# Patient Record
Sex: MALE | Race: WHITE | NOT HISPANIC OR LATINO | Employment: PART TIME | URBAN - METROPOLITAN AREA
[De-identification: names, ages, dates, MRNs, and addresses within clinical notes are randomized per-mention and may not be internally consistent; named-entity substitution may affect disease eponyms.]

---

## 2017-03-06 RX ORDER — LEVOTHYROXINE SODIUM 175 UG/1
TABLET ORAL
Refills: 0 | Status: CANCELLED | OUTPATIENT
Start: 2017-03-06

## 2017-03-06 RX ORDER — ATORVASTATIN CALCIUM 40 MG/1
TABLET, FILM COATED ORAL
Refills: 0 | Status: CANCELLED | OUTPATIENT
Start: 2017-03-06

## 2017-03-07 RX ORDER — LEVOTHYROXINE SODIUM 175 UG/1
TABLET ORAL
Qty: 30 TAB | Refills: 0 | Status: SHIPPED | OUTPATIENT
Start: 2017-03-07 | End: 2017-06-13 | Stop reason: SDUPTHER

## 2017-03-07 RX ORDER — ATORVASTATIN CALCIUM 40 MG/1
TABLET, FILM COATED ORAL
Qty: 30 TAB | Refills: 0 | Status: SHIPPED | OUTPATIENT
Start: 2017-03-07 | End: 2017-06-13 | Stop reason: SDUPTHER

## 2017-06-13 RX ORDER — LEVOTHYROXINE SODIUM 175 UG/1
TABLET ORAL
Qty: 90 TAB | Refills: 1 | Status: SHIPPED | OUTPATIENT
Start: 2017-06-13 | End: 2018-07-12 | Stop reason: SDUPTHER

## 2017-06-13 RX ORDER — ATORVASTATIN CALCIUM 40 MG/1
TABLET, FILM COATED ORAL
Qty: 90 TAB | Refills: 1 | Status: SHIPPED | OUTPATIENT
Start: 2017-06-13 | End: 2018-07-12 | Stop reason: SDUPTHER

## 2017-07-13 ENCOUNTER — TELEPHONE (OUTPATIENT)
Dept: MEDICAL GROUP | Facility: MEDICAL CENTER | Age: 62
End: 2017-07-13

## 2017-07-13 DIAGNOSIS — E03.9 HYPOTHYROIDISM, UNSPECIFIED TYPE: ICD-10-CM

## 2017-07-13 DIAGNOSIS — E78.5 HYPERLIPIDEMIA, UNSPECIFIED HYPERLIPIDEMIA TYPE: ICD-10-CM

## 2017-07-13 NOTE — TELEPHONE ENCOUNTER
1. Caller Name: Alberto Galicia                                           Call Back Number: [phone] (home)         Patient approves a detailed voicemail message: N\A    Patient wanted to know if you would order labs for the patients up coming appointment on 8/11, ? Please advise

## 2017-07-25 ENCOUNTER — OFFICE VISIT (OUTPATIENT)
Dept: MEDICAL GROUP | Facility: MEDICAL CENTER | Age: 62
End: 2017-07-25
Payer: COMMERCIAL

## 2017-07-25 VITALS
RESPIRATION RATE: 16 BRPM | WEIGHT: 226 LBS | HEART RATE: 78 BPM | TEMPERATURE: 97.7 F | BODY MASS INDEX: 28.1 KG/M2 | OXYGEN SATURATION: 95 % | DIASTOLIC BLOOD PRESSURE: 72 MMHG | SYSTOLIC BLOOD PRESSURE: 124 MMHG | HEIGHT: 75 IN

## 2017-07-25 DIAGNOSIS — J01.10 ACUTE NON-RECURRENT FRONTAL SINUSITIS: ICD-10-CM

## 2017-07-25 DIAGNOSIS — E03.9 HYPOTHYROIDISM, UNSPECIFIED TYPE: ICD-10-CM

## 2017-07-25 DIAGNOSIS — Z91.09 POLLEN ALLERGIES: ICD-10-CM

## 2017-07-25 PROCEDURE — 99214 OFFICE O/P EST MOD 30 MIN: CPT | Performed by: NURSE PRACTITIONER

## 2017-07-25 RX ORDER — FEXOFENADINE HCL 180 MG/1
180 TABLET ORAL DAILY
Qty: 30 TAB | COMMUNITY
Start: 2017-07-25 | End: 2020-03-17

## 2017-07-25 RX ORDER — DOXYCYCLINE HYCLATE 100 MG
100 TABLET ORAL 2 TIMES DAILY
Qty: 20 TAB | Refills: 0 | Status: SHIPPED | OUTPATIENT
Start: 2017-07-25 | End: 2017-07-26 | Stop reason: CLARIF

## 2017-07-25 RX ORDER — FLUTICASONE PROPIONATE 50 MCG
2 SPRAY, SUSPENSION (ML) NASAL DAILY
Qty: 16 G | Refills: 2 | Status: SHIPPED
Start: 2017-07-25 | End: 2020-03-17

## 2017-07-25 ASSESSMENT — ENCOUNTER SYMPTOMS: HEADACHES: 1

## 2017-07-25 NOTE — PROGRESS NOTES
Subjective:      Alberto Galicia is a 62 y.o. male who presents with Sinus Problem            Sinus Problem  Associated symptoms include headaches.   Alberto Galicia is a patient of  here same day visit for sinus issues.      1. Acute non-recurrent frontal sinusitis  Patient reports while in [place] last week he developed severe allergy issues. Upon return to the area he developed pain in his left frontal sinus area. He states symptoms are similar to when he has had sinusitis in the past. He also has had sinus pressure, postnasal drip and congestion. He tried Actifed and some other over-the-counter medicines which did not help. He denies fever, chills, sore throat or ear pain. He did travel [place] the [place] altitude.    2. Pollen allergies  Patient reports history of allergies and normally uses decongestants. It only bothers him when he goes to [place] which he has to do frequently. He states he was going to go to an allergist in the past but never set up an appointment and would like a referral.    3. Hypothyroidism, unspecified type  Review of chart shows patient has not completed his lab work since 2015 and he does have lab work pending from his PCP. He states he is taking his levothyroxine 175 µg.    Social History   Substance Use Topics   • Smoking status: Never Smoker    • Smokeless tobacco: Never Used   • Alcohol Use: 1.5 oz/week     3 Standard drinks or equivalent per week     Current Outpatient Prescriptions   Medication Sig Dispense Refill   • fluticasone (FLONASE) 50 MCG/ACT nasal spray Spray 2 Sprays in nose every day. 16 g 2   • fexofenadine (ALLEGRA) 180 MG tablet Take 1 Tab by mouth every day. 30 Tab    • doxycycline (VIBRAMYCIN) 100 MG Tab Take 1 Tab by mouth 2 times a day for 10 days. 20 Tab 0   • levothyroxine (SYNTHROID) 175 MCG Tab TAKE ONE TABLET BY MOUTH ONCE DAILY IN THE MORNING ON EMPTY STOMACH 90 Tab 1   • atorvastatin (LIPITOR) 40 MG Tab TAKE ONE TABLET BY MOUTH ONCE DAILY  "90 Tab 1     No current facility-administered medications for this visit.     Past Medical History   Diagnosis Date   • Hyperlipidemia    • Thyroid disease    • Pituitary disease (CMS-HCC)    • Asthma      Family History   Problem Relation Age of Onset   • Diabetes Mother    • Heart Disease Father    • Diabetes Brother        Review of Systems   Neurological: Positive for headaches.   Endo/Heme/Allergies: Positive for environmental allergies.   All other systems reviewed and are negative.         Objective:     /72 mmHg  Pulse 78  Temp(Src) 36.5 °C (97.7 °F)  Resp 16  Ht 1.905 m (6' 3\")  Wt 102.513 kg (226 lb)  BMI 28.25 kg/m2  SpO2 95%     Physical Exam   Constitutional: He is oriented to person, place, and time. He appears well-developed and well-nourished. No distress.   HENT:   Head: Normocephalic and atraumatic.   Right Ear: External ear normal.   Left Ear: External ear normal.   Nose: Left sinus exhibits frontal sinus tenderness.   Mouth/Throat: Oropharynx is clear and moist.   Eyes: Conjunctivae are normal. Right eye exhibits no discharge. Left eye exhibits no discharge.   Neck: Normal range of motion. Neck supple. No tracheal deviation present. No thyromegaly present.   Cardiovascular: Normal rate, regular rhythm and normal heart sounds.    No murmur heard.  Pulmonary/Chest: Effort normal and breath sounds normal. No respiratory distress. He has no wheezes. He has no rales.   Lymphadenopathy:     He has no cervical adenopathy.   Neurological: He is alert and oriented to person, place, and time. Coordination normal.   Skin: Skin is warm and dry. No rash noted. He is not diaphoretic. No erythema.   Psychiatric: He has a normal mood and affect. His behavior is normal. Judgment and thought content normal.   Nursing note and vitals reviewed.              Assessment/Plan:     1. Acute non-recurrent frontal sinusitis  Patient is allergic to penicillin.  Sinus infection teaching provided including;  A. " Take antibiotics as prescribed until finished.  B. Use over-the-counter Tylenol, Aleve, or ibuprofen as needed for discomfort.  C. Consider Mucinex to help loosen secretions.  D. Sinus irrigations may be helpful. Consider using a Nedi Pot. Normal saline spray may help prevent sinus irritation in the future.  E. Increase humidity in your home with a humidifier or putting head over a steaming pot of water.  F. Consider seeing a ENT if symptoms do not improve or if symptoms becomes recurrent.  - doxycycline (VIBRAMYCIN) 100 MG Tab; Take 1 Tab by mouth 2 times a day for 10 days.  Dispense: 20 Tab; Refill: 0    2. Pollen allergies  I advised patient not to use Sudafed products since it can cause anxiety, palpitations and hypertension. I recommended instead Flonase nasal spray and a antihistamine.  - fluticasone (FLONASE) 50 MCG/ACT nasal spray; Spray 2 Sprays in nose every day.  Dispense: 16 g; Refill: 2  - fexofenadine (ALLEGRA) 180 MG tablet; Take 1 Tab by mouth every day.  Dispense: 30 Tab  - REFERRAL TO ALLERGY    3. Hypothyroidism, unspecified type  Patient reminded it is important he do his lab work. He states he has to do it at [facility] and I advised him to contact them in a week if he has not heard back regarding results.

## 2017-07-25 NOTE — MR AVS SNAPSHOT
"Alberto Galicia   2017 9:20 AM   Office Visit   MRN: [id number]    Department:  [address] [place] [address]   Dept Phone:  [phone]    Description:  Male : 1955   Provider:  ANSLEY MenendezPDARIAN.           Reason for Visit     Sinus Problem pressure on left side of the head       Allergies as of 2017     Allergen Noted Reactions    Pcn [Penicillins] 2015   Swelling      You were diagnosed with     Acute non-recurrent frontal sinusitis   [6173530]       Pollen allergies   [464625]       Hypothyroidism, unspecified type   [4909418]         Vital Signs     Blood Pressure Pulse Temperature Respirations Height Weight    124/72 mmHg 78 36.5 °C (97.7 °F) 16 1.905 m (6' 3\") 102.513 kg (226 lb)    Body Mass Index Oxygen Saturation Smoking Status             28.25 kg/m2 95% Never Smoker          Basic Information     Date Of Birth Sex Race Ethnicity Preferred Language    1955 Male White Unknown English      Your appointments     Aug 11, 2017  8:20 AM   Established Patient with Manjula Zaidi M.D.   [facility] [address]    [address] [facility] [address]  [facility] [id number] [phone]           You will be receiving a confirmation call a few days before your appointment from our automated call confirmation system.              Problem List              ICD-10-CM Priority Class Noted - Resolved    Hypothyroidism E03.9   2015 - Present    Hyperlipidemia E78.5   2015 - Present    Pituitary adenoma (CMS-[facility]) D35.2   2015 - Present    Reactive airway disease J45.909   2015 - Present    Pollen allergies J30.1   2017 - Present      Health Maintenance        Date Due Completion Dates    IMM DTaP/Tdap/Td Vaccine (1 - Tdap) 5/10/1973 ---    COLONOSCOPY 5/10/2004 ---    IMM ZOSTER VACCINE 5/10/2014 ---    IMM INFLUENZA (1) 2017 ---            Current Immunizations     No immunizations on file.      Below and/or attached are the medications your " provider expects you to take. Review all of your home medications and newly ordered medications with your provider and/or pharmacist. Follow medication instructions as directed by your provider and/or pharmacist. Please keep your medication list with you and share with your provider. Update the information when medications are discontinued, doses are changed, or new medications (including over-the-counter products) are added; and carry medication information at all times in the event of emergency situations     Allergies:  PCN - Swelling               Medications  Valid as of: July 25, 2017 -  9:43 AM    Generic Name Brand Name Tablet Size Instructions for use    Atorvastatin Calcium (Tab) LIPITOR 40 MG TAKE ONE TABLET BY MOUTH ONCE DAILY        Doxycycline Hyclate (Tab) VIBRAMYCIN 100 MG Take 1 Tab by mouth 2 times a day for 10 days.        Fexofenadine HCl (Tab) ALLEGRA 180 MG Take 1 Tab by mouth every day.        Fluticasone Propionate (Suspension) FLONASE 50 MCG/ACT Spray 2 Sprays in nose every day.        Levothyroxine Sodium (Tab) SYNTHROID 175 MCG TAKE ONE TABLET BY MOUTH ONCE DAILY IN THE MORNING ON EMPTY STOMACH        .                 Medicines prescribed today were sent to:     [facility] [address] [place] [address] (S), [place] [address] [facility] (S) [place] [address]    Phone: [phone] Fax: [phone]    Open 24 Hours?: No      Medication refill instructions:       If your prescription bottle indicates you have medication refills left, it is not necessary to call your provider’s office. Please contact your pharmacy and they will refill your medication.    If your prescription bottle indicates you do not have any refills left, you may request refills at any time through one of the following ways: The online [employer] system (except Urgent Care), by calling your provider’s office, or by asking your pharmacy to contact your provider’s office with a refill request. Medication refills are processed  only during regular business hours and may not be available until the next business day. Your provider may request additional information or to have a follow-up visit with you prior to refilling your medication.   *Please Note: Medication refills are assigned a new Rx number when refilled electronically. Your pharmacy may indicate that no refills were authorized even though a new prescription for the same medication is available at the pharmacy. Please request the medicine by name with the pharmacy before contacting your provider for a refill.        Referral     A referral request has been sent to our patient care coordination department. Please allow 3-5 business days for us to process this request and contact you either by phone or mail. If you do not hear from us by the 5th business day, please call us at [phone].           [employer] Access Code: Activation code not generated  Current [employer] Status: Active

## 2017-07-26 ENCOUNTER — TELEPHONE (OUTPATIENT)
Dept: MEDICAL GROUP | Facility: MEDICAL CENTER | Age: 62
End: 2017-07-26

## 2017-07-26 DIAGNOSIS — J01.10 ACUTE NON-RECURRENT FRONTAL SINUSITIS: ICD-10-CM

## 2017-07-26 RX ORDER — DOXYCYCLINE HYCLATE 100 MG/1
100 CAPSULE ORAL 2 TIMES DAILY
Qty: 20 CAP | Refills: 0 | Status: SHIPPED
Start: 2017-07-26 | End: 2020-03-17

## 2017-07-26 NOTE — TELEPHONE ENCOUNTER
1. Caller Name: Alberto Galicia                                           Call Back Number: [phone]        Patient approves a detailed voicemail message: yes    Patient called, he states his prescription for  doxycycline (VIBRAMYCIN) 100 MG Tab   That was sent to the pharmacy yesterday he was not able to pick it up because pharmacy does not have tablets, they only have capsules and would like you to sent a new rx to the pharmacy for doxycycline (VIBRAMYCIN) 100 MG Tab but in capsules so he can be able to  the prescription because pharmacy is requiring a new prescription for this.

## 2017-08-11 ENCOUNTER — OFFICE VISIT (OUTPATIENT)
Dept: MEDICAL GROUP | Facility: MEDICAL CENTER | Age: 62
End: 2017-08-11
Payer: COMMERCIAL

## 2017-08-11 VITALS
TEMPERATURE: 97.8 F | BODY MASS INDEX: 27.93 KG/M2 | DIASTOLIC BLOOD PRESSURE: 70 MMHG | SYSTOLIC BLOOD PRESSURE: 98 MMHG | HEIGHT: 75 IN | WEIGHT: 224.65 LBS | OXYGEN SATURATION: 96 % | RESPIRATION RATE: 14 BRPM | HEART RATE: 89 BPM

## 2017-08-11 DIAGNOSIS — D35.2 PITUITARY ADENOMA (HCC): ICD-10-CM

## 2017-08-11 DIAGNOSIS — E03.9 HYPOTHYROIDISM, UNSPECIFIED TYPE: ICD-10-CM

## 2017-08-11 DIAGNOSIS — E78.5 HYPERLIPIDEMIA, UNSPECIFIED HYPERLIPIDEMIA TYPE: ICD-10-CM

## 2017-08-11 DIAGNOSIS — Z12.11 COLON CANCER SCREENING: ICD-10-CM

## 2017-08-11 PROCEDURE — 99214 OFFICE O/P EST MOD 30 MIN: CPT | Performed by: FAMILY MEDICINE

## 2017-08-11 ASSESSMENT — PATIENT HEALTH QUESTIONNAIRE - PHQ9: CLINICAL INTERPRETATION OF PHQ2 SCORE: 0

## 2017-08-11 NOTE — MR AVS SNAPSHOT
"Alberto Galicia   2017 8:20 AM   Office Visit   MRN: [id number]    Department:  [address] [place] [address]   Dept Phone:  [phone]    Description:  Male : 1955   Provider:  Manjula Zaidi M.D.           Reason for Visit     Establish Care requesting lab work      Allergies as of 2017     Allergen Noted Reactions    Pcn [Penicillins] 2015   Swelling      You were diagnosed with     Pituitary adenoma (CMS-HCC)   [105460]       Hypothyroidism, unspecified type   [3945963]       Hyperlipidemia, unspecified hyperlipidemia type   [9113049]       Colon cancer screening   [556310]         Vital Signs     Blood Pressure Pulse Temperature Respirations Height Weight    98/70 mmHg 89 36.6 °C (97.8 °F) 14 1.905 m (6' 3\") 101.9 kg (224 lb 10.4 oz)    Body Mass Index Oxygen Saturation Smoking Status             28.08 kg/m2 96% Never Smoker          Basic Information     Date Of Birth Sex Race Ethnicity Preferred Language    1955 Male White Unknown English      Problem List              ICD-10-CM Priority Class Noted - Resolved    Hypothyroidism E03.9   2015 - Present    Hyperlipidemia E78.5   2015 - Present    Pituitary adenoma (CMS-HCC) D35.2   2015 - Present    Reactive airway disease J45.909   2015 - Present    Pollen allergies J30.1   2017 - Present      Health Maintenance        Date Due Completion Dates    IMM DTaP/Tdap/Td Vaccine (1 - Tdap) 5/10/1973 ---    COLONOSCOPY 5/10/2004 ---    IMM ZOSTER VACCINE 5/10/2014 ---    IMM INFLUENZA (1) 2017 ---            Current Immunizations     No immunizations on file.      Below and/or attached are the medications your provider expects you to take. Review all of your home medications and newly ordered medications with your provider and/or pharmacist. Follow medication instructions as directed by your provider and/or pharmacist. Please keep your medication list with you and share with your provider. Update the " information when medications are discontinued, doses are changed, or new medications (including over-the-counter products) are added; and carry medication information at all times in the event of emergency situations     Allergies:  PCN - Swelling               Medications  Valid as of: August 11, 2017 -  8:57 AM    Generic Name Brand Name Tablet Size Instructions for use    Atorvastatin Calcium (Tab) LIPITOR 40 MG TAKE ONE TABLET BY MOUTH ONCE DAILY        Doxycycline Hyclate (Cap) VIBRAMYCIN 100 MG Take 1 Cap by mouth 2 times a day.        Fexofenadine HCl (Tab) ALLEGRA 180 MG Take 1 Tab by mouth every day.        Fluticasone Propionate (Suspension) FLONASE 50 MCG/ACT Spray 2 Sprays in nose every day.        Levothyroxine Sodium (Tab) SYNTHROID 175 MCG TAKE ONE TABLET BY MOUTH ONCE DAILY IN THE MORNING ON EMPTY STOMACH        .                 Medicines prescribed today were sent to:     [facility] [address] [place] [address] (S), [place] - [address]    [facility] (S) [place] [address]    Phone: [phone] Fax: [phone]    Open 24 Hours?: No      Medication refill instructions:       If your prescription bottle indicates you have medication refills left, it is not necessary to call your provider’s office. Please contact your pharmacy and they will refill your medication.    If your prescription bottle indicates you do not have any refills left, you may request refills at any time through one of the following ways: The online [employer] system (except Urgent Care), by calling your provider’s office, or by asking your pharmacy to contact your provider’s office with a refill request. Medication refills are processed only during regular business hours and may not be available until the next business day. Your provider may request additional information or to have a follow-up visit with you prior to refilling your medication.   *Please Note: Medication refills are assigned a new Rx number when refilled electronically.  Your pharmacy may indicate that no refills were authorized even though a new prescription for the same medication is available at the pharmacy. Please request the medicine by name with the pharmacy before contacting your provider for a refill.        Your To Do List     Future Labs/Procedures Complete By Expires    CBC WITH DIFFERENTIAL  As directed 8/11/2018    COMP METABOLIC PANEL  As directed 8/11/2018    PROLACTIN  As directed 8/11/2018    TESTOSTERONE SERUM  As directed 8/11/2018      Referral     A referral request has been sent to our patient care coordination department. Please allow 3-5 business days for us to process this request and contact you either by phone or mail. If you do not hear from us by the 5th business day, please call us at [phone].           [facility] Access Code: Activation code not generated  Current [facility] Status: Active

## 2017-08-11 NOTE — PROGRESS NOTES
"CC: multiple medical issues.    HPI:   Alberto presents today to discuss the following medical issues:    Patient has h/o pituitary adenoma underwent surgical removal in 1979.has been stable, and asymptomatic.Has been checking her prolactin level, testosterone levels every year, and MRI every 3 yrs.Has been normal.    Hypothyroidism,he has been tolerating Levothyroxine, no palpitation, no cold or heat intolerance. Has been on levothyroxine 175 mcg, Last TSH checked in 2/2016 was normal.    Hyperlipidemia, he has been tolerating the statin. Denies muscle pain LFTs has been normal, he has been on lipitor 40 mg daily.        Patient Active Problem List    Diagnosis Date Noted   • Pollen allergies 07/25/2017   • Hypothyroidism 06/30/2015   • Hyperlipidemia 06/30/2015   • Pituitary adenoma (CMS-HCC) 06/30/2015   • Reactive airway disease 06/30/2015       Current Outpatient Prescriptions   Medication Sig Dispense Refill   • fluticasone (FLONASE) 50 MCG/ACT nasal spray Spray 2 Sprays in nose every day. 16 g 2   • fexofenadine (ALLEGRA) 180 MG tablet Take 1 Tab by mouth every day. 30 Tab    • levothyroxine (SYNTHROID) 175 MCG Tab TAKE ONE TABLET BY MOUTH ONCE DAILY IN THE MORNING ON EMPTY STOMACH 90 Tab 1   • atorvastatin (LIPITOR) 40 MG Tab TAKE ONE TABLET BY MOUTH ONCE DAILY 90 Tab 1   • doxycycline (VIBRAMYCIN) 100 MG Cap Take 1 Cap by mouth 2 times a day. 20 Cap 0     No current facility-administered medications for this visit.         Allergies as of 08/11/2017 - Farshad as Reviewed 08/11/2017   Allergen Reaction Noted   • Pcn [penicillins] Swelling 06/30/2015        ROS: Denies any chest pain, Shortness of breath, Changes bowel or bladder, Lower extremity edema.    Physical Exam:  BP 98/70 mmHg  Pulse 89  Temp(Src) 36.6 °C (97.8 °F)  Resp 14  Ht 1.905 m (6' 3\")  Wt 101.9 kg (224 lb 10.4 oz)  BMI 28.08 kg/m2  SpO2 96%  Gen.: Well-developed, well-nourished, no apparent distress,pleasant and cooperative with the " examination  Skin:  Warm and dry with good turgor. No rashes or suspicious lesions in visible areas  HEENT:Sinuses nontender with palpation, TMs clear, nares patent with pink mucosa and clear rhinorrhea,no septal deviation ,polyps or lesions. lips without lesions, oropharynx clear.  Neck: Trachea midline,no masses or adenopathy. No JVD.  Cor: Regular rate and rhythm without murmur, gallop or rub.  Lungs: Respirations unlabored.Clear to auscultation with equal breath sounds bilaterally. No wheezes, rhonchi.  Extremities: No cyanosis, clubbing or edema.      Assessment and Plan.   62 y.o. male       1. H/O Pituitary adenoma s/p surgery in 1979.  Stable, and asymptomatic.  Has been checking her prolactin level, testosterone levels every year, and MRI every 3 yrs.Has been normal.    - PROLACTIN; Future  - TESTOSTERONE SERUM; Future    2. Hypothyroidism, unspecified  He has been tolerating Levothyroxine, no palpitation, no cold or heat intolerance. Last TSH checked in 2/2016 was nrmal.  Continue on levothyroxine 175 mcg,   will check TSH/ freeT4.    - TSH; Future  - FREE THYROXINE; Future    3. Hyperlipidemia  He has been tolerating the statin. Denies muscle pain LFTs has been normal  Continue on lipitor 40 mg daily.    - LIPID PROFILE; Future

## 2018-07-12 RX ORDER — LEVOTHYROXINE SODIUM 175 UG/1
TABLET ORAL
Qty: 90 TAB | Refills: 3 | Status: SHIPPED | OUTPATIENT
Start: 2018-07-12 | End: 2019-07-10 | Stop reason: SDUPTHER

## 2018-07-12 RX ORDER — ATORVASTATIN CALCIUM 40 MG/1
TABLET, FILM COATED ORAL
Qty: 90 TAB | Refills: 3 | Status: SHIPPED | OUTPATIENT
Start: 2018-07-12 | End: 2019-07-10 | Stop reason: SDUPTHER

## 2019-07-10 ENCOUNTER — OFFICE VISIT (OUTPATIENT)
Dept: MEDICAL GROUP | Facility: MEDICAL CENTER | Age: 64
End: 2019-07-10
Payer: MEDICARE

## 2019-07-10 VITALS
WEIGHT: 211.64 LBS | OXYGEN SATURATION: 92 % | SYSTOLIC BLOOD PRESSURE: 128 MMHG | HEART RATE: 85 BPM | TEMPERATURE: 96.6 F | DIASTOLIC BLOOD PRESSURE: 88 MMHG | HEIGHT: 76 IN | BODY MASS INDEX: 25.77 KG/M2 | RESPIRATION RATE: 16 BRPM

## 2019-07-10 DIAGNOSIS — L30.9 ECZEMA, UNSPECIFIED TYPE: ICD-10-CM

## 2019-07-10 DIAGNOSIS — Z12.11 COLON CANCER SCREENING: ICD-10-CM

## 2019-07-10 DIAGNOSIS — J45.20 MILD INTERMITTENT ASTHMA WITHOUT COMPLICATION: ICD-10-CM

## 2019-07-10 DIAGNOSIS — D35.2 PITUITARY ADENOMA (HCC): ICD-10-CM

## 2019-07-10 DIAGNOSIS — E78.5 HYPERLIPIDEMIA, UNSPECIFIED HYPERLIPIDEMIA TYPE: ICD-10-CM

## 2019-07-10 DIAGNOSIS — E03.9 HYPOTHYROIDISM, UNSPECIFIED TYPE: ICD-10-CM

## 2019-07-10 PROCEDURE — 99214 OFFICE O/P EST MOD 30 MIN: CPT | Performed by: FAMILY MEDICINE

## 2019-07-10 RX ORDER — ATORVASTATIN CALCIUM 40 MG/1
TABLET, FILM COATED ORAL
Qty: 90 TAB | Refills: 3 | Status: SHIPPED | OUTPATIENT
Start: 2019-07-10 | End: 2020-03-18 | Stop reason: SDUPTHER

## 2019-07-10 RX ORDER — ATORVASTATIN CALCIUM 40 MG/1
40 TABLET, FILM COATED ORAL
Qty: 90 TAB | Refills: 3 | Status: CANCELLED | OUTPATIENT
Start: 2019-07-10

## 2019-07-10 RX ORDER — LEVOTHYROXINE SODIUM 175 UG/1
TABLET ORAL
Qty: 90 TAB | Refills: 3 | Status: CANCELLED | OUTPATIENT
Start: 2019-07-10

## 2019-07-10 RX ORDER — LEVOTHYROXINE SODIUM 175 UG/1
TABLET ORAL
Qty: 90 TAB | Refills: 3 | Status: SHIPPED | OUTPATIENT
Start: 2019-07-10 | End: 2020-03-18 | Stop reason: SDUPTHER

## 2019-07-10 ASSESSMENT — PATIENT HEALTH QUESTIONNAIRE - PHQ9: CLINICAL INTERPRETATION OF PHQ2 SCORE: 0

## 2019-07-10 NOTE — PROGRESS NOTES
CC: Hypothyroidism, hyperlipidemia, history of pituitary adenoma, asthma, eczema.    HPI:   Alberto presents today discuss the following    Hypothyroidism,  he has been tolerating Levothyroxine, no palpitation, no cold or heat intolerance. Has been on levothyroxine 175 mcg, Last TSH checked in 2/2016 was normal.     Hyperlipidemia,   he has been tolerating the statin. Denies muscle pain LFTs has been normal, he has been on lipitor 40 mg daily.     Pituitary adenoma (HCC)  Patient has h/o pituitary adenoma underwent surgical removal in 1979.has been stable, and asymptomatic.Has been checking her prolactin level, testosterone levels every year, and MRI every 3 yrs.Has been normal.    Mild intermittent asthma without complication  Patient has been mostly asymptomatic.  Patient stated that he usually asymptomatic in [place], however [place] he used to live, he got the symptoms, and albuterol has been helping.    Eczema, unspecified type  Patient has chronic eczematous lesion of the skin of the lower leg bilaterally.  Gets worse when the skin is dry.  Has been responding to hydrocortisone cream and skin moisturizers.    Due for colon cancer screening.    Patient Active Problem List    Diagnosis Date Noted   • Mild intermittent asthma without complication 07/10/2019   • Eczema 07/10/2019   • Pollen allergies 07/25/2017   • Hypothyroidism 06/30/2015   • Hyperlipidemia 06/30/2015   • Pituitary adenoma (HCC) 06/30/2015   • Reactive airway disease 06/30/2015       Current Outpatient Prescriptions   Medication Sig Dispense Refill   • levothyroxine (SYNTHROID) 175 MCG Tab TAKE ONE TABLET BY MOUTH ONCE DAILY IN THE MORNING ON EMPTY STOMACH 90 Tab 3   • atorvastatin (LIPITOR) 40 MG Tab TAKE ONE TABLET BY MOUTH ONCE DAILY 90 Tab 3   • doxycycline (VIBRAMYCIN) 100 MG Cap Take 1 Cap by mouth 2 times a day. 20 Cap 0   • fluticasone (FLONASE) 50 MCG/ACT nasal spray Spray 2 Sprays in nose every  "day. 16 g 2   • fexofenadine (ALLEGRA) 180 MG tablet Take 1 Tab by mouth every day. 30 Tab      No current facility-administered medications for this visit.          Allergies as of 07/10/2019 - Reviewed 07/10/2019   Allergen Reaction Noted   • Pcn [penicillins] Swelling 06/30/2015        ROS: Denies any chest pain, Shortness of breath, Changes bowel or bladder, Lower extremity edema.    Physical Exam:  /88 (BP Location: Right arm, Patient Position: Sitting, BP Cuff Size: Adult)   Pulse 85   Temp 35.9 °C (96.6 °F) (Temporal)   Resp 16   Ht 1.93 m (6' 4\") Comment: patient stated  Wt 96 kg (211 lb 10.3 oz)   SpO2 92%   BMI 25.76 kg/m²   Gen.: Well-developed, well-nourished, no apparent distress,pleasant and cooperative with the examination  Skin:  Warm and dry with good turgor. No rashes or suspicious lesions in visible areas  HEENT:Sinuses nontender with palpation, TMs clear, nares patent with pink mucosa and clear rhinorrhea,no septal deviation ,polyps or lesions. lips without lesions, oropharynx clear.  Neck: Trachea midline,no masses or adenopathy. No JVD.  Cor: Regular rate and rhythm without murmur, gallop or rub.  Lungs: Respirations unlabored.Clear to auscultation with equal breath sounds bilaterally. No wheezes, rhonchi.  Extremities: No cyanosis, clubbing or edema.      Assessment and Plan.   64 y.o. male     1. Hypothyroidism, unspecified type  He has been tolerating Levothyroxine, no palpitation, no cold or heat intolerance. Last TSH checked in 2/2016 was nrmal.  Continue on levothyroxine 175 mcg,   will check TSH/ freeT4.    - CBC WITH DIFFERENTIAL; Future  - TSH+FREE T4    2. Hyperlipidemia, unspecified hyperlipidemia type  He has been tolerating the statin. Denies muscle pain LFTs has been normal  Continue on atorvastatin 40 mg daily.     - Lipid Profile; Future    3. Pituitary adenoma (HCC)  Stable, and asymptomatic.  Has been checking her prolactin level, testosterone levels every " year..Has been normal as per patient.    - PROLACTIN; Future  - TESTOSTERONE, FREE AND TOTAL; Future  - Comp Metabolic Panel; Future    4. Colon cancer screening    - OCCULT BLOOD FECES IMMUNOASSAY; Future    5. Mild intermittent asthma without complication  Stable.  Asymptomatic.  Continue albuterol as needed.    6. Eczema, unspecified type  Chronic.  Has been responding to hydrocortisone cream.

## 2019-07-18 ENCOUNTER — HOSPITAL ENCOUNTER (OUTPATIENT)
Dept: LAB | Facility: MEDICAL CENTER | Age: 64
End: 2019-07-18
Attending: FAMILY MEDICINE
Payer: MEDICARE

## 2019-07-18 DIAGNOSIS — E78.5 HYPERLIPIDEMIA, UNSPECIFIED HYPERLIPIDEMIA TYPE: ICD-10-CM

## 2019-07-18 DIAGNOSIS — D35.2 PITUITARY ADENOMA (HCC): ICD-10-CM

## 2019-07-18 DIAGNOSIS — E03.9 HYPOTHYROIDISM, UNSPECIFIED TYPE: ICD-10-CM

## 2019-07-18 LAB
ALBUMIN SERPL BCP-MCNC: 4.2 G/DL (ref 3.2–4.9)
ALBUMIN/GLOB SERPL: 1.1 G/DL
ALP SERPL-CCNC: 68 U/L (ref 30–99)
ALT SERPL-CCNC: 47 U/L (ref 2–50)
ANION GAP SERPL CALC-SCNC: 9 MMOL/L (ref 0–11.9)
AST SERPL-CCNC: 82 U/L (ref 12–45)
BASOPHILS # BLD AUTO: 0.7 % (ref 0–1.8)
BASOPHILS # BLD: 0.04 K/UL (ref 0–0.12)
BILIRUB SERPL-MCNC: 0.7 MG/DL (ref 0.1–1.5)
BUN SERPL-MCNC: 7 MG/DL (ref 8–22)
CALCIUM SERPL-MCNC: 9.9 MG/DL (ref 8.5–10.5)
CHLORIDE SERPL-SCNC: 100 MMOL/L (ref 96–112)
CHOLEST SERPL-MCNC: 242 MG/DL (ref 100–199)
CO2 SERPL-SCNC: 27 MMOL/L (ref 20–33)
CREAT SERPL-MCNC: 0.94 MG/DL (ref 0.5–1.4)
EOSINOPHIL # BLD AUTO: 0.32 K/UL (ref 0–0.51)
EOSINOPHIL NFR BLD: 5.9 % (ref 0–6.9)
ERYTHROCYTE [DISTWIDTH] IN BLOOD BY AUTOMATED COUNT: 47.8 FL (ref 35.9–50)
FASTING STATUS PATIENT QL REPORTED: NORMAL
GLOBULIN SER CALC-MCNC: 3.8 G/DL (ref 1.9–3.5)
GLUCOSE SERPL-MCNC: 90 MG/DL (ref 65–99)
HCT VFR BLD AUTO: 55 % (ref 42–52)
HDLC SERPL-MCNC: 42 MG/DL
HGB BLD-MCNC: 18.1 G/DL (ref 14–18)
IMM GRANULOCYTES # BLD AUTO: 0.01 K/UL (ref 0–0.11)
IMM GRANULOCYTES NFR BLD AUTO: 0.2 % (ref 0–0.9)
LDLC SERPL CALC-MCNC: 161 MG/DL
LYMPHOCYTES # BLD AUTO: 2.51 K/UL (ref 1–4.8)
LYMPHOCYTES NFR BLD: 46.3 % (ref 22–41)
MCH RBC QN AUTO: 32.7 PG (ref 27–33)
MCHC RBC AUTO-ENTMCNC: 32.9 G/DL (ref 33.7–35.3)
MCV RBC AUTO: 99.3 FL (ref 81.4–97.8)
MONOCYTES # BLD AUTO: 0.47 K/UL (ref 0–0.85)
MONOCYTES NFR BLD AUTO: 8.7 % (ref 0–13.4)
NEUTROPHILS # BLD AUTO: 2.07 K/UL (ref 1.82–7.42)
NEUTROPHILS NFR BLD: 38.2 % (ref 44–72)
NRBC # BLD AUTO: 0 K/UL
NRBC BLD-RTO: 0 /100 WBC
PLATELET # BLD AUTO: 267 K/UL (ref 164–446)
PMV BLD AUTO: 9.4 FL (ref 9–12.9)
POTASSIUM SERPL-SCNC: 4.9 MMOL/L (ref 3.6–5.5)
PROLACTIN SERPL-MCNC: 2.9 NG/ML (ref 2.1–17.7)
PROT SERPL-MCNC: 8 G/DL (ref 6–8.2)
RBC # BLD AUTO: 5.54 M/UL (ref 4.7–6.1)
SODIUM SERPL-SCNC: 136 MMOL/L (ref 135–145)
T4 FREE SERPL-MCNC: 0.41 NG/DL (ref 0.53–1.43)
TRIGL SERPL-MCNC: 195 MG/DL (ref 0–149)
TSH SERPL DL<=0.005 MIU/L-ACNC: 5.38 UIU/ML (ref 0.38–5.33)
WBC # BLD AUTO: 5.4 K/UL (ref 4.8–10.8)

## 2019-07-18 PROCEDURE — 84270 ASSAY OF SEX HORMONE GLOBUL: CPT

## 2019-07-18 PROCEDURE — 84146 ASSAY OF PROLACTIN: CPT

## 2019-07-18 PROCEDURE — 84439 ASSAY OF FREE THYROXINE: CPT

## 2019-07-18 PROCEDURE — 84403 ASSAY OF TOTAL TESTOSTERONE: CPT

## 2019-07-18 PROCEDURE — 85025 COMPLETE CBC W/AUTO DIFF WBC: CPT

## 2019-07-18 PROCEDURE — 80061 LIPID PANEL: CPT

## 2019-07-18 PROCEDURE — 84443 ASSAY THYROID STIM HORMONE: CPT

## 2019-07-18 PROCEDURE — 80053 COMPREHEN METABOLIC PANEL: CPT

## 2019-07-18 PROCEDURE — 36415 COLL VENOUS BLD VENIPUNCTURE: CPT

## 2019-07-19 LAB
SHBG SERPL-SCNC: 66 NMOL/L (ref 11–80)
TESTOST FREE MFR SERPL: 1.1 % (ref 1.6–2.9)
TESTOST FREE SERPL-MCNC: 18 PG/ML (ref 47–244)
TESTOST SERPL-MCNC: 160 NG/DL (ref 300–720)

## 2019-07-21 DIAGNOSIS — D35.2 PITUITARY ADENOMA (HCC): ICD-10-CM

## 2019-07-21 DIAGNOSIS — R79.89 LOW TESTOSTERONE IN MALE: ICD-10-CM

## 2019-10-07 ENCOUNTER — HOSPITAL ENCOUNTER (OUTPATIENT)
Facility: MEDICAL CENTER | Age: 64
End: 2019-10-07
Attending: FAMILY MEDICINE
Payer: MEDICARE

## 2019-10-07 PROCEDURE — 82274 ASSAY TEST FOR BLOOD FECAL: CPT

## 2019-10-08 DIAGNOSIS — Z12.11 COLON CANCER SCREENING: ICD-10-CM

## 2019-10-08 LAB — HEMOCCULT STL QL IA: NEGATIVE

## 2019-10-22 ENCOUNTER — APPOINTMENT (OUTPATIENT)
Dept: ENDOCRINOLOGY | Facility: MEDICAL CENTER | Age: 64
End: 2019-10-22
Payer: COMMERCIAL

## 2019-11-15 ENCOUNTER — OFFICE VISIT (OUTPATIENT)
Dept: MEDICAL GROUP | Facility: MEDICAL CENTER | Age: 64
End: 2019-11-15
Payer: MEDICARE

## 2019-11-15 VITALS
SYSTOLIC BLOOD PRESSURE: 106 MMHG | DIASTOLIC BLOOD PRESSURE: 68 MMHG | TEMPERATURE: 98 F | HEIGHT: 76 IN | WEIGHT: 203 LBS | HEART RATE: 86 BPM | RESPIRATION RATE: 16 BRPM | BODY MASS INDEX: 24.72 KG/M2 | OXYGEN SATURATION: 94 %

## 2019-11-15 DIAGNOSIS — E03.9 HYPOTHYROIDISM, UNSPECIFIED TYPE: ICD-10-CM

## 2019-11-15 DIAGNOSIS — R79.89 LOW TESTOSTERONE IN MALE: ICD-10-CM

## 2019-11-15 DIAGNOSIS — D35.2 PITUITARY ADENOMA (HCC): ICD-10-CM

## 2019-11-15 DIAGNOSIS — E78.5 HYPERLIPIDEMIA, UNSPECIFIED HYPERLIPIDEMIA TYPE: ICD-10-CM

## 2019-11-15 PROCEDURE — 99214 OFFICE O/P EST MOD 30 MIN: CPT | Performed by: FAMILY MEDICINE

## 2019-11-15 NOTE — PROGRESS NOTES
CC: Pituitary adenoma, hypothyroidism, low testosterone level, hyperlipidemia    HPI:   Alberto presents today to discuss the following medical issues    Pituitary adenoma (HCC)  Patient underwent surgical removal in 1979.has been mostly stable.  Most recent prolactin level was normal (low normal ), patient denies any headache, dizziness, or blurry vision.  Has a low testosterone level.  Patient was referred to endocrinologist after the last.  But he did not follow-up because of insurance issues.  He has been following up with annual testosterone and prolactin level and MRI of the brain every 3 years.  He is due for an MRI.    Hypothyroidism,  he has been tolerating Levothyroxine, no palpitation, no cold or heat intolerance. Has been on levothyroxine 175 mcg, Last TSH checked in 2/2016 was normal.     Low testosterone in male  Last blood work showed low total and free testosterone level(160, and 18 respectively) patient has been feeling tired and weak.  He reported a complete loss of libido.    Hyperlipidemia,   he has been tolerating the statin. Denies muscle pain LFTs has been normal, he has been on lipitor 40 mg daily.  No history of diabetes, CAD, or stroke.       Patient Active Problem List    Diagnosis Date Noted   • Low testosterone in male 11/15/2019   • Mild intermittent asthma without complication 07/10/2019   • Eczema 07/10/2019   • Pollen allergies 07/25/2017   • Hypothyroidism 06/30/2015   • Hyperlipidemia 06/30/2015   • Pituitary adenoma (HCC) 06/30/2015   • Reactive airway disease 06/30/2015       Current Outpatient Medications   Medication Sig Dispense Refill   • levothyroxine (SYNTHROID) 175 MCG Tab TAKE ONE TABLET BY MOUTH ONCE DAILY IN THE MORNING ON EMPTY STOMACH 90 Tab 3   • atorvastatin (LIPITOR) 40 MG Tab TAKE ONE TABLET BY MOUTH ONCE DAILY 90 Tab 3   • fluticasone (FLONASE) 50 MCG/ACT nasal spray Spray 2 Sprays in nose every day. 16 g 2   • fexofenadine (ALLEGRA) 180 MG tablet Take 1 Tab by  "mouth every day. 30 Tab    • doxycycline (VIBRAMYCIN) 100 MG Cap Take 1 Cap by mouth 2 times a day. (Patient not taking: Reported on 11/15/2019) 20 Cap 0     No current facility-administered medications for this visit.          Allergies as of 11/15/2019 - Reviewed 11/15/2019   Allergen Reaction Noted   • Pcn [penicillins] Swelling 06/30/2015        ROS: Denies any chest pain, Shortness of breath, Changes bowel or bladder, Lower extremity edema.    Physical Exam:  /68 (BP Location: Right arm, Patient Position: Sitting)   Pulse 86   Temp 36.7 °C (98 °F) (Temporal)   Resp 16   Ht 1.93 m (6' 4\")   Wt 92.1 kg (203 lb)   SpO2 94%   BMI 24.71 kg/m²   Gen.: Well-developed, well-nourished, no apparent distress,pleasant and cooperative with the examination  Skin:  Warm and dry with good turgor. No rashes or suspicious lesions in visible areas  HEENT:Sinuses nontender with palpation, TMs clear, nares patent with pink mucosa and clear rhinorrhea,no septal deviation ,polyps or lesions. lips without lesions, oropharynx clear.  Neck: Trachea midline,no masses or adenopathy. No JVD.  Cor: Regular rate and rhythm without murmur, gallop or rub.  Lungs: Respirations unlabored.Clear to auscultation with equal breath sounds bilaterally. No wheezes, rhonchi.  Extremities: No cyanosis, clubbing or edema.        Assessment and Plan.   64 y.o. male     1. Pituitary adenoma (HCC)  Status post surgical removal in 1979.  Mostly stable.  However reported some tiredness.  Most recent blood work showed low testosterone level, prolactin is normal however in the low side.  No headache.  Will refer to endocrinologist for testosterone placement    2. Hypothyroidism, unspecified type  He has been tolerating Levothyroxine, no palpitation, no cold or heat intolerance. Last TSH checked in 2/2016 was nrmal.  Continue on levothyroxine 175 mcg,     3. Low testosterone in male  Will refer patient to endocrinologist for an evaluation for " testosterone replacement.    4. Hyperlipidemia, unspecified hyperlipidemia type  He has been tolerating the statin. Denies muscle pain LFTs has been normal  Continue on atorvastatin 40 mg daily.

## 2020-02-13 ENCOUNTER — PATIENT OUTREACH (OUTPATIENT)
Dept: HEALTH INFORMATION MANAGEMENT | Facility: OTHER | Age: 65
End: 2020-02-13

## 2020-02-13 SDOH — ECONOMIC STABILITY: FOOD INSECURITY: WITHIN THE PAST 12 MONTHS, YOU WORRIED THAT YOUR FOOD WOULD RUN OUT BEFORE YOU GOT MONEY TO BUY MORE.: NEVER TRUE

## 2020-02-13 SDOH — ECONOMIC STABILITY: TRANSPORTATION INSECURITY
IN THE PAST 12 MONTHS, HAS LACK OF TRANSPORTATION KEPT YOU FROM MEETINGS, WORK, OR FROM GETTING THINGS NEEDED FOR DAILY LIVING?: NO

## 2020-02-13 SDOH — ECONOMIC STABILITY: TRANSPORTATION INSECURITY
IN THE PAST 12 MONTHS, HAS THE LACK OF TRANSPORTATION KEPT YOU FROM MEDICAL APPOINTMENTS OR FROM GETTING MEDICATIONS?: NO

## 2020-02-13 SDOH — ECONOMIC STABILITY: FOOD INSECURITY: WITHIN THE PAST 12 MONTHS, THE FOOD YOU BOUGHT JUST DIDN'T LAST AND YOU DIDN'T HAVE MONEY TO GET MORE.: NEVER TRUE

## 2020-02-13 NOTE — PROGRESS NOTES
Outcome: Left Message    Please transfer to Patient Outreach Team at [phone] when patient returns call.    HealthConnect Verified: yes    Attempt # 1

## 2020-02-14 NOTE — PROGRESS NOTES
1. HealthConnect Verified: yes    2. Verify PCP: yes    3. Review and add  to Care Team: yes    4. WebIZ Checked & Epic Updated: No WebIZ record  WebIZ Recommendations: SHINGRIX (Shingles)  Is patient due for Tdap? NO  Is patient due for Shingles? YES. Patient was not notified of copay/out of pocket cost.    5. Reviewed/Updated the following with patient:       •   Communication Preference Obtained? YES  • [employer] Activation: already active       •   E-Mail Address Obtained? YES       •   Appointment Day and Time Preferences? YES       •   Preferred Pharmacy? YES       •   Preferred Lab? YES    6. Care Gap Scheduling (Attempt to Schedule EACH Overdue Care Gap!)    No Appointments scheduled at this time

## 2020-02-14 NOTE — PROGRESS NOTES
Alberto returned Mikaela's call and I Introduced [facility] PA program and Welcomed him to [facility].  Alberto informed me that he has not received his new ID Card, I reordered through [employer]  No other questions or concerns at this time.  Mikaela [facility] PA ext [phone] was notified of this call

## 2020-03-17 ENCOUNTER — OFFICE VISIT (OUTPATIENT)
Dept: ENDOCRINOLOGY | Facility: MEDICAL CENTER | Age: 65
End: 2020-03-17
Payer: MEDICARE

## 2020-03-17 VITALS
OXYGEN SATURATION: 94 % | HEIGHT: 76 IN | HEART RATE: 90 BPM | DIASTOLIC BLOOD PRESSURE: 78 MMHG | WEIGHT: 203.3 LBS | BODY MASS INDEX: 24.76 KG/M2 | SYSTOLIC BLOOD PRESSURE: 120 MMHG

## 2020-03-17 DIAGNOSIS — R79.89 LOW TESTOSTERONE IN MALE: ICD-10-CM

## 2020-03-17 DIAGNOSIS — E03.9 ACQUIRED HYPOTHYROIDISM: ICD-10-CM

## 2020-03-17 DIAGNOSIS — D35.2 PITUITARY ADENOMA (HCC): ICD-10-CM

## 2020-03-17 PROCEDURE — 99204 OFFICE O/P NEW MOD 45 MIN: CPT | Performed by: INTERNAL MEDICINE

## 2020-03-17 ASSESSMENT — FIBROSIS 4 INDEX: FIB4 SCORE: 2.91

## 2020-03-18 ENCOUNTER — PATIENT OUTREACH (OUTPATIENT)
Dept: HEALTH INFORMATION MANAGEMENT | Facility: OTHER | Age: 65
End: 2020-03-18

## 2020-03-18 DIAGNOSIS — E03.9 HYPOTHYROIDISM, UNSPECIFIED TYPE: ICD-10-CM

## 2020-03-18 DIAGNOSIS — E78.5 HYPERLIPIDEMIA, UNSPECIFIED HYPERLIPIDEMIA TYPE: ICD-10-CM

## 2020-03-18 RX ORDER — ATORVASTATIN CALCIUM 40 MG/1
TABLET, FILM COATED ORAL
Qty: 100 TAB | Refills: 0 | Status: SHIPPED | OUTPATIENT
Start: 2020-03-18 | End: 2020-04-03 | Stop reason: SDUPTHER

## 2020-03-18 RX ORDER — LEVOTHYROXINE SODIUM 175 UG/1
TABLET ORAL
Qty: 100 TAB | Refills: 0 | Status: SHIPPED | OUTPATIENT
Start: 2020-03-18 | End: 2020-09-02 | Stop reason: SDUPTHER

## 2020-03-18 RX ORDER — ATORVASTATIN CALCIUM 40 MG/1
TABLET, FILM COATED ORAL
Qty: 100 TAB | Refills: 1 | Status: SHIPPED | OUTPATIENT
Start: 2020-03-18 | End: 2020-03-18 | Stop reason: SDUPTHER

## 2020-03-18 NOTE — PROGRESS NOTES
"Member called in to schedule lab work and ask what his copay would be. I advised that his benefits states \" You pay $0 for Medicare-covered laboratory services. This copayment does not apply to blood draws or INR testing (anti-coagulant testing).\" I was able to schedule the lab work for him and provided directions. He had no other questions at this time  "

## 2020-03-18 NOTE — PROGRESS NOTES
Chief Complaint: Consult requested by Manjula Zaidi M.D. 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 male here for initial evaluation of history of pituitary tumor status post transsphenoidal surgery and radiotherapy with new diagnosis of hypogonadism.     Patient is a relatively poor historian.  Much of the history was obtained through review of records.    He claims that he was diagnosed with a large pituitary tumor in the 1970s and he underwent surgery in [place] but he does not recall the details.  After surgery he underwent external beam radiation therapy to decrease the chance of recurrence of his pituitary tumor.  He has not had an MRI recently for reevaluation of this.  I do not have the copies of his previous records including pathology reports and radiotherapy records.      He was found to have abnormal baseline labs on July 2019 by his primary care physician with a low total testosterone of 160 and low free testosterone of 18 and normal prolactin of 2.9.  Sex hormone binding protein was low at 66.  His TSH was elevated at 5.3 and free T4 was low at 0.41.    Currently he denies having any headache, vision changes, increased thirst, frequent urination, dizziness, and lightheadedness.  He reports decreased libido and fatigue.  He denies a history of cardiovascular disease and atrial arrhythmias.    He is supposed to be taking levothyroxine 175 MCG daily but admits to noncompliance.  He claims that he ran out of medication 3 weeks ago but when I reviewed his prescriptions he was given a 3-month supply with 3 refills by his primary care physician      Patient's medications, allergies, and social histories were reviewed and updated as appropriate.    ROS:     CONS:     No fever, no chills, no weight loss, reports fatigue   EYES:      No  diplopia, no blurry vision, no redness of eyes, no swelling of eyelids   ENT:    No hearing loss, No ear pain, No sore throat, no dysphagia, no neck swelling   CV:     No chest pain, no palpitations, no claudication, no orthopnea, no PND   PULM:    No SOB, no cough, no hemoptysis, no wheezing    GI:   No nausea, no vomiting, no diarrhea, no constipation, no bloody stools   :  Passing urine well, no dysuria, no hematuria   ENDO:   No polyuria, no polydipsia, no heat intolerance, no cold intolerance   NEURO: No headaches, no dizziness, no convulsions, no tremors   MUSC:  No joint swellings, no arthralgias, no myalgias, no weakness   SKIN:   No rash, no ulcers, no dry skin   PSYCH:   No depression, no anxiety, no difficulty sleeping       Past Medical History:  Patient Active Problem List    Diagnosis Date Noted   • Low testosterone in male 11/15/2019   • Mild intermittent asthma without complication 07/10/2019   • Eczema 07/10/2019   • Pollen allergies 07/25/2017   • Hypothyroidism 06/30/2015   • Hyperlipidemia 06/30/2015   • Pituitary adenoma (HCC) 06/30/2015   • Reactive airway disease 06/30/2015       Past Surgical History:  Past Surgical History:   Procedure Laterality Date   • CRANIOTOMY          Allergies:  Pcn [penicillins]     Current Medications:    Current Outpatient Medications:   •  levothyroxine (SYNTHROID) 175 MCG Tab, TAKE ONE TABLET BY MOUTH ONCE DAILY IN THE MORNING ON EMPTY STOMACH, Disp: 90 Tab, Rfl: 3  •  atorvastatin (LIPITOR) 40 MG Tab, TAKE ONE TABLET BY MOUTH ONCE DAILY, Disp: 90 Tab, Rfl: 3    Social History:  Social History     Socioeconomic History   • Marital status: Single     Spouse name: Not on file   • Number of children: Not on file   • Years of education: Not on file   • Highest education level: Not on file   Occupational History   • Not on file   Social Needs   • Financial resource strain: Not on file   • Food insecurity     Worry: Never true     Inability: Never true   •  "Transportation needs     Medical: No     Non-medical: No   Tobacco Use   • Smoking status: Never Smoker   • Smokeless tobacco: Never Used   Substance and Sexual Activity   • Alcohol use: Yes     Alcohol/week: 1.5 oz     Types: 3 Standard drinks or equivalent per week   • Drug use: No   • Sexual activity: Not on file   Lifestyle   • Physical activity     Days per week: Not on file     Minutes per session: Not on file   • Stress: Not on file   Relationships   • Social connections     Talks on phone: Not on file     Gets together: Not on file     Attends Zoroastrianism service: Not on file     Active member of club or organization: Not on file     Attends meetings of clubs or organizations: Not on file     Relationship status: Not on file   • Intimate partner violence     Fear of current or ex partner: Not on file     Emotionally abused: Not on file     Physically abused: Not on file     Forced sexual activity: Not on file   Other Topics Concern   • Not on file   Social History Narrative   • Not on file        Family History:   Family History   Problem Relation Age of Onset   • Diabetes Mother    • Heart Disease Father    • Diabetes Brother    • Alzheimer's Disease Maternal Grandmother    • Cancer Maternal Grandfather    • Heart Disease Paternal Grandmother    • Cancer Paternal Grandfather          PHYSICAL EXAM:   Vital signs: /78 (BP Location: Left arm, Patient Position: Sitting, BP Cuff Size: Adult)   Pulse 90   Ht 1.93 m (6' 4\")   Wt 92.2 kg (203 lb 4.8 oz)   SpO2 94%   BMI 24.75 kg/m²   GENERAL: Tall elderly gentleman in no apparent distress.   EYE: No ocular and eyelid asymmetry, Anicteric sclerae,  PERRL, No exophthalmos or lidlag  HENT: Hearing grossly intact, Normocephalic, atraumatic. Pink, moist mucous membranes, No exudate  NECK: Supple. Trachea midline. thyroid is normal in size without nodules or tenderness  CARDIOVASCULAR: Regular rate and rhythm. No murmurs, rubs, or gallops.   LUNGS: Clear to " auscultation bilaterally   ABDOMEN: Soft, nontender with positive bowel sounds.   EXTREMITIES: No clubbing, cyanosis, or edema.   NEUROLOGICAL: Cranial nerves II-XII are grossly intact   Symmetric reflexes at the patella no proximal muscle weakness, No visible tremor with both outstretched hands  LYMPH: No cervical, supraclavicular,  adenopathy palpated.   SKIN: No rashes, lesions. Turgor is normal.    Labs:  Lab Results   Component Value Date/Time    WBC 5.4 07/18/2019 07:22 AM    RBC 5.54 07/18/2019 07:22 AM    HEMOGLOBIN 18.1 (H) 07/18/2019 07:22 AM    MCV 99.3 (H) 07/18/2019 07:22 AM    MCH 32.7 07/18/2019 07:22 AM    MCHC 32.9 (L) 07/18/2019 07:22 AM    RDW 47.8 07/18/2019 07:22 AM    MPV 9.4 07/18/2019 07:22 AM       Lab Results   Component Value Date/Time    SODIUM 136 07/18/2019 07:22 AM    POTASSIUM 4.9 07/18/2019 07:22 AM    CHLORIDE 100 07/18/2019 07:22 AM    CO2 27 07/18/2019 07:22 AM    ANION 9.0 07/18/2019 07:22 AM    GLUCOSE 90 07/18/2019 07:22 AM    BUN 7 (L) 07/18/2019 07:22 AM    CREATININE 0.94 07/18/2019 07:22 AM    CALCIUM 9.9 07/18/2019 07:22 AM    ASTSGOT 82 (H) 07/18/2019 07:22 AM    ALTSGPT 47 07/18/2019 07:22 AM    TBILIRUBIN 0.7 07/18/2019 07:22 AM    ALBUMIN 4.2 07/18/2019 07:22 AM    TOTPROTEIN 8.0 07/18/2019 07:22 AM    GLOBULIN 3.8 (H) 07/18/2019 07:22 AM    AGRATIO 1.1 07/18/2019 07:22 AM       Lab Results   Component Value Date/Time    TSHULTRASEN 5.380 (H) 07/18/2019 0722     No results found for: FREEDIR  No results found for: FREET3  No results found for: THYSTIMIG        Imaging:      ASSESSMENT/PLAN:     1. Low testosterone in male  Uncontrolled  I suspect that he has secondary hypogonadism due to history of pituitary surgery and radiotherapy  His labs are more than 8 months old  I recommend checking a baseline morning LH, FSH and total testosterone and sex hormone binding protein level and I would like to see him again to go over the test results in detail and discuss androgen  replacement therapy and go over its side effects, advantages and disadvantages  He is going to get labs this week and we will schedule him for a follow-up appointment in 3 weeks    2. Pituitary adenoma (HCC)  Unstable  He has a history of pituitary surgery and radiotherapy  I recommend obtaining a new baseline MRI since his last MRI was many years ago  I am scheduling him for a pituitary MRI with [facility]  Clinically I doubt that he has recurrence of his pituitary tumor because he is asymptomatic  I explained to him that his low testosterone is because of the effects of radiotherapy    3. Acquired hypothyroidism  Uncontrolled  He admits to noncompliance with thyroid hormone replacement therapy  I am checking a TSH and free T4 level today.  Because of his history of radiotherapy I suspect that his TSH levels are going to be unreliable and we should monitor his free T4 levels for assessment of his control for his hypothyroidism  Based upon his history he most likely has secondary hypothyroidism now onset of primary hypothyroidism      Return in about 3 weeks (around 4/7/2020).       Thank you kindly for allowing me to participate in the endocrine care plan for this patient.    Catracho Montano MD, [facility], [facility]  03/17/20    CC:   Manjula Zaidi M.D.

## 2020-03-18 NOTE — PROGRESS NOTES
Patient called to ask what his co pay would be for a CAT scan. Per his EOB I advised that it is $100. I then transferred him to imaging for scheduling. He had no other questions.

## 2020-03-19 ENCOUNTER — HOSPITAL ENCOUNTER (OUTPATIENT)
Dept: LAB | Facility: MEDICAL CENTER | Age: 65
End: 2020-03-19
Attending: INTERNAL MEDICINE
Payer: MEDICARE

## 2020-03-19 DIAGNOSIS — E03.9 ACQUIRED HYPOTHYROIDISM: ICD-10-CM

## 2020-03-19 DIAGNOSIS — R79.89 LOW TESTOSTERONE IN MALE: ICD-10-CM

## 2020-03-19 DIAGNOSIS — D35.2 PITUITARY ADENOMA (HCC): ICD-10-CM

## 2020-03-19 LAB
ALBUMIN SERPL BCP-MCNC: 4.3 G/DL (ref 3.2–4.9)
ALBUMIN/GLOB SERPL: 1 G/DL
ALP SERPL-CCNC: 91 U/L (ref 30–99)
ALT SERPL-CCNC: 56 U/L (ref 2–50)
ANION GAP SERPL CALC-SCNC: 13 MMOL/L (ref 7–16)
AST SERPL-CCNC: 110 U/L (ref 12–45)
BASOPHILS # BLD AUTO: 1.3 % (ref 0–1.8)
BASOPHILS # BLD: 0.06 K/UL (ref 0–0.12)
BILIRUB SERPL-MCNC: 0.7 MG/DL (ref 0.1–1.5)
BUN SERPL-MCNC: 6 MG/DL (ref 8–22)
CALCIUM SERPL-MCNC: 9.5 MG/DL (ref 8.5–10.5)
CHLORIDE SERPL-SCNC: 98 MMOL/L (ref 96–112)
CO2 SERPL-SCNC: 24 MMOL/L (ref 20–33)
CREAT SERPL-MCNC: 0.76 MG/DL (ref 0.5–1.4)
EOSINOPHIL # BLD AUTO: 0.43 K/UL (ref 0–0.51)
EOSINOPHIL NFR BLD: 9.1 % (ref 0–6.9)
ERYTHROCYTE [DISTWIDTH] IN BLOOD BY AUTOMATED COUNT: 50.5 FL (ref 35.9–50)
FERRITIN SERPL-MCNC: 413 NG/ML (ref 22–322)
FSH SERPL-ACNC: 4 MIU/ML (ref 1.5–12.4)
GLOBULIN SER CALC-MCNC: 4.4 G/DL (ref 1.9–3.5)
GLUCOSE SERPL-MCNC: 91 MG/DL (ref 65–99)
HCT VFR BLD AUTO: 56.3 % (ref 42–52)
HGB BLD-MCNC: 18.7 G/DL (ref 14–18)
IMM GRANULOCYTES # BLD AUTO: 0.01 K/UL (ref 0–0.11)
IMM GRANULOCYTES NFR BLD AUTO: 0.2 % (ref 0–0.9)
IRON SATN MFR SERPL: 41 % (ref 15–55)
IRON SERPL-MCNC: 141 UG/DL (ref 50–180)
LH SERPL-ACNC: 3.2 IU/L (ref 1.7–8.6)
LYMPHOCYTES # BLD AUTO: 2.17 K/UL (ref 1–4.8)
LYMPHOCYTES NFR BLD: 46 % (ref 22–41)
MCH RBC QN AUTO: 33.3 PG (ref 27–33)
MCHC RBC AUTO-ENTMCNC: 33.2 G/DL (ref 33.7–35.3)
MCV RBC AUTO: 100.2 FL (ref 81.4–97.8)
MONOCYTES # BLD AUTO: 0.28 K/UL (ref 0–0.85)
MONOCYTES NFR BLD AUTO: 5.9 % (ref 0–13.4)
NEUTROPHILS # BLD AUTO: 1.77 K/UL (ref 1.82–7.42)
NEUTROPHILS NFR BLD: 37.5 % (ref 44–72)
NRBC # BLD AUTO: 0 K/UL
NRBC BLD-RTO: 0 /100 WBC
PLATELET # BLD AUTO: 181 K/UL (ref 164–446)
PMV BLD AUTO: 9.9 FL (ref 9–12.9)
POTASSIUM SERPL-SCNC: 4.1 MMOL/L (ref 3.6–5.5)
PROLACTIN SERPL-MCNC: 2.59 NG/ML (ref 2.1–17.7)
PROT SERPL-MCNC: 8.7 G/DL (ref 6–8.2)
RBC # BLD AUTO: 5.62 M/UL (ref 4.7–6.1)
SODIUM SERPL-SCNC: 135 MMOL/L (ref 135–145)
T4 FREE SERPL-MCNC: 0.47 NG/DL (ref 0.53–1.43)
THYROPEROXIDASE AB SERPL-ACNC: 12 IU/ML (ref 0–9)
TIBC SERPL-MCNC: 348 UG/DL (ref 250–450)
TSH SERPL DL<=0.005 MIU/L-ACNC: 5.33 UIU/ML (ref 0.38–5.33)
UIBC SERPL-MCNC: 207 UG/DL (ref 110–370)
WBC # BLD AUTO: 4.7 K/UL (ref 4.8–10.8)

## 2020-03-19 PROCEDURE — 83540 ASSAY OF IRON: CPT

## 2020-03-19 PROCEDURE — 80053 COMPREHEN METABOLIC PANEL: CPT

## 2020-03-19 PROCEDURE — 84270 ASSAY OF SEX HORMONE GLOBUL: CPT

## 2020-03-19 PROCEDURE — 82024 ASSAY OF ACTH: CPT

## 2020-03-19 PROCEDURE — 84146 ASSAY OF PROLACTIN: CPT

## 2020-03-19 PROCEDURE — 84305 ASSAY OF SOMATOMEDIN: CPT

## 2020-03-19 PROCEDURE — 85025 COMPLETE CBC W/AUTO DIFF WBC: CPT

## 2020-03-19 PROCEDURE — 83002 ASSAY OF GONADOTROPIN (LH): CPT

## 2020-03-19 PROCEDURE — 83520 IMMUNOASSAY QUANT NOS NONAB: CPT

## 2020-03-19 PROCEDURE — 84402 ASSAY OF FREE TESTOSTERONE: CPT

## 2020-03-19 PROCEDURE — 82728 ASSAY OF FERRITIN: CPT

## 2020-03-19 PROCEDURE — 83001 ASSAY OF GONADOTROPIN (FSH): CPT

## 2020-03-19 PROCEDURE — 84403 ASSAY OF TOTAL TESTOSTERONE: CPT

## 2020-03-19 PROCEDURE — 84439 ASSAY OF FREE THYROXINE: CPT

## 2020-03-19 PROCEDURE — 36415 COLL VENOUS BLD VENIPUNCTURE: CPT

## 2020-03-19 PROCEDURE — 84443 ASSAY THYROID STIM HORMONE: CPT

## 2020-03-19 PROCEDURE — 83550 IRON BINDING TEST: CPT

## 2020-03-19 PROCEDURE — 86376 MICROSOMAL ANTIBODY EACH: CPT

## 2020-03-21 LAB
ACTH PLAS-MCNC: 13.2 PG/ML (ref 7.2–63.3)
IGF-I SERPL-MCNC: 46 NG/ML (ref 40–231)
IGF-I Z-SCORE SERPL: -2.4
SHBG SERPL-SCNC: 56 NMOL/L (ref 11–80)
TESTOST FREE MFR SERPL: 1.2 % (ref 1.6–2.9)
TESTOST FREE SERPL-MCNC: 8 PG/ML (ref 47–244)
TESTOST SERPL-MCNC: 68 NG/DL (ref 300–720)

## 2020-03-23 ENCOUNTER — HOSPITAL ENCOUNTER (OUTPATIENT)
Dept: RADIOLOGY | Facility: MEDICAL CENTER | Age: 65
End: 2020-03-23
Attending: INTERNAL MEDICINE
Payer: MEDICARE

## 2020-03-23 DIAGNOSIS — R79.89 LOW TESTOSTERONE IN MALE: ICD-10-CM

## 2020-03-23 DIAGNOSIS — D35.2 PITUITARY ADENOMA (HCC): ICD-10-CM

## 2020-03-23 DIAGNOSIS — E03.9 ACQUIRED HYPOTHYROIDISM: ICD-10-CM

## 2020-03-23 PROCEDURE — A9576 INJ PROHANCE MULTIPACK: HCPCS | Performed by: INTERNAL MEDICINE

## 2020-03-23 PROCEDURE — 700117 HCHG RX CONTRAST REV CODE 255: Performed by: INTERNAL MEDICINE

## 2020-03-23 PROCEDURE — 70553 MRI BRAIN STEM W/O & W/DYE: CPT

## 2020-03-23 RX ADMIN — GADOTERIDOL 15 ML: 279.3 INJECTION, SOLUTION INTRAVENOUS at 13:58

## 2020-03-24 ENCOUNTER — HOSPITAL ENCOUNTER (OUTPATIENT)
Dept: LAB | Facility: MEDICAL CENTER | Age: 65
End: 2020-03-24
Attending: INTERNAL MEDICINE
Payer: MEDICARE

## 2020-03-24 DIAGNOSIS — R79.89 LOW TESTOSTERONE IN MALE: ICD-10-CM

## 2020-03-24 DIAGNOSIS — D35.2 PITUITARY ADENOMA (HCC): ICD-10-CM

## 2020-03-24 DIAGNOSIS — E03.9 ACQUIRED HYPOTHYROIDISM: ICD-10-CM

## 2020-03-24 LAB — CORTIS SERPL-MCNC: 3.2 UG/DL (ref 0–23)

## 2020-03-24 PROCEDURE — 36415 COLL VENOUS BLD VENIPUNCTURE: CPT

## 2020-03-24 PROCEDURE — 82533 TOTAL CORTISOL: CPT

## 2020-03-25 DIAGNOSIS — E78.5 HYPERLIPIDEMIA, UNSPECIFIED HYPERLIPIDEMIA TYPE: ICD-10-CM

## 2020-03-28 LAB — MISCELLANEOUS LAB RESULT MISCLAB: NORMAL

## 2020-03-31 ENCOUNTER — OFFICE VISIT (OUTPATIENT)
Dept: ENDOCRINOLOGY | Facility: MEDICAL CENTER | Age: 65
End: 2020-03-31
Payer: MEDICARE

## 2020-03-31 VITALS
DIASTOLIC BLOOD PRESSURE: 62 MMHG | WEIGHT: 197 LBS | OXYGEN SATURATION: 98 % | SYSTOLIC BLOOD PRESSURE: 100 MMHG | HEIGHT: 76 IN | HEART RATE: 117 BPM | BODY MASS INDEX: 23.99 KG/M2

## 2020-03-31 DIAGNOSIS — E27.49 SECONDARY ADRENAL INSUFFICIENCY (HCC): ICD-10-CM

## 2020-03-31 DIAGNOSIS — E03.8 SECONDARY HYPOTHYROIDISM: ICD-10-CM

## 2020-03-31 DIAGNOSIS — D35.2 PITUITARY ADENOMA (HCC): ICD-10-CM

## 2020-03-31 DIAGNOSIS — E29.1 SECONDARY MALE HYPOGONADISM: ICD-10-CM

## 2020-03-31 PROCEDURE — 99214 OFFICE O/P EST MOD 30 MIN: CPT | Performed by: INTERNAL MEDICINE

## 2020-03-31 RX ORDER — HYDROCORTISONE 10 MG/1
TABLET ORAL
Qty: 90 TAB | Refills: 3 | Status: SHIPPED | OUTPATIENT
Start: 2020-03-31 | End: 2020-09-02

## 2020-03-31 RX ORDER — TESTOSTERONE GEL, 1% 10 MG/G
50 GEL TRANSDERMAL DAILY
Qty: 30 PACKET | Refills: 5 | Status: SHIPPED | OUTPATIENT
Start: 2020-03-31 | End: 2020-04-30

## 2020-03-31 ASSESSMENT — FIBROSIS 4 INDEX: FIB4 SCORE: 5.28

## 2020-04-01 NOTE — PROGRESS NOTES
Chief Complaint: Follow up for Secondary Hypogonadism     HPI:     Alberto Galicia is a 64 y.o. male here for follow up of Hypogonadism   His main risk factors include   Previous pituitary surgery and radiation many years ago in the 1970s    Today I am seeing him to review his baseline labs showing to low testosterone levels.  LH was low at 3.2 with a total testosterone of 68 on March 2020 and a total testosterone of 160 on July 2019.  His cortisol was also low at 3.2 with a normal ACTH of 13, free T4 is low at 0.47 with a normal TSH of 5.3.  His prolactin is 2.59 IGF-I is 46.    Pituitary MRI on March 23, 2020 showed no evidence of tumor recurrence    Since last visit patient reports feeling poor.   He is not on androgen replacement therapy and not on physiologic steroid replacement therapy.  He is noncompliant with thyroid hormone replacement therapy.  He reports lack of appetite, nausea but no vomiting.  He reports poor energy levels.    He currently reports fatigue, depression, loss of libido, erectile dysfunction.    He currently denies decreased stream, hesitancy, intermittency, post void dribbling and sense of incomplete emptying.        Patient's medications, allergies, and social histories were reviewed and updated as appropriate.      ROS:       CONS:     No fever, no chills   EYES:     No diplopia, no blurry vision   CV:           No chest pain, no palpitations   PULM:     No SOB, no cough, no hemoptysis.   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  2020-03: Secondary adrenal insufficiency (HCC)  2019-11: Low testosterone in male  2019-07: Mild intermittent asthma without complication  2019-07: Eczema  2017-07: Pollen allergies  2015-06: Secondary hypothyroidism  2015-06: Hyperlipidemia  2015-06: Pituitary adenoma  "([facility])  2015-06: Reactive airway disease      Past Surgical History:  Past Surgical History:   Procedure Laterality Date   • CRANIOTOMY          Allergies:  Pcn [penicillins]     Social History:  Social History     Tobacco Use   • Smoking status: Never Smoker   • Smokeless tobacco: Never Used   Substance Use Topics   • Alcohol use: Yes     Alcohol/week: 1.5 oz     Types: 3 Standard drinks or equivalent per week   • Drug use: No        Family History:   family history includes Alzheimer's Disease in his maternal grandmother; Cancer in his maternal grandfather and paternal grandfather; Diabetes in his brother and mother; Heart Disease in his father and paternal grandmother.        PHYSICAL EXAM:   Vital signs: /62 (BP Location: Left arm, Patient Position: Sitting, BP Cuff Size: Adult)   Pulse (!) 117   Ht 1.93 m (6' 4\")   Wt 89.4 kg (197 lb)   SpO2 98%   BMI 23.98 kg/m²   GENERAL: Well-developed, well-nourished in no apparent distress.   EYE:  No ocular asymmetry, PERRLA  HENT: Pink, moist mucous membranes.     CHEST: no gynecomastia  CARDIOVASCULAR:  No murmurs  LUNGS: Clear breath sounds  ABDOMEN: Soft, nontender   GENIT: normal  EXTREMITIES: No clubbing, cyanosis, or edema.   NEUROLOGICAL: No gross focal motor abnormalities. No visible tremor, no proximal muscle weakness   LYMPH: No cervical adenopathy palpated.   SKIN: No rashes, lesions.       Labs:  Lab Results   Component Value Date/Time    WBC 4.7 (L) 03/19/2020 09:14 AM    RBC 5.62 03/19/2020 09:14 AM    HEMOGLOBIN 18.7 (H) 03/19/2020 09:14 AM    .2 (H) 03/19/2020 09:14 AM    MCH 33.3 (H) 03/19/2020 09:14 AM    MCHC 33.2 (L) 03/19/2020 09:14 AM    RDW 50.5 (H) 03/19/2020 09:14 AM    MPV 9.9 03/19/2020 09:14 AM       Lab Results   Component Value Date/Time    SODIUM 135 03/19/2020 09:14 AM    POTASSIUM 4.1 03/19/2020 09:14 AM    CHLORIDE 98 03/19/2020 09:14 AM    CO2 24 03/19/2020 09:14 AM    ANION 13.0 03/19/2020 09:14 AM    GLUCOSE 91 " 03/19/2020 09:14 AM    BUN 6 (L) 03/19/2020 09:14 AM    CREATININE 0.76 03/19/2020 09:14 AM    CALCIUM 9.5 03/19/2020 09:14 AM    ASTSGOT 110 (H) 03/19/2020 09:14 AM    ALTSGPT 56 (H) 03/19/2020 09:14 AM    TBILIRUBIN 0.7 03/19/2020 09:14 AM    ALBUMIN 4.3 03/19/2020 09:14 AM    TOTPROTEIN 8.7 (H) 03/19/2020 09:14 AM    GLOBULIN 4.4 (H) 03/19/2020 09:14 AM    AGRATIO 1.0 03/19/2020 09:14 AM       No results found for: LH    Lab Results   Component Value Date/Time    FSH 4.0 03/19/2020 0914       Lab Results   Component Value Date/Time    TESTOSTERONE 68 (L) 03/19/2020 0914           Imaging:    ASSESSMENT/PLAN:      1. Secondary hypothyroidism  Uncontrolled  Explained to patient that his TSH is unreliable  He should restart levothyroxine 175 MCG daily which is his previous dose  We will have to rely on his free T4 levels for monitoring of his thyroid hormone replacement therapy  Recommend repeating his free T4 levels in 2 to 3 months    2. Secondary male hypogonadism  Uncontrolled  Discussed the options for androgen replacement therapy such as injections and patches and gel.  Based upon his insurance gel therapy is covered particularly testim  I am starting him on Testim 50 mg daily  Reviewed the importance of handwashing and avoidance of transference  Reviewed the side effects of androgen replacement therapy  We will plan for repeat testosterone levels in 3 months    3. Secondary adrenal insufficiency (HCC)  Uncontrolled  Low cortisol levels noted  Recommend to restart hydrocortisone 20 mg in the morning and 10 mg in the afternoon  I will plan to gradually taper his dose  We will plan for follow-up in 3 months with repeat of his CMP    4. Pituitary adenoma (HCC)  Stable  There is no evidence of recurrence of his pituitary tumor  Recommend observation and repeating his MRI after 5 to 10 years      Return in about 3 months (around 6/30/2020).      This patient during there office visit today was started on a new  medication.  Side effects of the new medication were discussed with the patient today in the office.     Thank you kindly for allowing me to participate in the endocrine care plan for this patient.    Catracho Montano MD, [facility], [facility]  03/31/20    CC:   Manjula Zaidi M.D.

## 2020-04-03 DIAGNOSIS — E78.5 HYPERLIPIDEMIA, UNSPECIFIED HYPERLIPIDEMIA TYPE: ICD-10-CM

## 2020-04-03 RX ORDER — ATORVASTATIN CALCIUM 40 MG/1
TABLET, FILM COATED ORAL
Qty: 100 TAB | Refills: 1 | Status: SHIPPED | OUTPATIENT
Start: 2020-04-03 | End: 2020-11-18 | Stop reason: SDUPTHER

## 2020-04-24 ENCOUNTER — TELEPHONE (OUTPATIENT)
Dept: ENDOCRINOLOGY | Facility: MEDICAL CENTER | Age: 65
End: 2020-04-24

## 2020-04-24 DIAGNOSIS — E27.49 SECONDARY ADRENAL INSUFFICIENCY (HCC): ICD-10-CM

## 2020-04-24 RX ORDER — HYDROCORTISONE 20 MG/1
TABLET ORAL
Qty: 100 TAB | Refills: 3 | Status: SHIPPED | OUTPATIENT
Start: 2020-04-24 | End: 2020-09-02

## 2020-04-24 NOTE — TELEPHONE ENCOUNTER
Patient is requesting refill on Hydrocortisol 10mg. His pharmacy is out of the 10 mg, but can give him 20 mg they just need a script to be sent if possible.     [phone]

## 2020-08-04 ENCOUNTER — HOSPITAL ENCOUNTER (OUTPATIENT)
Dept: LAB | Facility: MEDICAL CENTER | Age: 65
End: 2020-08-04
Attending: INTERNAL MEDICINE
Payer: MEDICARE

## 2020-08-04 DIAGNOSIS — E03.8 SECONDARY HYPOTHYROIDISM: ICD-10-CM

## 2020-08-04 DIAGNOSIS — E27.49 SECONDARY ADRENAL INSUFFICIENCY (HCC): ICD-10-CM

## 2020-08-04 DIAGNOSIS — E29.1 SECONDARY MALE HYPOGONADISM: ICD-10-CM

## 2020-08-04 LAB
ALBUMIN SERPL BCP-MCNC: 4.2 G/DL (ref 3.2–4.9)
ALBUMIN/GLOB SERPL: 1.3 G/DL
ALP SERPL-CCNC: 75 U/L (ref 30–99)
ALT SERPL-CCNC: 105 U/L (ref 2–50)
ANION GAP SERPL CALC-SCNC: 13 MMOL/L (ref 7–16)
AST SERPL-CCNC: 157 U/L (ref 12–45)
BILIRUB SERPL-MCNC: 0.8 MG/DL (ref 0.1–1.5)
BUN SERPL-MCNC: 10 MG/DL (ref 8–22)
CALCIUM SERPL-MCNC: 9.5 MG/DL (ref 8.5–10.5)
CHLORIDE SERPL-SCNC: 102 MMOL/L (ref 96–112)
CO2 SERPL-SCNC: 21 MMOL/L (ref 20–33)
CREAT SERPL-MCNC: 0.7 MG/DL (ref 0.5–1.4)
GLOBULIN SER CALC-MCNC: 3.3 G/DL (ref 1.9–3.5)
GLUCOSE SERPL-MCNC: 94 MG/DL (ref 65–99)
HCT VFR BLD AUTO: 52 % (ref 42–52)
HGB BLD-MCNC: 17.1 G/DL (ref 14–18)
POTASSIUM SERPL-SCNC: 4.4 MMOL/L (ref 3.6–5.5)
PROT SERPL-MCNC: 7.5 G/DL (ref 6–8.2)
SODIUM SERPL-SCNC: 136 MMOL/L (ref 135–145)
T4 FREE SERPL-MCNC: 1.09 NG/DL (ref 0.93–1.7)
TSH SERPL DL<=0.005 MIU/L-ACNC: 0.48 UIU/ML (ref 0.38–5.33)

## 2020-08-04 PROCEDURE — 85014 HEMATOCRIT: CPT

## 2020-08-04 PROCEDURE — 84402 ASSAY OF FREE TESTOSTERONE: CPT

## 2020-08-04 PROCEDURE — 36415 COLL VENOUS BLD VENIPUNCTURE: CPT

## 2020-08-04 PROCEDURE — 80053 COMPREHEN METABOLIC PANEL: CPT

## 2020-08-04 PROCEDURE — 84439 ASSAY OF FREE THYROXINE: CPT

## 2020-08-04 PROCEDURE — 84403 ASSAY OF TOTAL TESTOSTERONE: CPT

## 2020-08-04 PROCEDURE — 85018 HEMOGLOBIN: CPT

## 2020-08-04 PROCEDURE — 84270 ASSAY OF SEX HORMONE GLOBUL: CPT

## 2020-08-04 PROCEDURE — 84443 ASSAY THYROID STIM HORMONE: CPT

## 2020-08-06 LAB
SHBG SERPL-SCNC: 56 NMOL/L (ref 11–80)
TESTOST FREE MFR SERPL: 1.5 % (ref 1.6–2.9)
TESTOST FREE SERPL-MCNC: 131 PG/ML (ref 47–244)
TESTOST SERPL-MCNC: 871 NG/DL (ref 300–720)

## 2020-09-02 ENCOUNTER — OFFICE VISIT (OUTPATIENT)
Dept: ENDOCRINOLOGY | Facility: MEDICAL CENTER | Age: 65
End: 2020-09-02
Attending: INTERNAL MEDICINE
Payer: MEDICARE

## 2020-09-02 VITALS
WEIGHT: 234.7 LBS | SYSTOLIC BLOOD PRESSURE: 120 MMHG | HEART RATE: 101 BPM | DIASTOLIC BLOOD PRESSURE: 70 MMHG | HEIGHT: 76 IN | BODY MASS INDEX: 28.58 KG/M2 | OXYGEN SATURATION: 94 %

## 2020-09-02 DIAGNOSIS — E29.1 SECONDARY MALE HYPOGONADISM: ICD-10-CM

## 2020-09-02 DIAGNOSIS — E27.49 SECONDARY ADRENAL INSUFFICIENCY (HCC): ICD-10-CM

## 2020-09-02 DIAGNOSIS — D32.9 MENINGIOMA (HCC): ICD-10-CM

## 2020-09-02 DIAGNOSIS — E23.0 PANHYPOPITUITARISM (HCC): ICD-10-CM

## 2020-09-02 DIAGNOSIS — E03.8 SECONDARY HYPOTHYROIDISM: ICD-10-CM

## 2020-09-02 PROCEDURE — 99214 OFFICE O/P EST MOD 30 MIN: CPT | Performed by: INTERNAL MEDICINE

## 2020-09-02 PROCEDURE — 99211 OFF/OP EST MAY X REQ PHY/QHP: CPT | Performed by: INTERNAL MEDICINE

## 2020-09-02 RX ORDER — HYDROCORTISONE 10 MG/1
TABLET ORAL
Qty: 180 TAB | Refills: 3 | Status: SHIPPED | OUTPATIENT
Start: 2020-09-02 | End: 2020-12-21 | Stop reason: SDUPTHER

## 2020-09-02 RX ORDER — TESTOSTERONE GEL, 1% 10 MG/G
50 GEL TRANSDERMAL DAILY
Qty: 5 G | Refills: 5 | Status: SHIPPED | OUTPATIENT
Start: 2020-09-02 | End: 2020-09-02 | Stop reason: SDUPTHER

## 2020-09-02 RX ORDER — LEVOTHYROXINE SODIUM 175 UG/1
175 TABLET ORAL
Qty: 90 TAB | Refills: 3 | Status: SHIPPED | OUTPATIENT
Start: 2020-09-02 | End: 2020-10-13 | Stop reason: SDUPTHER

## 2020-09-02 RX ORDER — TESTOSTERONE GEL, 1% 10 MG/G
50 GEL TRANSDERMAL DAILY
Qty: 5 G | Refills: 5 | Status: SHIPPED
Start: 2020-09-02 | End: 2020-10-02

## 2020-09-02 ASSESSMENT — FIBROSIS 4 INDEX: FIB4 SCORE: 5.59

## 2020-09-02 NOTE — PROGRESS NOTES
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 male here for follow up for the above medical issue.  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    At baseline he was uncontrolled with a low total testosterone of 68, low free T4 levels of 0.47, low prolactin levels and low IGF-I levels on March 2020.  He was also not taking any hydrocortisone or physiologic steroid replacement therapy and sodium levels were low at 135 at baseline    Pituitary MRI on March 23, 2020 showed no evidence of tumor recurrence  He did have an incidental 5 mm meningioma at the area of his previous pituitary surgery in the [place]      Since I last saw him he was started on physiologic replacement with hydrocortisone, thyroid hormone and testosterone.    He reports feeling much better however he has gained approximately more than 10 pounds because of increased appetite from his steroid replacement therapy.    He reports good energy levels and denies fatigue, depression, loss of libido and erectile dysfunction.  He denies having any headache, blurring of vision and excessive urination.      He is taking levothyroxine 175 MCG daily and his free T4 was normal 1.09 on August 4, 2020 we did check a TSH and it was normal 0.476     He is also taking hydrocortisone 20 mg in morning and 10 mg in the afternoon.  His blood pressure today is excellent and his sodium is back to normal potassium levels are also normal on August 2020.    His liver enzymes are high most likely because of fatty liver disease from progressive weight gain.    Finally he is on Testim 50 mg 1 packet daily and his total testosterone improved from 6-8 up to 871 and free testosterone improved from 8 up to 130 on August 4, 2020        Patient's medications, allergies, and social histories were reviewed and updated as  "appropriate.      ROS:       CONS:     No fever, no chills   EYES:     No diplopia, no blurry vision   CV:           No chest pain, no palpitations   PULM:     No SOB, no cough, no hemoptysis.   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  2020-03: Secondary adrenal insufficiency (HCC)  2019-11: Low testosterone in male  2019-07: Mild intermittent asthma without complication  2019-07: Eczema  2017-07: Pollen allergies  2015-06: Secondary hypothyroidism  2015-06: Hyperlipidemia  2015-06: Pituitary adenoma (HCC)  2015-06: Reactive airway disease      Past Surgical History:  Past Surgical History:   Procedure Laterality Date   • CRANIOTOMY          Allergies:  Pcn [penicillins]     Social History:  Social History     Tobacco Use   • Smoking status: Never Smoker   • Smokeless tobacco: Never Used   Substance Use Topics   • Alcohol use: Yes     Alcohol/week: 1.5 oz     Types: 3 Standard drinks or equivalent per week   • Drug use: No        Family History:   family history includes Alzheimer's Disease in his maternal grandmother; Cancer in his maternal grandfather and paternal grandfather; Diabetes in his brother and mother; Heart Disease in his father and paternal grandmother.        PHYSICAL EXAM:   Vital signs: /70 (BP Location: Left arm, Patient Position: Sitting, BP Cuff Size: Large adult)   Pulse (!) 101   Ht 1.93 m (6' 4\")   Wt 106.5 kg (234 lb 11.2 oz)   SpO2 94%   BMI 28.57 kg/m²   GENERAL: Well-developed, well-nourished in no apparent distress.   EYE:  No ocular asymmetry, PERRLA  HENT: Pink, moist mucous membranes.     CHEST: no gynecomastia  CARDIOVASCULAR:  No murmurs  LUNGS: Clear breath sounds  ABDOMEN: Soft, nontender   GENIT: normal  EXTREMITIES: No clubbing, cyanosis, or edema.   NEUROLOGICAL: No gross focal " motor abnormalities. No visible tremor, no proximal muscle weakness   LYMPH: No cervical adenopathy palpated.   SKIN: No rashes, lesions.       Labs:  Lab Results   Component Value Date/Time    WBC 4.7 (L) 03/19/2020 09:14 AM    RBC 5.62 03/19/2020 09:14 AM    HEMOGLOBIN 17.1 08/04/2020 09:12 AM    .2 (H) 03/19/2020 09:14 AM    MCH 33.3 (H) 03/19/2020 09:14 AM    MCHC 33.2 (L) 03/19/2020 09:14 AM    RDW 50.5 (H) 03/19/2020 09:14 AM    MPV 9.9 03/19/2020 09:14 AM       Lab Results   Component Value Date/Time    SODIUM 136 08/04/2020 09:12 AM    POTASSIUM 4.4 08/04/2020 09:12 AM    CHLORIDE 102 08/04/2020 09:12 AM    CO2 21 08/04/2020 09:12 AM    ANION 13.0 08/04/2020 09:12 AM    GLUCOSE 94 08/04/2020 09:12 AM    BUN 10 08/04/2020 09:12 AM    CREATININE 0.70 08/04/2020 09:12 AM    CALCIUM 9.5 08/04/2020 09:12 AM    ASTSGOT 157 (H) 08/04/2020 09:12 AM    ALTSGPT 105 (H) 08/04/2020 09:12 AM    TBILIRUBIN 0.8 08/04/2020 09:12 AM    ALBUMIN 4.2 08/04/2020 09:12 AM    TOTPROTEIN 7.5 08/04/2020 09:12 AM    GLOBULIN 3.3 08/04/2020 09:12 AM    AGRATIO 1.3 08/04/2020 09:12 AM       No results found for: LH    Lab Results   Component Value Date/Time    FSH 4.0 03/19/2020 0914       Lab Results   Component Value Date/Time    TESTOSTERONE 68 (L) 03/19/2020 0914           Imaging: Please refer to pituitary MRI from March 2020    ASSESSMENT/PLAN:      1. Secondary adrenal insufficiency (HCC)  Improved control  Blood pressure, serum electrolytes are much better and overall he is doing much better   However he has gained weight since starting physiologic steroid replacement therapy  I am adjusting his hydrocortisone to 10 mg in the morning and 5 mg in the afternoon  Discussed stress dosing of steroids for illness  Recommend follow-up in 6 months with labs    2. Secondary hypothyroidism  Well-controlled  His TSH levels are unreliable because of radiotherapy  Free T4 levels are normal  Continue levothyroxine 175 MCG  daily  Repeat free T4 levels in 6 months    3. Secondary male hypogonadism  Improved control  Continue Testim  Reviewed the risk of androgen replacement therapy  We will check his hematocrit, and testosterone levels in 6 months recommend that he get an annual PSA and digital rectal exam per his primary care physician    4. Panhypopituitarism (HCC)  See above    5. Meningioma (HCC)  Stable  This is most likely related to effects of radiotherapy  Recommend observation and repeating an MRI in 12 months and less frequently thereafter if stable because he is asymptomatic      Return in about 6 months (around 3/2/2021).      Thank you kindly for allowing me to participate in the endocrine care plan for this patient.    Catracho Montano MD, F[facility], [facility]  03/31/20    CC:   Manjula Zaidi M.D.

## 2020-10-13 DIAGNOSIS — E03.8 SECONDARY HYPOTHYROIDISM: ICD-10-CM

## 2020-10-13 RX ORDER — LEVOTHYROXINE SODIUM 175 UG/1
175 TABLET ORAL
Qty: 90 TAB | Refills: 3 | Status: SHIPPED | OUTPATIENT
Start: 2020-10-13 | End: 2020-12-21 | Stop reason: SDUPTHER

## 2020-11-18 DIAGNOSIS — E78.5 HYPERLIPIDEMIA, UNSPECIFIED HYPERLIPIDEMIA TYPE: ICD-10-CM

## 2020-11-18 RX ORDER — ATORVASTATIN CALCIUM 40 MG/1
TABLET, FILM COATED ORAL
Qty: 100 TAB | Refills: 1 | Status: SHIPPED | OUTPATIENT
Start: 2020-11-18 | End: 2020-12-21 | Stop reason: SDUPTHER

## 2020-12-21 DIAGNOSIS — E27.49 SECONDARY ADRENAL INSUFFICIENCY (HCC): ICD-10-CM

## 2020-12-21 DIAGNOSIS — E78.5 HYPERLIPIDEMIA, UNSPECIFIED HYPERLIPIDEMIA TYPE: ICD-10-CM

## 2020-12-21 DIAGNOSIS — E03.8 SECONDARY HYPOTHYROIDISM: ICD-10-CM

## 2020-12-21 RX ORDER — LEVOTHYROXINE SODIUM 175 UG/1
175 TABLET ORAL
Qty: 90 TAB | Refills: 3 | Status: SHIPPED | OUTPATIENT
Start: 2020-12-21 | End: 2021-03-15 | Stop reason: SDUPTHER

## 2020-12-21 RX ORDER — HYDROCORTISONE 10 MG/1
TABLET ORAL
Qty: 180 TAB | Refills: 3 | Status: SHIPPED | OUTPATIENT
Start: 2020-12-21 | End: 2021-03-15 | Stop reason: SDUPTHER

## 2020-12-21 RX ORDER — ATORVASTATIN CALCIUM 40 MG/1
TABLET, FILM COATED ORAL
Qty: 100 TAB | Refills: 1 | Status: SHIPPED | OUTPATIENT
Start: 2020-12-21 | End: 2021-07-02 | Stop reason: SDUPTHER

## 2020-12-23 ENCOUNTER — TELEPHONE (OUTPATIENT)
Dept: ENDOCRINOLOGY | Facility: MEDICAL CENTER | Age: 65
End: 2020-12-23

## 2020-12-23 NOTE — TELEPHONE ENCOUNTER
Phone Number Called: [phone]    Call outcome: Spoke to patient regarding message below.    Message: Contacted patient and let him know Dr. Montano was unable to order the test because he was not a primary care doctor. I directed patient to contact PCP. Patient stated he called PCP office three times since this morning no answer. I let him know and provided the [facility] hotline [phone]. He will be calling that number to hopefully get the test. He wanted me to thank dr. Montano for his time and call back.    .

## 2020-12-23 NOTE — TELEPHONE ENCOUNTER
VOICEMAIL  1. Caller Name: Alberto Galicia                      Call Back Number: [phone]    2. Message: Patient called requeting Dr. Montano to please order a covid test for him. He has been feeling sick with fevers, cough, and diarrhea. He is concerned that he might covid and he is a diabetic.     3. Patient approves office to leave a detailed voicemail/MyChart message: yes

## 2021-02-25 ENCOUNTER — HOSPITAL ENCOUNTER (OUTPATIENT)
Dept: LAB | Facility: MEDICAL CENTER | Age: 66
End: 2021-02-25
Attending: INTERNAL MEDICINE
Payer: MEDICARE

## 2021-02-25 DIAGNOSIS — E23.0 PANHYPOPITUITARISM (HCC): ICD-10-CM

## 2021-02-25 DIAGNOSIS — E27.49 SECONDARY ADRENAL INSUFFICIENCY (HCC): ICD-10-CM

## 2021-02-25 DIAGNOSIS — D32.9 MENINGIOMA (HCC): ICD-10-CM

## 2021-02-25 DIAGNOSIS — E03.8 SECONDARY HYPOTHYROIDISM: ICD-10-CM

## 2021-02-25 DIAGNOSIS — E29.1 SECONDARY MALE HYPOGONADISM: ICD-10-CM

## 2021-02-25 LAB
ALBUMIN SERPL BCP-MCNC: 4 G/DL (ref 3.2–4.9)
ALBUMIN/GLOB SERPL: 1.1 G/DL
ALP SERPL-CCNC: 72 U/L (ref 30–99)
ALT SERPL-CCNC: 38 U/L (ref 2–50)
ANION GAP SERPL CALC-SCNC: 8 MMOL/L (ref 7–16)
AST SERPL-CCNC: 56 U/L (ref 12–45)
BILIRUB SERPL-MCNC: 0.7 MG/DL (ref 0.1–1.5)
BUN SERPL-MCNC: 8 MG/DL (ref 8–22)
CALCIUM SERPL-MCNC: 9.3 MG/DL (ref 8.5–10.5)
CHLORIDE SERPL-SCNC: 108 MMOL/L (ref 96–112)
CO2 SERPL-SCNC: 28 MMOL/L (ref 20–33)
CORTIS SERPL-MCNC: 2.8 UG/DL (ref 0–23)
CREAT SERPL-MCNC: 0.79 MG/DL (ref 0.5–1.4)
GLOBULIN SER CALC-MCNC: 3.6 G/DL (ref 1.9–3.5)
GLUCOSE SERPL-MCNC: 85 MG/DL (ref 65–99)
HCT VFR BLD AUTO: 55.4 % (ref 42–52)
HGB BLD-MCNC: 18.1 G/DL (ref 14–18)
POTASSIUM SERPL-SCNC: 4.6 MMOL/L (ref 3.6–5.5)
PROT SERPL-MCNC: 7.6 G/DL (ref 6–8.2)
PSA SERPL-MCNC: 0.17 NG/ML (ref 0–4)
SODIUM SERPL-SCNC: 144 MMOL/L (ref 135–145)
T3FREE SERPL-MCNC: 3.16 PG/ML (ref 2–4.4)
T4 FREE SERPL-MCNC: 1 NG/DL (ref 0.93–1.7)

## 2021-02-25 PROCEDURE — 84403 ASSAY OF TOTAL TESTOSTERONE: CPT

## 2021-02-25 PROCEDURE — 80053 COMPREHEN METABOLIC PANEL: CPT

## 2021-02-25 PROCEDURE — 84270 ASSAY OF SEX HORMONE GLOBUL: CPT

## 2021-02-25 PROCEDURE — 85014 HEMATOCRIT: CPT

## 2021-02-25 PROCEDURE — 82533 TOTAL CORTISOL: CPT

## 2021-02-25 PROCEDURE — 84481 FREE ASSAY (FT-3): CPT

## 2021-02-25 PROCEDURE — 85018 HEMOGLOBIN: CPT

## 2021-02-25 PROCEDURE — 84439 ASSAY OF FREE THYROXINE: CPT

## 2021-02-25 PROCEDURE — 84402 ASSAY OF FREE TESTOSTERONE: CPT

## 2021-02-25 PROCEDURE — 36415 COLL VENOUS BLD VENIPUNCTURE: CPT

## 2021-02-25 PROCEDURE — 84153 ASSAY OF PSA TOTAL: CPT

## 2021-02-26 LAB
SHBG SERPL-SCNC: 72 NMOL/L (ref 11–80)
TESTOST FREE MFR SERPL: 1.4 % (ref 1.6–2.9)
TESTOST FREE SERPL-MCNC: 166 PG/ML (ref 47–244)
TESTOST SERPL-MCNC: 1220 NG/DL (ref 300–720)

## 2021-03-03 DIAGNOSIS — Z23 NEED FOR VACCINATION: ICD-10-CM

## 2021-03-15 ENCOUNTER — OFFICE VISIT (OUTPATIENT)
Dept: ENDOCRINOLOGY | Facility: MEDICAL CENTER | Age: 66
End: 2021-03-15
Attending: INTERNAL MEDICINE
Payer: MEDICARE

## 2021-03-15 VITALS
WEIGHT: 233.4 LBS | SYSTOLIC BLOOD PRESSURE: 134 MMHG | HEIGHT: 76 IN | OXYGEN SATURATION: 94 % | HEART RATE: 115 BPM | BODY MASS INDEX: 28.42 KG/M2 | DIASTOLIC BLOOD PRESSURE: 82 MMHG

## 2021-03-15 DIAGNOSIS — D32.9 MENINGIOMA (HCC): ICD-10-CM

## 2021-03-15 DIAGNOSIS — E29.1 SECONDARY MALE HYPOGONADISM: ICD-10-CM

## 2021-03-15 DIAGNOSIS — E03.8 SECONDARY HYPOTHYROIDISM: ICD-10-CM

## 2021-03-15 DIAGNOSIS — E23.0 PANHYPOPITUITARISM (HCC): ICD-10-CM

## 2021-03-15 DIAGNOSIS — E27.49 SECONDARY ADRENAL INSUFFICIENCY (HCC): ICD-10-CM

## 2021-03-15 PROCEDURE — 99211 OFF/OP EST MAY X REQ PHY/QHP: CPT | Performed by: INTERNAL MEDICINE

## 2021-03-15 PROCEDURE — 99214 OFFICE O/P EST MOD 30 MIN: CPT | Performed by: INTERNAL MEDICINE

## 2021-03-15 RX ORDER — TESTOSTERONE GEL, 1% 10 MG/G
50 GEL TRANSDERMAL
Qty: 70 G | Refills: 5 | Status: SHIPPED | OUTPATIENT
Start: 2021-03-15 | End: 2021-04-12

## 2021-03-15 RX ORDER — HYDROCORTISONE 10 MG/1
TABLET ORAL
Qty: 180 TABLET | Refills: 3 | Status: SHIPPED | OUTPATIENT
Start: 2021-03-15 | End: 2021-09-20 | Stop reason: SDUPTHER

## 2021-03-15 RX ORDER — TESTOSTERONE GEL, 1% 10 MG/G
GEL TRANSDERMAL
COMMUNITY
Start: 2021-02-23 | End: 2021-03-15 | Stop reason: SDUPTHER

## 2021-03-15 RX ORDER — LEVOTHYROXINE SODIUM 175 UG/1
175 TABLET ORAL
Qty: 90 TABLET | Refills: 3 | Status: SHIPPED | OUTPATIENT
Start: 2021-03-15 | End: 2021-06-13

## 2021-03-15 ASSESSMENT — FIBROSIS 4 INDEX: FIB4 SCORE: 3.31

## 2021-03-15 NOTE — PROGRESS NOTES
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 male here for follow up for the above medical issue.  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    Pituitary MRI on March 23, 2020 showed no evidence of tumor recurrence  He did have an incidental 5 mm meningioma at the area of his previous pituitary surgery in the [place].  I have asked him to follow up with his pcp for his meningioma          He reports that he is doing well.  His energy is good.  He does report mild grief from his parents passing away.  He denies depression however.      He is taking levothyroxine 175 MCG daily   He reports good compliance  His free T4  is 1.0 and free T3 is 3.16 on Feb 2021  He denies constipation and cold intolerance      Last visit I reduce his hydrocortisone from 30 mg to 15 mg total because he was experiencing weight gain  He is now taking hydrocortisone 10 mg in morning and 5 mg in the afternoon.  He reports feeling great  His blood pressure today normal and electrolytes are normal      His liver enzymes that were elevated have decreased progressively.    AST is 56 and ALT is 38 on Feb 2021      Finally he is on Testim 50 mg 1 packet daily and his total testosterone improved from 68 up to 871   But his most recent total testosterone is evaded at 1220 with an elevated hematocrit of 55% on February 25, 2021  PSA was normal at 0.17        Patient's medications, allergies, and social histories were reviewed and updated as appropriate.      ROS:       CONS:     No fever, no chills   EYES:     No diplopia, no blurry vision   CV:           No chest pain, no palpitations   PULM:     No SOB, no cough, no hemoptysis.   GI:            No nausea, no vomiting, no diarrhea, no constipation   ENDO:     No polyuria, no polydipsia, no heat intolerance, no cold intolerance  "      Past Medical History:  Problem List:  2020-09: Meningioma ([facility])  2020-09: Panhypopituitarism ([facility])  2020-03: Secondary male hypogonadism  2020-03: Secondary adrenal insufficiency ([facility])  2019-11: Low testosterone in male  2019-07: Mild intermittent asthma without complication  2019-07: Eczema  2017-07: Pollen allergies  2015-06: Secondary hypothyroidism  2015-06: Hyperlipidemia  2015-06: Pituitary adenoma ([facility])  2015-06: Reactive airway disease      Past Surgical History:  Past Surgical History:   Procedure Laterality Date   • CRANIOTOMY          Allergies:  Pcn [penicillins]     Social History:  Social History     Tobacco Use   • Smoking status: Never Smoker   • Smokeless tobacco: Never Used   Substance Use Topics   • Alcohol use: Yes     Alcohol/week: 1.5 oz     Types: 3 Standard drinks or equivalent per week   • Drug use: No        Family History:   family history includes Alzheimer's Disease in his maternal grandmother; Cancer in his maternal grandfather and paternal grandfather; Diabetes in his brother and mother; Heart Disease in his father and paternal grandmother.        PHYSICAL EXAM:   Vital signs: /82 (BP Location: Left arm, Patient Position: Sitting, BP Cuff Size: Adult)   Pulse (!) 115   Ht 1.93 m (6' 4\")   Wt 106 kg (233 lb 6.4 oz)   SpO2 94%   BMI 28.41 kg/m²   GENERAL: Well-developed, well-nourished in no apparent distress.   EYE:  No ocular asymmetry, PERRLA  HENT: Pink, moist mucous membranes.     CHEST: no gynecomastia  CARDIOVASCULAR:  No murmurs  LUNGS: Clear breath sounds  ABDOMEN: Soft, nontender   GENIT: normal  EXTREMITIES: No clubbing, cyanosis, or edema.   NEUROLOGICAL: No gross focal motor abnormalities. No visible tremor, no proximal muscle weakness   LYMPH: No cervical adenopathy palpated.   SKIN: No rashes, lesions.       Labs:  Lab Results   Component Value Date/Time    WBC 4.7 (L) 03/19/2020 09:14 AM    RBC 5.62 03/19/2020 09:14 AM    HEMOGLOBIN 18.1 (H) 02/25/2021 " 08:35 AM    .2 (H) 03/19/2020 09:14 AM    MCH 33.3 (H) 03/19/2020 09:14 AM    MCHC 33.2 (L) 03/19/2020 09:14 AM    RDW 50.5 (H) 03/19/2020 09:14 AM    MPV 9.9 03/19/2020 09:14 AM       Lab Results   Component Value Date/Time    SODIUM 144 02/25/2021 08:35 AM    POTASSIUM 4.6 02/25/2021 08:35 AM    CHLORIDE 108 02/25/2021 08:35 AM    CO2 28 02/25/2021 08:35 AM    ANION 8.0 02/25/2021 08:35 AM    GLUCOSE 85 02/25/2021 08:35 AM    BUN 8 02/25/2021 08:35 AM    CREATININE 0.79 02/25/2021 08:35 AM    CALCIUM 9.3 02/25/2021 08:35 AM    ASTSGOT 56 (H) 02/25/2021 08:35 AM    ALTSGPT 38 02/25/2021 08:35 AM    TBILIRUBIN 0.7 02/25/2021 08:35 AM    ALBUMIN 4.0 02/25/2021 08:35 AM    TOTPROTEIN 7.6 02/25/2021 08:35 AM    GLOBULIN 3.6 (H) 02/25/2021 08:35 AM    AGRATIO 1.1 02/25/2021 08:35 AM       No results found for: LH    Lab Results   Component Value Date/Time    FSH 4.0 03/19/2020 0914       Lab Results   Component Value Date/Time    TESTOSTERONE 68 (L) 03/19/2020 0914           Imaging: Please refer to pituitary MRI from March 2020    ASSESSMENT/PLAN:      1. Secondary adrenal insufficiency (HCC)  Stable control  Continue hydrocortisone 10 mg in the morning and 5 mg in the afternoon  Reviewed stress dosing of steroids for illness  Plan for follow-up in 6 months with labs    2. Secondary hypothyroidism  Well-controlled  His TSH levels are unreliable because of radiotherapy for his pituitary  Free T4 levels are normal  Continue levothyroxine 175 MCG daily  We will repeat his free T4 levels in 6 months    3. Secondary male hypogonadism  Unstable  Testosterone and hematocrit levels are elevated  Adjust Testim to every other day placement instead of every day  Reviewed the risks of androgen replacement therapy  We will check his hematocrit, and testosterone levels in 6 months recommend that he get an annual PSA and digital rectal exam with his primary care physician    4. Panhypopituitarism (HCC)  See above    5.  Meningioma (HCC)  Stable  This is most likely related to effects of radiotherapy  Recommend observation and repeating an MRI in 12 months and less frequently thereafter if stable because he is asymptomatic  I recommend that he follow-up with his primary care for monitoring of this incidental meningioma      Return in about 6 months (around 9/15/2021).      Thank you kindly for allowing me to participate in the endocrine care plan for this patient.    Catracho Montano MD, [facility], [facility]  03/31/20    CC:   Manjula Zaidi M.D.

## 2021-04-21 DIAGNOSIS — E29.1 SECONDARY MALE HYPOGONADISM: ICD-10-CM

## 2021-04-21 NOTE — TELEPHONE ENCOUNTER
Received request via: Pharmacy    Was the patient seen in the last year in this department? Yes    Does the patient have an active prescription (recently filled or refills available) for medication(s) requested? No       testosterone (TESTIM/ANDROGEL) 50 MG/5GM (1%) Gel gel    Sig: APPLY 50MG (1 PACKET) OF GEL TOPICALLY TO SKIN AS DIRECTED ONCE DAILY FOR 30 DAYS

## 2021-04-28 DIAGNOSIS — E29.1 SECONDARY MALE HYPOGONADISM: ICD-10-CM

## 2021-04-28 RX ORDER — TESTOSTERONE GEL, 1% 10 MG/G
50 GEL TRANSDERMAL DAILY
Qty: 150 G | Refills: 5 | Status: SHIPPED | OUTPATIENT
Start: 2021-04-28 | End: 2021-05-28

## 2021-05-20 ENCOUNTER — PATIENT MESSAGE (OUTPATIENT)
Dept: HEALTH INFORMATION MANAGEMENT | Facility: OTHER | Age: 66
End: 2021-05-20

## 2021-05-28 RX ORDER — TESTOSTERONE GEL, 1% 10 MG/G
GEL TRANSDERMAL
Qty: 150 G | Refills: 5 | Status: SHIPPED | OUTPATIENT
Start: 2021-05-28 | End: 2021-06-27

## 2021-07-02 DIAGNOSIS — E78.5 HYPERLIPIDEMIA, UNSPECIFIED HYPERLIPIDEMIA TYPE: ICD-10-CM

## 2021-07-06 RX ORDER — ATORVASTATIN CALCIUM 40 MG/1
TABLET, FILM COATED ORAL
Qty: 100 TABLET | Refills: 1 | Status: SHIPPED | OUTPATIENT
Start: 2021-07-06 | End: 2021-07-26 | Stop reason: SDUPTHER

## 2021-07-21 ENCOUNTER — PATIENT OUTREACH (OUTPATIENT)
Dept: HEALTH INFORMATION MANAGEMENT | Facility: OTHER | Age: 66
End: 2021-07-21

## 2021-07-21 NOTE — NON-PROVIDER
Attempt #: Final  [facility] Verified: yes  Verify PCP: yes      Annual Wellness Visit Scheduling /  Comprehensive Health Assessment   1. Scheduling Status:Scheduled     Care Gap Scheduling (Attempt to Schedule EACH Overdue Care Gap!)  Health Maintenance Due   Topic Date Due   • Annual Wellness Visit  Never done   • HEPATITIS C SCREENING  Never done   • COLONOSCOPY  Never done   • IMM ZOSTER VACCINES (1 of 2) Never done   • IMM DTaP/Tdap/Td Vaccine (2 - Td or Tdap) 07/12/2021         MyChart Activation: already active

## 2021-07-26 ENCOUNTER — OFFICE VISIT (OUTPATIENT)
Dept: MEDICAL GROUP | Facility: MEDICAL CENTER | Age: 66
End: 2021-07-26
Payer: MEDICARE

## 2021-07-26 VITALS
RESPIRATION RATE: 16 BRPM | TEMPERATURE: 97.4 F | HEART RATE: 90 BPM | HEIGHT: 76 IN | OXYGEN SATURATION: 94 % | BODY MASS INDEX: 28.49 KG/M2 | WEIGHT: 234 LBS | DIASTOLIC BLOOD PRESSURE: 72 MMHG | SYSTOLIC BLOOD PRESSURE: 110 MMHG

## 2021-07-26 DIAGNOSIS — J45.20 MILD INTERMITTENT ASTHMA WITHOUT COMPLICATION: ICD-10-CM

## 2021-07-26 DIAGNOSIS — E29.1 SECONDARY MALE HYPOGONADISM: ICD-10-CM

## 2021-07-26 DIAGNOSIS — E78.5 HYPERLIPIDEMIA, UNSPECIFIED HYPERLIPIDEMIA TYPE: ICD-10-CM

## 2021-07-26 DIAGNOSIS — Z00.00 MEDICARE ANNUAL WELLNESS VISIT, INITIAL: ICD-10-CM

## 2021-07-26 DIAGNOSIS — E23.0 PANHYPOPITUITARISM (HCC): ICD-10-CM

## 2021-07-26 DIAGNOSIS — Z12.11 COLON CANCER SCREENING: ICD-10-CM

## 2021-07-26 DIAGNOSIS — L30.9 ECZEMA, UNSPECIFIED TYPE: ICD-10-CM

## 2021-07-26 DIAGNOSIS — E03.8 SECONDARY HYPOTHYROIDISM: ICD-10-CM

## 2021-07-26 DIAGNOSIS — Z23 NEED FOR VACCINATION: ICD-10-CM

## 2021-07-26 DIAGNOSIS — D35.2 PITUITARY ADENOMA (HCC): ICD-10-CM

## 2021-07-26 DIAGNOSIS — E27.49 SECONDARY ADRENAL INSUFFICIENCY (HCC): ICD-10-CM

## 2021-07-26 PROCEDURE — 90715 TDAP VACCINE 7 YRS/> IM: CPT | Performed by: FAMILY MEDICINE

## 2021-07-26 PROCEDURE — G0438 PPPS, INITIAL VISIT: HCPCS | Performed by: FAMILY MEDICINE

## 2021-07-26 PROCEDURE — 90472 IMMUNIZATION ADMIN EACH ADD: CPT | Performed by: FAMILY MEDICINE

## 2021-07-26 PROCEDURE — 90471 IMMUNIZATION ADMIN: CPT | Performed by: FAMILY MEDICINE

## 2021-07-26 PROCEDURE — 90750 HZV VACC RECOMBINANT IM: CPT | Performed by: FAMILY MEDICINE

## 2021-07-26 RX ORDER — LEVOTHYROXINE SODIUM 175 UG/1
175 TABLET ORAL
COMMUNITY
End: 2021-09-20 | Stop reason: SDUPTHER

## 2021-07-26 RX ORDER — ALBUTEROL SULFATE 90 UG/1
2 AEROSOL, METERED RESPIRATORY (INHALATION) EVERY 4 HOURS PRN
Qty: 1 EACH | Refills: 3 | Status: SHIPPED | OUTPATIENT
Start: 2021-07-26 | End: 2022-01-05 | Stop reason: SDUPTHER

## 2021-07-26 RX ORDER — TESTOSTERONE GEL, 1% 10 MG/G
GEL TRANSDERMAL
COMMUNITY
Start: 2021-07-19 | End: 2021-09-20 | Stop reason: SDUPTHER

## 2021-07-26 RX ORDER — ATORVASTATIN CALCIUM 40 MG/1
TABLET, FILM COATED ORAL
Qty: 100 TABLET | Refills: 1 | Status: SHIPPED | OUTPATIENT
Start: 2021-07-26 | End: 2022-05-16

## 2021-07-26 RX ORDER — TRIAMCINOLONE ACETONIDE 1 MG/G
1 CREAM TOPICAL 2 TIMES DAILY
Qty: 80 G | Refills: 2 | Status: SHIPPED | OUTPATIENT
Start: 2021-07-26 | End: 2022-11-28

## 2021-07-26 ASSESSMENT — ACTIVITIES OF DAILY LIVING (ADL): BATHING_REQUIRES_ASSISTANCE: 0

## 2021-07-26 ASSESSMENT — PATIENT HEALTH QUESTIONNAIRE - PHQ9: CLINICAL INTERPRETATION OF PHQ2 SCORE: 0

## 2021-07-26 ASSESSMENT — ENCOUNTER SYMPTOMS: GENERAL WELL-BEING: FAIR

## 2021-07-26 ASSESSMENT — FIBROSIS 4 INDEX: FIB4 SCORE: 3.36

## 2021-07-26 NOTE — PROGRESS NOTES
Chief Complaint   Patient presents with   • Annual Exam       HPI:  Alberto Galicia is a 66 y.o. here for Medicare Annual Wellness Visit     Medicare annual wellness visit, initial  Preventive measures and chronic medical issues reviewed.    Panhypopituitarism (HCC)/ Pituitary adenoma (HCC)/secondary hypothyroidism/secondary adrenal insufficiency/secondary hypothyroidism  Patient underwent radiation of the pituitary adenoma.  Afterwards she developed panhypopituitarism.  Has been doing fine on replacement therapy(testosterone, levothyroxine, hydrocortisone).  He has been following up with endocrinologist.    Mild intermittent asthma without complication  Has been symptomatic because there is a lot of smoke in the air.  He has not been taking his albuterol because of the cost.  However now he has senior care plus insurance will help with that.  So medication refilled for him.  Patient also has been on hydrocortisone replacement therapy for adrenal insufficiency which has been helping his asthma episodes.    Hyperlipidemia, unspecified hyperlipidemia type  He has been tolerating the statin. Denies muscle pain LFTs has been normal, currently on atorvastatin 40 mg daily.  Needs medication refill    Eczema, unspecified type  Has been helpful using over-the-counter hydrocortisone cream, has been helping a little bit.    Patient does not have anyone to take him home after colonoscopy.  So he decided to do fit test.  Patient is due for Tdap vaccine and shingles vaccine.    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     • testosterone (TESTIM/ANDROGEL) 50 MG/5GM (1%) Gel gel APPLY 1 PACKET OF GEL TOPICALLY TO SKIN ONCE DAILY FOR 30 DAYS     • atorvastatin (LIPITOR) 40 MG Tab TAKE ONE TABLET BY MOUTH ONCE DAILY 100 tablet 1   • hydrocortisone (CORTEF) 10 MG Tab Take 1 pill in the morning and 1/2 in the afternoon 90 day supply 180 tablet 3     No current facility-administered medications for this visit.          Current supplements as per medication list.     Allergies: Pcn [penicillins]    Current social contact/activities:      He  reports that he has never smoked. He has never used smokeless tobacco. He reports current alcohol use of about 1.5 oz of alcohol per week. He reports that he does not use drugs.  Counseling given: Not Answered      DPA/Advanced Directive:  Patient does not have an Advanced Directive.  A packet and workshop information was given on Advanced Directives.    ROS:    Gait: Uses no assistive device  Ostomy: No  Other tubes: No  Amputations: No  Chronic oxygen use: No  Last eye exam: N/A  Wears hearing aids: No   : Denies any urinary leakage during the last 6 months    Screening:    Depression Screening    Little interest or pleasure in doing things?  0 - not at all  Feeling down, depressed , or hopeless? 0 - not at all  Patient Health Questionnaire Score: 0     If depressive symptoms identified deferred to follow up visit unless specifically addressed in assessment and plan.    Interpretation of PHQ-9 Total Score   Score Severity   1-4 No Depression   5-9 Mild Depression   10-14 Moderate Depression   15-19 Moderately Severe Depression   20-27 Severe Depression    Screening for Cognitive Impairment    Three Minute Recall  (captain, garden, picture) 3/3    Carlos clock face with all 12 numbers and set the hands to show 5 past 8.  Yes    Cognitive concerns identified deferred for follow up unless specifically addressed in assessment and plan.    Fall Risk Assessment    Has the patient had two or more falls in the last year or any fall with injury in the last year?  No    Safety Assessment    Throw rugs on floor.  Yes  Handrails on all stairs.  No  Good lighting in all hallways.  Yes  Difficulty hearing.  No  Patient counseled about all safety risks that were identified.    Functional Assessment ADLs    Are there any barriers preventing you from cooking for yourself or meeting nutritional needs?  No.    Are there any barriers preventing you from driving safely or obtaining transportation?  No.    Are there any barriers preventing you from using a telephone or calling for help?  No.    Are there any barriers preventing you from shopping?  No.    Are there any barriers preventing you from taking care of your own finances?  No.    Are there any barriers preventing you from managing your medications?  No.    Are there any barriers preventing you from showering, bathing or dressing yourself?  No.    Are you currently engaging in any exercise or physical activity?  No.     What is your perception of your health?  Fair.      Health Maintenance Summary                Annual Wellness Visit Overdue 1955     HEPATITIS C SCREENING Overdue 1955     COLONOSCOPY Overdue 5/10/2004     IMM ZOSTER VACCINES Overdue 5/10/2004     IMM DTaP/Tdap/Td Vaccine Overdue 7/12/2021      Done 7/12/2011 Imm Admin: Tdap Vaccine    IMM INFLUENZA Next Due 9/1/2021      Done 10/21/2020 Imm Admin: Influenza Vaccine Quad Recombinant     Patient has more history with this topic...          Patient Care Team:  Manjula Zaidi M.D. as PCP - General (Geriatrics)  Mikaela Manzanares as          Social History     Tobacco Use   •  Smoking status: Never Smoker   • Smokeless tobacco: Never Used   Vaping Use   • Vaping Use: Never used   Substance Use Topics   • Alcohol use: Yes     Alcohol/week: 1.5 oz     Types: 3 Standard drinks or equivalent per week   • Drug use: No     Family History   Problem Relation Age of Onset   • Diabetes Mother    • Heart Disease Father    • Diabetes Brother    • Alzheimer's Disease Maternal Grandmother    • Cancer Maternal Grandfather    • Heart Disease Paternal Grandmother    • Cancer Paternal Grandfather      He  has a past medical history of Asthma, Hyperlipidemia, Pituitary disease (HCC), and Thyroid disease.   Past Surgical History:   Procedure Laterality Date   • CRANIOTOMY         Exam:   There were no vitals taken for this visit. There is no height or weight on file to calculate BMI.    Hearing excellent.    Dentition good  Alert, oriented in no acute distress.  Eye contact is good, speech goal directed, affect calm    Assessment and Plan. The following treatment and monitoring plan is recommended:    66 y.o. male     1. Medicare annual wellness visit, initial  Preventive measures and chronic medical issues reviewed.    - Initial Annual Wellness Visit - Includes PPPS ()    2. Panhypopituitarism (HCC)/ Pituitary adenoma (HCC)  Status post radiation of the pituitary adenoma  On replacement therapy(testosterone, levothyroxine, hydrocortisone)    - Initial Annual Wellness Visit - Includes PPPS ()    3. Secondary male hypogonadism  Stable.  On testosterone  Follow-up with endocrinology    - Initial Annual Wellness Visit - Includes PPPS ()    4. Secondary adrenal insufficiency (HCC)  Stable.  On hydrocortisone 10 mg in the morning, 5 mg in the evening  Continue follow-up with endocrinology    - Initial Annual Wellness Visit - Includes PPPS ()    5. Secondary hypothyroidism  Stable.  Continue levothyroxine replacement therapy  Continue to follow endocrinology    - Initial Annual Wellness Visit -  Includes PPPS ()    6. Pituitary adenoma (HCC)  Status post radiation therapy.  Developed panhypopituitarism  Replacement therapy.    - Initial Annual Wellness Visit - Includes PPPS ()    7. Mild intermittent asthma without complication  Has been symptomatic because of the smoke.  Will start patient on albuterol as needed.  Hydrocortisone for adrenal insufficiency has been helping.    - Initial Annual Wellness Visit - Includes PPPS ()  - albuterol 108 (90 Base) MCG/ACT Aero Soln inhalation aerosol; Inhale 2 Puffs every four hours as needed for Shortness of Breath.  Dispense: 1 Each; Refill: 3    8. Hyperlipidemia, unspecified hyperlipidemia type  He has been tolerating the statin. Denies muscle pain LFTs has been normal  Continue atorvastatin 40 mg daily    - atorvastatin (LIPITOR) 40 MG Tab; TAKE ONE TABLET BY MOUTH ONCE DAILY  Dispense: 100 tablet; Refill: 1  - Lipid Profile; Future    9. Eczema, unspecified type  Has been helpful using over-the-counter hydrocortisone cream, has been helping a little bit.  Advised to take Kenalog cream    - triamcinolone acetonide (KENALOG) 0.1 % Cream; Apply 1 Application topically 2 times a day.  Dispense: 80 g; Refill: 2    10. Colon cancer screening  Patient does not have anyone to take him home after colonoscopy.  So he decided to do fit test.    - OCCULT BLOOD FECES IMMUNOASSAY; Future    11. Need for vaccination  Shingles patient was and Tdap given today.  - Shingles Vaccine (Shingrix)  - Tdap =>8yo IM        Services suggested: No services needed at this time  Health Care Screening: Age-appropriate preventive services recommended by USPTF and ACIP covered by Medicare were discussed today. Services ordered if indicated and agreed upon by the patient.  Referrals offered: Community-based lifestyle interventions to reduce health risks and promote self-management and wellness, fall prevention, nutrition, physical activity, tobacco-use cessation, weight loss, and  mental health services as per orders if indicated.    Discussion today about general wellness and lifestyle habits:    · Prevent falls and reduce trip hazards; Cautioned about securing or removing rugs.  · Have a working fire alarm and carbon monoxide detector;   · Engage in regular physical activity and social activities     Follow-up: No follow-ups on file.

## 2021-07-27 PROBLEM — J30.2 SEASONAL ALLERGIES: Status: ACTIVE | Noted: 2021-07-27

## 2021-07-27 PROBLEM — D75.89 MACROCYTOSIS: Status: ACTIVE | Noted: 2021-07-27

## 2021-08-06 ENCOUNTER — HOSPITAL ENCOUNTER (OUTPATIENT)
Dept: LAB | Facility: MEDICAL CENTER | Age: 66
End: 2021-08-06
Attending: FAMILY MEDICINE
Payer: MEDICARE

## 2021-08-06 DIAGNOSIS — E78.5 HYPERLIPIDEMIA, UNSPECIFIED HYPERLIPIDEMIA TYPE: ICD-10-CM

## 2021-08-06 LAB
CHOLEST SERPL-MCNC: 229 MG/DL (ref 100–199)
FASTING STATUS PATIENT QL REPORTED: NORMAL
HDLC SERPL-MCNC: 46 MG/DL
LDLC SERPL CALC-MCNC: 159 MG/DL
TRIGL SERPL-MCNC: 119 MG/DL (ref 0–149)

## 2021-08-06 PROCEDURE — 80061 LIPID PANEL: CPT

## 2021-08-06 PROCEDURE — 36415 COLL VENOUS BLD VENIPUNCTURE: CPT

## 2021-09-13 ENCOUNTER — HOSPITAL ENCOUNTER (OUTPATIENT)
Dept: LAB | Facility: MEDICAL CENTER | Age: 66
End: 2021-09-13
Attending: INTERNAL MEDICINE
Payer: MEDICARE

## 2021-09-13 DIAGNOSIS — E29.1 SECONDARY MALE HYPOGONADISM: ICD-10-CM

## 2021-09-13 DIAGNOSIS — E27.49 SECONDARY ADRENAL INSUFFICIENCY (HCC): ICD-10-CM

## 2021-09-13 DIAGNOSIS — E23.0 PANHYPOPITUITARISM (HCC): ICD-10-CM

## 2021-09-13 DIAGNOSIS — E03.8 SECONDARY HYPOTHYROIDISM: ICD-10-CM

## 2021-09-13 LAB
ALBUMIN SERPL BCP-MCNC: 4.1 G/DL (ref 3.2–4.9)
ALBUMIN/GLOB SERPL: 1.1 G/DL
ALP SERPL-CCNC: 115 U/L (ref 30–99)
ALT SERPL-CCNC: 48 U/L (ref 2–50)
ANION GAP SERPL CALC-SCNC: 10 MMOL/L (ref 7–16)
AST SERPL-CCNC: 79 U/L (ref 12–45)
BILIRUB SERPL-MCNC: 0.7 MG/DL (ref 0.1–1.5)
BUN SERPL-MCNC: 6 MG/DL (ref 8–22)
CALCIUM SERPL-MCNC: 9.3 MG/DL (ref 8.5–10.5)
CHLORIDE SERPL-SCNC: 102 MMOL/L (ref 96–112)
CO2 SERPL-SCNC: 27 MMOL/L (ref 20–33)
CREAT SERPL-MCNC: 0.68 MG/DL (ref 0.5–1.4)
GLOBULIN SER CALC-MCNC: 3.7 G/DL (ref 1.9–3.5)
GLUCOSE SERPL-MCNC: 92 MG/DL (ref 65–99)
HCT VFR BLD AUTO: 50.3 % (ref 42–52)
HGB BLD-MCNC: 17.1 G/DL (ref 14–18)
POTASSIUM SERPL-SCNC: 4 MMOL/L (ref 3.6–5.5)
PROT SERPL-MCNC: 7.8 G/DL (ref 6–8.2)
SODIUM SERPL-SCNC: 139 MMOL/L (ref 135–145)
T3FREE SERPL-MCNC: 2.43 PG/ML (ref 2–4.4)
T4 FREE SERPL-MCNC: 1.06 NG/DL (ref 0.93–1.7)

## 2021-09-13 PROCEDURE — 84270 ASSAY OF SEX HORMONE GLOBUL: CPT

## 2021-09-13 PROCEDURE — 84439 ASSAY OF FREE THYROXINE: CPT

## 2021-09-13 PROCEDURE — 85018 HEMOGLOBIN: CPT

## 2021-09-13 PROCEDURE — 36415 COLL VENOUS BLD VENIPUNCTURE: CPT

## 2021-09-13 PROCEDURE — 80053 COMPREHEN METABOLIC PANEL: CPT

## 2021-09-13 PROCEDURE — 85014 HEMATOCRIT: CPT

## 2021-09-13 PROCEDURE — 84403 ASSAY OF TOTAL TESTOSTERONE: CPT

## 2021-09-13 PROCEDURE — 84481 FREE ASSAY (FT-3): CPT

## 2021-09-13 PROCEDURE — 84402 ASSAY OF FREE TESTOSTERONE: CPT

## 2021-09-14 LAB
SHBG SERPL-SCNC: 75 NMOL/L (ref 11–80)
TESTOST FREE MFR SERPL: 1.1 % (ref 1.6–2.9)
TESTOST FREE SERPL-MCNC: 49 PG/ML (ref 47–244)
TESTOST SERPL-MCNC: 451 NG/DL (ref 300–720)

## 2021-09-20 ENCOUNTER — OFFICE VISIT (OUTPATIENT)
Dept: ENDOCRINOLOGY | Facility: MEDICAL CENTER | Age: 66
End: 2021-09-20
Attending: INTERNAL MEDICINE
Payer: MEDICARE

## 2021-09-20 VITALS
DIASTOLIC BLOOD PRESSURE: 82 MMHG | SYSTOLIC BLOOD PRESSURE: 130 MMHG | HEIGHT: 78 IN | OXYGEN SATURATION: 90 % | HEART RATE: 91 BPM | WEIGHT: 228 LBS | BODY MASS INDEX: 26.38 KG/M2

## 2021-09-20 DIAGNOSIS — E27.49 SECONDARY ADRENAL INSUFFICIENCY (HCC): ICD-10-CM

## 2021-09-20 DIAGNOSIS — E29.1 SECONDARY MALE HYPOGONADISM: ICD-10-CM

## 2021-09-20 DIAGNOSIS — E03.8 SECONDARY HYPOTHYROIDISM: ICD-10-CM

## 2021-09-20 DIAGNOSIS — E23.0 PANHYPOPITUITARISM (HCC): ICD-10-CM

## 2021-09-20 PROCEDURE — 99211 OFF/OP EST MAY X REQ PHY/QHP: CPT | Performed by: INTERNAL MEDICINE

## 2021-09-20 PROCEDURE — 99214 OFFICE O/P EST MOD 30 MIN: CPT | Performed by: INTERNAL MEDICINE

## 2021-09-20 RX ORDER — HYDROCORTISONE 10 MG/1
TABLET ORAL
Qty: 180 TABLET | Refills: 3 | Status: SHIPPED | OUTPATIENT
Start: 2021-09-20 | End: 2022-04-04 | Stop reason: SDUPTHER

## 2021-09-20 RX ORDER — LEVOTHYROXINE SODIUM 175 UG/1
175 TABLET ORAL
Qty: 90 TABLET | Refills: 3 | Status: SHIPPED | OUTPATIENT
Start: 2021-09-20 | End: 2022-01-03 | Stop reason: SDUPTHER

## 2021-09-20 RX ORDER — TESTOSTERONE GEL, 1% 10 MG/G
50 GEL TRANSDERMAL DAILY
Qty: 150 G | Refills: 5 | Status: SHIPPED | OUTPATIENT
Start: 2021-09-20 | End: 2021-10-20

## 2021-09-20 ASSESSMENT — FIBROSIS 4 INDEX: FIB4 SCORE: 4.22

## 2021-09-20 NOTE — PROGRESS NOTES
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 male here for follow up for the above medical issue.  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    Pituitary MRI on March 23, 2020 showed no evidence of tumor recurrence  He did have an incidental 5 mm meningioma at the area of his previous pituitary surgery in the sella.  I have asked him to follow up with his pcp for his meningioma        For his secondary hypothyroidism,  He is taking levothyroxine 175 MCG daily   He reports good compliance  His free T4  is 1.0 is normal on 9/13/2021  He reports that he is doing well.  His energy is good.    He denies constipation and cold intolerance      For his secondary adrenal insufficiency  He is now taking hydrocortisone 10 mg in morning and 5 mg in the afternoon.  He reports feeling great  His blood pressure today normal and electrolytes are normal  His weight is stable and he doesn't look cushingoid       For his secondary hypogonadism  He is on Testim 50 mg 1 packet every other day and   His total testosterone improved and dropped from 1220 to 451   HCT is normal at 50% on 9/13/2021      Incidentally I noted that his LDL cholesterol is elevated and total cholesterol is elevated on August 2021 but he reports that when he got these labs drawn he was out of medication for 3 weeks        Patient's medications, allergies, and social histories were reviewed and updated as appropriate.      ROS:       CONS:     No fever, no chills   EYES:     No diplopia, no blurry vision   CV:           No chest pain, no palpitations   PULM:     No SOB, no cough, no hemoptysis.   GI:            No nausea, no vomiting, no diarrhea, no constipation   ENDO:     No polyuria, no polydipsia, no heat intolerance, no cold intolerance       Past Medical History:  Problem  "List:  2021-07: Macrocytosis  2021-07: Seasonal allergies  2020-09: Meningioma ([facility])  2020-09: Panhypopituitarism ([facility])  2020-03: Secondary male hypogonadism  2020-03: Secondary adrenal insufficiency ([facility])  2019-11: Elevated liver function tests  2019-07: Mild intermittent asthma without complication  2019-07: Eczema  2017-07: Pollen allergies  2015-06: Secondary hypothyroidism  2015-06: Hyperlipidemia  2015-06: Pituitary adenoma ([facility])  2015-06: Reactive airway disease  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[facility])      Past Surgical History:  Past Surgical History:   Procedure Laterality Date   • CRANIOTOMY          Allergies:  Pcn [penicillins]     Social History:  Social History     Tobacco Use   • Smoking status: Never Smoker   • Smokeless tobacco: Never Used   Vaping Use   • Vaping Use: Never used   Substance Use Topics   • Alcohol use: Yes     Alcohol/week: 1.5 oz     Types: 3 Standard drinks or equivalent per week   • Drug use: No        Family History:   family history includes Alzheimer's Disease in his maternal grandmother; Cancer in his maternal grandfather and paternal grandfather; Diabetes in his brother and mother; Heart Disease in his father and paternal grandmother.        PHYSICAL EXAM:   Vital signs: /82 (BP Location: Left arm, Patient Position: Sitting, BP Cuff Size: Adult)   Pulse 91   Ht 2.019 m (6' 7.5\")   Wt 103 kg (228 lb)   SpO2 90%   BMI 25.36 kg/m²   GENERAL: Well-developed, well-nourished in no apparent distress.   EYE:  No ocular asymmetry, PERRLA  HENT: Pink, moist mucous membranes.     CHEST: no gynecomastia  CARDIOVASCULAR:  No murmurs  LUNGS: Clear breath sounds  ABDOMEN: Soft, nontender   GENIT: normal  EXTREMITIES: No clubbing, cyanosis, or edema.   NEUROLOGICAL: No gross focal motor abnormalities. No visible tremor, no proximal muscle weakness   LYMPH: No cervical adenopathy palpated.   SKIN: No rashes, lesions.       Labs:  Lab Results   Component Value Date/Time    WBC " 4.7 (L) 03/19/2020 09:14 AM    RBC 5.62 03/19/2020 09:14 AM    HEMOGLOBIN 17.1 09/13/2021 08:15 AM    .2 (H) 03/19/2020 09:14 AM    MCH 33.3 (H) 03/19/2020 09:14 AM    MCHC 33.2 (L) 03/19/2020 09:14 AM    RDW 50.5 (H) 03/19/2020 09:14 AM    MPV 9.9 03/19/2020 09:14 AM       Lab Results   Component Value Date/Time    SODIUM 139 09/13/2021 08:15 AM    POTASSIUM 4.0 09/13/2021 08:15 AM    CHLORIDE 102 09/13/2021 08:15 AM    CO2 27 09/13/2021 08:15 AM    ANION 10.0 09/13/2021 08:15 AM    GLUCOSE 92 09/13/2021 08:15 AM    BUN 6 (L) 09/13/2021 08:15 AM    CREATININE 0.68 09/13/2021 08:15 AM    CALCIUM 9.3 09/13/2021 08:15 AM    ASTSGOT 79 (H) 09/13/2021 08:15 AM    ALTSGPT 48 09/13/2021 08:15 AM    TBILIRUBIN 0.7 09/13/2021 08:15 AM    ALBUMIN 4.1 09/13/2021 08:15 AM    TOTPROTEIN 7.8 09/13/2021 08:15 AM    GLOBULIN 3.7 (H) 09/13/2021 08:15 AM    AGRATIO 1.1 09/13/2021 08:15 AM       No results found for: LH    Lab Results   Component Value Date/Time    FSH 4.0 03/19/2020 0914       Lab Results   Component Value Date/Time    TESTOSTERONE 68 (L) 03/19/2020 0914           Imaging: Please refer to pituitary MRI from March 2020    ASSESSMENT/PLAN:      1. Secondary adrenal insufficiency (HCC)  Stable control  Continue hydrocortisone 10 mg in the morning and 5 mg in the afternoon  Reviewed stress dosing of steroids for illness  Plan for follow-up in 6 months with labs    2. Secondary hypothyroidism  Well-controlled  His TSH levels are unreliable because of radiotherapy for his pituitary and should not be utilized for monitoring response to therapy  His free T4 and free T3 levels are normal  Continue levothyroxine 175 MCG daily  We will repeat his free T4 levels in 6 months    3. Secondary male hypogonadism  Controlled   Continue Testim 50 mg per 5 g 1% gel  every other day   Reviewed the risks of androgen replacement therapy  We will check his hematocrit, and testosterone levels in 6 months Recommend that he get an  annual PSA and digital rectal exam with his primary care physician    4. Panhypopituitarism (HCC)  See above      Return in about 6 months (around 3/20/2022).      Thank you kindly for allowing me to participate in the endocrine care plan for this patient.    Catracho Montano MD, [facility], [facility]  03/31/20    CC:   Manjula Zaidi M.D.

## 2021-10-01 ENCOUNTER — PATIENT MESSAGE (OUTPATIENT)
Dept: HEALTH INFORMATION MANAGEMENT | Facility: OTHER | Age: 66
End: 2021-10-01

## 2021-11-29 ENCOUNTER — APPOINTMENT (OUTPATIENT)
Dept: RADIOLOGY | Facility: MEDICAL CENTER | Age: 66
End: 2021-11-29
Attending: EMERGENCY MEDICINE
Payer: MEDICARE

## 2021-11-29 ENCOUNTER — HOSPITAL ENCOUNTER (EMERGENCY)
Facility: MEDICAL CENTER | Age: 66
End: 2021-11-29
Attending: EMERGENCY MEDICINE
Payer: MEDICARE

## 2021-11-29 VITALS
SYSTOLIC BLOOD PRESSURE: 125 MMHG | RESPIRATION RATE: 18 BRPM | TEMPERATURE: 97.1 F | BODY MASS INDEX: 27.54 KG/M2 | OXYGEN SATURATION: 95 % | HEIGHT: 76 IN | DIASTOLIC BLOOD PRESSURE: 76 MMHG | WEIGHT: 226.19 LBS | HEART RATE: 88 BPM

## 2021-11-29 DIAGNOSIS — J45.901 ASTHMA WITH ACUTE EXACERBATION, UNSPECIFIED ASTHMA SEVERITY, UNSPECIFIED WHETHER PERSISTENT: ICD-10-CM

## 2021-11-29 LAB
ALBUMIN SERPL BCP-MCNC: 4.4 G/DL (ref 3.2–4.9)
ALBUMIN/GLOB SERPL: 1.3 G/DL
ALP SERPL-CCNC: 105 U/L (ref 30–99)
ALT SERPL-CCNC: 62 U/L (ref 2–50)
ANION GAP SERPL CALC-SCNC: 13 MMOL/L (ref 7–16)
AST SERPL-CCNC: 119 U/L (ref 12–45)
BASOPHILS # BLD AUTO: 1 % (ref 0–1.8)
BASOPHILS # BLD: 0.06 K/UL (ref 0–0.12)
BILIRUB SERPL-MCNC: 0.7 MG/DL (ref 0.1–1.5)
BUN SERPL-MCNC: 5 MG/DL (ref 8–22)
CALCIUM SERPL-MCNC: 10 MG/DL (ref 8.5–10.5)
CHLORIDE SERPL-SCNC: 94 MMOL/L (ref 96–112)
CO2 SERPL-SCNC: 23 MMOL/L (ref 20–33)
CREAT SERPL-MCNC: 0.65 MG/DL (ref 0.5–1.4)
EKG IMPRESSION: NORMAL
EOSINOPHIL # BLD AUTO: 0.8 K/UL (ref 0–0.51)
EOSINOPHIL NFR BLD: 13.4 % (ref 0–6.9)
ERYTHROCYTE [DISTWIDTH] IN BLOOD BY AUTOMATED COUNT: 46.3 FL (ref 35.9–50)
GLOBULIN SER CALC-MCNC: 3.3 G/DL (ref 1.9–3.5)
GLUCOSE SERPL-MCNC: 91 MG/DL (ref 65–99)
HCT VFR BLD AUTO: 50.4 % (ref 42–52)
HGB BLD-MCNC: 17.7 G/DL (ref 14–18)
IMM GRANULOCYTES # BLD AUTO: 0.01 K/UL (ref 0–0.11)
IMM GRANULOCYTES NFR BLD AUTO: 0.2 % (ref 0–0.9)
LYMPHOCYTES # BLD AUTO: 1.97 K/UL (ref 1–4.8)
LYMPHOCYTES NFR BLD: 33.1 % (ref 22–41)
MCH RBC QN AUTO: 33.6 PG (ref 27–33)
MCHC RBC AUTO-ENTMCNC: 35.1 G/DL (ref 33.7–35.3)
MCV RBC AUTO: 95.6 FL (ref 81.4–97.8)
MONOCYTES # BLD AUTO: 0.56 K/UL (ref 0–0.85)
MONOCYTES NFR BLD AUTO: 9.4 % (ref 0–13.4)
NEUTROPHILS # BLD AUTO: 2.55 K/UL (ref 1.82–7.42)
NEUTROPHILS NFR BLD: 42.9 % (ref 44–72)
NRBC # BLD AUTO: 0 K/UL
NRBC BLD-RTO: 0 /100 WBC
NT-PROBNP SERPL IA-MCNC: 33 PG/ML (ref 0–125)
PLATELET # BLD AUTO: 276 K/UL (ref 164–446)
PMV BLD AUTO: 8.9 FL (ref 9–12.9)
POTASSIUM SERPL-SCNC: 4.3 MMOL/L (ref 3.6–5.5)
PROT SERPL-MCNC: 7.7 G/DL (ref 6–8.2)
RBC # BLD AUTO: 5.27 M/UL (ref 4.7–6.1)
SODIUM SERPL-SCNC: 130 MMOL/L (ref 135–145)
TROPONIN T SERPL-MCNC: <6 NG/L (ref 6–19)
WBC # BLD AUTO: 6 K/UL (ref 4.8–10.8)

## 2021-11-29 PROCEDURE — 71045 X-RAY EXAM CHEST 1 VIEW: CPT

## 2021-11-29 PROCEDURE — 93005 ELECTROCARDIOGRAM TRACING: CPT | Performed by: EMERGENCY MEDICINE

## 2021-11-29 PROCEDURE — 99284 EMERGENCY DEPT VISIT MOD MDM: CPT

## 2021-11-29 PROCEDURE — 700101 HCHG RX REV CODE 250: Performed by: EMERGENCY MEDICINE

## 2021-11-29 PROCEDURE — 93005 ELECTROCARDIOGRAM TRACING: CPT

## 2021-11-29 PROCEDURE — 94640 AIRWAY INHALATION TREATMENT: CPT

## 2021-11-29 PROCEDURE — 83880 ASSAY OF NATRIURETIC PEPTIDE: CPT

## 2021-11-29 PROCEDURE — 84484 ASSAY OF TROPONIN QUANT: CPT

## 2021-11-29 PROCEDURE — 700111 HCHG RX REV CODE 636 W/ 250 OVERRIDE (IP): Performed by: EMERGENCY MEDICINE

## 2021-11-29 PROCEDURE — 85025 COMPLETE CBC W/AUTO DIFF WBC: CPT

## 2021-11-29 PROCEDURE — 80053 COMPREHEN METABOLIC PANEL: CPT

## 2021-11-29 RX ORDER — IPRATROPIUM BROMIDE AND ALBUTEROL SULFATE 2.5; .5 MG/3ML; MG/3ML
3 SOLUTION RESPIRATORY (INHALATION) ONCE
Status: COMPLETED | OUTPATIENT
Start: 2021-11-29 | End: 2021-11-29

## 2021-11-29 RX ORDER — PREDNISONE 20 MG/1
60 TABLET ORAL DAILY
Qty: 15 TABLET | Refills: 0 | Status: SHIPPED | OUTPATIENT
Start: 2021-11-29 | End: 2021-12-04

## 2021-11-29 RX ORDER — PREDNISONE 20 MG/1
60 TABLET ORAL DAILY
Status: DISCONTINUED | OUTPATIENT
Start: 2021-11-29 | End: 2021-11-29 | Stop reason: HOSPADM

## 2021-11-29 RX ORDER — ALBUTEROL SULFATE 90 UG/1
2 AEROSOL, METERED RESPIRATORY (INHALATION) EVERY 6 HOURS PRN
Qty: 8.5 G | Refills: 0 | Status: SHIPPED | OUTPATIENT
Start: 2021-11-29 | End: 2022-04-21 | Stop reason: SDUPTHER

## 2021-11-29 RX ADMIN — IPRATROPIUM BROMIDE AND ALBUTEROL SULFATE 3 ML: .5; 3 SOLUTION RESPIRATORY (INHALATION) at 17:37

## 2021-11-29 RX ADMIN — IPRATROPIUM BROMIDE AND ALBUTEROL SULFATE 3 ML: .5; 2.5 SOLUTION RESPIRATORY (INHALATION) at 16:31

## 2021-11-29 RX ADMIN — PREDNISONE 60 MG: 20 TABLET ORAL at 16:48

## 2021-11-29 ASSESSMENT — FIBROSIS 4 INDEX: FIB4 SCORE: 4.22

## 2021-11-29 NOTE — ED NOTES
Patient states that it is really hard to breathe.  Explained oxygen level is still okay and assured him we are doing our best to get him seen.

## 2021-11-29 NOTE — ED PROVIDER NOTES
ED Provider Note    CHIEF COMPLAINT  Chief Complaint   Patient presents with   • Shortness of Breath     x3 days, speaking in full sentences.    • Cough     x3 days, productive - clear colored   • Loss of Appetite     x3 days, denies loss of taste or smell   • Chest Pain     gradual onset x3 days, denies radiating pain, no n/v       HPI  Alberto Galicia is a 66 y.o. male with a history of asthma who presents with a chief complaint of shortness of breath, cough productive of clear sputum, and decreased appetite that has been ongoing for the past 3 weeks since he had an upper respiratory infection.  He denies any fever or chills.  Patient denies specific worsening with exertion and notes that he is short of breath even at baseline.  He has been using albuterol with improvement but is now out of his albuterol inhalers.  He does not smoke cigarettes.  Despite the nursing staff chief complaint he adamantly denies any chest pain or tightness.    ACS risk factors: No history of MI, HTN, DM, tobacco use, amphetamine/cocaine use, or family history of CAD prior to age 60.  Patient does have an elevated BMI and history of hyperlipidemia.    ACS features: Not exertional, shortness of breath, no diaphoresis    PE risk factors: No history of DVT/PE, pleuritic component, hemoptysis, unilateral leg swelling/pain, recent travel/immobilization, recent surgery, exogenous hormone use    Aortic dissection features: No neuro symptoms, does not radiate to the back, no ripping/tearing pain    No IVDA, no fevers but patient was recently ill, not positional    REVIEW OF SYSTEMS  See HPI for further details.  Shortness of breath.  Cough.  Decreased appetite.  All other systems are negative.     PAST MEDICAL HISTORY   has a past medical history of Asthma, Hyperlipidemia, Pituitary disease (HCC), and Thyroid disease.    SOCIAL HISTORY  Social History     Tobacco Use   • Smoking status: Never Smoker   • Smokeless tobacco: Never Used   Vaping Use  "  • Vaping Use: Never used   Substance and Sexual Activity   • Alcohol use: Yes     Alcohol/week: 1.5 oz     Types: 3 Standard drinks or equivalent per week     Comment: daily   • Drug use: No   • Sexual activity: Not on file       SURGICAL HISTORY   has a past surgical history that includes craniotomy.    CURRENT MEDICATIONS  Home Medications     Reviewed by Elke Sterling R.N. (Registered Nurse) on 11/29/21 at 1151  Med List Status: Partial   Medication Last Dose Status   albuterol 108 (90 Base) MCG/ACT Aero Soln inhalation aerosol  Active   atorvastatin (LIPITOR) 40 MG Tab  Active   calcium carbonate (TUMS) 500 MG Chew Tab  Active   ePHEDrine-guaiFENesin (PRIMATENE ASTHMA PO)  Active   Fexofenadine HCl (ALLERGY 24-HR PO)  Active   hydrocortisone (CORTEF) 10 MG Tab  Active   levothyroxine (SYNTHROID) 175 MCG Tab  Active   melatonin 5 mg Tab  Active   triamcinolone acetonide (KENALOG) 0.1 % Cream  Active                ALLERGIES  Allergies   Allergen Reactions   • Pcn [Penicillins] Swelling       PHYSICAL EXAM  VITAL SIGNS: /76   Pulse 94   Temp 36.2 °C (97.1 °F) (Temporal)   Resp 18   Ht 1.93 m (6' 4\")   Wt 103 kg (226 lb 3.1 oz)   SpO2 94%   BMI 27.53 kg/m²     Pulse ox interpretation: I interpret this pulse ox as normal.  Constitutional: Alert in no apparent distress.  HENT: No signs of trauma, Bilateral external ears normal, Nose normal.  Moist mucous membranes.  Eyes: Pupils are equal and reactive, Conjunctiva normal, Non-icteric.   Neck: Normal range of motion, No tenderness, Supple, No stridor.   Lymphatic: No lymphadenopathy noted.   Cardiovascular: Regular rate and rhythm, no murmurs. Pulses symmetrical.  Thorax & Lungs: Decreased breath sounds bilaterally with faint expiratory wheezes, No chest tenderness.   Abdomen: Bowel sounds normal, Soft, No tenderness, No masses, No pulsatile masses. No peritoneal signs.  Skin: Warm, Dry, diffuse eczematous rash.   Back: Normal alignment.  Extremities: " No cyanosis.  Musculoskeletal: No major deformities noted.   Neurologic: Alert, No focal deficits noted.   Psychiatric: Affect normal, Judgment normal, Mood normal.     DIAGNOSTIC STUDIES / PROCEDURES    LABS  Results for orders placed or performed during the hospital encounter of 11/29/21   CBC with Differential   Result Value Ref Range    WBC 6.0 4.8 - 10.8 K/uL    RBC 5.27 4.70 - 6.10 M/uL    Hemoglobin 17.7 14.0 - 18.0 g/dL    Hematocrit 50.4 42.0 - 52.0 %    MCV 95.6 81.4 - 97.8 fL    MCH 33.6 (H) 27.0 - 33.0 pg    MCHC 35.1 33.7 - 35.3 g/dL    RDW 46.3 35.9 - 50.0 fL    Platelet Count 276 164 - 446 K/uL    MPV 8.9 (L) 9.0 - 12.9 fL    Neutrophils-Polys 42.90 (L) 44.00 - 72.00 %    Lymphocytes 33.10 22.00 - 41.00 %    Monocytes 9.40 0.00 - 13.40 %    Eosinophils 13.40 (H) 0.00 - 6.90 %    Basophils 1.00 0.00 - 1.80 %    Immature Granulocytes 0.20 0.00 - 0.90 %    Nucleated RBC 0.00 /100 WBC    Neutrophils (Absolute) 2.55 1.82 - 7.42 K/uL    Lymphs (Absolute) 1.97 1.00 - 4.80 K/uL    Monos (Absolute) 0.56 0.00 - 0.85 K/uL    Eos (Absolute) 0.80 (H) 0.00 - 0.51 K/uL    Baso (Absolute) 0.06 0.00 - 0.12 K/uL    Immature Granulocytes (abs) 0.01 0.00 - 0.11 K/uL    NRBC (Absolute) 0.00 K/uL   Complete Metabolic Panel (CMP)   Result Value Ref Range    Sodium 130 (L) 135 - 145 mmol/L    Potassium 4.3 3.6 - 5.5 mmol/L    Chloride 94 (L) 96 - 112 mmol/L    Co2 23 20 - 33 mmol/L    Anion Gap 13.0 7.0 - 16.0    Glucose 91 65 - 99 mg/dL    Bun 5 (L) 8 - 22 mg/dL    Creatinine 0.65 0.50 - 1.40 mg/dL    Calcium 10.0 8.5 - 10.5 mg/dL    AST(SGOT) 119 (H) 12 - 45 U/L    ALT(SGPT) 62 (H) 2 - 50 U/L    Alkaline Phosphatase 105 (H) 30 - 99 U/L    Total Bilirubin 0.7 0.1 - 1.5 mg/dL    Albumin 4.4 3.2 - 4.9 g/dL    Total Protein 7.7 6.0 - 8.2 g/dL    Globulin 3.3 1.9 - 3.5 g/dL    A-G Ratio 1.3 g/dL   Troponin   Result Value Ref Range    Troponin T <6 6 - 19 ng/L   ESTIMATED GFR   Result Value Ref Range    GFR If   >60 >60 mL/min/1.73 m 2    GFR If Non African American >60 >60 mL/min/1.73 m 2   proBrain Natriuretic Peptide, NT   Result Value Ref Range    NT-proBNP 33 0 - 125 pg/mL   EKG   Result Value Ref Range    Report       [facility] Emergency Dept.    Test Date:  2021  Pt Name:    MATT MOODY                   Department: ER  MRN:        [id number]                      Room:  Gender:     Male                         Technician: [id number]  :        1955                   Requested By:ER TRIAGE PROTOCOL  Order #:    [id number]                    Reading MD: Ren Sharma MD    Measurements  Intervals                                Axis  Rate:       92                           P:          83  WI:         176                          QRS:        -75  QRSD:       108                          T:          68  QT:         376  QTc:        466    Interpretive Statements  SINUS RHYTHM  ATRIAL PREMATURE COMPLEX  LEFT ANTERIOR FASCICULAR BLOCK  CONSIDER RIGHT VENTRICULAR HYPERTROPHY  No previous ECG available for comparison  Electronically Signed On 2021 16:11:37 PST by Ren Sharma MD       RADIOLOGY  DX-CHEST-PORTABLE (1 VIEW)   Final Result      Negative single view of the chest.          COURSE & MEDICAL DECISION MAKING  Pertinent Labs & Imaging studies reviewed. (See chart for details)  This is a trey 66-year-old male who is here with cough and shortness of breath after what sounds like a viral syndrome 3 weeks ago.  He is vaccinated against COVID-19 and denies any current fevers, loss of taste or smell.  Despite the nursing staff chief complaint he adamantly denies any chest pain or pressure.  He has decreased breath sounds bilaterally with minimal expiratory wheezes.  Given the patient's history I have a low suspicion for ACS.  Patient will be given a dose of prednisone as well as a DuoNeb.    Patient has no risk factors for PE.  He has not had any hemoptysis and denies any leg swelling.  He  is not tachycardic or hypoxic.  Low suspicion for embolism.    CBC is without leukocytosis or left shift.  Metabolic panel demonstrates mild hyponatremia and transaminitis which are likely be due to his daily alcohol use.  Initial troponin is negative and BNP is normal.  Given that patient does not have any chest pain and has a low heart score, his symptoms have been ongoing persistently for the past 3 weeks I do not feel that a second troponin is indicated for additional ACS risk ratification.  Chest x-ray did not reveal acute abnormality.    HEART score: 3    Patient was reevaluated at bedside, he reports that he has achieved modest improvement with the breathing treatment.  Repeat auscultation demonstrates increased air movement but significantly worsening expiratory wheezes.  Patient will be given a second DuoNeb.    Patient was once again reevaluated at bedside after the second DuoNeb.  He notes that he has had increased improvement in his shortness of breath and wheezing.  He does have mild improvement on his exam.  At this point I offered him admission for additional management but he feels comfortable going home.  I will give him a prescription for albuterol and prednisone.  He will follow-up with his primary care physician within the next 24 to 48 hours for recheck.  I did give him very strict return precautions.  Low suspicion for ACS given his physical exam and history.    The patient will not drink alcohol nor drive with prescribed medications. The patient will return for worsening symptoms and is stable at the time of discharge. The patient verbalizes understanding and will comply.    FINAL IMPRESSION  1. Asthma with acute exacerbation, unspecified asthma severity, unspecified whether persistent  albuterol 108 (90 Base) MCG/ACT Aero Soln inhalation aerosol    predniSONE (DELTASONE) 20 MG Tab         Electronically signed by: Ren Sharma M.D., 11/29/2021 3:54 PM

## 2021-11-30 NOTE — DISCHARGE INSTRUCTIONS
You were seen in the ER for shortness of breath and cough that I suspect is due to an asthma exacerbation.  We did give you a dose of steroids in the ER as well as 2 breathing treatments and you have improved.  You will likely continue to improve as I am giving you a prescription for a 5-day burst of steroids and a refill of your albuterol inhaler, please take these medications as directed.  It will be important you follow-up with your primary care physician tomorrow for recheck.  If you develop any new or worsening symptoms whatsoever please return immediately to the ER.  Good luck, I hope you feel better soon!

## 2022-01-03 DIAGNOSIS — E03.8 SECONDARY HYPOTHYROIDISM: ICD-10-CM

## 2022-01-03 RX ORDER — LEVOTHYROXINE SODIUM 175 UG/1
175 TABLET ORAL
Qty: 90 TABLET | Refills: 3 | Status: SHIPPED | OUTPATIENT
Start: 2022-01-03 | End: 2022-04-04 | Stop reason: SDUPTHER

## 2022-01-05 DIAGNOSIS — J45.20 MILD INTERMITTENT ASTHMA WITHOUT COMPLICATION: ICD-10-CM

## 2022-01-05 RX ORDER — ALBUTEROL SULFATE 90 UG/1
2 AEROSOL, METERED RESPIRATORY (INHALATION) EVERY 4 HOURS PRN
Qty: 1 EACH | Refills: 3 | Status: SHIPPED | OUTPATIENT
Start: 2022-01-05 | End: 2022-03-01 | Stop reason: SDUPTHER

## 2022-01-13 ENCOUNTER — OFFICE VISIT (OUTPATIENT)
Dept: MEDICAL GROUP | Facility: MEDICAL CENTER | Age: 67
End: 2022-01-13
Payer: MEDICARE

## 2022-01-13 VITALS
OXYGEN SATURATION: 95 % | WEIGHT: 226.4 LBS | BODY MASS INDEX: 27.57 KG/M2 | HEART RATE: 92 BPM | RESPIRATION RATE: 16 BRPM | TEMPERATURE: 97.7 F | SYSTOLIC BLOOD PRESSURE: 120 MMHG | DIASTOLIC BLOOD PRESSURE: 80 MMHG | HEIGHT: 76 IN

## 2022-01-13 DIAGNOSIS — J45.40 MODERATE PERSISTENT ASTHMA WITHOUT COMPLICATION: ICD-10-CM

## 2022-01-13 DIAGNOSIS — R74.8 ELEVATED LIVER ENZYMES: ICD-10-CM

## 2022-01-13 DIAGNOSIS — D32.9 MENINGIOMA (HCC): ICD-10-CM

## 2022-01-13 DIAGNOSIS — E27.49 SECONDARY ADRENAL INSUFFICIENCY (HCC): ICD-10-CM

## 2022-01-13 DIAGNOSIS — E23.0 PANHYPOPITUITARISM (HCC): ICD-10-CM

## 2022-01-13 PROCEDURE — 99214 OFFICE O/P EST MOD 30 MIN: CPT | Performed by: FAMILY MEDICINE

## 2022-01-13 ASSESSMENT — FIBROSIS 4 INDEX: FIB4 SCORE: 3.67

## 2022-01-14 NOTE — PROGRESS NOTES
CC: Asthma, elevated liver enzymes, hypopituitarism.    HPI:   Jack presents today to discuss the following medical issues:    Moderate persistent asthma without complication  Patient has few exacerbations episodes in the past 3 months, and had one visit to the ER.  However currently he is asymptomatic. Discussed with patient that we we will need to add steroid inhaler, however patient is adamant to add any more medications because he cannot afford it.  Patient stating that he has been doing fine with the albuterol as needed.  He has been using it 3-4 times weekly.  However he stated that sometimes he takes it for mild symptoms of congestion.  Patient advised to take it only if he really needs it.    Elevated liver enzymes  Last liver function showed:  AST(SGOT) 12 - 45 U/L 119 High   79 High   56 High   157 High   110 High   82 High     ALT(SGPT) 2 - 50 U/L 62 High   48  38  105 High   56 High   47    Alkaline Phosphatase 30 - 99 U/L 105 High   115 High   72  75  91  68    Total Bilirubin 0.1 - 1.5 mg/dL 0.7  0.7  0.7  0.8  0.7  0.7    Albumin 3.2 - 4.9 g/dL 4.4  4.1  4.0  4.2  4.3  4.2    Total Protein 6.0 - 8.2 g/dL 7.7  7.8  7.6  7.5  8.7 High   8.0    Globulin 1.9 - 3.5 g/dL 3.3  3.7 High   3.6 High   3.3  4.4 High   3.8 High     A-G Ratio g/dL 1.3            Discussed with patient that elevated liver enzymes is an alarming sign of hepatocellular damage as a result of alcohol consumption(has been consuming hard liquor/vodka).  Patient denies any abdominal pain, distention, denies any change in the color of the urine, stool, or skin.  Patient stated that he does not drink until he feels drunk, and he drinks only few times a week.  However sometimes needs to drink more special mild to help him sleep.    Panhypopituitarism (HCC)  Patient with history of pituitary adenoma s/p radiation therapy, developed panhypopituitarism, has been on levothyroxine 75 mcg daily, hydrocortisone(10 mg in a.m., 5 mg in p.m.), and  testosterone (Testim 50 mg per 5 g 1% gel  every other day ).  Patient has been in his normal state of energy/stamina .  Has been following up with cardiology regular basis.    Meningioma (HCC)  In September 2020 during routine surveillance for his pituitary tumor he was found to have a small meningioma that is believed to have been developed as a result of radiation previously. This is being monitored and surveyed on a regular basis but at this time is considered nonmalignant.      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 90 Tablet 3   • albuterol 108 (90 Base) MCG/ACT Aero Soln inhalation aerosol Inhale 2 Puffs every 6 hours as needed for Shortness of Breath. 8.5 g 0   • hydrocortisone (CORTEF) 10 MG Tab Take 1 pill in the morning and 1/2 in the afternoon 90 day supply 180 Tablet 3   • atorvastatin (LIPITOR) 40 MG Tab TAKE ONE TABLET BY MOUTH ONCE DAILY 100 tablet 1   • albuterol 108 (90 Base) MCG/ACT Aero Soln inhalation aerosol Inhale 2 Puffs every four hours as needed for Shortness of Breath. 1 Each 3   • Fexofenadine HCl (ALLERGY 24-HR PO) Take 10 mg by  "mouth every day.     • ePHEDrine-guaiFENesin (PRIMATENE ASTHMA PO) Take 1 capsule by mouth every day.     • calcium carbonate (TUMS) 500 MG Chew Tab Chew 500 mg every day.     • melatonin 5 mg Tab Take 5 mg by mouth every day.     • triamcinolone acetonide (KENALOG) 0.1 % Cream Apply 1 Application topically 2 times a day. 80 g 2     No current facility-administered medications for this visit.         Allergies as of 01/13/2022 - Reviewed 01/13/2022   Allergen Reaction Noted   • Pcn [penicillins] Swelling 06/30/2015        ROS: Denies any chest pain, Shortness of breath, Changes bowel or bladder, Lower extremity edema.    Physical Exam:  /80 (BP Location: Right arm, Patient Position: Sitting, BP Cuff Size: Large adult)   Pulse 92   Temp 36.5 °C (97.7 °F) (Temporal)   Resp 16   Ht 1.93 m (6' 4\")   Wt 103 kg (226 lb 6.4 oz)   SpO2 95%   BMI 27.56 kg/m²   Gen.: Well-developed, well-nourished, no apparent distress,pleasant and cooperative with the examination  Skin:  Warm and dry with good turgor. No rashes or suspicious lesions in visible areas  HEENT:Sinuses nontender with palpation, TMs clear, nares patent with pink mucosa and clear rhinorrhea,no septal deviation ,polyps or lesions. lips without lesions, oropharynx clear.  Neck: Trachea midline,no masses or adenopathy. No JVD.  Cor: Regular rate and rhythm without murmur, gallop or rub.  Lungs: Respirations unlabored.Clear to auscultation with equal breath sounds bilaterally. No wheezes, rhonchi.  Extremities: No cyanosis, clubbing or edema.      Assessment and Plan.   66 y.o. male     1. Moderate persistent asthma without complication  Patient has few exacerbations episodes in the past 3 months.  However currently he is asymptomatic  Discussed with patient that we we will need to add steroid inhaler, however patient is adamant to add any more medications because he cannot afford it.  Patient stating that he has been doing fine with the albuterol.    2. " Elevated liver enzymes  Probably related to alcohol consumption.  Discussed with patient that elevated liver enzymes is an alarming sign of hepatocellular damage as a result of alcohol consumption(has been consuming hard liquor/vodka).  Patient promised to cut down on alcohol consumption.  We will repeat liver enzymes in 3 months    - HEPATIC FUNCTION PANEL; Future    3. Panhypopituitarism (HCC)/ Secondary adrenal insufficiency (HCC)  Doing fine on replacement therapy  Continue follow-up with endocrinology.    4. Meningioma (HCC)  Stable, symptomatic.  We will continue to monitor.  We will continue to monitor.

## 2022-03-01 DIAGNOSIS — J45.20 MILD INTERMITTENT ASTHMA WITHOUT COMPLICATION: ICD-10-CM

## 2022-03-01 RX ORDER — ALBUTEROL SULFATE 90 UG/1
2 AEROSOL, METERED RESPIRATORY (INHALATION) EVERY 4 HOURS PRN
Qty: 1 EACH | Refills: 3 | Status: SHIPPED | OUTPATIENT
Start: 2022-03-01 | End: 2022-04-20

## 2022-03-22 ENCOUNTER — TELEPHONE (OUTPATIENT)
Dept: MEDICAL GROUP | Facility: MEDICAL CENTER | Age: 67
End: 2022-03-22
Payer: MEDICARE

## 2022-03-22 NOTE — TELEPHONE ENCOUNTER
ESTABLISHED PATIENT PRE-VISIT PLANNING     Phone Number Called: [phone] (home)   Call outcome: Spoke to patient regarding message below.  Message: Appointment scheduled with , Wednesday, 3/23/2022 at 9:00 AM, [address], [phone]. Arrive 10-15 minutes early for check-in.      Patient was contacted to complete PVP.     Note: Patient will not be contacted if there is no indication to call.     1.  Reviewed notes from the last few office visits within the medical group: Yes    2.  If any orders were placed at last visit or intended to be done for this visit (i.e. 6 mos follow-up), do we have Results/Consult Notes?         •  Labs - Labs ordered, NOT completed. Patient advised to complete prior to next appointment.   During Pre-Visit Planning, called and spoke to patient. Per patient, labs will be completed for next following appointment in April 2022 with .    Note: If patient appointment is for lab review and patient did not complete labs, check with provider if OK to reschedule patient until labs completed.       •  Imaging - Imaging was not ordered at last office visit.       •  Referrals - No referrals were ordered at last office visit.    3. Is this appointment scheduled as a Hospital Follow-Up? No    4.  Immunizations were updated in Epic using Reconcile Outside Information activity? Yes    5.  Patient is due for the following Health Maintenance Topics:   Health Maintenance Due   Topic Date Due   • HEPATITIS C SCREENING  Never done   • IMM INFLUENZA (1) 09/01/2021   • IMM ZOSTER VACCINES (2 of 2) 09/20/2021       6.  AHA (Pulse8) form printed for Provider? No, already completed AHA Done 1/13/2022.     Eucrisa Counseling: Patient may experience a mild burning sensation during topical application. Eucrisa is not approved in children less than 2 years of age.

## 2022-03-23 ENCOUNTER — APPOINTMENT (OUTPATIENT)
Dept: MEDICAL GROUP | Facility: MEDICAL CENTER | Age: 67
End: 2022-03-23
Payer: MEDICARE

## 2022-03-24 ENCOUNTER — HOSPITAL ENCOUNTER (OUTPATIENT)
Dept: LAB | Facility: MEDICAL CENTER | Age: 67
End: 2022-03-24
Attending: INTERNAL MEDICINE
Payer: MEDICARE

## 2022-03-24 ENCOUNTER — HOSPITAL ENCOUNTER (OUTPATIENT)
Dept: LAB | Facility: MEDICAL CENTER | Age: 67
End: 2022-03-24
Attending: FAMILY MEDICINE
Payer: MEDICARE

## 2022-03-24 DIAGNOSIS — E03.8 SECONDARY HYPOTHYROIDISM: ICD-10-CM

## 2022-03-24 DIAGNOSIS — E29.1 SECONDARY MALE HYPOGONADISM: ICD-10-CM

## 2022-03-24 DIAGNOSIS — E23.0 PANHYPOPITUITARISM (HCC): ICD-10-CM

## 2022-03-24 DIAGNOSIS — E27.49 SECONDARY ADRENAL INSUFFICIENCY (HCC): ICD-10-CM

## 2022-03-24 DIAGNOSIS — R74.8 ELEVATED LIVER ENZYMES: ICD-10-CM

## 2022-03-24 LAB
ALBUMIN SERPL BCP-MCNC: 4.1 G/DL (ref 3.2–4.9)
ALBUMIN SERPL BCP-MCNC: 4.2 G/DL (ref 3.2–4.9)
ALBUMIN/GLOB SERPL: 1.4 G/DL
ALP SERPL-CCNC: 107 U/L (ref 30–99)
ALP SERPL-CCNC: 109 U/L (ref 30–99)
ALT SERPL-CCNC: 44 U/L (ref 2–50)
ALT SERPL-CCNC: 44 U/L (ref 2–50)
ANION GAP SERPL CALC-SCNC: 14 MMOL/L (ref 7–16)
AST SERPL-CCNC: 76 U/L (ref 12–45)
AST SERPL-CCNC: 76 U/L (ref 12–45)
BILIRUB CONJ SERPL-MCNC: <0.2 MG/DL (ref 0.1–0.5)
BILIRUB INDIRECT SERPL-MCNC: ABNORMAL MG/DL (ref 0–1)
BILIRUB SERPL-MCNC: 0.5 MG/DL (ref 0.1–1.5)
BILIRUB SERPL-MCNC: 0.6 MG/DL (ref 0.1–1.5)
BUN SERPL-MCNC: 7 MG/DL (ref 8–22)
CALCIUM SERPL-MCNC: 9.8 MG/DL (ref 8.5–10.5)
CHLORIDE SERPL-SCNC: 102 MMOL/L (ref 96–112)
CO2 SERPL-SCNC: 27 MMOL/L (ref 20–33)
CREAT SERPL-MCNC: 0.87 MG/DL (ref 0.5–1.4)
GFR SERPLBLD CREATININE-BSD FMLA CKD-EPI: 94 ML/MIN/1.73 M 2
GLOBULIN SER CALC-MCNC: 3 G/DL (ref 1.9–3.5)
GLUCOSE SERPL-MCNC: 89 MG/DL (ref 65–99)
HCT VFR BLD AUTO: 51.2 % (ref 42–52)
HGB BLD-MCNC: 17.1 G/DL (ref 14–18)
POTASSIUM SERPL-SCNC: 4.1 MMOL/L (ref 3.6–5.5)
PROT SERPL-MCNC: 7.1 G/DL (ref 6–8.2)
PROT SERPL-MCNC: 7.1 G/DL (ref 6–8.2)
SODIUM SERPL-SCNC: 143 MMOL/L (ref 135–145)
T4 FREE SERPL-MCNC: 1.15 NG/DL (ref 0.93–1.7)

## 2022-03-24 PROCEDURE — 85014 HEMATOCRIT: CPT

## 2022-03-24 PROCEDURE — 84270 ASSAY OF SEX HORMONE GLOBUL: CPT

## 2022-03-24 PROCEDURE — 84402 ASSAY OF FREE TESTOSTERONE: CPT

## 2022-03-24 PROCEDURE — 84403 ASSAY OF TOTAL TESTOSTERONE: CPT

## 2022-03-24 PROCEDURE — 84439 ASSAY OF FREE THYROXINE: CPT

## 2022-03-24 PROCEDURE — 80053 COMPREHEN METABOLIC PANEL: CPT

## 2022-03-24 PROCEDURE — 85018 HEMOGLOBIN: CPT

## 2022-03-24 PROCEDURE — 80076 HEPATIC FUNCTION PANEL: CPT

## 2022-03-24 PROCEDURE — 36415 COLL VENOUS BLD VENIPUNCTURE: CPT

## 2022-03-29 LAB
SHBG SERPL-SCNC: 73 NMOL/L (ref 19–76)
TESTOST FREE MFR SERPL: 1.1 % (ref 1.6–2.9)
TESTOST FREE SERPL-MCNC: 57 PG/ML (ref 47–244)
TESTOST SERPL-MCNC: 503 NG/DL (ref 300–720)

## 2022-04-04 ENCOUNTER — OFFICE VISIT (OUTPATIENT)
Dept: ENDOCRINOLOGY | Facility: MEDICAL CENTER | Age: 67
End: 2022-04-04
Attending: INTERNAL MEDICINE
Payer: MEDICARE

## 2022-04-04 VITALS
OXYGEN SATURATION: 93 % | DIASTOLIC BLOOD PRESSURE: 82 MMHG | WEIGHT: 227.5 LBS | HEIGHT: 76 IN | BODY MASS INDEX: 27.7 KG/M2 | HEART RATE: 101 BPM | SYSTOLIC BLOOD PRESSURE: 122 MMHG

## 2022-04-04 DIAGNOSIS — E29.1 SECONDARY MALE HYPOGONADISM: ICD-10-CM

## 2022-04-04 DIAGNOSIS — E03.8 SECONDARY HYPOTHYROIDISM: ICD-10-CM

## 2022-04-04 DIAGNOSIS — D18.00 CAVERNOUS ANGIOMA: ICD-10-CM

## 2022-04-04 DIAGNOSIS — E23.0 PANHYPOPITUITARISM (HCC): ICD-10-CM

## 2022-04-04 DIAGNOSIS — D32.9 MENINGIOMA (HCC): ICD-10-CM

## 2022-04-04 DIAGNOSIS — E27.49 SECONDARY ADRENAL INSUFFICIENCY (HCC): ICD-10-CM

## 2022-04-04 PROCEDURE — 99214 OFFICE O/P EST MOD 30 MIN: CPT | Performed by: INTERNAL MEDICINE

## 2022-04-04 PROCEDURE — 99211 OFF/OP EST MAY X REQ PHY/QHP: CPT | Performed by: INTERNAL MEDICINE

## 2022-04-04 RX ORDER — TESTOSTERONE GEL, 1% 10 MG/G
50 GEL TRANSDERMAL DAILY
Qty: 150 G | Refills: 5 | Status: SHIPPED | OUTPATIENT
Start: 2022-04-04 | End: 2022-12-29

## 2022-04-04 RX ORDER — HYDROCORTISONE 10 MG/1
TABLET ORAL
Qty: 180 TABLET | Refills: 3 | Status: ON HOLD | OUTPATIENT
Start: 2022-04-04 | End: 2023-04-06

## 2022-04-04 RX ORDER — LEVOTHYROXINE SODIUM 175 UG/1
175 TABLET ORAL
Qty: 90 TABLET | Refills: 3 | Status: SHIPPED | OUTPATIENT
Start: 2022-04-04 | End: 2022-11-28

## 2022-04-04 ASSESSMENT — PATIENT HEALTH QUESTIONNAIRE - PHQ9: CLINICAL INTERPRETATION OF PHQ2 SCORE: 0

## 2022-04-04 ASSESSMENT — FIBROSIS 4 INDEX: FIB4 SCORE: 2.78

## 2022-04-04 NOTE — PROGRESS NOTES
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 male here for follow up for the above medical issue.  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    Pituitary MRI on March 23, 2020 showed no evidence of tumor recurrence  He did have an incidental 5 mm meningioma at the area of his previous pituitary surgery in the [place]ella.  I have asked him to follow up with his pcp for his meningioma      Since I last saw him he had a severe asthma exacerbation leading to ER visit.  (He has had this since childhood).  He is still stuggling and he reports that his pcp is starting him on new inhalers.        For his secondary hypothyroidism,  He is taking levothyroxine 175 MCG daily   He reports good compliance  His free T4  is 1.15 is normal on 3/24/2022.    He denies constipation and cold intolerance      For his secondary adrenal insufficiency  He is now taking hydrocortisone 10 mg in morning AM  He didn't tolerate an afternoon dose due to insomnia  He reports feeling great  His blood pressure today normal and electrolytes are normal  His weight is stable and he doesn't look cushingoid       For his secondary hypogonadism  He is on Testim 50 mg 1 packet every other day and   His total testosterone was 503  3/24/2022  HCT is normal at 51% on 3/24/2022          Patient's medications, allergies, and social histories were reviewed and updated as appropriate.      ROS:       CONS:     No fever, no chills   EYES:     No diplopia, no blurry vision   CV:           No chest pain, no palpitations   PULM:     No SOB, no cough, no hemoptysis.   GI:            No nausea, no vomiting, no diarrhea, no constipation   ENDO:     No polyuria, no polydipsia, no heat intolerance, no cold intolerance       Past Medical History:  Problem List:  2022-01: Moderate persistent asthma without  "complication  2022-01: Elevated liver enzymes  2021-07: Macrocytosis  2021-07: Seasonal allergies  2020-09: Meningioma (HCC)  2020-09: Panhypopituitarism (HCC)  2020-03: Secondary male hypogonadism  2020-03: Secondary adrenal insufficiency (HCC)  2019-11: Elevated liver function tests  2019-07: Mild intermittent asthma without complication  2019-07: Eczema  2017-07: Pollen allergies  2015-06: Secondary hypothyroidism  2015-06: Hyperlipidemia  2015-06: Pituitary adenoma (HCC)  2015-06: Reactive airway disease  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[facility])      Past Surgical History:  Past Surgical History:   Procedure Laterality Date   • CRANIOTOMY          Allergies:  Pcn [penicillins]     Social History:  Social History     Tobacco Use   • Smoking status: Never Smoker   • Smokeless tobacco: Never Used   Vaping Use   • Vaping Use: Never used   Substance Use Topics   • Alcohol use: Yes     Alcohol/week: 1.5 oz     Types: 3 Standard drinks or equivalent per week     Comment: daily   • Drug use: No        Family History:   family history includes Alzheimer's Disease in his maternal grandmother; Cancer in his maternal grandfather and paternal grandfather; Diabetes in his brother and mother; Heart Disease in his father and paternal grandmother.        PHYSICAL EXAM:   Vital signs: /82 (BP Location: Left arm, Patient Position: Sitting, BP Cuff Size: Large adult)   Pulse (!) 101   Ht 1.93 m (6' 4\")   Wt 103 kg (227 lb 8 oz)   SpO2 93%   BMI 27.69 kg/m²   GENERAL: Well-developed, well-nourished in no apparent distress.   EYE:  No ocular asymmetry, PERRLA  HENT: Pink, moist mucous membranes.     CHEST: no gynecomastia  CARDIOVASCULAR:  No murmurs  LUNGS: Clear breath sounds  ABDOMEN: Soft, nontender   GENIT: normal  EXTREMITIES: No clubbing, cyanosis, or edema.   NEUROLOGICAL: No gross focal motor abnormalities. No visible tremor, no proximal muscle weakness   LYMPH: No cervical adenopathy palpated.   SKIN: No " rashes, lesions.       Labs:  Lab Results   Component Value Date/Time    WBC 6.0 11/29/2021 12:20 PM    RBC 5.27 11/29/2021 12:20 PM    HEMOGLOBIN 17.1 03/24/2022 10:18 AM    MCV 95.6 11/29/2021 12:20 PM    MCH 33.6 (H) 11/29/2021 12:20 PM    MCHC 35.1 11/29/2021 12:20 PM    RDW 46.3 11/29/2021 12:20 PM    MPV 8.9 (L) 11/29/2021 12:20 PM       Lab Results   Component Value Date/Time    SODIUM 143 03/24/2022 10:18 AM    POTASSIUM 4.1 03/24/2022 10:18 AM    CHLORIDE 102 03/24/2022 10:18 AM    CO2 27 03/24/2022 10:18 AM    ANION 14.0 03/24/2022 10:18 AM    GLUCOSE 89 03/24/2022 10:18 AM    BUN 7 (L) 03/24/2022 10:18 AM    CREATININE 0.87 03/24/2022 10:18 AM    CALCIUM 9.8 03/24/2022 10:18 AM    ASTSGOT 76 (H) 03/24/2022 10:18 AM    ALTSGPT 44 03/24/2022 10:18 AM    TBILIRUBIN 0.6 03/24/2022 10:18 AM    ALBUMIN 4.1 03/24/2022 10:18 AM    TOTPROTEIN 7.1 03/24/2022 10:18 AM    GLOBULIN 3.0 03/24/2022 10:18 AM    AGRATIO 1.4 03/24/2022 10:18 AM       No results found for: LH    Lab Results   Component Value Date/Time    FSH 4.0 03/19/2020 0914       Lab Results   Component Value Date/Time    TESTOSTERONE 68 (L) 03/19/2020 0914           Imaging: Please refer to pituitary MRI from March 2020    ASSESSMENT/PLAN:      1. Secondary adrenal insufficiency (HCC)  Stable control  Continue hydrocortisone 10 mg in the morning and 5 mg in the afternoon  Reviewed stress dosing of steroids for illness  Plan for follow-up in 12 months with labs    2. Secondary hypothyroidism  Well-controlled  His TSH levels are unreliable because of radiotherapy for his pituitary and should not be utilized for monitoring response to therapy  His free T4 are normal  Continue levothyroxine 175 MCG daily  We will repeat his free T4 levels in 12 months    3. Secondary male hypogonadism  Controlled   Continue Testim 50 mg per 5 g 1% gel  every other day   Reviewed the risks of androgen replacement therapy  We will check his hematocrit, and testosterone  levels in 6-12 months Recommend that he get an annual PSA and digital rectal exam with his primary care physician    4. Panhypopituitarism (HCC)  See above      5. Men ingioma (HCC)  we will schedule the patient for a Brain MRI to reevaluate his incidental meningioma and cavernoma     6. Cavernous angioma  we will schedule the patient for a Brain  MRI to reevaluate his incidental meningioma and cavernoma   I will update him on the test results      Return in about 1 year (around 4/4/2023).      Thank you kindly for allowing me to participate in the endocrine care plan for this patient.    Catracho Montano MD, F[facility], [facility]  03/31/20    CC:   Manjula Zaidi M.D.

## 2022-04-06 ENCOUNTER — OFFICE VISIT (OUTPATIENT)
Dept: MEDICAL GROUP | Facility: MEDICAL CENTER | Age: 67
End: 2022-04-06
Payer: MEDICARE

## 2022-04-06 VITALS
RESPIRATION RATE: 16 BRPM | DIASTOLIC BLOOD PRESSURE: 80 MMHG | HEIGHT: 76 IN | WEIGHT: 225.75 LBS | SYSTOLIC BLOOD PRESSURE: 130 MMHG | HEART RATE: 89 BPM | BODY MASS INDEX: 27.49 KG/M2 | TEMPERATURE: 97.7 F | OXYGEN SATURATION: 94 %

## 2022-04-06 DIAGNOSIS — J45.41 MODERATE PERSISTENT ASTHMA WITH ACUTE EXACERBATION: ICD-10-CM

## 2022-04-06 PROCEDURE — 99214 OFFICE O/P EST MOD 30 MIN: CPT | Performed by: FAMILY MEDICINE

## 2022-04-06 RX ORDER — METHYLPREDNISOLONE 4 MG/1
TABLET ORAL
Qty: 21 TABLET | Refills: 0 | Status: SHIPPED | OUTPATIENT
Start: 2022-04-06 | End: 2023-01-04

## 2022-04-06 RX ORDER — DEXAMETHASONE 4 MG/1
1 TABLET ORAL 2 TIMES DAILY
Qty: 12 G | Refills: 3 | Status: ON HOLD | OUTPATIENT
Start: 2022-04-06 | End: 2023-01-05 | Stop reason: SDUPTHER

## 2022-04-06 ASSESSMENT — FIBROSIS 4 INDEX: FIB4 SCORE: 2.78

## 2022-04-07 NOTE — PROGRESS NOTES
CC: Asthma exacerbation    HPI:   Jack presents today with shortness of breath especially with exertion.  Patient was diagnosed 2 months ago with asthma, he has been on albuterol as needed.  However patient stated that lately he has been using albuterol at least twice a day or shortness of breath.  And sometimes he can hear his wheezing especially in the morning and at night.  At night he needs to  multiple times because of the shortness of breath.  Today he is oxygen saturation is normal in room air .  His symptoms gets a little bit better than a few days ago.      Patient Active Problem List    Diagnosis Date Noted   • Moderate persistent asthma without complication 01/13/2022   • Elevated liver enzymes 01/13/2022   • Macrocytosis 07/27/2021   • Seasonal allergies 07/27/2021   • Meningioma ([facility]) 09/02/2020   • Panhypopituitarism ([facility]) 09/02/2020   • Secondary male hypogonadism 03/31/2020   • Secondary adrenal insufficiency ([facility]) 03/31/2020   • Elevated liver function tests 11/15/2019   • Mild intermittent asthma without complication 07/10/2019   • Eczema 07/10/2019   • Pollen allergies 07/25/2017   • Secondary hypothyroidism 06/30/2015   • Hyperlipidemia 06/30/2015   • Pituitary adenoma ([facility]) 06/30/2015   • Reactive airway disease 06/30/2015   • 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[facility]) 08/13/2009       Current Outpatient Medications   Medication Sig Dispense Refill   • fluticasone (FLOVENT HFA) 110 MCG/ACT Aerosol Inhale 1 Puff 2 times a day. 12 g 3   • methylPREDNISolone (MEDROL DOSEPAK) 4 MG Tablet Therapy Pack As directed on the packaging label. 21 Tablet 0   • levothyroxine (SYNTHROID) 175 MCG Tab Take 1 Tablet by mouth every morning on an empty stomach. 90 Tablet 3   • hydrocortisone (CORTEF) 10 MG Tab Take 1 pill in the morning and 1/2 in the afternoon 90 day supply 180 Tablet 3   • testosterone (TESTIM/ANDROGEL) 50 MG/5GM (1%) Gel gel Place 50 mg on the skin every day for 30 days. 150 g 5   • albuterol  "108 (90 Base) MCG/ACT Aero Soln inhalation aerosol Inhale 2 Puffs every four hours as needed for Shortness of Breath. 1 Each 3   • albuterol 108 (90 Base) MCG/ACT Aero Soln inhalation aerosol Inhale 2 Puffs every 6 hours as needed for Shortness of Breath. 8.5 g 0   • Fexofenadine HCl (ALLERGY 24-HR PO) Take 10 mg by mouth every day.     • ePHEDrine-guaiFENesin (PRIMATENE ASTHMA PO) Take 1 capsule by mouth every day. (Patient not taking: Reported on 4/4/2022)     • calcium carbonate (TUMS) 500 MG Chew Tab Chew 500 mg every day.     • melatonin 5 mg Tab Take 5 mg by mouth every day.     • atorvastatin (LIPITOR) 40 MG Tab TAKE ONE TABLET BY MOUTH ONCE DAILY 100 tablet 1   • triamcinolone acetonide (KENALOG) 0.1 % Cream Apply 1 Application topically 2 times a day. 80 g 2     No current facility-administered medications for this visit.         Allergies as of 04/06/2022 - Reviewed 04/06/2022   Allergen Reaction Noted   • Pcn [penicillins] Swelling 06/30/2015        ROS: Denies any chest pain, Shortness of breath, Changes bowel or bladder, Lower extremity edema.    Physical Exam:  /80 (BP Location: Right arm, Patient Position: Sitting, BP Cuff Size: Adult)   Pulse 89   Temp 36.5 °C (97.7 °F) (Temporal)   Resp 16   Ht 1.93 m (6' 4\")   Wt 102 kg (225 lb 12 oz)   SpO2 94%   BMI 27.48 kg/m²   Gen.: Well-developed, well-nourished, no apparent distress,pleasant and cooperative with the examination  HEENT:Sinuses nontender with palpation, TMs clear, nares patent with pink mucosa and clear rhinorrhea,no septal deviation ,polyps or lesions. lips without lesions, oropharynx clear.  Neck: Trachea midline,no masses or adenopathy. No JVD.  Cor: Regular rate and rhythm without murmur, gallop or rub.  Lungs: Respirations unlabored.decreased breath sounds bilaterally.  Scattered expiratory wheezes   Extremities: No cyanosis, clubbing or edema.        Assessment and Plan.   66 y.o. male     1. Moderate persistent asthma with " acute exacerbation  Patient stating that he has been needing to albuterol at least twice a day.  I will start patient on oral steroids tapering dose for 6 days.  Patient advised to take albuterol every 6 hours regularly for 2 days and then as needed after that.  I will add Flovent inhaler to be taken twice a day regularly  I will refer patient to pulmonology for future evaluation and treatment.    - fluticasone (FLOVENT HFA) 110 MCG/ACT Aerosol; Inhale 1 Puff 2 times a day.  Dispense: 12 g; Refill: 3  - Referral to Pulmonary and Sleep Medicine  - methylPREDNISolone (MEDROL DOSEPAK) 4 MG Tablet Therapy Pack; As directed on the packaging label.  Dispense: 21 Tablet; Refill: 0

## 2022-04-20 DIAGNOSIS — J45.20 MILD INTERMITTENT ASTHMA WITHOUT COMPLICATION: ICD-10-CM

## 2022-04-20 RX ORDER — ALBUTEROL SULFATE 90 UG/1
AEROSOL, METERED RESPIRATORY (INHALATION)
Qty: 9 G | Refills: 0 | Status: SHIPPED | OUTPATIENT
Start: 2022-04-20 | End: 2022-06-23

## 2022-04-21 DIAGNOSIS — J45.901 ASTHMA WITH ACUTE EXACERBATION, UNSPECIFIED ASTHMA SEVERITY, UNSPECIFIED WHETHER PERSISTENT: ICD-10-CM

## 2022-04-21 RX ORDER — ALBUTEROL SULFATE 90 UG/1
2 AEROSOL, METERED RESPIRATORY (INHALATION) EVERY 6 HOURS PRN
Qty: 8.5 G | Refills: 0 | Status: SHIPPED | OUTPATIENT
Start: 2022-04-21 | End: 2022-08-10 | Stop reason: SDUPTHER

## 2022-05-14 DIAGNOSIS — E78.5 HYPERLIPIDEMIA, UNSPECIFIED HYPERLIPIDEMIA TYPE: ICD-10-CM

## 2022-05-16 RX ORDER — ATORVASTATIN CALCIUM 40 MG/1
TABLET, FILM COATED ORAL
Qty: 100 TABLET | Refills: 0 | Status: SHIPPED | OUTPATIENT
Start: 2022-05-16 | End: 2022-05-23

## 2022-06-22 DIAGNOSIS — J45.20 MILD INTERMITTENT ASTHMA WITHOUT COMPLICATION: ICD-10-CM

## 2022-06-23 RX ORDER — ALBUTEROL SULFATE 90 UG/1
AEROSOL, METERED RESPIRATORY (INHALATION)
Qty: 9 G | Refills: 0 | Status: SHIPPED | OUTPATIENT
Start: 2022-06-23 | End: 2022-08-09

## 2022-06-29 ENCOUNTER — HOSPITAL ENCOUNTER (OUTPATIENT)
Facility: MEDICAL CENTER | Age: 67
End: 2022-06-29
Attending: FAMILY MEDICINE
Payer: MEDICARE

## 2022-06-29 PROCEDURE — 82274 ASSAY TEST FOR BLOOD FECAL: CPT

## 2022-07-01 DIAGNOSIS — Z12.11 COLON CANCER SCREENING: ICD-10-CM

## 2022-07-04 LAB — IMM ASSAY OCC BLD FITOB: NEGATIVE

## 2022-08-08 DIAGNOSIS — J45.20 MILD INTERMITTENT ASTHMA WITHOUT COMPLICATION: ICD-10-CM

## 2022-08-09 RX ORDER — ALBUTEROL SULFATE 90 UG/1
AEROSOL, METERED RESPIRATORY (INHALATION)
Qty: 9 G | Refills: 0 | Status: SHIPPED | OUTPATIENT
Start: 2022-08-09 | End: 2023-01-04

## 2022-08-10 DIAGNOSIS — J45.901 ASTHMA WITH ACUTE EXACERBATION, UNSPECIFIED ASTHMA SEVERITY, UNSPECIFIED WHETHER PERSISTENT: ICD-10-CM

## 2022-08-10 RX ORDER — ALBUTEROL SULFATE 90 UG/1
2 AEROSOL, METERED RESPIRATORY (INHALATION) EVERY 6 HOURS PRN
Qty: 8.5 G | Refills: 3 | Status: SHIPPED | OUTPATIENT
Start: 2022-08-10 | End: 2022-12-19

## 2022-08-23 ENCOUNTER — HOSPITAL ENCOUNTER (OUTPATIENT)
Dept: RADIOLOGY | Facility: MEDICAL CENTER | Age: 67
End: 2022-08-23
Attending: INTERNAL MEDICINE
Payer: MEDICARE

## 2022-08-23 DIAGNOSIS — E23.0 PANHYPOPITUITARISM (HCC): ICD-10-CM

## 2022-08-23 DIAGNOSIS — D18.00 CAVERNOUS ANGIOMA: ICD-10-CM

## 2022-08-23 DIAGNOSIS — D32.9 MENINGIOMA (HCC): ICD-10-CM

## 2022-08-23 PROCEDURE — 70553 MRI BRAIN STEM W/O & W/DYE: CPT | Mod: MG

## 2022-08-23 PROCEDURE — 700117 HCHG RX CONTRAST REV CODE 255: Performed by: INTERNAL MEDICINE

## 2022-08-23 PROCEDURE — A9576 INJ PROHANCE MULTIPACK: HCPCS | Performed by: INTERNAL MEDICINE

## 2022-08-23 RX ADMIN — GADOTERIDOL 20 ML: 279.3 INJECTION, SOLUTION INTRAVENOUS at 13:53

## 2022-10-24 ENCOUNTER — OFFICE VISIT (OUTPATIENT)
Dept: MEDICAL GROUP | Facility: MEDICAL CENTER | Age: 67
End: 2022-10-24
Payer: MEDICARE

## 2022-10-24 ENCOUNTER — HOSPITAL ENCOUNTER (EMERGENCY)
Facility: MEDICAL CENTER | Age: 67
End: 2022-10-24
Attending: EMERGENCY MEDICINE
Payer: MEDICARE

## 2022-10-24 ENCOUNTER — APPOINTMENT (OUTPATIENT)
Dept: RADIOLOGY | Facility: MEDICAL CENTER | Age: 67
End: 2022-10-24
Attending: EMERGENCY MEDICINE
Payer: MEDICARE

## 2022-10-24 VITALS
OXYGEN SATURATION: 88 % | RESPIRATION RATE: 20 BRPM | BODY MASS INDEX: 26.91 KG/M2 | HEIGHT: 76 IN | SYSTOLIC BLOOD PRESSURE: 120 MMHG | HEART RATE: 104 BPM | WEIGHT: 221 LBS | DIASTOLIC BLOOD PRESSURE: 70 MMHG | TEMPERATURE: 98.7 F

## 2022-10-24 VITALS
BODY MASS INDEX: 26.93 KG/M2 | DIASTOLIC BLOOD PRESSURE: 85 MMHG | SYSTOLIC BLOOD PRESSURE: 146 MMHG | RESPIRATION RATE: 19 BRPM | HEIGHT: 76 IN | HEART RATE: 93 BPM | TEMPERATURE: 98 F | OXYGEN SATURATION: 93 % | WEIGHT: 221.12 LBS

## 2022-10-24 DIAGNOSIS — Z20.822 SUSPECTED 2019 NOVEL CORONAVIRUS INFECTION: ICD-10-CM

## 2022-10-24 DIAGNOSIS — J45.901 EXACERBATION OF ASTHMA, UNSPECIFIED ASTHMA SEVERITY, UNSPECIFIED WHETHER PERSISTENT: ICD-10-CM

## 2022-10-24 DIAGNOSIS — R09.02 HYPOXIA: ICD-10-CM

## 2022-10-24 DIAGNOSIS — R06.00 DYSPNEA, UNSPECIFIED TYPE: ICD-10-CM

## 2022-10-24 LAB
ALBUMIN SERPL BCP-MCNC: 3.7 G/DL (ref 3.2–4.9)
ALBUMIN/GLOB SERPL: 0.9 G/DL
ALP SERPL-CCNC: 115 U/L (ref 30–99)
ALT SERPL-CCNC: 55 U/L (ref 2–50)
ANION GAP SERPL CALC-SCNC: 10 MMOL/L (ref 7–16)
AST SERPL-CCNC: 82 U/L (ref 12–45)
BASOPHILS # BLD AUTO: 0.7 % (ref 0–1.8)
BASOPHILS # BLD: 0.04 K/UL (ref 0–0.12)
BILIRUB SERPL-MCNC: 0.5 MG/DL (ref 0.1–1.5)
BUN SERPL-MCNC: 6 MG/DL (ref 8–22)
CALCIUM SERPL-MCNC: 9.4 MG/DL (ref 8.5–10.5)
CHLORIDE SERPL-SCNC: 103 MMOL/L (ref 96–112)
CO2 SERPL-SCNC: 24 MMOL/L (ref 20–33)
CREAT SERPL-MCNC: 0.7 MG/DL (ref 0.5–1.4)
EKG IMPRESSION: NORMAL
EOSINOPHIL # BLD AUTO: 0.51 K/UL (ref 0–0.51)
EOSINOPHIL NFR BLD: 8.7 % (ref 0–6.9)
ERYTHROCYTE [DISTWIDTH] IN BLOOD BY AUTOMATED COUNT: 45 FL (ref 35.9–50)
FLUAV RNA SPEC QL NAA+PROBE: NEGATIVE
FLUBV RNA SPEC QL NAA+PROBE: NEGATIVE
GFR SERPLBLD CREATININE-BSD FMLA CKD-EPI: 100 ML/MIN/1.73 M 2
GLOBULIN SER CALC-MCNC: 4 G/DL (ref 1.9–3.5)
GLUCOSE SERPL-MCNC: 123 MG/DL (ref 65–99)
HCT VFR BLD AUTO: 48.1 % (ref 42–52)
HGB BLD-MCNC: 16.3 G/DL (ref 14–18)
IMM GRANULOCYTES # BLD AUTO: 0.01 K/UL (ref 0–0.11)
IMM GRANULOCYTES NFR BLD AUTO: 0.2 % (ref 0–0.9)
LYMPHOCYTES # BLD AUTO: 1.61 K/UL (ref 1–4.8)
LYMPHOCYTES NFR BLD: 27.5 % (ref 22–41)
MCH RBC QN AUTO: 33.6 PG (ref 27–33)
MCHC RBC AUTO-ENTMCNC: 33.9 G/DL (ref 33.7–35.3)
MCV RBC AUTO: 99.2 FL (ref 81.4–97.8)
MONOCYTES # BLD AUTO: 0.35 K/UL (ref 0–0.85)
MONOCYTES NFR BLD AUTO: 6 % (ref 0–13.4)
NEUTROPHILS # BLD AUTO: 3.33 K/UL (ref 1.82–7.42)
NEUTROPHILS NFR BLD: 56.9 % (ref 44–72)
NRBC # BLD AUTO: 0 K/UL
NRBC BLD-RTO: 0 /100 WBC
PLATELET # BLD AUTO: 235 K/UL (ref 164–446)
PMV BLD AUTO: 9.6 FL (ref 9–12.9)
POTASSIUM SERPL-SCNC: 5.4 MMOL/L (ref 3.6–5.5)
PROT SERPL-MCNC: 7.7 G/DL (ref 6–8.2)
RBC # BLD AUTO: 4.85 M/UL (ref 4.7–6.1)
RSV RNA SPEC QL NAA+PROBE: NEGATIVE
SARS-COV-2 RNA RESP QL NAA+PROBE: NOTDETECTED
SODIUM SERPL-SCNC: 137 MMOL/L (ref 135–145)
SPECIMEN SOURCE: NORMAL
WBC # BLD AUTO: 5.9 K/UL (ref 4.8–10.8)

## 2022-10-24 PROCEDURE — 36415 COLL VENOUS BLD VENIPUNCTURE: CPT

## 2022-10-24 PROCEDURE — 99284 EMERGENCY DEPT VISIT MOD MDM: CPT

## 2022-10-24 PROCEDURE — 94640 AIRWAY INHALATION TREATMENT: CPT

## 2022-10-24 PROCEDURE — 93005 ELECTROCARDIOGRAM TRACING: CPT

## 2022-10-24 PROCEDURE — 96374 THER/PROPH/DIAG INJ IV PUSH: CPT

## 2022-10-24 PROCEDURE — 85025 COMPLETE CBC W/AUTO DIFF WBC: CPT

## 2022-10-24 PROCEDURE — C9803 HOPD COVID-19 SPEC COLLECT: HCPCS | Performed by: EMERGENCY MEDICINE

## 2022-10-24 PROCEDURE — 0241U HCHG SARS-COV-2 COVID-19 NFCT DS RESP RNA 4 TRGT MIC: CPT

## 2022-10-24 PROCEDURE — 93005 ELECTROCARDIOGRAM TRACING: CPT | Performed by: EMERGENCY MEDICINE

## 2022-10-24 PROCEDURE — 700101 HCHG RX REV CODE 250: Performed by: EMERGENCY MEDICINE

## 2022-10-24 PROCEDURE — 99214 OFFICE O/P EST MOD 30 MIN: CPT | Performed by: FAMILY MEDICINE

## 2022-10-24 PROCEDURE — 700111 HCHG RX REV CODE 636 W/ 250 OVERRIDE (IP): Performed by: EMERGENCY MEDICINE

## 2022-10-24 PROCEDURE — 80053 COMPREHEN METABOLIC PANEL: CPT

## 2022-10-24 PROCEDURE — 71045 X-RAY EXAM CHEST 1 VIEW: CPT

## 2022-10-24 RX ORDER — IPRATROPIUM BROMIDE AND ALBUTEROL SULFATE 2.5; .5 MG/3ML; MG/3ML
3 SOLUTION RESPIRATORY (INHALATION) ONCE
OUTPATIENT
Start: 2022-10-24 | End: 2022-10-27

## 2022-10-24 RX ORDER — METHYLPREDNISOLONE SODIUM SUCCINATE 125 MG/2ML
40 INJECTION, POWDER, LYOPHILIZED, FOR SOLUTION INTRAMUSCULAR; INTRAVENOUS ONCE
Status: COMPLETED | OUTPATIENT
Start: 2022-10-24 | End: 2022-10-24

## 2022-10-24 RX ORDER — PREDNISONE 20 MG/1
40 TABLET ORAL DAILY
Qty: 8 TABLET | Refills: 0 | Status: SHIPPED | OUTPATIENT
Start: 2022-10-24 | End: 2023-01-04

## 2022-10-24 RX ADMIN — METHYLPREDNISOLONE SODIUM SUCCINATE 40 MG: 125 INJECTION, POWDER, FOR SOLUTION INTRAMUSCULAR; INTRAVENOUS at 20:26

## 2022-10-24 RX ADMIN — ALBUTEROL SULFATE 10 MG: 2.5 SOLUTION RESPIRATORY (INHALATION) at 20:43

## 2022-10-24 ASSESSMENT — FIBROSIS 4 INDEX
FIB4 SCORE: 2.82
FIB4 SCORE: 2.82

## 2022-10-25 NOTE — PROGRESS NOTES
CC: Shortness of breath    HPI:   Jack presents today because he has been having worsening shortness of breath.  Patient with history of asthma for the past week he has been using his albuterol more frequent but doing 2-3 times a day.  Today he feels worse.  He came to the clinic gasping for air, oxygen saturation 87% in room air.  Went up to 92% on 2 L of oxygen, was started on treatment(DuoNeb treatment).  Feels better but still short of breath.  Oxygen saturation drops again to 88% on room air.  Patient reported subjective fever  at home few days ago.  He also reported cough with phlegm.  Patient denies any rhinorrhea or nasal congestion.        Patient Active Problem List    Diagnosis Date Noted    Moderate persistent asthma without complication 01/13/2022    Elevated liver enzymes 01/13/2022    Macrocytosis 07/27/2021    Seasonal allergies 07/27/2021    Meningioma (HCC) 09/02/2020    Panhypopituitarism (HCC) 09/02/2020    Secondary male hypogonadism 03/31/2020    Secondary adrenal insufficiency (HCC) 03/31/2020    Elevated liver function tests 11/15/2019    Mild intermittent asthma without complication 07/10/2019    Eczema 07/10/2019    Pollen allergies 07/25/2017    Secondary hypothyroidism 06/30/2015    Hyperlipidemia 06/30/2015    Pituitary adenoma (HCC) 06/30/2015    Reactive airway disease 06/30/2015    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[facility]) 08/13/2009       Current Outpatient Medications   Medication Sig Dispense Refill    albuterol 108 (90 Base) MCG/ACT Aero Soln inhalation aerosol Inhale 2 Puffs every 6 hours as needed for Shortness of Breath. 8.5 g 3    albuterol 108 (90 Base) MCG/ACT Aero Soln inhalation aerosol INHALE 2 PUFFS BY MOUTH EVERY 4 HOURS AS NEEDED FOR SHORTNESS OF BREATH 9 g 0    atorvastatin (LIPITOR) 40 MG Tab Take 1 tablet by mouth once daily 100 Tablet 2    fluticasone (FLOVENT HFA) 110 MCG/ACT Aerosol Inhale 1 Puff 2 times a day. 12 g 3    methylPREDNISolone (MEDROL DOSEPAK) 4 MG Tablet  "Therapy Pack As directed on the packaging label. 21 Tablet 0    levothyroxine (SYNTHROID) 175 MCG Tab Take 1 Tablet by mouth every morning on an empty stomach. 90 Tablet 3    hydrocortisone (CORTEF) 10 MG Tab Take 1 pill in the morning and 1/2 in the afternoon 90 day supply 180 Tablet 3    Fexofenadine HCl (ALLERGY 24-HR PO) Take 10 mg by mouth every day.      ePHEDrine-guaiFENesin (PRIMATENE ASTHMA PO) Take 1 capsule by mouth every day. (Patient not taking: Reported on 4/4/2022)      calcium carbonate (TUMS) 500 MG Chew Tab Chew 500 mg every day.      melatonin 5 mg Tab Take 5 mg by mouth every day.      triamcinolone acetonide (KENALOG) 0.1 % Cream Apply 1 Application topically 2 times a day. 80 g 2     No current facility-administered medications for this visit.         Allergies as of 10/24/2022 - Reviewed 04/06/2022   Allergen Reaction Noted    Pcn [penicillins] Swelling 06/30/2015        ROS: Denies any chest pain, Shortness of breath, Changes bowel or bladder, Lower extremity edema.    Physical Exam:  /70 (BP Location: Right arm, Patient Position: Sitting, BP Cuff Size: Adult)   Pulse (!) 104   Temp 37.1 °C (98.7 °F)   Resp 20   Ht 1.93 m (6' 4\")   Wt 100 kg (221 lb)   SpO2 88%   BMI 26.90 kg/m²   Gen.: Well-developed, well-nourished, no apparent distress,pleasant and cooperative with the examination  Skin:  Warm and dry with good turgor. No rashes or suspicious lesions in visible areas  HEENT:Sinuses nontender with palpation, TMs clear, nares patent with pink mucosa and clear rhinorrhea,no septal deviation ,polyps or lesions. lips without lesions, oropharynx clear.  Neck: Trachea midline,no masses or adenopathy. No JVD.  Cor: Regular rate and rhythm without murmur, gallop or rub.  Lungs: Respirations labored.decreased breath sounds bilaterally.  Diffuse wheezes, mild scattered crackles  Extremities: No edema.        Assessment and Plan.   67 y.o. male     1. Exacerbation of asthma, unspecified " asthma severity, unspecified whether persistent  2. Hypoxia  Oxygen 2 L/min administered.  Improved oxygen saturation, but dropped again to 88% in room air(after he was taken off oxygen)  DuoNeb nebulizer administered.  Feels better, but still slightly hypoxic  His condition improves.  However he still hypoxic without oxygen.  Patient advised to go to ER for more evaluation and possibly chest x-ray to rule out pneumonia(he reported subjective fever), and to be checked for COVID infection.  We will need IV steroids as well.  Advised to be escorted to ER, but he insisted that he is able to walk to the ER.    - ipratropium-albuterol (DUONEB) nebulizer solution

## 2022-10-25 NOTE — ED NOTES
Pt resting, airway patent, rr even and unlabored, equal chest rise and fall. NAD noted. Pt states he is feeling better.

## 2022-10-25 NOTE — ED PROVIDER NOTES
"ER Provider Note     Scribed for Dennis Tierney M.D. by Betty Connors. 10/24/2022, 7:52 PM.    Primary Care Provider: Manjula Zaidi M.D.  Means of Arrival: Walk-in   History obtained from: Patient  History limited by: None     CHIEF COMPLAINT  Chief Complaint   Patient presents with    Shortness of Breath     HPI  Alberto Galicia is a 67 y.o. male, with prior history of Asthma, who presents to the Emergency Department for evaluation of progressive shortness of breath over the past weekend. The patient notes that he has also been increasingly fatigued, and would have to stop every ten steps while at the store over the weekend. He has been weak with a productive cough and tactile fever since yesterday. When he wakes up in the mornings he has been \"fighting for air\" and he has been loosing sleep secondary to his shortness of breath. This same symptomology occurred one year ago. He was placed on a ventilator and discharged with follow up to PCP. He states that he has been using albuterol and anti-histamines which only help alleviate his symptoms temporarily. He initially presented to his PCP today, where he notes that he was \"gasping for air\". His PCP administered a breathing treatment, and placed him on oxygen, which helped. He was redirected here for PNA rule out. He denies any associated nausea, urinary symptoms, vomiting, dizziness, or headache. He further denies any smoking or drug use history, but drinks alcohol daily for 10 years. Prior medical history includes hypothyroidism which he is medicated for with Synthroid. He states that today's symptoms feel different from prior asthma attacks, because he was not wheezing today. He is not dependent on O2 at baseline. He denies any prior cardiac history.     REVIEW OF SYSTEMS  See HPI for further details. All other systems are negative. C    PAST MEDICAL HISTORY   has a past medical history of Asthma, Hyperlipidemia, Pituitary disease (HCC), and Thyroid " "disease.    SURGICAL HISTORY   has a past surgical history that includes craniotomy.    SOCIAL HISTORY  Social History     Tobacco Use    Smoking status: Never    Smokeless tobacco: Never   Vaping Use    Vaping Use: Never used   Substance Use Topics    Alcohol use: Yes     Alcohol/week: 1.5 oz     Types: 3 Standard drinks or equivalent per week     Comment: daily    Drug use: No      Social History     Substance and Sexual Activity   Drug Use No       FAMILY HISTORY  Family History   Problem Relation Age of Onset    Diabetes Mother     Heart Disease Father     Diabetes Brother     Alzheimer's Disease Maternal Grandmother     Cancer Maternal Grandfather     Heart Disease Paternal Grandmother     Cancer Paternal Grandfather        CURRENT MEDICATIONS  Home Medications       Reviewed by Shanice Mcneal R.N. (Registered Nurse) on 10/24/22 at 1751  Med List Status: Partial     Medication Last Dose Status   albuterol 108 (90 Base) MCG/ACT Aero Soln inhalation aerosol  Active   albuterol 108 (90 Base) MCG/ACT Aero Soln inhalation aerosol  Active   atorvastatin (LIPITOR) 40 MG Tab  Active   calcium carbonate (TUMS) 500 MG Chew Tab  Active   ePHEDrine-guaiFENesin (PRIMATENE ASTHMA PO)  Active   Fexofenadine HCl (ALLERGY 24-HR PO)  Active   fluticasone (FLOVENT HFA) 110 MCG/ACT Aerosol  Active   hydrocortisone (CORTEF) 10 MG Tab  Active   ipratropium-albuterol (DUONEB) nebulizer solution  Active   levothyroxine (SYNTHROID) 175 MCG Tab  Active   melatonin 5 mg Tab  Active   methylPREDNISolone (MEDROL DOSEPAK) 4 MG Tablet Therapy Pack  Active   triamcinolone acetonide (KENALOG) 0.1 % Cream  Active        ALLERGIES  Allergies   Allergen Reactions    Pcn [Penicillins] Swelling       PHYSICAL EXAM  VITAL SIGNS: /77   Pulse (!) 103   Temp 36.4 °C (97.6 °F) (Temporal)   Resp 16   Ht 1.93 m (6' 4\")   Wt 100 kg (221 lb 1.9 oz)   SpO2 88%   BMI 26.92 kg/m²    Constitutional: Alert in no apparent distress.  HENT: No " signs of trauma, Bilateral external ears normal, Nose normal.   Eyes: Pupils are equal and reactive, Conjunctiva normal, Non-icteric.   Neck: Normal range of motion, No tenderness, Supple, No stridor.   Lymphatic: No lymphadenopathy noted.   Cardiovascular: Regular rate and rhythm, no palpable thrill  Thorax & Lungs: Inspiratory and expiratory wheezing bilaterally. No chest tenderness.  CTAB  Abdomen: Bowel sounds normal, Soft, No tenderness, No masses, No pulsatile masses. No peritoneal signs.  Skin: Eczema on bilateral lower extremities. Warm, Dry  Back: No bony tenderness, No CVA tenderness.   Extremities: Intact distal pulses, No edema, No tenderness, No cyanosis.  Musculoskeletal: Good range of motion in all major joints. No tenderness to palpation or major deformities noted.   Neurologic: Alert , Normal motor function, Normal sensory function, No focal deficits noted.   Psychiatric: Affect normal, Judgment normal, Mood normal.     DIAGNOSTIC STUDIES / PROCEDURES    EKG Interpretation:  Interpreted by me    12 Lead EKG interpreted by me to show:  Normal sinus rhythm  Rate 91  Axis: Normal  Intervals: Normal  Normal T waves  Normal ST segments  My impression of this EKG: Does not indicate ischemia or arrhythmia at this time.     LABS  Labs Reviewed   CBC WITH DIFFERENTIAL - Abnormal; Notable for the following components:       Result Value    MCV 99.2 (*)     MCH 33.6 (*)     Eosinophils 8.70 (*)     All other components within normal limits   COMP METABOLIC PANEL - Abnormal; Notable for the following components:    Glucose 123 (*)     Bun 6 (*)     AST(SGOT) 82 (*)     ALT(SGPT) 55 (*)     Alkaline Phosphatase 115 (*)     Globulin 4.0 (*)     All other components within normal limits   ESTIMATED GFR   COV-2, FLU A/B, AND RSV BY PCR (CEPZokosID)     All labs reviewed by me.    RADIOLOGY  DX-CHEST-PORTABLE (1 VIEW)   Final Result         1.  No acute cardiopulmonary disease.         The radiologist's interpretation  of all radiological studies have been reviewed by me.    COURSE & MEDICAL DECISION MAKING  Pertinent Labs & Imaging studies reviewed. (See chart for details)    This is a 67 y.o. male that presents with what appears to be an asthma exacerbation.  The patient has no history of smoking.  I will treat aggressively for asthma and evaluate for COVID as well as pneumonia and get a screening EKG.  I will reassess after this..     7:52 PM - Patient seen and examined at bedside. Ordered DX-chest, estimated GFR, CBC with differential, CMP, EKG.  Discussed plan of care with the patient at this time. Patient verbalizes understanding and agreement to this plan of care.     8:14 PM- Ordered Albuterol and solu-medrol treatment secondary to findings of wheezing on physical examination. Patient will be observed following the treatment and evaluation for discharge at this time.    Patient is feeling much improved.  The patient no longer requires oxygen.  The patient has a mild AST ALT elevations.  There is no ischemia noted on the EKG.  There is no anemia.  We will discharge the patient home with strict return precautions and follow-up.      The patient will return for new or worsening symptoms and is stable at the time of discharge.    The patient is referred to a primary physician for blood pressure management, diabetic screening, and for all other preventative health concerns.      DISPOSITION:  Patient will be discharged home in stable condition.    FOLLOW UP:  Manjula Zaidi M.D.  [address] [place] [id number]  [phone]    In 2 days      OUTPATIENT MEDICATIONS:  Discharge Medication List as of 10/24/2022 11:37 PM        START taking these medications    Details   predniSONE (DELTASONE) 20 MG Tab Take 2 Tablets by mouth every day., Disp-8 Tablet, R-0, Normal             FINAL IMPRESSION  1. Dyspnea, unspecified type    2. Suspected 2019 novel coronavirus infection          I, Betty Connors (Scribe),  am scribing for, and in the presence of, Dennis Tierney M.D..    Electronically signed by: Betty Connors (Tara), 10/24/2022    I, Dennis Tierney M.D. personally performed the services described in this documentation, as scribed by Betty Connors in my presence, and it is both accurate and complete.     The note accurately reflects work and decisions made by me.  Dennis Tierney M.D.  10/25/2022  1:52 AM

## 2022-10-25 NOTE — ED NOTES
Pt ambulatory in department at 96% on room air. Pt denies new respiratory distress. Pt states he still doing better than when he came in. PT reports he feels ok to go home.

## 2022-10-25 NOTE — ED NOTES
Pt aox4, skin pink warm and dry, airway patent, rr even and unlabored, NAD noted. No new complaints. Pt vss. Pt assisted to wheelchair and to car without new incident or distress.

## 2022-10-25 NOTE — ED TRIAGE NOTES
Vitals:    10/24/22 1739   BP: 114/77   Pulse: (!) 103   Resp: 16   Temp: 36.4 °C (97.6 °F)   SpO2: 88%     Chief Complaint   Patient presents with    Shortness of Breath     Pt has hx of asthma, has been needing to use his inhaler more frequently in the last few days. He is fairly short of breath speaking in several word sentences. Pt 88% on room air. Placed on 2L NC.     Pt is ambulatory to and from triage and is alert and oriented x 4.

## 2022-10-25 NOTE — DISCHARGE INSTRUCTIONS
Your test results:  You have been tested for COVID-19 today. Your results are pending. Please act as if these results are positive and self isolate until you receive the results. Make sure you still wear a mask, social distance and practice good hand hygiene no matterthe result. In order to receive the results you will need to log into your mychart on the [employer] website. If you do not have an account you can create an account. You can login or create an account for [employer] at [email or web address].  Quarantine Criteria:  If your COVID test is positive self isolation at home is required.  Per the CDC guidelines, you must quarantine at home until the following conditions have been met:  It has been 10 days since the onset of symptoms  You have been fever free for 24 hours  Your symptoms are improving.     Self Care:  You need to get plenty of rest.   You should take Tylenol as needed for fever and body aches  Drink plenty of fluids and have good nutrition    Community Care:  If you have no choice and have to go out to get medications or food, please wear a mask and wash your hands frequently.    Please tell everyone you know to wear a mask when in public to help decrease transmission of the virus.  Per CDC guidelines, you are not required to provide a healthcare provider’s note to validate your illness or to return to work, as healthcare provider offices and medical facilities may be extremely busy and not able to provide such documentation in a timely way.    Return to the ER Precautions:  Return to the ED if the is any significant shortness of breath, worsening symptoms, not improving as expected or you develop any concerning symptoms.   If your symptoms worsen to a point that you become so short of breath that you can only walk very short distances prior to fatigue or feel you were unable to manage your symptoms at home please return to the emergency department. For a more objective approach you can buy a pulse  oximeter online. [employer] has multiple to choose from. If your oxygen saturation in these devices is persistently below 90% please return to the ER.

## 2022-11-08 ENCOUNTER — DOCUMENTATION (OUTPATIENT)
Dept: HEALTH INFORMATION MANAGEMENT | Facility: OTHER | Age: 67
End: 2022-11-08
Payer: MEDICARE

## 2022-11-23 DIAGNOSIS — L30.9 ECZEMA, UNSPECIFIED TYPE: ICD-10-CM

## 2022-11-28 RX ORDER — TRIAMCINOLONE ACETONIDE 1 MG/G
CREAM TOPICAL
Qty: 80 G | Refills: 0 | Status: SHIPPED | OUTPATIENT
Start: 2022-11-28 | End: 2023-01-04

## 2022-12-18 DIAGNOSIS — J45.901 ASTHMA WITH ACUTE EXACERBATION, UNSPECIFIED ASTHMA SEVERITY, UNSPECIFIED WHETHER PERSISTENT: ICD-10-CM

## 2022-12-19 RX ORDER — ALBUTEROL SULFATE 90 UG/1
AEROSOL, METERED RESPIRATORY (INHALATION)
Qty: 9 G | Refills: 0 | Status: SHIPPED | OUTPATIENT
Start: 2022-12-19 | End: 2023-01-03

## 2022-12-29 DIAGNOSIS — E29.1 SECONDARY MALE HYPOGONADISM: ICD-10-CM

## 2022-12-29 RX ORDER — TESTOSTERONE GEL, 1% 10 MG/G
GEL TRANSDERMAL
Qty: 150 G | Refills: 0 | Status: SHIPPED | OUTPATIENT
Start: 2022-12-29 | End: 2023-01-06 | Stop reason: SDUPTHER

## 2023-01-03 ENCOUNTER — HOSPITAL ENCOUNTER (INPATIENT)
Facility: MEDICAL CENTER | Age: 68
LOS: 1 days | DRG: 202 | End: 2023-01-05
Attending: EMERGENCY MEDICINE | Admitting: INTERNAL MEDICINE
Payer: MEDICARE

## 2023-01-03 ENCOUNTER — APPOINTMENT (OUTPATIENT)
Dept: RADIOLOGY | Facility: MEDICAL CENTER | Age: 68
DRG: 202 | End: 2023-01-03
Attending: EMERGENCY MEDICINE
Payer: MEDICARE

## 2023-01-03 DIAGNOSIS — J45.51 SEVERE PERSISTENT ASTHMA WITH ACUTE EXACERBATION: ICD-10-CM

## 2023-01-03 DIAGNOSIS — R09.02 HYPOXIA: ICD-10-CM

## 2023-01-03 DIAGNOSIS — J96.01 ACUTE RESPIRATORY FAILURE WITH HYPOXIA (HCC): ICD-10-CM

## 2023-01-03 DIAGNOSIS — J45.41 MODERATE PERSISTENT ASTHMA WITH ACUTE EXACERBATION: ICD-10-CM

## 2023-01-03 DIAGNOSIS — J45.901 ASTHMA WITH ACUTE EXACERBATION, UNSPECIFIED ASTHMA SEVERITY, UNSPECIFIED WHETHER PERSISTENT: ICD-10-CM

## 2023-01-03 DIAGNOSIS — J45.40 MODERATE PERSISTENT ASTHMA WITHOUT COMPLICATION: ICD-10-CM

## 2023-01-03 LAB
ALBUMIN SERPL BCP-MCNC: 4.4 G/DL (ref 3.2–4.9)
ALBUMIN/GLOB SERPL: 1.1 G/DL
ALP SERPL-CCNC: 116 U/L (ref 30–99)
ALT SERPL-CCNC: 56 U/L (ref 2–50)
ANION GAP SERPL CALC-SCNC: 14 MMOL/L (ref 7–16)
AST SERPL-CCNC: 135 U/L (ref 12–45)
BASOPHILS # BLD AUTO: 0.8 % (ref 0–1.8)
BASOPHILS # BLD: 0.07 K/UL (ref 0–0.12)
BILIRUB SERPL-MCNC: 1.1 MG/DL (ref 0.1–1.5)
BUN SERPL-MCNC: 6 MG/DL (ref 8–22)
CALCIUM ALBUM COR SERPL-MCNC: 10.4 MG/DL (ref 8.5–10.5)
CALCIUM SERPL-MCNC: 10.7 MG/DL (ref 8.5–10.5)
CHLORIDE SERPL-SCNC: 94 MMOL/L (ref 96–112)
CO2 SERPL-SCNC: 27 MMOL/L (ref 20–33)
CREAT SERPL-MCNC: 0.85 MG/DL (ref 0.5–1.4)
EKG IMPRESSION: NORMAL
EKG IMPRESSION: NORMAL
EOSINOPHIL # BLD AUTO: 0.85 K/UL (ref 0–0.51)
EOSINOPHIL NFR BLD: 9.5 % (ref 0–6.9)
ERYTHROCYTE [DISTWIDTH] IN BLOOD BY AUTOMATED COUNT: 48 FL (ref 35.9–50)
FLUAV RNA SPEC QL NAA+PROBE: NEGATIVE
FLUBV RNA SPEC QL NAA+PROBE: NEGATIVE
GFR SERPLBLD CREATININE-BSD FMLA CKD-EPI: 94 ML/MIN/1.73 M 2
GLOBULIN SER CALC-MCNC: 4.1 G/DL (ref 1.9–3.5)
GLUCOSE SERPL-MCNC: 102 MG/DL (ref 65–99)
HCT VFR BLD AUTO: 51.8 % (ref 42–52)
HGB BLD-MCNC: 17.6 G/DL (ref 14–18)
IMM GRANULOCYTES # BLD AUTO: 0.03 K/UL (ref 0–0.11)
IMM GRANULOCYTES NFR BLD AUTO: 0.3 % (ref 0–0.9)
LYMPHOCYTES # BLD AUTO: 2.45 K/UL (ref 1–4.8)
LYMPHOCYTES NFR BLD: 27.4 % (ref 22–41)
MCH RBC QN AUTO: 33.9 PG (ref 27–33)
MCHC RBC AUTO-ENTMCNC: 34 G/DL (ref 33.7–35.3)
MCV RBC AUTO: 99.8 FL (ref 81.4–97.8)
MONOCYTES # BLD AUTO: 0.5 K/UL (ref 0–0.85)
MONOCYTES NFR BLD AUTO: 5.6 % (ref 0–13.4)
NEUTROPHILS # BLD AUTO: 5.04 K/UL (ref 1.82–7.42)
NEUTROPHILS NFR BLD: 56.4 % (ref 44–72)
NRBC # BLD AUTO: 0 K/UL
NRBC BLD-RTO: 0 /100 WBC
PLATELET # BLD AUTO: 274 K/UL (ref 164–446)
PMV BLD AUTO: 9.1 FL (ref 9–12.9)
POTASSIUM SERPL-SCNC: 3.5 MMOL/L (ref 3.6–5.5)
PROT SERPL-MCNC: 8.5 G/DL (ref 6–8.2)
RBC # BLD AUTO: 5.19 M/UL (ref 4.7–6.1)
RSV RNA SPEC QL NAA+PROBE: NEGATIVE
SARS-COV-2 RNA RESP QL NAA+PROBE: NOTDETECTED
SODIUM SERPL-SCNC: 135 MMOL/L (ref 135–145)
SPECIMEN SOURCE: NORMAL
TROPONIN T SERPL-MCNC: 9 NG/L (ref 6–19)
WBC # BLD AUTO: 8.9 K/UL (ref 4.8–10.8)

## 2023-01-03 PROCEDURE — 80053 COMPREHEN METABOLIC PANEL: CPT

## 2023-01-03 PROCEDURE — 84484 ASSAY OF TROPONIN QUANT: CPT

## 2023-01-03 PROCEDURE — 71045 X-RAY EXAM CHEST 1 VIEW: CPT

## 2023-01-03 PROCEDURE — 700105 HCHG RX REV CODE 258: Performed by: EMERGENCY MEDICINE

## 2023-01-03 PROCEDURE — 96375 TX/PRO/DX INJ NEW DRUG ADDON: CPT

## 2023-01-03 PROCEDURE — 99285 EMERGENCY DEPT VISIT HI MDM: CPT

## 2023-01-03 PROCEDURE — 93005 ELECTROCARDIOGRAM TRACING: CPT | Performed by: EMERGENCY MEDICINE

## 2023-01-03 PROCEDURE — 94645 CONT INHLJ TX EACH ADDL HOUR: CPT

## 2023-01-03 PROCEDURE — 94644 CONT INHLJ TX 1ST HOUR: CPT

## 2023-01-03 PROCEDURE — 700101 HCHG RX REV CODE 250: Performed by: EMERGENCY MEDICINE

## 2023-01-03 PROCEDURE — 94760 N-INVAS EAR/PLS OXIMETRY 1: CPT

## 2023-01-03 PROCEDURE — 36415 COLL VENOUS BLD VENIPUNCTURE: CPT

## 2023-01-03 PROCEDURE — C9803 HOPD COVID-19 SPEC COLLECT: HCPCS | Performed by: EMERGENCY MEDICINE

## 2023-01-03 PROCEDURE — 93005 ELECTROCARDIOGRAM TRACING: CPT

## 2023-01-03 PROCEDURE — 85025 COMPLETE CBC W/AUTO DIFF WBC: CPT

## 2023-01-03 PROCEDURE — 0241U HCHG SARS-COV-2 COVID-19 NFCT DS RESP RNA 4 TRGT MIC: CPT

## 2023-01-03 PROCEDURE — 96365 THER/PROPH/DIAG IV INF INIT: CPT

## 2023-01-03 PROCEDURE — 700111 HCHG RX REV CODE 636 W/ 250 OVERRIDE (IP): Performed by: EMERGENCY MEDICINE

## 2023-01-03 RX ORDER — MAGNESIUM SULFATE HEPTAHYDRATE 40 MG/ML
2 INJECTION, SOLUTION INTRAVENOUS ONCE
Status: COMPLETED | OUTPATIENT
Start: 2023-01-03 | End: 2023-01-03

## 2023-01-03 RX ORDER — SODIUM CHLORIDE 9 MG/ML
1000 INJECTION, SOLUTION INTRAVENOUS ONCE
Status: COMPLETED | OUTPATIENT
Start: 2023-01-03 | End: 2023-01-03

## 2023-01-03 RX ORDER — IPRATROPIUM BROMIDE AND ALBUTEROL SULFATE 2.5; .5 MG/3ML; MG/3ML
6 SOLUTION RESPIRATORY (INHALATION)
Status: COMPLETED | OUTPATIENT
Start: 2023-01-03 | End: 2023-01-03

## 2023-01-03 RX ORDER — ALBUTEROL SULFATE 90 UG/1
AEROSOL, METERED RESPIRATORY (INHALATION)
Qty: 9 G | Refills: 0 | Status: SHIPPED | OUTPATIENT
Start: 2023-01-03 | End: 2023-04-03

## 2023-01-03 RX ORDER — METHYLPREDNISOLONE SODIUM SUCCINATE 40 MG/ML
40 INJECTION, POWDER, LYOPHILIZED, FOR SOLUTION INTRAMUSCULAR; INTRAVENOUS ONCE
Status: COMPLETED | OUTPATIENT
Start: 2023-01-03 | End: 2023-01-03

## 2023-01-03 RX ADMIN — METHYLPREDNISOLONE SODIUM SUCCINATE 40 MG: 40 INJECTION, POWDER, FOR SOLUTION INTRAMUSCULAR; INTRAVENOUS at 21:21

## 2023-01-03 RX ADMIN — MAGNESIUM SULFATE HEPTAHYDRATE 2 G: 40 INJECTION, SOLUTION INTRAVENOUS at 21:21

## 2023-01-03 RX ADMIN — SODIUM CHLORIDE 1000 ML: 9 INJECTION, SOLUTION INTRAVENOUS at 21:21

## 2023-01-03 RX ADMIN — ALBUTEROL SULFATE 15 MG/HR: 2.5 SOLUTION RESPIRATORY (INHALATION) at 23:43

## 2023-01-03 RX ADMIN — IPRATROPIUM BROMIDE AND ALBUTEROL SULFATE 6 ML: .5; 3 SOLUTION RESPIRATORY (INHALATION) at 21:21

## 2023-01-03 ASSESSMENT — FIBROSIS 4 INDEX: FIB4 SCORE: 3.2

## 2023-01-04 PROBLEM — J96.90 RESPIRATORY FAILURE (HCC): Status: ACTIVE | Noted: 2023-01-04

## 2023-01-04 LAB
ETHANOL BLD-MCNC: <10.1 MG/DL
PHOSPHATE SERPL-MCNC: 1.5 MG/DL (ref 2.5–4.5)
PROCALCITONIN SERPL-MCNC: 0.14 NG/ML
T4 FREE SERPL-MCNC: 1.03 NG/DL (ref 0.93–1.7)
TSH SERPL DL<=0.005 MIU/L-ACNC: 0.28 UIU/ML (ref 0.38–5.33)

## 2023-01-04 PROCEDURE — 700101 HCHG RX REV CODE 250: Performed by: STUDENT IN AN ORGANIZED HEALTH CARE EDUCATION/TRAINING PROGRAM

## 2023-01-04 PROCEDURE — 94640 AIRWAY INHALATION TREATMENT: CPT

## 2023-01-04 PROCEDURE — 99222 1ST HOSP IP/OBS MODERATE 55: CPT | Mod: AI | Performed by: INTERNAL MEDICINE

## 2023-01-04 PROCEDURE — A9270 NON-COVERED ITEM OR SERVICE: HCPCS | Performed by: INTERNAL MEDICINE

## 2023-01-04 PROCEDURE — 700111 HCHG RX REV CODE 636 W/ 250 OVERRIDE (IP): Performed by: INTERNAL MEDICINE

## 2023-01-04 PROCEDURE — 96367 TX/PROPH/DG ADDL SEQ IV INF: CPT

## 2023-01-04 PROCEDURE — 84443 ASSAY THYROID STIM HORMONE: CPT

## 2023-01-04 PROCEDURE — 700101 HCHG RX REV CODE 250: Performed by: INTERNAL MEDICINE

## 2023-01-04 PROCEDURE — 770001 HCHG ROOM/CARE - MED/SURG/GYN PRIV*

## 2023-01-04 PROCEDURE — 82077 ASSAY SPEC XCP UR&BREATH IA: CPT

## 2023-01-04 PROCEDURE — 700102 HCHG RX REV CODE 250 W/ 637 OVERRIDE(OP): Performed by: INTERNAL MEDICINE

## 2023-01-04 PROCEDURE — 84439 ASSAY OF FREE THYROXINE: CPT

## 2023-01-04 PROCEDURE — 84145 PROCALCITONIN (PCT): CPT

## 2023-01-04 PROCEDURE — 36415 COLL VENOUS BLD VENIPUNCTURE: CPT

## 2023-01-04 PROCEDURE — 84100 ASSAY OF PHOSPHORUS: CPT

## 2023-01-04 PROCEDURE — 96376 TX/PRO/DX INJ SAME DRUG ADON: CPT

## 2023-01-04 RX ORDER — IPRATROPIUM BROMIDE AND ALBUTEROL SULFATE 2.5; .5 MG/3ML; MG/3ML
3 SOLUTION RESPIRATORY (INHALATION)
Status: COMPLETED | OUTPATIENT
Start: 2023-01-04 | End: 2023-01-04

## 2023-01-04 RX ORDER — METHYLPREDNISOLONE SODIUM SUCCINATE 125 MG/2ML
62.5 INJECTION, POWDER, LYOPHILIZED, FOR SOLUTION INTRAMUSCULAR; INTRAVENOUS EVERY 6 HOURS
Status: DISPENSED | OUTPATIENT
Start: 2023-01-04 | End: 2023-01-05

## 2023-01-04 RX ORDER — CALCIUM CARBONATE 500 MG/1
500 TABLET, CHEWABLE ORAL DAILY
Status: DISCONTINUED | OUTPATIENT
Start: 2023-01-04 | End: 2023-01-05 | Stop reason: HOSPADM

## 2023-01-04 RX ORDER — GUAIFENESIN/DEXTROMETHORPHAN 100-10MG/5
10 SYRUP ORAL EVERY 6 HOURS PRN
Status: DISCONTINUED | OUTPATIENT
Start: 2023-01-04 | End: 2023-01-05 | Stop reason: HOSPADM

## 2023-01-04 RX ORDER — ATORVASTATIN CALCIUM 40 MG/1
40 TABLET, FILM COATED ORAL DAILY
Status: DISCONTINUED | OUTPATIENT
Start: 2023-01-04 | End: 2023-01-05 | Stop reason: HOSPADM

## 2023-01-04 RX ORDER — CHOLECALCIFEROL (VITAMIN D3) 125 MCG
5 CAPSULE ORAL DAILY
Status: DISCONTINUED | OUTPATIENT
Start: 2023-01-04 | End: 2023-01-05 | Stop reason: HOSPADM

## 2023-01-04 RX ORDER — TESTOSTERONE GEL, 1% 10 MG/G
50 GEL TRANSDERMAL DAILY
Status: DISCONTINUED | OUTPATIENT
Start: 2023-01-04 | End: 2023-01-04

## 2023-01-04 RX ORDER — POTASSIUM CHLORIDE 20 MEQ/1
40 TABLET, EXTENDED RELEASE ORAL ONCE
Status: COMPLETED | OUTPATIENT
Start: 2023-01-04 | End: 2023-01-04

## 2023-01-04 RX ORDER — BISACODYL 10 MG
10 SUPPOSITORY, RECTAL RECTAL
Status: DISCONTINUED | OUTPATIENT
Start: 2023-01-04 | End: 2023-01-05 | Stop reason: HOSPADM

## 2023-01-04 RX ORDER — HYDROCORTISONE 10 MG/1
10 TABLET ORAL 2 TIMES DAILY
Status: DISCONTINUED | OUTPATIENT
Start: 2023-01-05 | End: 2023-01-05 | Stop reason: HOSPADM

## 2023-01-04 RX ORDER — ENOXAPARIN SODIUM 100 MG/ML
40 INJECTION SUBCUTANEOUS DAILY
Status: DISCONTINUED | OUTPATIENT
Start: 2023-01-04 | End: 2023-01-05 | Stop reason: HOSPADM

## 2023-01-04 RX ORDER — ONDANSETRON 4 MG/1
4 TABLET, ORALLY DISINTEGRATING ORAL EVERY 4 HOURS PRN
Status: DISCONTINUED | OUTPATIENT
Start: 2023-01-04 | End: 2023-01-05 | Stop reason: HOSPADM

## 2023-01-04 RX ORDER — ACETAMINOPHEN 325 MG/1
650 TABLET ORAL EVERY 6 HOURS PRN
Status: DISCONTINUED | OUTPATIENT
Start: 2023-01-04 | End: 2023-01-05 | Stop reason: HOSPADM

## 2023-01-04 RX ORDER — AMOXICILLIN 250 MG
2 CAPSULE ORAL 2 TIMES DAILY
Status: DISCONTINUED | OUTPATIENT
Start: 2023-01-04 | End: 2023-01-05 | Stop reason: HOSPADM

## 2023-01-04 RX ORDER — LEVOTHYROXINE SODIUM 175 UG/1
175 TABLET ORAL
Status: DISCONTINUED | OUTPATIENT
Start: 2023-01-04 | End: 2023-01-05 | Stop reason: HOSPADM

## 2023-01-04 RX ORDER — LABETALOL HYDROCHLORIDE 5 MG/ML
10 INJECTION, SOLUTION INTRAVENOUS EVERY 4 HOURS PRN
Status: DISCONTINUED | OUTPATIENT
Start: 2023-01-04 | End: 2023-01-05 | Stop reason: HOSPADM

## 2023-01-04 RX ORDER — ONDANSETRON 2 MG/ML
4 INJECTION INTRAMUSCULAR; INTRAVENOUS EVERY 4 HOURS PRN
Status: DISCONTINUED | OUTPATIENT
Start: 2023-01-04 | End: 2023-01-05 | Stop reason: HOSPADM

## 2023-01-04 RX ORDER — IPRATROPIUM BROMIDE AND ALBUTEROL SULFATE 2.5; .5 MG/3ML; MG/3ML
3 SOLUTION RESPIRATORY (INHALATION)
Status: DISCONTINUED | OUTPATIENT
Start: 2023-01-04 | End: 2023-01-04

## 2023-01-04 RX ORDER — FEXOFENADINE HCL 60 MG/1
60 TABLET, FILM COATED ORAL DAILY
Status: DISCONTINUED | OUTPATIENT
Start: 2023-01-04 | End: 2023-01-05 | Stop reason: HOSPADM

## 2023-01-04 RX ORDER — FEXOFENADINE HCL 60 MG/1
60 TABLET, FILM COATED ORAL DAILY
COMMUNITY
End: 2023-04-03

## 2023-01-04 RX ORDER — FLUTICASONE PROPIONATE 110 UG/1
1 AEROSOL, METERED RESPIRATORY (INHALATION) 2 TIMES DAILY
Status: DISCONTINUED | OUTPATIENT
Start: 2023-01-04 | End: 2023-01-05 | Stop reason: HOSPADM

## 2023-01-04 RX ORDER — POLYETHYLENE GLYCOL 3350 17 G/17G
1 POWDER, FOR SOLUTION ORAL
Status: DISCONTINUED | OUTPATIENT
Start: 2023-01-04 | End: 2023-01-05 | Stop reason: HOSPADM

## 2023-01-04 RX ORDER — M-VIT,TX,IRON,MINS/CALC/FOLIC 27MG-0.4MG
1 TABLET ORAL DAILY
COMMUNITY
End: 2023-04-03

## 2023-01-04 RX ORDER — ALBUTEROL SULFATE 90 UG/1
2 AEROSOL, METERED RESPIRATORY (INHALATION)
Status: DISCONTINUED | OUTPATIENT
Start: 2023-01-04 | End: 2023-01-05 | Stop reason: HOSPADM

## 2023-01-04 RX ADMIN — METHYLPREDNISOLONE SODIUM SUCCINATE 62.5 MG: 125 INJECTION, POWDER, FOR SOLUTION INTRAMUSCULAR; INTRAVENOUS at 18:14

## 2023-01-04 RX ADMIN — THIAMINE HYDROCHLORIDE 100 MG: 100 INJECTION, SOLUTION INTRAMUSCULAR; INTRAVENOUS at 05:17

## 2023-01-04 RX ADMIN — IPRATROPIUM BROMIDE AND ALBUTEROL SULFATE 3 ML: 2.5; .5 SOLUTION RESPIRATORY (INHALATION) at 19:34

## 2023-01-04 RX ADMIN — IPRATROPIUM BROMIDE AND ALBUTEROL SULFATE 3 ML: 2.5; .5 SOLUTION RESPIRATORY (INHALATION) at 08:10

## 2023-01-04 RX ADMIN — FEXOFENADINE HYDROCHLORIDE 60 MG: 60 TABLET ORAL at 05:17

## 2023-01-04 RX ADMIN — IPRATROPIUM BROMIDE AND ALBUTEROL SULFATE 3 ML: 2.5; .5 SOLUTION RESPIRATORY (INHALATION) at 22:50

## 2023-01-04 RX ADMIN — POTASSIUM CHLORIDE 40 MEQ: 20 TABLET, EXTENDED RELEASE ORAL at 02:46

## 2023-01-04 RX ADMIN — IPRATROPIUM BROMIDE AND ALBUTEROL SULFATE 3 ML: 2.5; .5 SOLUTION RESPIRATORY (INHALATION) at 12:36

## 2023-01-04 RX ADMIN — LEVOTHYROXINE SODIUM 175 MCG: 0.17 TABLET ORAL at 05:17

## 2023-01-04 RX ADMIN — ATORVASTATIN CALCIUM 40 MG: 40 TABLET, FILM COATED ORAL at 05:17

## 2023-01-04 RX ADMIN — METHYLPREDNISOLONE SODIUM SUCCINATE 62.5 MG: 125 INJECTION, POWDER, FOR SOLUTION INTRAMUSCULAR; INTRAVENOUS at 23:07

## 2023-01-04 RX ADMIN — METHYLPREDNISOLONE SODIUM SUCCINATE 62.5 MG: 125 INJECTION, POWDER, FOR SOLUTION INTRAMUSCULAR; INTRAVENOUS at 13:23

## 2023-01-04 RX ADMIN — FLUTICASONE PROPIONATE 110 MCG: 110 AEROSOL, METERED RESPIRATORY (INHALATION) at 13:23

## 2023-01-04 RX ADMIN — METHYLPREDNISOLONE SODIUM SUCCINATE 62.5 MG: 125 INJECTION, POWDER, FOR SOLUTION INTRAMUSCULAR; INTRAVENOUS at 05:17

## 2023-01-04 RX ADMIN — CALCIUM CARBONATE 500 MG: 500 TABLET, CHEWABLE ORAL at 05:17

## 2023-01-04 RX ADMIN — DEXTROMETHORPHAN HYDROBROMIDE, GUAIFENESIN 10 ML: 10; 100 LIQUID ORAL at 21:58

## 2023-01-04 ASSESSMENT — ENCOUNTER SYMPTOMS
TREMORS: 0
NECK PAIN: 0
HEMOPTYSIS: 0
BACK PAIN: 0
FEVER: 0
COUGH: 1
FLANK PAIN: 0
CHILLS: 0
NERVOUS/ANXIOUS: 0
FOCAL WEAKNESS: 0
HEARTBURN: 0
SHORTNESS OF BREATH: 1
HEADACHES: 0
WEIGHT LOSS: 0
HALLUCINATIONS: 0
SPEECH CHANGE: 0
BRUISES/BLEEDS EASILY: 0
VOMITING: 0
NAUSEA: 0
DOUBLE VISION: 0
POLYDIPSIA: 0
SPUTUM PRODUCTION: 0
ORTHOPNEA: 0
BLURRED VISION: 0
PALPITATIONS: 0
PHOTOPHOBIA: 0

## 2023-01-04 ASSESSMENT — FIBROSIS 4 INDEX: FIB4 SCORE: 4.48

## 2023-01-04 ASSESSMENT — COPD QUESTIONNAIRES
DO YOU EVER COUGH UP ANY MUCUS OR PHLEGM?: NO/ONLY WITH OCCASIONAL COLDS OR INFECTIONS
HAVE YOU SMOKED AT LEAST 100 CIGARETTES IN YOUR ENTIRE LIFE: YES
COPD SCREENING SCORE: 5
DURING THE PAST 4 WEEKS HOW MUCH DID YOU FEEL SHORT OF BREATH: SOME OF THE TIME

## 2023-01-04 ASSESSMENT — LIFESTYLE VARIABLES: SUBSTANCE_ABUSE: 0

## 2023-01-04 NOTE — DISCHARGE PLANNING
HTH/SCP TCN chart review completed. Collaborated with CM prior to meeting with the pt. The most current review of medical record, knowledge of pt's PLOF and social support, LACE+ score of 78, no 6 clicks mobility scores currently, however per nursing note, reports steady gait with ambulation in ED.    Pt seen at bedside. Introduced TCN program. Provided education regarding post acute levels of care. Discussed [facility]TH/[facility] plan benefits (Meds to Beds, medical uber and [facility] Pt verbalizes understanding.  Patient lives alone, denies transportation or food needs and no equipment at home as patient was independent at baseline. Significant conversation on purpose of DME choice for O2 provided.  Max reinforcement that TCN is not part of medical team, and not making medical decision, just providing choice just in case patient needs equipment to discharge home.    Patient reports I with ambulation, and no mobility concerns.  Additionally no mobility concerns documented..  DME for equipment gathered today just in case functional mobility status changes.    No consults requested. Choice proactively obtained for DME and faxed to [facility]. TCN will continue to follow and collaborate with discharge planning team as additional post acute needs arise. Thank you.     Completed today  Choice obtained: DME (O2) - 1/4  [facility] referral sent - 1/4

## 2023-01-04 NOTE — H&P
"Hospital Medicine History & Physical Note    Date of Service  1/4/2023    Primary Care Physician  Manjula Zaidi M.D.      Code Status  Full Code    Chief Complaint  Chief Complaint   Patient presents with    Shortness of Breath     Seen for the same on 10/24. Patient reports increased SOB, insomnia, and weakness x 5 days. Difficulty eating due to SOB while chewing, also reports loss of appetite. Speaking in full sentences. RA sat 88% in triage, 95% after being placed on 4 L NC. Not on home O2, history of asthma.        History of Presenting Illness  Alberto Galicia is a 67 y.o. male with past medical history of asthma, pituitary insufficiency, hypothyroid, who presented 1/3/2023 with complaints of increased shortness of breath on exertion, cough with sputum, generalized weakness, nausea in the last 5 days.  Patient has been using her albuterol inhaler frequently.  He  was having difficulty sleeping each night due to shortness of breath and would get up and have alcoholic beverage to help him to fall asleep.  When asked how many drinks that he have on a daily basis he would say \"too many to count\".  In ER he desaturated to 88% on room air, was placed on 4 L.  Patient received treatment with DuoNeb, steroids, magnesium and still desaturated when trying to get up and walk.  Therefore, admission requested.  Chest x-ray negative for pneumonia.  COVID-19/restless/influenza screen is negative.  I discussed the plan of care with patient.    Review of Systems  Review of Systems   Constitutional:  Negative for chills, fever and weight loss.   HENT:  Negative for ear pain, hearing loss and tinnitus.    Eyes:  Negative for blurred vision, double vision and photophobia.   Respiratory:  Positive for cough and shortness of breath. Negative for hemoptysis and sputum production.    Cardiovascular:  Negative for chest pain, palpitations and orthopnea.   Gastrointestinal:  Negative for heartburn, nausea and vomiting. "   Genitourinary:  Negative for dysuria, flank pain, frequency and hematuria.   Musculoskeletal:  Negative for back pain, joint pain and neck pain.   Skin:  Negative for itching and rash.   Neurological:  Negative for tremors, speech change, focal weakness and headaches.   Endo/Heme/Allergies:  Negative for environmental allergies and polydipsia. Does not bruise/bleed easily.   Psychiatric/Behavioral:  Negative for hallucinations and substance abuse. The patient is not nervous/anxious.      Past Medical History   has a past medical history of Asthma, Hyperlipidemia, Pituitary disease (HCC), and Thyroid disease.    Surgical History   has a past surgical history that includes craniotomy.     Family History  family history includes Alzheimer's Disease in his maternal grandmother; Cancer in his maternal grandfather and paternal grandfather; Diabetes in his brother and mother; Heart Disease in his father and paternal grandmother.   Family history reviewed with patient. There is no family history that is pertinent to the chief complaint.     Social History   reports that he has never smoked. He has never used smokeless tobacco. He reports current alcohol use of about 12.6 oz per week. He reports that he does not use drugs.    Allergies  Allergies   Allergen Reactions    Pcn [Penicillins] Swelling       Medications  Prior to Admission Medications   Prescriptions Last Dose Informant Patient Reported? Taking?   Fexofenadine HCl (ALLERGY 24-HR PO)   Yes No   Sig: Take 10 mg by mouth every day.   albuterol 108 (90 Base) MCG/ACT Aero Soln inhalation aerosol   No No   Sig: INHALE 2 PUFFS BY MOUTH EVERY 4 HOURS AS NEEDED FOR SHORTNESS OF BREATH   albuterol 108 (90 Base) MCG/ACT Aero Soln inhalation aerosol   No No   Sig: INHALE 2 PUFFS BY MOUTH EVERY 6 HOURS AS NEEDED FOR SHORTNESS OF BREATH   atorvastatin (LIPITOR) 40 MG Tab   No No   Sig: Take 1 tablet by mouth once daily   calcium carbonate (TUMS) 500 MG Chew Tab   Yes No    Sig: Chew 500 mg every day.   ePHEDrine-guaiFENesin (PRIMATENE ASTHMA PO)   Yes No   Sig: Take 1 capsule by mouth every day.   Patient not taking: Reported on 4/4/2022   fluticasone (FLOVENT HFA) 110 MCG/ACT Aerosol   No No   Sig: Inhale 1 Puff 2 times a day.   hydrocortisone (CORTEF) 10 MG Tab   No No   Sig: Take 1 pill in the morning and 1/2 in the afternoon 90 day supply   levothyroxine (SYNTHROID) 175 MCG Tab   No No   Sig: Take 1 Tablet by mouth every morning on an empty stomach. Please make an appointment for future refills   melatonin 5 mg Tab   Yes No   Sig: Take 5 mg by mouth every day.   methylPREDNISolone (MEDROL DOSEPAK) 4 MG Tablet Therapy Pack   No No   Sig: As directed on the packaging label.   predniSONE (DELTASONE) 20 MG Tab   No No   Sig: Take 2 Tablets by mouth every day.   testosterone (TESTIM/ANDROGEL) 50 MG/5GM (1%) Gel gel   No No   Sig: APPLY 1 PACKET (50MG) TOPICALLY ONCE DAILY   triamcinolone acetonide (KENALOG) 0.1 % Cream   No No   Sig: APPLY CREAM TOPICALLY TO AFFECTED AREA TWICE DAILY      Facility-Administered Medications: None       Physical Exam  Temp:  [36.4 °C (97.6 °F)-36.6 °C (97.8 °F)] 36.6 °C (97.8 °F)  Pulse:  [] 84  Resp:  [18-84] 84  BP: (127-131)/(74-85) 129/74  SpO2:  [86 %-95 %] 86 %  Blood Pressure : 129/74   Temperature: 36.6 °C (97.8 °F)   Pulse: 84   Respiration: (!) 84   Pulse Oximetry: (!) 86 %       Physical Exam  Vitals and nursing note reviewed.   Constitutional:       General: He is not in acute distress.     Appearance: Normal appearance.   HENT:      Head: Normocephalic and atraumatic.      Nose: Nose normal.      Mouth/Throat:      Mouth: Mucous membranes are moist.   Eyes:      Extraocular Movements: Extraocular movements intact.      Pupils: Pupils are equal, round, and reactive to light.   Cardiovascular:      Rate and Rhythm: Normal rate and regular rhythm.   Pulmonary:      Effort: Pulmonary effort is normal.      Breath sounds: Examination of  the right-lower field reveals decreased breath sounds and wheezing. Examination of the left-lower field reveals decreased breath sounds and wheezing. Decreased breath sounds and wheezing present.   Abdominal:      General: Abdomen is flat. There is no distension.      Tenderness: There is no abdominal tenderness.   Musculoskeletal:         General: No swelling or deformity. Normal range of motion.      Cervical back: Normal range of motion and neck supple.   Skin:     General: Skin is warm and dry.   Neurological:      General: No focal deficit present.      Mental Status: He is alert and oriented to person, place, and time.   Psychiatric:         Mood and Affect: Mood normal.         Behavior: Behavior normal.       Laboratory:  Recent Labs     01/03/23  1722   WBC 8.9   RBC 5.19   HEMOGLOBIN 17.6   HEMATOCRIT 51.8   MCV 99.8*   MCH 33.9*   MCHC 34.0   RDW 48.0   PLATELETCT 274   MPV 9.1     Recent Labs     01/03/23  1722   SODIUM 135   POTASSIUM 3.5*   CHLORIDE 94*   CO2 27   GLUCOSE 102*   BUN 6*   CREATININE 0.85   CALCIUM 10.7*     Recent Labs     01/03/23  1722   ALTSGPT 56*   ASTSGOT 135*   ALKPHOSPHAT 116*   TBILIRUBIN 1.1   GLUCOSE 102*         No results for input(s): NTPROBNP in the last 72 hours.      Recent Labs     01/03/23  2049   TROPONINT 9       Imaging:  DX-CHEST-PORTABLE (1 VIEW)   Final Result      1.  No acute cardiac or pulmonary abnormalities are identified.          X-Ray:  I have personally reviewed the images and compared with prior images.    Assessment/Plan:  Justification for Admission Status  I anticipate this patient will require at least two midnights for appropriate medical management, necessitating inpatient admission because respiratory failure with hypoxia    Patient will need a Med/Surg bed on MEDICAL service .  The need is secondary to respiratory failure with hypoxia.    * Acute respiratory failure with hypoxia due to asthma exacerbation (HCC)- (present on  admission)  Assessment & Plan  History of moderate persistent asthma.  Presented with hypoxia, wheezes.  Will admit for treatment with DuoNeb and steroids for asthma exacerbation  RT protocol, supplemental oxygen, pulse oximetry  Follow-up with pulmonary clinic as outpatient    Elevated liver enzymes- (present on admission)  Assessment & Plan  Probably secondary to alcoholic liver disease.  This is chronic, but has been getting worse.  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

## 2023-01-04 NOTE — FACE TO FACE
"Face to Face Note  -  Durable Medical Equipment    Johnny Hamm M.D. - NPI: [id number]  I certify that this patient is under my care and that they had a durable medical equipment(DME)face to face encounter by myself that meets the physician DME face-to-face encounter requirements with this patient on:    Date of encounter:   Patient:                    MRN:                       YOB: 2024  Alberto Galicia  [id number]  1955     The encounter with the patient was in whole, or in part, for the following medical condition, which is the primary reason for durable medical equipment:  Asthma    I certify that, based on my findings, the following durable medical equipment is medically necessary:    Oxygen   HOME O2 Saturation Measurements:(Values must be present for Home Oxygen orders)  Room air sat at rest: 88  Room air sat with amb: 86  With liters of O2: 4, O2 sat at rest with O2: 93  With Liters of O2: 4, O2 sat with amb with O2 : 92  Is the patient mobile?: Yes  If patient feels more short of breath, they can go up to 6 liters per minute and contact healthcare provider.    Supporting Symptoms: The patient requires supplemental oxygen, as the following interventions have been tried with limited or no improvement: \"Bronchodilators and/or steroid inhalers.    My Clinical findings support the need for the above equipment due to:  Hypoxia  "

## 2023-01-04 NOTE — ED NOTES
Patient resting in bed. Remains on 2L O2 at this time.   No needs or complaints   Pt on ra. Breath sounds clear. Mild retractions noted. Pt tolerating 35 mls q3 hrs po well. Hearing screen passed. Pt voiding well. No stool noted. NaCl dc'd. Mother called and updated on plan of care by RN.

## 2023-01-04 NOTE — ED PROVIDER NOTES
ER Provider Note    Scribed for Vin López M.d. by Siri Piedra. 1/3/2023  8:23 PM    Primary Care Provider: Manjula Zaidi M.D.    CHIEF COMPLAINT  Chief Complaint   Patient presents with    Shortness of Breath     Seen for the same on 10/24. Patient reports increased SOB, insomnia, and weakness x 5 days. Difficulty eating due to SOB while chewing, also reports loss of appetite. Speaking in full sentences. RA sat 88% in triage, 95% after being placed on 4 L NC. Not on home O2, history of asthma.      EXTERNAL RECORDS REVIEWED  Select: Inpatient Notes last ED visit    HPI/ROS  LIMITATION TO HISTORY   Select: : None  OUTSIDE HISTORIAN(S):  Select: None    Alberto Galicia is a 67 y.o. male who presents to the ED complaining of shortness of breath onset 5 days ago. The patient states he takes albuterol regularly, but that his symptoms onset suddenly and have prevented him from eating and drinking since then. He also experiences trouble sleeping, weakness, nausea, and sore throat. Patient denies single leg swelling or pain, chest pain, fever, vomiting, or diarrhea. Denies oxygen usage at baseline or history of heart attack. No alleviating or exacerbating factors reported. Vaccinations are up to date.    PAST MEDICAL HISTORY  Past Medical History:   Diagnosis Date    Asthma     Hyperlipidemia     Pituitary disease (HCC)     Thyroid disease      SURGICAL HISTORY  Past Surgical History:   Procedure Laterality Date    CRANIOTOMY       FAMILY HISTORY  Family History   Problem Relation Age of Onset    Diabetes Mother     Heart Disease Father     Diabetes Brother     Alzheimer's Disease Maternal Grandmother     Cancer Maternal Grandfather     Heart Disease Paternal Grandmother     Cancer Paternal Grandfather      SOCIAL HISTORY   reports that he has never smoked. He has never used smokeless tobacco. He reports current alcohol use of about 12.6 oz per week. He reports that he does not use  "drugs.    CURRENT MEDICATIONS  Previous Medications    ALBUTEROL 108 (90 BASE) MCG/ACT AERO SOLN INHALATION AEROSOL    INHALE 2 PUFFS BY MOUTH EVERY 4 HOURS AS NEEDED FOR SHORTNESS OF BREATH    ATORVASTATIN (LIPITOR) 40 MG TAB    Take 1 tablet by mouth once daily    CALCIUM CARBONATE (TUMS) 500 MG CHEW TAB    Chew 500 mg every day.    EPHEDRINE-GUAIFENESIN (PRIMATENE ASTHMA PO)    Take 1 capsule by mouth every day.    FEXOFENADINE HCL (ALLERGY 24-HR PO)    Take 10 mg by mouth every day.    FLUTICASONE (FLOVENT HFA) 110 MCG/ACT AEROSOL    Inhale 1 Puff 2 times a day.    HYDROCORTISONE (CORTEF) 10 MG TAB    Take 1 pill in the morning and 1/2 in the afternoon 90 day supply    LEVOTHYROXINE (SYNTHROID) 175 MCG TAB    Take 1 Tablet by mouth every morning on an empty stomach. Please make an appointment for future refills    MELATONIN 5 MG TAB    Take 5 mg by mouth every day.    METHYLPREDNISOLONE (MEDROL DOSEPAK) 4 MG TABLET THERAPY PACK    As directed on the packaging label.    PREDNISONE (DELTASONE) 20 MG TAB    Take 2 Tablets by mouth every day.    TESTOSTERONE (TESTIM/ANDROGEL) 50 MG/5GM (1%) GEL GEL    APPLY 1 PACKET (50MG) TOPICALLY ONCE DAILY    TRIAMCINOLONE ACETONIDE (KENALOG) 0.1 % CREAM    APPLY CREAM TOPICALLY TO AFFECTED AREA TWICE DAILY     ALLERGIES  Pcn [penicillins]    PHYSICAL EXAM  /84   Pulse 77   Temp 36.6 °C (97.8 °F) (Temporal)   Resp 18   Ht 1.93 m (6' 4\")   Wt 97.2 kg (214 lb 4.6 oz)   SpO2 95%   BMI 26.08 kg/m²     Constitutional: Well developed, Well nourished, Mild distress.   HENT: Normocephalic, Atraumatic, Oropharynx moist, No oral exudates.   Eyes: Conjunctiva normal, No discharge.   Neck: Supple, No stridor.  Cardiovascular: Tachycardic heart rate, Normal rhythm, No murmurs, equal pulses.   Pulmonary: No respiratory distress, Tight lungs with expiratory wheezing, Tachypneic, No rales, No rhonchi.  Chest: No chest wall tenderness or deformity.   Abdomen:Soft, No tenderness, No " masses, no rebound, no guarding.   Back: No CVA tenderness.   Musculoskeletal: No major deformities noted, No tenderness. No edema in legs, No calf tenderness   Skin: Warm, Dry, No erythema, No rash.   Neurologic: Alert & oriented x 3, Normal motor function,  No focal deficits noted.   Psychiatric: Affect normal, Judgment normal, Mood normal.     DIAGNOSTIC STUDIES    Labs:   Results for orders placed or performed during the hospital encounter of 01/03/23   CBC with Differential   Result Value Ref Range    WBC 8.9 4.8 - 10.8 K/uL    RBC 5.19 4.70 - 6.10 M/uL    Hemoglobin 17.6 14.0 - 18.0 g/dL    Hematocrit 51.8 42.0 - 52.0 %    MCV 99.8 (H) 81.4 - 97.8 fL    MCH 33.9 (H) 27.0 - 33.0 pg    MCHC 34.0 33.7 - 35.3 g/dL    RDW 48.0 35.9 - 50.0 fL    Platelet Count 274 164 - 446 K/uL    MPV 9.1 9.0 - 12.9 fL    Neutrophils-Polys 56.40 44.00 - 72.00 %    Lymphocytes 27.40 22.00 - 41.00 %    Monocytes 5.60 0.00 - 13.40 %    Eosinophils 9.50 (H) 0.00 - 6.90 %    Basophils 0.80 0.00 - 1.80 %    Immature Granulocytes 0.30 0.00 - 0.90 %    Nucleated RBC 0.00 /100 WBC    Neutrophils (Absolute) 5.04 1.82 - 7.42 K/uL    Lymphs (Absolute) 2.45 1.00 - 4.80 K/uL    Monos (Absolute) 0.50 0.00 - 0.85 K/uL    Eos (Absolute) 0.85 (H) 0.00 - 0.51 K/uL    Baso (Absolute) 0.07 0.00 - 0.12 K/uL    Immature Granulocytes (abs) 0.03 0.00 - 0.11 K/uL    NRBC (Absolute) 0.00 K/uL   Comp Metabolic Panel   Result Value Ref Range    Sodium 135 135 - 145 mmol/L    Potassium 3.5 (L) 3.6 - 5.5 mmol/L    Chloride 94 (L) 96 - 112 mmol/L    Co2 27 20 - 33 mmol/L    Anion Gap 14.0 7.0 - 16.0    Glucose 102 (H) 65 - 99 mg/dL    Bun 6 (L) 8 - 22 mg/dL    Creatinine 0.85 0.50 - 1.40 mg/dL    Calcium 10.7 (H) 8.5 - 10.5 mg/dL    AST(SGOT) 135 (H) 12 - 45 U/L    ALT(SGPT) 56 (H) 2 - 50 U/L    Alkaline Phosphatase 116 (H) 30 - 99 U/L    Total Bilirubin 1.1 0.1 - 1.5 mg/dL    Albumin 4.4 3.2 - 4.9 g/dL    Total Protein 8.5 (H) 6.0 - 8.2 g/dL    Globulin 4.1 (H)  1.9 - 3.5 g/dL    A-G Ratio 1.1 g/dL   ESTIMATED GFR   Result Value Ref Range    GFR (CKD-EPI) 94 >60 mL/min/1.73 m 2   CORRECTED CALCIUM   Result Value Ref Range    Correct Calcium 10.4 8.5 - 10.5 mg/dL   CoV-2, FLU A/B, and RSV by PCR (2-4 Hours CEPHEID) : Collect NP swab in VTM    Specimen: Respirate   Result Value Ref Range    Influenza virus A RNA Negative Negative    Influenza virus B, PCR Negative Negative    RSV, PCR Negative Negative    SARS-CoV-2 by PCR NotDetected     SARS-CoV-2 Source NP Swab    TROPONIN   Result Value Ref Range    Troponin T 9 6 - 19 ng/L   EKG   Result Value Ref Range    Report       [facility]    Test Date:  2023  Pt Name:    MATT MOODY                   Department: ER  MRN:        [id number]                      Room:  Gender:     Male                         Technician: [id number]  :        1955                   Requested By:ER TRIAGE PROTOCOL  Order #:    [id number]                    Reading MD:    Measurements  Intervals                                Axis  Rate:       92                           P:          58  IN:         175                          QRS:        -65  QRSD:       106                          T:          82  QT:         368  QTc:        456    Interpretive Statements  Sinus tachycardia  Multiple premature complexes, vent & supraven  Left anterior fascicular block  Abnormal R-wave progression, early transition  Nonspecific T abnrm, anterolateral leads  Compared to ECG 10/24/2022 17:39:37  Sinus rhythm no longer present     EKG (NOW)   Result Value Ref Range    Report       [facility]    Test Date:  2023  Pt Name:    MATT MOODY                   Department: ER  MRN:        [id number]                      Room:       [facility]  Gender:     Male                         Technician: [id number]  :        1955                   Requested By:CELINA GARVIN  Order #:    [id number]                     Reading MD:    Measurements  Intervals                                Axis  Rate:       100                          P:          85  ME:         172                          QRS:        -61  QRSD:       108                          T:          86  QT:         369  QTc:        476    Interpretive Statements  Sinus tachycardia  Multiform ventricular premature complexes  Incomplete RBBB and LAFB  Abnormal R-wave progression, early transition  Borderline prolonged QT interval  Compared to ECG 01/03/2023 17:28:26  Ventricular premature complex(es) now present  Incomplete right bundle-branch block now present  Right bundle-branch  block now present       EKG:   I have independently interpreted this EKG as shown above.     Radiology:   The attending emergency physician has independently interpreted the diagnostic imaging associated with this visit and am waiting the final reading from the radiologist.   DX-CHEST-PORTABLE (1 VIEW)   Final Result      1.  No acute cardiac or pulmonary abnormalities are identified.         COURSE & MEDICAL DECISION MAKING     ED Observation Status? Yes; I am placing the patient in to an observation status due to a diagnostic uncertainty as well as therapeutic intensity. Patient placed in observation status at  8:25 PM, 1/3/2023.  Based off needed for multiple breathing treatments and evaluation what his hypoxia does      INITIAL ASSESSMENT AND PLAN  Care Narrative:     8:23 PM - Patient seen and examined at bedside. Discussed plan of care, including treatment wth albuterol. Patient verbalizes understanding and agreement to this plan of care. Patient will be treated with NS infusion 1L, Duoneb nebulizer solution, Solu-Medrol injeciton 40 mg, and Magnesium Sulfate IVPB premix 2 g. Ordered DX-Chest, CMP, CBC w/ Diff, SARS-CoV-2, Flu A/B, and RSV, and Troponin to evaluate.     10:53 PM - Patient was reevaluated at bedside. Discussed lab and radiology results with the patient. Patient  has better air movement, but is still wheezing. Patient will be treated with albuterol nebulizer solution.    Patient reexamined at 1 AM after second albuterol treatment and finishing his magnesium.  He is still wheezing.  We attempted to take him off his oxygen but he immediately dropped down to 90%.  On any ambulation he is at 85%.  Patient is not normally on oxygen.  Patient was placed back on 2 L nasal cannula    HYDRATION: Based on the patient's presentation of Tachycardia the patient was given IV fluids. IV Hydration was used because oral hydration was not adequate alone. Upon recheck following hydration, the patient was improved.    Patient discharged from ED observation at 1:10 AM for hospitalization      Differential Diagnoses: (include but are not limited to) Covid vs Flu vs Asthma Exacerbation vs MI.    Escalation of care considered, and ultimately not performed: IV fluids, blood analysis, and diagnostic imaging.    Decision tools and prescription drugs considered including, but not limited to: Select: Magnesium to help with bronchodilation .    Medical Decision Making:      FINAL PROBLEM LIST AND DISPOSITION    Problem #1: Wheezing.  At this point time I think this is secondary to the patient's asthma exacerbation.  This is been pretty persistent he was given Medrol, magnesium, and several breathing treatments but continues to have hypoxia and with significant wheezing.  Patient is negative for RSV, flu, COVID I do not think this is an infectious problem.  Chest x-ray does not show any pneumonia.  I think this is all secondary to asthma exacerbation.  and Problem #2: Hypoxia.  EKG does not show any signs of arrhythmia and is negative troponin do not think this is secondary to myocardial infarction.  Chest x-ray is negative for pneumonia.    I have discussed management of the patient with the following physicians and JOANA's: Dr. Prince hospitalist is agreed to consult on hospitalization      FINAL  IMPRESSION   1. Severe persistent asthma with acute exacerbation    2. Hypoxia        I, Siri Piedra (Scribe), am scribing for, and in the presence of, FANTA Dawn*.    Electronically signed by: Siri Piedra (Scribe), 1/3/2023    Vin WANG M.* personally performed the services described in this documentation, as scribed by Siri Piedra in my presence, and it is both accurate and complete.     The note accurately reflects work and decisions made by me.  Vin López M.D.  1/4/2023  1:12 AM

## 2023-01-04 NOTE — DISCHARGE PLANNING
Received Choice form at [phone]  Agency/Facility Name: [facility]  Referral sent per Choice form @ [phone]

## 2023-01-04 NOTE — ASSESSMENT & PLAN NOTE
Probably secondary to alcoholic liver disease.  This is chronic, but has been getting worse.  Alcohol cessation counseling  Follow-up with PCP for outpatient management

## 2023-01-04 NOTE — ED NOTES
"Patient ambulated in hallway.  Room air sat with ambulation: 86%  Ambulation sat with 4L O2: 92%  Room air saturation at rest: 87%  Saturation with 4L O2 at rest\" 94%  MD aware  "

## 2023-01-04 NOTE — PROGRESS NOTES
"Hospital Medicine Daily Progress Note    Date of Service  1/4/2023    Chief Complaint  Alberto Galicia is a 67 y.o. male admitted 1/3/2023 with dyspnea    Hospital Course  67 y.o. male with past medical history of asthma, pituitary insufficiency, hypothyroid, who presented 1/3/2023 with complaints of increased shortness of breath on exertion, cough with sputum, generalized weakness, nausea in the last 5 days.    Patient has been using her albuterol inhaler frequently.  He  was having difficulty sleeping each night due to shortness of breath and would get up and have alcoholic beverage to help him to fall asleep.  When asked how many drinks that he have on a daily basis he would say \"too many to count\".  In ER he desaturated to 88% on room air, was placed on 4 L.  Patient received treatment with DuoNeb, steroids, magnesium and still desaturated when trying to get up and walk.  Therefore, admission requested.  Chest x-ray negative for pneumonia.  COVID-19/restless/influenza screen is negative.    Patient has chronic history of asthma with increased frequency of asthma exacerbations within this past year.  He is only on albuterol not on inhaled corticosteroids at home.    Interval Problem Update  Patient was evaluated at bedside  Requiring 3 L to maintain saturations  Viral panel negative and chest x-ray normal  RT/O2  Continue DuoNebs  Continue inhaled corticosteroid  Continue IV steroids  Frequent incentive spirometer  Titrate oxygen as tolerable  Home oxygen evaluation  Pending labs  Labs on AM    I had an extensive conversation with patient regarding clinical picture and his acute hypoxic respiratory failure secondary to uncontrolled asthma and asthma exacerbation.  Patient adamant about going home and reporting that he is going to get oxygen through insurance and that he has to go to his endocrinologist appointment.  I explained the importance of staying in the hospital as patient is still hypoxic requiring 3 L " nasal cannula and requiring scheduled duo nebs, inhaled corticosteroids and intravenous systemic steroids for which she voiced understanding but reports that he wants to be discharged.  He does not have a smart phone for which she cannot be set up with home oxygen monitoring on discharge.  I do not recommend hospital discharge for at this moment.  To have a better understanding of patient's hypoxia, he will undergo a home oxygen evaluation to determine his oxygen requirements during ambulation.  Continue medical management as per above        I have discussed this patient's plan of care and discharge plan at IDT rounds today with Case Management, Nursing, Nursing leadership, and other members of the IDT team.    Consultants/Specialty  None    Code Status  Full Code    Disposition  Patient is not medically cleared for discharge.   Anticipate discharge to to home with close outpatient follow-up.  I have placed the appropriate orders for post-discharge needs.    Review of Systems  ROS     Physical Exam  Temp:  [36.4 °C (97.6 °F)-36.6 °C (97.8 °F)] 36.6 °C (97.8 °F)  Pulse:  [] 90  Resp:  [18-84] 20  BP: (125-140)/(69-87) 133/78  SpO2:  [86 %-95 %] 93 %    Physical Exam    Fluids    Intake/Output Summary (Last 24 hours) at 1/4/2023 1157  Last data filed at 1/3/2023 2156  Gross per 24 hour   Intake 999 ml   Output --   Net 999 ml       Laboratory  Recent Labs     01/03/23  1722   WBC 8.9   RBC 5.19   HEMOGLOBIN 17.6   HEMATOCRIT 51.8   MCV 99.8*   MCH 33.9*   MCHC 34.0   RDW 48.0   PLATELETCT 274   MPV 9.1     Recent Labs     01/03/23  1722   SODIUM 135   POTASSIUM 3.5*   CHLORIDE 94*   CO2 27   GLUCOSE 102*   BUN 6*   CREATININE 0.85   CALCIUM 10.7*                   Imaging  DX-CHEST-PORTABLE (1 VIEW)   Final Result      1.  No acute cardiac or pulmonary abnormalities are identified.           Assessment/Plan  * Acute respiratory failure with hypoxia due to asthma exacerbation (HCC)- (present on  admission)  Assessment & Plan  History of moderate persistent asthma.  Presented with hypoxia, wheezes.  Will admit for treatment with DuoNeb and steroids for asthma exacerbation  RT protocol, supplemental oxygen, pulse oximetry  Follow-up with pulmonary clinic as outpatient    Elevated liver enzymes- (present on admission)  Assessment & Plan  Probably secondary to alcoholic liver disease.  This is chronic, but has been getting worse.  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 In review of yesterday's note (1/3/2023), there are no changes except as documented above.

## 2023-01-04 NOTE — DISCHARGE PLANNING
CM met with pt to discuss home oxygen. Choice was obtained and faxed to [facility] with follow up Teams msg. Pt would also like a nebulizer and Dr Vanegas was sent voalte msg and will order it. Pt initially wanted to leave today but has agreed to stay the night and leave tomorrow morning.

## 2023-01-04 NOTE — ASSESSMENT & PLAN NOTE
History of moderate persistent asthma.  Presented with hypoxia, wheezes.  Will admit for treatment with DuoNeb and steroids for asthma exacerbation  RT protocol, supplemental oxygen, pulse oximetry  Follow-up with pulmonary clinic as outpatient

## 2023-01-04 NOTE — ED NOTES
Patient medicated per MAR. Patient noted to be 87% on 2L, oxygen titrated to 4L O2. No other needs at this time.

## 2023-01-04 NOTE — ED NOTES
Ambulatory pulse OX failed, 87%RA standing and de-stats to 84-85%RA while moving in room, ERP Aware.

## 2023-01-04 NOTE — ED TRIAGE NOTES
Alberto Pelletiern Galicia  67 y.o. male  Chief Complaint   Patient presents with    Shortness of Breath     Seen for the same on 10/24. Patient reports increased SOB, insomnia, and weakness x 5 days. Difficulty eating due to SOB while chewing, also reports loss of appetite. Speaking in full sentences. RA sat 88% in triage, 95% after being placed on 4 L NC. Not on home O2, history of asthma.        Pt ambulatory to triage with steady gait for above complaint.     Pt is GCS 15, speaking in full sentences, follows commands and responds appropriately to questions. Resp are even and unlabored. Patient denies pain.     SOB protocol ordered. Pt placed in lobby. Pt educated on triage process. Pt encouraged to alert staff for any changes.     This RN masked and in appropriate PPE during encounter.     Vitals:    01/03/23 1656   BP:    Pulse:    Resp:    Temp:    SpO2: 95%

## 2023-01-05 VITALS
BODY MASS INDEX: 25.61 KG/M2 | HEIGHT: 76 IN | HEART RATE: 93 BPM | RESPIRATION RATE: 17 BRPM | DIASTOLIC BLOOD PRESSURE: 79 MMHG | TEMPERATURE: 98.1 F | OXYGEN SATURATION: 94 % | WEIGHT: 210.32 LBS | SYSTOLIC BLOOD PRESSURE: 123 MMHG

## 2023-01-05 LAB
ALBUMIN SERPL BCP-MCNC: 4.1 G/DL (ref 3.2–4.9)
ALBUMIN/GLOB SERPL: 1.2 G/DL
ALP SERPL-CCNC: 99 U/L (ref 30–99)
ALT SERPL-CCNC: 37 U/L (ref 2–50)
ANION GAP SERPL CALC-SCNC: 13 MMOL/L (ref 7–16)
AST SERPL-CCNC: 53 U/L (ref 12–45)
BASOPHILS # BLD AUTO: 0.2 % (ref 0–1.8)
BASOPHILS # BLD: 0.05 K/UL (ref 0–0.12)
BILIRUB SERPL-MCNC: 0.7 MG/DL (ref 0.1–1.5)
BUN SERPL-MCNC: 12 MG/DL (ref 8–22)
CALCIUM ALBUM COR SERPL-MCNC: 9.5 MG/DL (ref 8.5–10.5)
CALCIUM SERPL-MCNC: 9.6 MG/DL (ref 8.5–10.5)
CHLORIDE SERPL-SCNC: 99 MMOL/L (ref 96–112)
CO2 SERPL-SCNC: 24 MMOL/L (ref 20–33)
CREAT SERPL-MCNC: 0.83 MG/DL (ref 0.5–1.4)
EOSINOPHIL # BLD AUTO: 0 K/UL (ref 0–0.51)
EOSINOPHIL NFR BLD: 0 % (ref 0–6.9)
ERYTHROCYTE [DISTWIDTH] IN BLOOD BY AUTOMATED COUNT: 47.7 FL (ref 35.9–50)
GFR SERPLBLD CREATININE-BSD FMLA CKD-EPI: 95 ML/MIN/1.73 M 2
GLOBULIN SER CALC-MCNC: 3.4 G/DL (ref 1.9–3.5)
GLUCOSE SERPL-MCNC: 190 MG/DL (ref 65–99)
HCT VFR BLD AUTO: 47.2 % (ref 42–52)
HGB BLD-MCNC: 16.1 G/DL (ref 14–18)
IMM GRANULOCYTES # BLD AUTO: 0.22 K/UL (ref 0–0.11)
IMM GRANULOCYTES NFR BLD AUTO: 1 % (ref 0–0.9)
LYMPHOCYTES # BLD AUTO: 1.07 K/UL (ref 1–4.8)
LYMPHOCYTES NFR BLD: 4.7 % (ref 22–41)
MAGNESIUM SERPL-MCNC: 1.8 MG/DL (ref 1.5–2.5)
MCH RBC QN AUTO: 33.7 PG (ref 27–33)
MCHC RBC AUTO-ENTMCNC: 34.1 G/DL (ref 33.7–35.3)
MCV RBC AUTO: 98.7 FL (ref 81.4–97.8)
MONOCYTES # BLD AUTO: 0.32 K/UL (ref 0–0.85)
MONOCYTES NFR BLD AUTO: 1.4 % (ref 0–13.4)
NEUTROPHILS # BLD AUTO: 21.06 K/UL (ref 1.82–7.42)
NEUTROPHILS NFR BLD: 92.7 % (ref 44–72)
NRBC # BLD AUTO: 0 K/UL
NRBC BLD-RTO: 0 /100 WBC
PLATELET # BLD AUTO: 250 K/UL (ref 164–446)
PMV BLD AUTO: 9.5 FL (ref 9–12.9)
POTASSIUM SERPL-SCNC: 4.2 MMOL/L (ref 3.6–5.5)
PROT SERPL-MCNC: 7.5 G/DL (ref 6–8.2)
RBC # BLD AUTO: 4.78 M/UL (ref 4.7–6.1)
SODIUM SERPL-SCNC: 136 MMOL/L (ref 135–145)
WBC # BLD AUTO: 22.7 K/UL (ref 4.8–10.8)

## 2023-01-05 PROCEDURE — A9270 NON-COVERED ITEM OR SERVICE: HCPCS | Performed by: INTERNAL MEDICINE

## 2023-01-05 PROCEDURE — 99239 HOSP IP/OBS DSCHRG MGMT >30: CPT | Performed by: STUDENT IN AN ORGANIZED HEALTH CARE EDUCATION/TRAINING PROGRAM

## 2023-01-05 PROCEDURE — 83735 ASSAY OF MAGNESIUM: CPT

## 2023-01-05 PROCEDURE — 700102 HCHG RX REV CODE 250 W/ 637 OVERRIDE(OP): Performed by: INTERNAL MEDICINE

## 2023-01-05 PROCEDURE — 85025 COMPLETE CBC W/AUTO DIFF WBC: CPT

## 2023-01-05 PROCEDURE — 700105 HCHG RX REV CODE 258: Performed by: INTERNAL MEDICINE

## 2023-01-05 PROCEDURE — 36415 COLL VENOUS BLD VENIPUNCTURE: CPT

## 2023-01-05 PROCEDURE — 700111 HCHG RX REV CODE 636 W/ 250 OVERRIDE (IP): Performed by: INTERNAL MEDICINE

## 2023-01-05 PROCEDURE — 80053 COMPREHEN METABOLIC PANEL: CPT

## 2023-01-05 RX ORDER — IPRATROPIUM BROMIDE AND ALBUTEROL SULFATE 2.5; .5 MG/3ML; MG/3ML
3 SOLUTION RESPIRATORY (INHALATION) EVERY 4 HOURS PRN
Qty: 30 EACH | Refills: 0 | Status: SHIPPED | OUTPATIENT
Start: 2023-01-05 | End: 2023-02-04

## 2023-01-05 RX ORDER — FLUTICASONE PROPIONATE 110 UG/1
1 AEROSOL, METERED RESPIRATORY (INHALATION) 2 TIMES DAILY
Qty: 12 G | Refills: 0 | Status: SHIPPED | OUTPATIENT
Start: 2023-01-05 | End: 2023-02-04

## 2023-01-05 RX ORDER — GUAIFENESIN 600 MG/1
600 TABLET, EXTENDED RELEASE ORAL EVERY 12 HOURS
Qty: 14 TABLET | Refills: 0 | Status: SHIPPED | OUTPATIENT
Start: 2023-01-05 | End: 2023-01-12

## 2023-01-05 RX ADMIN — THIAMINE HYDROCHLORIDE 100 MG: 100 INJECTION, SOLUTION INTRAMUSCULAR; INTRAVENOUS at 06:15

## 2023-01-05 RX ADMIN — LEVOTHYROXINE SODIUM 175 MCG: 0.17 TABLET ORAL at 04:51

## 2023-01-05 RX ADMIN — METHYLPREDNISOLONE SODIUM SUCCINATE 62.5 MG: 125 INJECTION, POWDER, FOR SOLUTION INTRAMUSCULAR; INTRAVENOUS at 04:48

## 2023-01-05 RX ADMIN — FLUTICASONE PROPIONATE 110 MCG: 110 AEROSOL, METERED RESPIRATORY (INHALATION) at 04:48

## 2023-01-05 RX ADMIN — ATORVASTATIN CALCIUM 40 MG: 40 TABLET, FILM COATED ORAL at 04:51

## 2023-01-05 RX ADMIN — CALCIUM CARBONATE 500 MG: 500 TABLET, CHEWABLE ORAL at 04:51

## 2023-01-05 RX ADMIN — FEXOFENADINE HYDROCHLORIDE 60 MG: 60 TABLET ORAL at 04:51

## 2023-01-05 RX ADMIN — ENOXAPARIN SODIUM 40 MG: 100 INJECTION SUBCUTANEOUS at 04:50

## 2023-01-05 ASSESSMENT — PATIENT HEALTH QUESTIONNAIRE - PHQ9
1. LITTLE INTEREST OR PLEASURE IN DOING THINGS: NOT AT ALL
SUM OF ALL RESPONSES TO PHQ9 QUESTIONS 1 AND 2: 0
2. FEELING DOWN, DEPRESSED, IRRITABLE, OR HOPELESS: NOT AT ALL

## 2023-01-05 ASSESSMENT — COGNITIVE AND FUNCTIONAL STATUS - GENERAL
SUGGESTED CMS G CODE MODIFIER DAILY ACTIVITY: CH
SUGGESTED CMS G CODE MODIFIER MOBILITY: CH
DAILY ACTIVITIY SCORE: 24
MOBILITY SCORE: 24

## 2023-01-05 ASSESSMENT — LIFESTYLE VARIABLES
ON A TYPICAL DAY WHEN YOU DRINK ALCOHOL HOW MANY DRINKS DO YOU HAVE: 3
ALCOHOL_USE: YES
EVER FELT BAD OR GUILTY ABOUT YOUR DRINKING: NO
TOTAL SCORE: 0
HOW MANY TIMES IN THE PAST YEAR HAVE YOU HAD 5 OR MORE DRINKS IN A DAY: 0
EVER HAD A DRINK FIRST THING IN THE MORNING TO STEADY YOUR NERVES TO GET RID OF A HANGOVER: NO
HAVE PEOPLE ANNOYED YOU BY CRITICIZING YOUR DRINKING: NO
CONSUMPTION TOTAL: NEGATIVE
HAVE YOU EVER FELT YOU SHOULD CUT DOWN ON YOUR DRINKING: NO
DOES PATIENT WANT TO STOP DRINKING: NO
AVERAGE NUMBER OF DAYS PER WEEK YOU HAVE A DRINK CONTAINING ALCOHOL: 3

## 2023-01-05 NOTE — DISCHARGE PLANNING
Case Management Discharge Planning    Admission Date: 1/3/2023  GMLOS: 2.8  ALOS: 1    6-Clicks ADL Score: 24  6-Clicks Mobility Score: 24      Anticipated Discharge Dispo: Discharge Disposition: Discharged to home/self care (01)    DME Needed: Yes    DME Ordered: No    Action(s) Taken: Choice obtained and OTHER, CM RN awaiting oxygen order for updated walking test. CM RN reached out to [facility] DME, spoke with Roe. Discussed oxygen will be delivered after update order is sent. Updates referral for 5L faxed to [facility].    1244: CM RN called [facility] to inquire if updated order was received. CM RN spoke to Roe and order has not been received. CM RN manually faxed to [facility].       1338: CM RN called [facility] about oxygen order and spoke to Roe. Per Roe oxygen is in route and will be delivered by  [facility].     Escalations Completed: Provider and Bedside RN    Medically Clear: Yes    Next Steps: CM RN to follow up with [facility] once fax has been completed.     Barriers to Discharge: DME    Is the patient up for discharge tomorrow: No

## 2023-01-05 NOTE — DISCHARGE PLANNING
"HTH/SCP TCN chart review completed. Collaborated with TATIANA Walters. Discussed current discharge plan, noted to home with oxygen needs.     TCN met with patient at bedside. Patient verbalized understanding for current oxygen needs, stating he only \"needs training and then I can go.\" Patient with no additional questions for TCN at this time.    TCN will continue to follow and collaborate with discharge planning team should additional post acute needs arise. Thank you.    Completed  Choice obtained: DME (O2) - 1/4. Appreciate CM following with patient choice of [facility] for anticipated oxygen needs at time of discharge  [facility] referral sent - 1/4  "

## 2023-01-05 NOTE — PROGRESS NOTES
4 Eyes Skin Assessment Completed by DELIA Sidhu and DELIA Gant.    Head WDL  Ears WDL  Nose WDL  Mouth WDL  Neck WDL  Breast/Chest WDL  Shoulder Blades WDL  Spine WDL  (R) Arm/Elbow/Hand WDL  (L) Arm/Elbow/Hand WDL  Abdomen WDL  Groin WDL  Scrotum/Coccyx/Buttocks WDL  (R) Leg Scab  (L) Leg Scab  (R) Heel/Foot/Toe WDL  (L) Heel/Foot/Toe WDL          Devices In Places Blood Pressure Cuff and Pulse Ox      Interventions In Place Gray Ear Foams and Heels Loaded W/Pillows    Possible Skin Injury No    Pictures Uploaded Into Epic N/A  Wound Consult Placed N/A  RN Wound Prevention Protocol Ordered No

## 2023-01-05 NOTE — CARE PLAN
The patient is Stable - Low risk of patient condition declining or worsening    Shift Goals  Clinical Goals: oxygen management, cough control  Patient Goals: oxygen management, rest    Progress made toward(s) clinical / shift goals:  Monitoring oxygen needs, PRN cough medicine on board, able to verbalize his needs.     Patient is not progressing towards the following goals: N/A

## 2023-01-05 NOTE — FACE TO FACE
"Face to Face Note  -  Durable Medical Equipment    Wolfgang Reyes M.D. - NPI: [id number]  I certify that this patient is under my care and that they had a durable medical equipment(DME)face to face encounter by myself that meets the physician DME face-to-face encounter requirements with this patient on:    Date of encounter:   Patient:                    MRN:                       YOB: 2024  Alberto Galicia  [id number]  1955     The encounter with the patient was in whole, or in part, for the following medical condition, which is the primary reason for durable medical equipment:  Asthma    I certify that, based on my findings, the following durable medical equipment is medically necessary:    Oxygen   HOME O2 Saturation Measurements:(Values must be present for Home Oxygen orders)  Room air sat at rest: 87  Room air sat with amb: 82  With liters of O2: 5, O2 sat at rest with O2: 94  With Liters of O2: 5, O2 sat with amb with O2 : 90  Is the patient mobile?: Yes  If patient feels more short of breath, they can go up to 6 liters per minute and contact healthcare provider.    Supporting Symptoms: The patient requires supplemental oxygen, as the following interventions have been tried with limited or no improvement: \"Bronchodilators and/or steroid inhalers, \"Positive expiratory pressure therapies, \"Oral and/or IV steroids, and \"Ambulation with oximetry.    My Clinical findings support the need for the above equipment due to:  Hypoxia  "

## 2023-01-06 ENCOUNTER — TELEPHONE (OUTPATIENT)
Dept: ENDOCRINOLOGY | Facility: MEDICAL CENTER | Age: 68
End: 2023-01-06
Payer: MEDICARE

## 2023-01-06 DIAGNOSIS — E29.1 SECONDARY MALE HYPOGONADISM: ICD-10-CM

## 2023-01-06 DIAGNOSIS — E27.49 SECONDARY ADRENAL INSUFFICIENCY (HCC): ICD-10-CM

## 2023-01-06 DIAGNOSIS — E55.9 VITAMIN D DEFICIENCY: ICD-10-CM

## 2023-01-06 DIAGNOSIS — E03.8 SECONDARY HYPOTHYROIDISM: ICD-10-CM

## 2023-01-06 RX ORDER — TESTOSTERONE GEL, 1% 10 MG/G
GEL TRANSDERMAL
Qty: 150 G | Refills: 2 | Status: SHIPPED | OUTPATIENT
Start: 2023-01-06 | End: 2023-02-06

## 2023-01-06 NOTE — DISCHARGE SUMMARY
"AMA Discharge Summary    CHIEF COMPLAINT ON ADMISSION  Chief Complaint   Patient presents with    Shortness of Breath     Seen for the same on 10/24. Patient reports increased SOB, insomnia, and weakness x 5 days. Difficulty eating due to SOB while chewing, also reports loss of appetite. Speaking in full sentences. RA sat 88% in triage, 95% after being placed on 4 L NC. Not on home O2, history of asthma.        Reason for Admission  asthma      Admission Date  1/3/2023    CODE STATUS  Full Code    HPI & HOSPITAL COURSE  67 y.o. male with past medical history of moderate persistent asthma, pituitary insufficiency, hypothyroid, who presented 1/3/2023 with complaints of increased shortness of breath on exertion, cough with sputum, generalized weakness, nausea in the last 5 days. Patient has been using her albuterol inhaler frequently.  He  was having difficulty sleeping each night due to shortness of breath and would get up and have alcoholic beverage to help him to fall asleep. When asked how many drinks that he have on a daily basis he would say \"too many to count\".  In ER he desaturated to 88% on room air, was placed on 4 L.  Chest x-ray negative for pneumonia.  COVID-19/restless/influenza screen is negative.    He was admitted on 1/4 and treated with iv steroid and duoneb and RT protocol. On 1/5 patient still needs 5L O2. He does not use oxygen at home. He is noted to have leukocytosis. This might secondary from steroid use or underlying infection is not excluded. His acute respiratory failure could secondary from asthma exacerbation. However other etiologies such as PE, CHF are also in differential. Patient does not want to stay in the hospital for further workups. He wants to leave the hospital AGAINST MEDICAL ADVICE. I examined and discussed extensively with patient. Prolonged time spent with patient discussing the risk of leaving the hospital AGAINST MEDICAL ADVICE.  At this moment patient is alert oriented x4 " and from my best clinical knowledge and judgment patient does have decision-making capacity. Patient is not a risk to himself or others. I explained to the patient the risk including worsening of medical condition, not able to have enough medical care, worse case scenario can be even death.  Patient understood and still wants to leave the hospital AGAINST MEDICAL ADVICE. At this moment I will respect patient's decision. Ambulatory O2 was performed and oxygen was ordered and I will provide patient with duoneb and mucinex as prescription for the best interest of patient.  He states he has albuterol inhalers and prednisone prescriptions which were prescribed preciously by his PCP.     HOME O2 Saturation Measurements:(Values must be present for Home Oxygen orders)  Room air sat at rest: 87  Room air sat with amb: 82  With liters of O2: 5, O2 sat at rest with O2: 94  With Liters of O2: 5, O2 sat with amb with O2 : 90  Is the patient mobile?: Yes    Patient left the hospital AGAINST MEDICAL ADVICE. He is advised to return to ER with worsening symptoms such as sob, chest pain. He voiced understanding. He is advised to follow up with PCP in one week. Pulmonary referral was placed. I have ordered remote oxygen monitoring on him as well.     Therefore, he is discharged in fair condition against medcial advice.        Discharge Date  1/5/2023    FOLLOW UP ITEMS POST DISCHARGE  PCP     DISCHARGE DIAGNOSES  Principal Problem:    Acute respiratory failure with hypoxia due to asthma exacerbation (HCC) POA: Yes  Active Problems:    Hyperlipidemia POA: Yes    Panhypopituitarism (HCC) POA: Yes    Elevated liver enzymes POA: Yes  Resolved Problems:    * No resolved hospital problems. *      FOLLOW UP  PCP in one week  Pulmonary referral placed     MEDICATIONS ON DISCHARGE     Medication List        START taking these medications        Instructions   guaiFENesin  MG Tb12  Commonly known as: Mucinex   Take 1 Tablet by mouth every  12 hours for 7 days.  Dose: 600 mg     ipratropium-albuterol 0.5-2.5 (3) MG/3ML nebulizer solution  Commonly known as: DUONEB   Take 3 mL by nebulization every four hours as needed for Shortness of Breath for up to 30 days.  Dose: 3 mL            CHANGE how you take these medications        Instructions   fluticasone 110 MCG/ACT Aero  What changed: medication strength  Commonly known as: Flovent HFA   Inhale 1 Puff 2 times a day for 30 days.  Dose: 1 Puff            CONTINUE taking these medications        Instructions   albuterol 108 (90 Base) MCG/ACT Aers inhalation aerosol   INHALE 2 PUFFS BY MOUTH EVERY 6 HOURS AS NEEDED FOR SHORTNESS OF BREATH     atorvastatin 40 MG Tabs  Commonly known as: LIPITOR   Take 1 tablet by mouth once daily     calcium carbonate 500 MG Chew  Commonly known as: Tums   Chew 500 mg every day.  Dose: 500 mg     fexofenadine 60 MG Tabs  Commonly known as: ALLEGRA   Take 60 mg by mouth every day.  Dose: 60 mg     hydrocortisone 10 MG Tabs  Commonly known as: CORTEF   Take 1 pill in the morning and 1/2 in the afternoon 90 day supply     levothyroxine 175 MCG Tabs  Commonly known as: SYNTHROID   Take 1 Tablet by mouth every morning on an empty stomach. Please make an appointment for future refills  Dose: 175 mcg     melatonin 5 mg Tabs   Take 5 mg by mouth at bedtime as needed.  Dose: 5 mg     testosterone 50 MG/5GM (1%) Gel gel  Commonly known as: TESTIM/ANDROGEL   APPLY 1 PACKET (50MG) TOPICALLY ONCE DAILY     therapeutic multivitamin-minerals Tabs   Take 1 Tablet by mouth every day.  Dose: 1 Tablet              Allergies  Allergies   Allergen Reactions    Pcn [Penicillins] Swelling       DIET  Orders Placed This Encounter   Procedures    Diet Order Diet: Regular     Standing Status:   Standing     Number of Occurrences:   1     Order Specific Question:   Diet:     Answer:   Regular [1]       ACTIVITY  As tolerated.  Weight bearing as  tolerated    CONSULTATIONS  na    PROCEDURES  na    LABORATORY  Lab Results   Component Value Date    SODIUM 136 01/05/2023    POTASSIUM 4.2 01/05/2023    CHLORIDE 99 01/05/2023    CO2 24 01/05/2023    GLUCOSE 190 (H) 01/05/2023    BUN 12 01/05/2023    CREATININE 0.83 01/05/2023        Lab Results   Component Value Date    WBC 22.7 (H) 01/05/2023    HEMOGLOBIN 16.1 01/05/2023    HEMATOCRIT 47.2 01/05/2023    PLATELETCT 250 01/05/2023      DX-CHEST-PORTABLE (1 VIEW)   Final Result      1.  No acute cardiac or pulmonary abnormalities are identified.          Total time of the discharge process 42 minutes.

## 2023-01-06 NOTE — TELEPHONE ENCOUNTER
Alberto would like Dr. WINTERS to call him when gets a chance to talk about his testosterone medication ASAP

## 2023-01-06 NOTE — TELEPHONE ENCOUNTER
Alberto called about needing his testosterone medication needing to be refilled but needed an appt to get it refilled. He had cancelled his appt on 1/4/23 and is now rescheduled for 2/1/23. He is still requesting to have his medication authorized until the appt so that he still gets his medication while waiting for his appt.

## 2023-01-09 ENCOUNTER — HOSPITAL ENCOUNTER (OUTPATIENT)
Dept: LAB | Facility: MEDICAL CENTER | Age: 68
End: 2023-01-09
Attending: INTERNAL MEDICINE
Payer: MEDICARE

## 2023-01-09 DIAGNOSIS — E55.9 VITAMIN D DEFICIENCY: ICD-10-CM

## 2023-01-09 DIAGNOSIS — E03.8 SECONDARY HYPOTHYROIDISM: ICD-10-CM

## 2023-01-09 DIAGNOSIS — E27.49 SECONDARY ADRENAL INSUFFICIENCY (HCC): ICD-10-CM

## 2023-01-09 DIAGNOSIS — E29.1 SECONDARY MALE HYPOGONADISM: ICD-10-CM

## 2023-01-09 LAB
25(OH)D3 SERPL-MCNC: 23 NG/ML (ref 30–100)
ALBUMIN SERPL BCP-MCNC: 3.8 G/DL (ref 3.2–4.9)
ALBUMIN/GLOB SERPL: 1.3 G/DL
ALP SERPL-CCNC: 96 U/L (ref 30–99)
ALT SERPL-CCNC: 55 U/L (ref 2–50)
ANION GAP SERPL CALC-SCNC: 10 MMOL/L (ref 7–16)
AST SERPL-CCNC: 77 U/L (ref 12–45)
BILIRUB SERPL-MCNC: 0.5 MG/DL (ref 0.1–1.5)
BUN SERPL-MCNC: 11 MG/DL (ref 8–22)
CALCIUM ALBUM COR SERPL-MCNC: 9.1 MG/DL (ref 8.5–10.5)
CALCIUM SERPL-MCNC: 8.9 MG/DL (ref 8.5–10.5)
CHLORIDE SERPL-SCNC: 102 MMOL/L (ref 96–112)
CO2 SERPL-SCNC: 26 MMOL/L (ref 20–33)
CORTIS SERPL-MCNC: 2.7 UG/DL (ref 0–23)
CREAT SERPL-MCNC: 0.7 MG/DL (ref 0.5–1.4)
FASTING STATUS PATIENT QL REPORTED: NORMAL
GFR SERPLBLD CREATININE-BSD FMLA CKD-EPI: 100 ML/MIN/1.73 M 2
GLOBULIN SER CALC-MCNC: 3 G/DL (ref 1.9–3.5)
GLUCOSE SERPL-MCNC: 89 MG/DL (ref 65–99)
HCT VFR BLD AUTO: 46.7 % (ref 42–52)
HGB BLD-MCNC: 15.6 G/DL (ref 14–18)
POTASSIUM SERPL-SCNC: 4.1 MMOL/L (ref 3.6–5.5)
PROLACTIN SERPL-MCNC: 3.83 NG/ML (ref 2.1–17.7)
PROT SERPL-MCNC: 6.8 G/DL (ref 6–8.2)
SODIUM SERPL-SCNC: 138 MMOL/L (ref 135–145)
T4 FREE SERPL-MCNC: 1.27 NG/DL (ref 0.93–1.7)

## 2023-01-09 PROCEDURE — 84305 ASSAY OF SOMATOMEDIN: CPT

## 2023-01-09 PROCEDURE — 84439 ASSAY OF FREE THYROXINE: CPT

## 2023-01-09 PROCEDURE — 84146 ASSAY OF PROLACTIN: CPT

## 2023-01-09 PROCEDURE — 84403 ASSAY OF TOTAL TESTOSTERONE: CPT

## 2023-01-09 PROCEDURE — 84270 ASSAY OF SEX HORMONE GLOBUL: CPT

## 2023-01-09 PROCEDURE — 84402 ASSAY OF FREE TESTOSTERONE: CPT

## 2023-01-09 PROCEDURE — 85014 HEMATOCRIT: CPT

## 2023-01-09 PROCEDURE — 85018 HEMOGLOBIN: CPT

## 2023-01-09 PROCEDURE — 82306 VITAMIN D 25 HYDROXY: CPT

## 2023-01-09 PROCEDURE — 36415 COLL VENOUS BLD VENIPUNCTURE: CPT

## 2023-01-09 PROCEDURE — 80053 COMPREHEN METABOLIC PANEL: CPT

## 2023-01-09 PROCEDURE — 82533 TOTAL CORTISOL: CPT

## 2023-01-10 LAB
SHBG SERPL-SCNC: 49 NMOL/L (ref 19–76)
TESTOST FREE MFR SERPL: 1.3 % (ref 1.6–2.9)
TESTOST FREE SERPL-MCNC: 8 PG/ML (ref 47–244)
TESTOST SERPL-MCNC: 62 NG/DL (ref 300–720)

## 2023-01-11 LAB
IGF-I SERPL-MCNC: 63 NG/ML (ref 31–243)
IGF-I Z-SCORE SERPL: -1.3

## 2023-01-29 ENCOUNTER — APPOINTMENT (OUTPATIENT)
Dept: RADIOLOGY | Facility: MEDICAL CENTER | Age: 68
DRG: 280 | End: 2023-01-29
Attending: EMERGENCY MEDICINE
Payer: MEDICARE

## 2023-01-29 ENCOUNTER — HOSPITAL ENCOUNTER (INPATIENT)
Facility: MEDICAL CENTER | Age: 68
LOS: 5 days | DRG: 280 | End: 2023-02-04
Attending: EMERGENCY MEDICINE | Admitting: INTERNAL MEDICINE
Payer: MEDICARE

## 2023-01-29 DIAGNOSIS — J96.21 ACUTE ON CHRONIC RESPIRATORY FAILURE WITH HYPOXIA AND HYPERCAPNIA (HCC): ICD-10-CM

## 2023-01-29 DIAGNOSIS — I50.20 HEART FAILURE WITH REDUCED EJECTION FRACTION (HCC): ICD-10-CM

## 2023-01-29 DIAGNOSIS — I21.4 NSTEMI (NON-ST ELEVATED MYOCARDIAL INFARCTION) (HCC): ICD-10-CM

## 2023-01-29 DIAGNOSIS — J96.22 ACUTE ON CHRONIC RESPIRATORY FAILURE WITH HYPOXIA AND HYPERCAPNIA (HCC): ICD-10-CM

## 2023-01-29 DIAGNOSIS — R57.0 CARDIOGENIC SHOCK (HCC): ICD-10-CM

## 2023-01-29 DIAGNOSIS — N40.0 BENIGN PROSTATIC HYPERPLASIA, UNSPECIFIED WHETHER LOWER URINARY TRACT SYMPTOMS PRESENT: ICD-10-CM

## 2023-01-29 DIAGNOSIS — R79.89 ELEVATED TROPONIN: ICD-10-CM

## 2023-01-29 DIAGNOSIS — J45.41 MODERATE PERSISTENT ASTHMA WITH ACUTE EXACERBATION: ICD-10-CM

## 2023-01-29 DIAGNOSIS — I48.91 ATRIAL FIBRILLATION WITH RAPID VENTRICULAR RESPONSE (HCC): ICD-10-CM

## 2023-01-29 LAB
BASE EXCESS BLDA CALC-SCNC: -3 MMOL/L (ref -4–3)
BASOPHILS # BLD AUTO: 0.5 % (ref 0–1.8)
BASOPHILS # BLD: 0.07 K/UL (ref 0–0.12)
BODY TEMPERATURE: ABNORMAL DEGREES
CA-I BLD ISE-SCNC: 1.18 MMOL/L (ref 1.1–1.3)
CO2 BLDA-SCNC: 25 MMOL/L (ref 20–33)
D DIMER PPP IA.FEU-MCNC: 0.87 UG/ML (FEU) (ref 0–0.5)
EKG IMPRESSION: NORMAL
EOSINOPHIL # BLD AUTO: 1.3 K/UL (ref 0–0.51)
EOSINOPHIL NFR BLD: 8.8 % (ref 0–6.9)
ERYTHROCYTE [DISTWIDTH] IN BLOOD BY AUTOMATED COUNT: 47.9 FL (ref 35.9–50)
HCO3 BLDA-SCNC: 23.6 MMOL/L (ref 17–25)
HCT VFR BLD AUTO: 48.5 % (ref 42–52)
HGB BLD-MCNC: 16 G/DL (ref 14–18)
HOROWITZ INDEX BLDA+IHG-RTO: 326 MM[HG]
IMM GRANULOCYTES # BLD AUTO: 0.05 K/UL (ref 0–0.11)
IMM GRANULOCYTES NFR BLD AUTO: 0.3 % (ref 0–0.9)
LYMPHOCYTES # BLD AUTO: 4.4 K/UL (ref 1–4.8)
LYMPHOCYTES NFR BLD: 29.8 % (ref 22–41)
MCH RBC QN AUTO: 33.5 PG (ref 27–33)
MCHC RBC AUTO-ENTMCNC: 33 G/DL (ref 33.7–35.3)
MCV RBC AUTO: 101.5 FL (ref 81.4–97.8)
MONOCYTES # BLD AUTO: 0.97 K/UL (ref 0–0.85)
MONOCYTES NFR BLD AUTO: 6.6 % (ref 0–13.4)
NEUTROPHILS # BLD AUTO: 7.98 K/UL (ref 1.82–7.42)
NEUTROPHILS NFR BLD: 54 % (ref 44–72)
NRBC # BLD AUTO: 0.02 K/UL
NRBC BLD-RTO: 0.1 /100 WBC
O2/TOTAL GAS SETTING VFR VENT: 50 %
PCO2 BLDA: 45.8 MMHG (ref 26–37)
PH BLDA: 7.32 [PH] (ref 7.4–7.5)
PLATELET # BLD AUTO: 338 K/UL (ref 164–446)
PMV BLD AUTO: 9.2 FL (ref 9–12.9)
PO2 BLDA: 163 MMHG (ref 64–87)
POTASSIUM BLD-SCNC: 4.4 MMOL/L (ref 3.6–5.5)
RBC # BLD AUTO: 4.78 M/UL (ref 4.7–6.1)
SAO2 % BLDA: 99 % (ref 93–99)
SODIUM BLD-SCNC: 132 MMOL/L (ref 135–145)
SPECIMEN DRAWN FROM PATIENT: ABNORMAL
WBC # BLD AUTO: 14.8 K/UL (ref 4.8–10.8)

## 2023-01-29 PROCEDURE — 85379 FIBRIN DEGRADATION QUANT: CPT

## 2023-01-29 PROCEDURE — 87169 MACROSCOPIC EXAM PARASITE: CPT

## 2023-01-29 PROCEDURE — 0241U HCHG SARS-COV-2 COVID-19 NFCT DS RESP RNA 4 TRGT MIC: CPT

## 2023-01-29 PROCEDURE — 87077 CULTURE AEROBIC IDENTIFY: CPT

## 2023-01-29 PROCEDURE — 84484 ASSAY OF TROPONIN QUANT: CPT

## 2023-01-29 PROCEDURE — 85025 COMPLETE CBC W/AUTO DIFF WBC: CPT

## 2023-01-29 PROCEDURE — 71045 X-RAY EXAM CHEST 1 VIEW: CPT

## 2023-01-29 PROCEDURE — 82330 ASSAY OF CALCIUM: CPT

## 2023-01-29 PROCEDURE — 84132 ASSAY OF SERUM POTASSIUM: CPT

## 2023-01-29 PROCEDURE — 93005 ELECTROCARDIOGRAM TRACING: CPT | Performed by: EMERGENCY MEDICINE

## 2023-01-29 PROCEDURE — 87040 BLOOD CULTURE FOR BACTERIA: CPT

## 2023-01-29 PROCEDURE — C9803 HOPD COVID-19 SPEC COLLECT: HCPCS | Performed by: EMERGENCY MEDICINE

## 2023-01-29 PROCEDURE — 80053 COMPREHEN METABOLIC PANEL: CPT

## 2023-01-29 PROCEDURE — 84295 ASSAY OF SERUM SODIUM: CPT

## 2023-01-29 PROCEDURE — 83605 ASSAY OF LACTIC ACID: CPT

## 2023-01-29 PROCEDURE — 36600 WITHDRAWAL OF ARTERIAL BLOOD: CPT

## 2023-01-29 PROCEDURE — 94660 CPAP INITIATION&MGMT: CPT

## 2023-01-29 PROCEDURE — 87150 DNA/RNA AMPLIFIED PROBE: CPT

## 2023-01-29 PROCEDURE — 82803 BLOOD GASES ANY COMBINATION: CPT

## 2023-01-29 ASSESSMENT — FIBROSIS 4 INDEX: FIB4 SCORE: 2.82

## 2023-01-29 ASSESSMENT — PULMONARY FUNCTION TESTS: EPAP_CMH2O: 10

## 2023-01-30 ENCOUNTER — APPOINTMENT (OUTPATIENT)
Dept: CARDIOLOGY | Facility: MEDICAL CENTER | Age: 68
DRG: 280 | End: 2023-01-30
Attending: INTERNAL MEDICINE
Payer: MEDICARE

## 2023-01-30 ENCOUNTER — APPOINTMENT (OUTPATIENT)
Dept: CARDIOLOGY | Facility: MEDICAL CENTER | Age: 68
DRG: 280 | End: 2023-01-30
Attending: PHYSICIAN ASSISTANT
Payer: MEDICARE

## 2023-01-30 ENCOUNTER — APPOINTMENT (OUTPATIENT)
Dept: RADIOLOGY | Facility: MEDICAL CENTER | Age: 68
DRG: 280 | End: 2023-01-30
Attending: INTERNAL MEDICINE
Payer: MEDICARE

## 2023-01-30 ENCOUNTER — APPOINTMENT (OUTPATIENT)
Dept: RADIOLOGY | Facility: MEDICAL CENTER | Age: 68
DRG: 280 | End: 2023-01-30
Payer: MEDICARE

## 2023-01-30 ENCOUNTER — APPOINTMENT (OUTPATIENT)
Dept: RADIOLOGY | Facility: MEDICAL CENTER | Age: 68
DRG: 280 | End: 2023-01-30
Attending: EMERGENCY MEDICINE
Payer: MEDICARE

## 2023-01-30 PROBLEM — J96.22 ACUTE ON CHRONIC RESPIRATORY FAILURE WITH HYPOXIA AND HYPERCAPNIA (HCC): Status: ACTIVE | Noted: 2023-01-30

## 2023-01-30 PROBLEM — R79.89 ELEVATED LACTIC ACID LEVEL: Status: ACTIVE | Noted: 2023-01-30

## 2023-01-30 PROBLEM — R79.89 ELEVATED TROPONIN: Status: ACTIVE | Noted: 2023-01-30

## 2023-01-30 PROBLEM — I51.3 LEFT VENTRICULAR THROMBUS: Status: ACTIVE | Noted: 2023-01-30

## 2023-01-30 PROBLEM — J45.901 ASTHMA WITH EXACERBATION: Status: ACTIVE | Noted: 2023-01-30

## 2023-01-30 PROBLEM — L30.9 ECZEMA: Status: RESOLVED | Noted: 2019-07-10 | Resolved: 2023-01-30

## 2023-01-30 PROBLEM — I21.4 NSTEMI (NON-ST ELEVATED MYOCARDIAL INFARCTION) (HCC): Status: ACTIVE | Noted: 2023-01-30

## 2023-01-30 PROBLEM — B88.8 INFESTATION BY BED BUG: Status: ACTIVE | Noted: 2023-01-30

## 2023-01-30 PROBLEM — E87.20 LACTIC ACIDOSIS: Status: ACTIVE | Noted: 2023-01-30

## 2023-01-30 PROBLEM — E87.20 LACTIC ACIDOSIS: Status: RESOLVED | Noted: 2023-01-30 | Resolved: 2023-01-30

## 2023-01-30 PROBLEM — J96.90 RESPIRATORY FAILURE (HCC): Status: RESOLVED | Noted: 2023-01-04 | Resolved: 2023-01-30

## 2023-01-30 PROBLEM — R57.9 SHOCK (HCC): Status: ACTIVE | Noted: 2023-01-30

## 2023-01-30 PROBLEM — R79.89 ELEVATED D-DIMER: Status: ACTIVE | Noted: 2023-01-30

## 2023-01-30 PROBLEM — A41.9 SEPSIS (HCC): Status: ACTIVE | Noted: 2023-01-30

## 2023-01-30 PROBLEM — J96.21 ACUTE ON CHRONIC RESPIRATORY FAILURE WITH HYPOXIA AND HYPERCAPNIA (HCC): Status: ACTIVE | Noted: 2023-01-30

## 2023-01-30 PROBLEM — I42.9 CARDIOMYOPATHY (HCC): Chronic | Status: ACTIVE | Noted: 2023-01-30

## 2023-01-30 LAB
ALBUMIN SERPL BCP-MCNC: 3.3 G/DL (ref 3.2–4.9)
ALBUMIN SERPL BCP-MCNC: 3.5 G/DL (ref 3.2–4.9)
ALBUMIN SERPL BCP-MCNC: 4.3 G/DL (ref 3.2–4.9)
ALBUMIN/GLOB SERPL: 1.2 G/DL
ALP SERPL-CCNC: 119 U/L (ref 30–99)
ALP SERPL-CCNC: 81 U/L (ref 30–99)
ALP SERPL-CCNC: 89 U/L (ref 30–99)
ALT SERPL-CCNC: 48 U/L (ref 2–50)
ALT SERPL-CCNC: 49 U/L (ref 2–50)
ALT SERPL-CCNC: 70 U/L (ref 2–50)
AMPHET UR QL SCN: NEGATIVE
ANION GAP SERPL CALC-SCNC: 13 MMOL/L (ref 7–16)
ANION GAP SERPL CALC-SCNC: 15 MMOL/L (ref 7–16)
ANION GAP SERPL CALC-SCNC: 15 MMOL/L (ref 7–16)
ANION GAP SERPL CALC-SCNC: 17 MMOL/L (ref 7–16)
APPEARANCE UR: CLEAR
APTT PPP: 31.7 SEC (ref 24.7–36)
AST SERPL-CCNC: 131 U/L (ref 12–45)
AST SERPL-CCNC: 82 U/L (ref 12–45)
AST SERPL-CCNC: 90 U/L (ref 12–45)
BACTERIA #/AREA URNS HPF: NEGATIVE /HPF
BARBITURATES UR QL SCN: NEGATIVE
BASE EXCESS BLDA CALC-SCNC: -4 MMOL/L (ref -4–3)
BASE EXCESS BLDA CALC-SCNC: -6 MMOL/L (ref -4–3)
BASE EXCESS BLDA CALC-SCNC: NORMAL MMOL/L (ref -4–3)
BASE EXCESS BLDV CALC-SCNC: -4 MMOL/L (ref -4–3)
BASE EXCESS BLDV CALC-SCNC: -5 MMOL/L (ref -4–3)
BENZODIAZ UR QL SCN: NEGATIVE
BILIRUB CONJ SERPL-MCNC: 0.2 MG/DL (ref 0.1–0.5)
BILIRUB CONJ SERPL-MCNC: <0.2 MG/DL (ref 0.1–0.5)
BILIRUB INDIRECT SERPL-MCNC: 0.4 MG/DL (ref 0–1)
BILIRUB INDIRECT SERPL-MCNC: ABNORMAL MG/DL (ref 0–1)
BILIRUB SERPL-MCNC: 0.5 MG/DL (ref 0.1–1.5)
BILIRUB SERPL-MCNC: 0.6 MG/DL (ref 0.1–1.5)
BILIRUB SERPL-MCNC: 1.2 MG/DL (ref 0.1–1.5)
BILIRUB UR QL STRIP.AUTO: NEGATIVE
BODY TEMPERATURE: ABNORMAL DEGREES
BODY TEMPERATURE: NORMAL DEGREES
BUN SERPL-MCNC: 11 MG/DL (ref 8–22)
BUN SERPL-MCNC: 12 MG/DL (ref 8–22)
BUN SERPL-MCNC: 13 MG/DL (ref 8–22)
BUN SERPL-MCNC: 7 MG/DL (ref 8–22)
BZE UR QL SCN: NEGATIVE
CALCIUM ALBUM COR SERPL-MCNC: 9.4 MG/DL (ref 8.5–10.5)
CALCIUM SERPL-MCNC: 8 MG/DL (ref 8.5–10.5)
CALCIUM SERPL-MCNC: 8.1 MG/DL (ref 8.5–10.5)
CALCIUM SERPL-MCNC: 8.2 MG/DL (ref 8.5–10.5)
CALCIUM SERPL-MCNC: 9.6 MG/DL (ref 8.5–10.5)
CANNABINOIDS UR QL SCN: NEGATIVE
CHLORIDE SERPL-SCNC: 102 MMOL/L (ref 96–112)
CHLORIDE SERPL-SCNC: 103 MMOL/L (ref 96–112)
CHLORIDE SERPL-SCNC: 94 MMOL/L (ref 96–112)
CHLORIDE SERPL-SCNC: 97 MMOL/L (ref 96–112)
CO2 BLDA-SCNC: 20 MMOL/L (ref 20–33)
CO2 BLDA-SCNC: 21 MMOL/L (ref 20–33)
CO2 BLDA-SCNC: NORMAL MMOL/L (ref 20–33)
CO2 BLDV-SCNC: 22 MMOL/L (ref 20–33)
CO2 BLDV-SCNC: 23 MMOL/L (ref 20–33)
CO2 SERPL-SCNC: 15 MMOL/L (ref 20–33)
CO2 SERPL-SCNC: 17 MMOL/L (ref 20–33)
CO2 SERPL-SCNC: 18 MMOL/L (ref 20–33)
CO2 SERPL-SCNC: 22 MMOL/L (ref 20–33)
COLOR UR: YELLOW
CREAT SERPL-MCNC: 0.87 MG/DL (ref 0.5–1.4)
CREAT SERPL-MCNC: 0.87 MG/DL (ref 0.5–1.4)
CREAT SERPL-MCNC: 0.93 MG/DL (ref 0.5–1.4)
CREAT SERPL-MCNC: 1.09 MG/DL (ref 0.5–1.4)
DELSYS IDSYS: ABNORMAL
DELSYS IDSYS: NORMAL
EKG IMPRESSION: NORMAL
EPI CELLS #/AREA URNS HPF: ABNORMAL /HPF
ERYTHROCYTE [DISTWIDTH] IN BLOOD BY AUTOMATED COUNT: 47.4 FL (ref 35.9–50)
ERYTHROCYTE [DISTWIDTH] IN BLOOD BY AUTOMATED COUNT: 47.6 FL (ref 35.9–50)
ETHANOL BLD-MCNC: <10.1 MG/DL
FLUAV RNA SPEC QL NAA+PROBE: NEGATIVE
FLUBV RNA SPEC QL NAA+PROBE: NEGATIVE
GFR SERPLBLD CREATININE-BSD FMLA CKD-EPI: 74 ML/MIN/1.73 M 2
GFR SERPLBLD CREATININE-BSD FMLA CKD-EPI: 89 ML/MIN/1.73 M 2
GFR SERPLBLD CREATININE-BSD FMLA CKD-EPI: 94 ML/MIN/1.73 M 2
GFR SERPLBLD CREATININE-BSD FMLA CKD-EPI: 94 ML/MIN/1.73 M 2
GLOBULIN SER CALC-MCNC: 3.7 G/DL (ref 1.9–3.5)
GLUCOSE BLD STRIP.AUTO-MCNC: 145 MG/DL (ref 65–99)
GLUCOSE BLD STRIP.AUTO-MCNC: 171 MG/DL (ref 65–99)
GLUCOSE SERPL-MCNC: 112 MG/DL (ref 65–99)
GLUCOSE SERPL-MCNC: 155 MG/DL (ref 65–99)
GLUCOSE SERPL-MCNC: 186 MG/DL (ref 65–99)
GLUCOSE SERPL-MCNC: 193 MG/DL (ref 65–99)
GLUCOSE UR STRIP.AUTO-MCNC: NEGATIVE MG/DL
HAV IGM SERPL QL IA: NORMAL
HBV CORE IGM SER QL: NORMAL
HBV SURFACE AG SER QL: NORMAL
HCO3 BLDA-SCNC: 18.9 MMOL/L (ref 17–25)
HCO3 BLDA-SCNC: 19.6 MMOL/L (ref 17–25)
HCO3 BLDA-SCNC: NORMAL MMOL/L (ref 17–25)
HCO3 BLDV-SCNC: 20.4 MMOL/L (ref 24–28)
HCO3 BLDV-SCNC: 21.9 MMOL/L (ref 24–28)
HCT VFR BLD AUTO: 42.2 % (ref 42–52)
HCT VFR BLD AUTO: 43.4 % (ref 42–52)
HCV AB SER QL: NORMAL
HGB BLD-MCNC: 14.3 G/DL (ref 14–18)
HGB BLD-MCNC: 14.7 G/DL (ref 14–18)
HOROWITZ INDEX BLDA+IHG-RTO: 280 MM[HG]
HOROWITZ INDEX BLDA+IHG-RTO: 385 MM[HG]
HOROWITZ INDEX BLDA+IHG-RTO: 385 MM[HG]
HOROWITZ INDEX BLDV+IHG-RTO: 80 MM[HG]
HOROWITZ INDEX BLDV+IHG-RTO: 95 MM[HG]
HYALINE CASTS #/AREA URNS LPF: ABNORMAL /LPF
INR PPP: 1.29 (ref 0.87–1.13)
KETONES UR STRIP.AUTO-MCNC: 15 MG/DL
LACTATE BLD-SCNC: 0.8 MMOL/L (ref 0.5–2)
LACTATE BLD-SCNC: 0.8 MMOL/L (ref 0.5–2)
LACTATE BLD-SCNC: 1.4 MMOL/L (ref 0.5–2)
LACTATE SERPL-SCNC: 1.5 MMOL/L (ref 0.5–2)
LACTATE SERPL-SCNC: 2.5 MMOL/L (ref 0.5–2)
LACTATE SERPL-SCNC: 3.4 MMOL/L (ref 0.5–2)
LACTATE SERPL-SCNC: 3.5 MMOL/L (ref 0.5–2)
LACTATE SERPL-SCNC: 3.9 MMOL/L (ref 0.5–2)
LEUKOCYTE ESTERASE UR QL STRIP.AUTO: NEGATIVE
LV EJECT FRACT  99904: 25
MAGNESIUM SERPL-MCNC: 1.7 MG/DL (ref 1.5–2.5)
MCH RBC QN AUTO: 33.8 PG (ref 27–33)
MCH RBC QN AUTO: 34.1 PG (ref 27–33)
MCHC RBC AUTO-ENTMCNC: 33.9 G/DL (ref 33.7–35.3)
MCHC RBC AUTO-ENTMCNC: 33.9 G/DL (ref 33.7–35.3)
MCV RBC AUTO: 100.7 FL (ref 81.4–97.8)
MCV RBC AUTO: 99.8 FL (ref 81.4–97.8)
METHADONE UR QL SCN: NEGATIVE
MICRO URNS: ABNORMAL
NITRITE UR QL STRIP.AUTO: NEGATIVE
O2/TOTAL GAS SETTING VFR VENT: 30 %
O2/TOTAL GAS SETTING VFR VENT: 40 %
O2/TOTAL GAS SETTING VFR VENT: NORMAL %
OPIATES UR QL SCN: NEGATIVE
OXYCODONE UR QL SCN: NEGATIVE
PARASITE SPEC INSPECT: ABNORMAL
PARASITE SPEC INSPECT: ABNORMAL
PCO2 BLDA: 32.4 MMHG (ref 26–37)
PCO2 BLDA: 36.2 MMHG (ref 26–37)
PCO2 BLDA: NORMAL MMHG (ref 26–37)
PCO2 BLDV: 36 MMHG (ref 41–51)
PCO2 BLDV: 45.7 MMHG (ref 41–51)
PCO2 TEMP ADJ BLDA: 31.7 MMHG (ref 26–37)
PCO2 TEMP ADJ BLDA: 36.7 MMHG (ref 26–37)
PCO2 TEMP ADJ BLDA: NORMAL MMHG (ref 26–37)
PCO2 TEMP ADJ BLDV: 36 MMHG (ref 41–51)
PCO2 TEMP ADJ BLDV: 45.7 MMHG (ref 41–51)
PCP UR QL SCN: NEGATIVE
PH BLDA: 7.33 [PH] (ref 7.4–7.5)
PH BLDA: 7.39 [PH] (ref 7.4–7.5)
PH BLDA: NORMAL [PH] (ref 7.4–7.5)
PH BLDV: 7.29 [PH] (ref 7.31–7.45)
PH BLDV: 7.36 [PH] (ref 7.31–7.45)
PH TEMP ADJ BLDA: 7.32 [PH] (ref 7.4–7.5)
PH TEMP ADJ BLDA: 7.4 [PH] (ref 7.4–7.5)
PH TEMP ADJ BLDA: NORMAL [PH] (ref 7.4–7.5)
PH TEMP ADJ BLDV: 7.29 [PH] (ref 7.31–7.45)
PH TEMP ADJ BLDV: 7.36 [PH] (ref 7.31–7.45)
PH UR STRIP.AUTO: 7.5 [PH] (ref 5–8)
PHOSPHATE SERPL-MCNC: 2.1 MG/DL (ref 2.5–4.5)
PLATELET # BLD AUTO: 204 K/UL (ref 164–446)
PLATELET # BLD AUTO: 228 K/UL (ref 164–446)
PMV BLD AUTO: 9.3 FL (ref 9–12.9)
PMV BLD AUTO: 9.6 FL (ref 9–12.9)
PO2 BLDA: 154 MMHG (ref 64–87)
PO2 BLDA: 84 MMHG (ref 64–87)
PO2 BLDA: NORMAL MMHG (ref 64–87)
PO2 BLDV: 32 MMHG (ref 25–40)
PO2 BLDV: 38 MMHG (ref 25–40)
PO2 TEMP ADJ BLDA: 156 MMHG (ref 64–87)
PO2 TEMP ADJ BLDA: 81 MMHG (ref 64–87)
PO2 TEMP ADJ BLDA: NORMAL MMHG (ref 64–87)
PO2 TEMP ADJ BLDV: 32 MMHG (ref 25–40)
PO2 TEMP ADJ BLDV: 38 MMHG (ref 25–40)
POTASSIUM SERPL-SCNC: 3.9 MMOL/L (ref 3.6–5.5)
POTASSIUM SERPL-SCNC: 4 MMOL/L (ref 3.6–5.5)
POTASSIUM SERPL-SCNC: 4.1 MMOL/L (ref 3.6–5.5)
POTASSIUM SERPL-SCNC: 4.3 MMOL/L (ref 3.6–5.5)
PROCALCITONIN SERPL-MCNC: 0.39 NG/ML
PROCALCITONIN SERPL-MCNC: 0.41 NG/ML
PROPOXYPH UR QL SCN: NEGATIVE
PROT SERPL-MCNC: 6.4 G/DL (ref 6–8.2)
PROT SERPL-MCNC: 6.5 G/DL (ref 6–8.2)
PROT SERPL-MCNC: 8 G/DL (ref 6–8.2)
PROT UR QL STRIP: 30 MG/DL
PROTHROMBIN TIME: 15.9 SEC (ref 12–14.6)
RBC # BLD AUTO: 4.23 M/UL (ref 4.7–6.1)
RBC # BLD AUTO: 4.31 M/UL (ref 4.7–6.1)
RBC # URNS HPF: ABNORMAL /HPF
RBC UR QL AUTO: NEGATIVE
RENAL EPI CELLS #/AREA URNS HPF: NEGATIVE /HPF
RSV RNA SPEC QL NAA+PROBE: NEGATIVE
SAO2 % BLDA: 96 % (ref 93–99)
SAO2 % BLDA: 99 % (ref 93–99)
SAO2 % BLDA: NORMAL % (ref 93–99)
SAO2 % BLDV: 55 %
SAO2 % BLDV: 70 %
SARS-COV-2 RNA RESP QL NAA+PROBE: NOTDETECTED
SIGNIFICANT IND 70042: ABNORMAL
SITE SITE: ABNORMAL
SODIUM SERPL-SCNC: 129 MMOL/L (ref 135–145)
SODIUM SERPL-SCNC: 131 MMOL/L (ref 135–145)
SODIUM SERPL-SCNC: 134 MMOL/L (ref 135–145)
SODIUM SERPL-SCNC: 134 MMOL/L (ref 135–145)
SOURCE SOURCE: ABNORMAL
SP GR UR STRIP.AUTO: 1.03
SPECIMEN DRAWN FROM PATIENT: ABNORMAL
SPECIMEN DRAWN FROM PATIENT: NORMAL
SPECIMEN SOURCE: NORMAL
T4 FREE SERPL-MCNC: 1.3 NG/DL (ref 0.93–1.7)
TRANS CELLS #/AREA URNS HPF: ABNORMAL /HPF
TROPONIN T SERPL-MCNC: 1245 NG/L (ref 6–19)
TROPONIN T SERPL-MCNC: 312 NG/L (ref 6–19)
TROPONIN T SERPL-MCNC: 369 NG/L (ref 6–19)
TROPONIN T SERPL-MCNC: 674 NG/L (ref 6–19)
TSH SERPL DL<=0.005 MIU/L-ACNC: 0.27 UIU/ML (ref 0.38–5.33)
UFH PPP CHRO-ACNC: 0.24 IU/ML
UFH PPP CHRO-ACNC: 0.32 IU/ML
UFH PPP CHRO-ACNC: <0.1 IU/ML
UROBILINOGEN UR STRIP.AUTO-MCNC: 1 MG/DL
VIT B12 SERPL-MCNC: 1134 PG/ML (ref 211–911)
WBC # BLD AUTO: 8.8 K/UL (ref 4.8–10.8)
WBC # BLD AUTO: 9.6 K/UL (ref 4.8–10.8)
WBC #/AREA URNS HPF: ABNORMAL /HPF

## 2023-01-30 PROCEDURE — 71045 X-RAY EXAM CHEST 1 VIEW: CPT

## 2023-01-30 PROCEDURE — A9270 NON-COVERED ITEM OR SERVICE: HCPCS | Performed by: EMERGENCY MEDICINE

## 2023-01-30 PROCEDURE — 85027 COMPLETE CBC AUTOMATED: CPT

## 2023-01-30 PROCEDURE — 700117 HCHG RX CONTRAST REV CODE 255: Performed by: INTERNAL MEDICINE

## 2023-01-30 PROCEDURE — 84443 ASSAY THYROID STIM HORMONE: CPT

## 2023-01-30 PROCEDURE — 87086 URINE CULTURE/COLONY COUNT: CPT

## 2023-01-30 PROCEDURE — 99291 CRITICAL CARE FIRST HOUR: CPT

## 2023-01-30 PROCEDURE — 36415 COLL VENOUS BLD VENIPUNCTURE: CPT

## 2023-01-30 PROCEDURE — 82077 ASSAY SPEC XCP UR&BREATH IA: CPT

## 2023-01-30 PROCEDURE — 700102 HCHG RX REV CODE 250 W/ 637 OVERRIDE(OP): Performed by: EMERGENCY MEDICINE

## 2023-01-30 PROCEDURE — 37799 UNLISTED PX VASCULAR SURGERY: CPT

## 2023-01-30 PROCEDURE — 700111 HCHG RX REV CODE 636 W/ 250 OVERRIDE (IP): Performed by: EMERGENCY MEDICINE

## 2023-01-30 PROCEDURE — 03HY32Z INSERTION OF MONITORING DEVICE INTO UPPER ARTERY, PERCUTANEOUS APPROACH: ICD-10-PCS | Performed by: INTERNAL MEDICINE

## 2023-01-30 PROCEDURE — 99291 CRITICAL CARE FIRST HOUR: CPT | Mod: 25 | Performed by: INTERNAL MEDICINE

## 2023-01-30 PROCEDURE — 93306 TTE W/DOPPLER COMPLETE: CPT

## 2023-01-30 PROCEDURE — 36620 INSERTION CATHETER ARTERY: CPT | Performed by: INTERNAL MEDICINE

## 2023-01-30 PROCEDURE — 700101 HCHG RX REV CODE 250

## 2023-01-30 PROCEDURE — 80048 BASIC METABOLIC PNL TOTAL CA: CPT

## 2023-01-30 PROCEDURE — 700111 HCHG RX REV CODE 636 W/ 250 OVERRIDE (IP): Performed by: INTERNAL MEDICINE

## 2023-01-30 PROCEDURE — 700105 HCHG RX REV CODE 258: Performed by: EMERGENCY MEDICINE

## 2023-01-30 PROCEDURE — 84439 ASSAY OF FREE THYROXINE: CPT

## 2023-01-30 PROCEDURE — 80076 HEPATIC FUNCTION PANEL: CPT

## 2023-01-30 PROCEDURE — 76705 ECHO EXAM OF ABDOMEN: CPT

## 2023-01-30 PROCEDURE — 82962 GLUCOSE BLOOD TEST: CPT

## 2023-01-30 PROCEDURE — 36556 INSERT NON-TUNNEL CV CATH: CPT

## 2023-01-30 PROCEDURE — 99291 CRITICAL CARE FIRST HOUR: CPT | Performed by: INTERNAL MEDICINE

## 2023-01-30 PROCEDURE — 93458 L HRT ARTERY/VENTRICLE ANGIO: CPT | Mod: 26 | Performed by: INTERNAL MEDICINE

## 2023-01-30 PROCEDURE — 71275 CT ANGIOGRAPHY CHEST: CPT

## 2023-01-30 PROCEDURE — 700101 HCHG RX REV CODE 250: Performed by: INTERNAL MEDICINE

## 2023-01-30 PROCEDURE — 02HP32Z INSERTION OF MONITORING DEVICE INTO PULMONARY TRUNK, PERCUTANEOUS APPROACH: ICD-10-PCS | Performed by: INTERNAL MEDICINE

## 2023-01-30 PROCEDURE — 700111 HCHG RX REV CODE 636 W/ 250 OVERRIDE (IP): Performed by: GENERAL PRACTICE

## 2023-01-30 PROCEDURE — 81001 URINALYSIS AUTO W/SCOPE: CPT

## 2023-01-30 PROCEDURE — C1894 INTRO/SHEATH, NON-LASER: HCPCS

## 2023-01-30 PROCEDURE — 84484 ASSAY OF TROPONIN QUANT: CPT | Mod: 91

## 2023-01-30 PROCEDURE — 99152 MOD SED SAME PHYS/QHP 5/>YRS: CPT | Performed by: INTERNAL MEDICINE

## 2023-01-30 PROCEDURE — 93503 INSERT/PLACE HEART CATHETER: CPT

## 2023-01-30 PROCEDURE — 99292 CRITICAL CARE ADDL 30 MIN: CPT | Mod: 25 | Performed by: EMERGENCY MEDICINE

## 2023-01-30 PROCEDURE — C1751 CATH, INF, PER/CENT/MIDLINE: HCPCS

## 2023-01-30 PROCEDURE — 83605 ASSAY OF LACTIC ACID: CPT

## 2023-01-30 PROCEDURE — 85520 HEPARIN ASSAY: CPT

## 2023-01-30 PROCEDURE — 84100 ASSAY OF PHOSPHORUS: CPT

## 2023-01-30 PROCEDURE — 85610 PROTHROMBIN TIME: CPT

## 2023-01-30 PROCEDURE — 94660 CPAP INITIATION&MGMT: CPT

## 2023-01-30 PROCEDURE — 700105 HCHG RX REV CODE 258: Performed by: INTERNAL MEDICINE

## 2023-01-30 PROCEDURE — 82607 VITAMIN B-12: CPT

## 2023-01-30 PROCEDURE — 4A023N7 MEASUREMENT OF CARDIAC SAMPLING AND PRESSURE, LEFT HEART, PERCUTANEOUS APPROACH: ICD-10-PCS | Performed by: INTERNAL MEDICINE

## 2023-01-30 PROCEDURE — 80307 DRUG TEST PRSMV CHEM ANLYZR: CPT

## 2023-01-30 PROCEDURE — 700111 HCHG RX REV CODE 636 W/ 250 OVERRIDE (IP)

## 2023-01-30 PROCEDURE — 700117 HCHG RX CONTRAST REV CODE 255: Performed by: EMERGENCY MEDICINE

## 2023-01-30 PROCEDURE — 82803 BLOOD GASES ANY COMBINATION: CPT | Mod: 91

## 2023-01-30 PROCEDURE — 93503 INSERT/PLACE HEART CATHETER: CPT | Mod: RT | Performed by: INTERNAL MEDICINE

## 2023-01-30 PROCEDURE — 36620 INSERTION CATHETER ARTERY: CPT

## 2023-01-30 PROCEDURE — 93005 ELECTROCARDIOGRAM TRACING: CPT | Performed by: EMERGENCY MEDICINE

## 2023-01-30 PROCEDURE — 80074 ACUTE HEPATITIS PANEL: CPT

## 2023-01-30 PROCEDURE — 93925 LOWER EXTREMITY STUDY: CPT

## 2023-01-30 PROCEDURE — 85730 THROMBOPLASTIN TIME PARTIAL: CPT

## 2023-01-30 PROCEDURE — 83735 ASSAY OF MAGNESIUM: CPT

## 2023-01-30 PROCEDURE — B2111ZZ FLUOROSCOPY OF MULTIPLE CORONARY ARTERIES USING LOW OSMOLAR CONTRAST: ICD-10-PCS | Performed by: INTERNAL MEDICINE

## 2023-01-30 PROCEDURE — 770022 HCHG ROOM/CARE - ICU (200)

## 2023-01-30 PROCEDURE — 05HY33Z INSERTION OF INFUSION DEVICE INTO UPPER VEIN, PERCUTANEOUS APPROACH: ICD-10-PCS | Performed by: INTERNAL MEDICINE

## 2023-01-30 PROCEDURE — 94640 AIRWAY INHALATION TREATMENT: CPT

## 2023-01-30 PROCEDURE — 84145 PROCALCITONIN (PCT): CPT

## 2023-01-30 PROCEDURE — 93306 TTE W/DOPPLER COMPLETE: CPT | Mod: 26 | Performed by: INTERNAL MEDICINE

## 2023-01-30 RX ORDER — HEPARIN SODIUM 1000 [USP'U]/ML
INJECTION, SOLUTION INTRAVENOUS; SUBCUTANEOUS
Status: COMPLETED
Start: 2023-01-30 | End: 2023-01-30

## 2023-01-30 RX ORDER — LORAZEPAM 2 MG/ML
1 INJECTION INTRAMUSCULAR
Status: DISCONTINUED | OUTPATIENT
Start: 2023-01-30 | End: 2023-01-30

## 2023-01-30 RX ORDER — POLYETHYLENE GLYCOL 3350 17 G/17G
1 POWDER, FOR SOLUTION ORAL
Status: DISCONTINUED | OUTPATIENT
Start: 2023-01-30 | End: 2023-02-04 | Stop reason: HOSPADM

## 2023-01-30 RX ORDER — AMOXICILLIN 250 MG
2 CAPSULE ORAL 2 TIMES DAILY
Status: DISCONTINUED | OUTPATIENT
Start: 2023-01-30 | End: 2023-02-04 | Stop reason: HOSPADM

## 2023-01-30 RX ORDER — LORAZEPAM 2 MG/ML
1 INJECTION INTRAMUSCULAR ONCE
Status: COMPLETED | OUTPATIENT
Start: 2023-01-30 | End: 2023-01-30

## 2023-01-30 RX ORDER — LIDOCAINE HYDROCHLORIDE 20 MG/ML
INJECTION, SOLUTION INFILTRATION; PERINEURAL
Status: COMPLETED
Start: 2023-01-30 | End: 2023-01-30

## 2023-01-30 RX ORDER — HEPARIN SODIUM 200 [USP'U]/100ML
INJECTION, SOLUTION INTRAVENOUS
Status: COMPLETED
Start: 2023-01-30 | End: 2023-01-30

## 2023-01-30 RX ORDER — LORAZEPAM 0.5 MG/1
0.5 TABLET ORAL EVERY 4 HOURS PRN
Status: DISCONTINUED | OUTPATIENT
Start: 2023-01-30 | End: 2023-01-31

## 2023-01-30 RX ORDER — SODIUM CHLORIDE, SODIUM LACTATE, POTASSIUM CHLORIDE, AND CALCIUM CHLORIDE .6; .31; .03; .02 G/100ML; G/100ML; G/100ML; G/100ML
30 INJECTION, SOLUTION INTRAVENOUS ONCE
Status: DISCONTINUED | OUTPATIENT
Start: 2023-01-30 | End: 2023-01-30

## 2023-01-30 RX ORDER — LORAZEPAM 2 MG/ML
2 INJECTION INTRAMUSCULAR
Status: DISCONTINUED | OUTPATIENT
Start: 2023-01-30 | End: 2023-01-30

## 2023-01-30 RX ORDER — LORAZEPAM 1 MG/1
1 TABLET ORAL EVERY 4 HOURS PRN
Status: DISCONTINUED | OUTPATIENT
Start: 2023-01-30 | End: 2023-01-31

## 2023-01-30 RX ORDER — SODIUM CHLORIDE 9 MG/ML
30 INJECTION, SOLUTION INTRAVENOUS ONCE
Status: COMPLETED | OUTPATIENT
Start: 2023-01-30 | End: 2023-01-30

## 2023-01-30 RX ORDER — ASPIRIN 81 MG/1
324 TABLET, CHEWABLE ORAL ONCE
Status: COMPLETED | OUTPATIENT
Start: 2023-01-30 | End: 2023-01-30

## 2023-01-30 RX ORDER — LORAZEPAM 2 MG/ML
1.5 INJECTION INTRAMUSCULAR
Status: DISCONTINUED | OUTPATIENT
Start: 2023-01-30 | End: 2023-01-31

## 2023-01-30 RX ORDER — MIDAZOLAM HYDROCHLORIDE 1 MG/ML
INJECTION INTRAMUSCULAR; INTRAVENOUS
Status: COMPLETED
Start: 2023-01-30 | End: 2023-01-30

## 2023-01-30 RX ORDER — LORAZEPAM 2 MG/ML
0.5 INJECTION INTRAMUSCULAR EVERY 4 HOURS PRN
Status: DISCONTINUED | OUTPATIENT
Start: 2023-01-30 | End: 2023-01-31

## 2023-01-30 RX ORDER — HEPARIN SODIUM 1000 [USP'U]/ML
2000 INJECTION, SOLUTION INTRAVENOUS; SUBCUTANEOUS PRN
Status: DISCONTINUED | OUTPATIENT
Start: 2023-01-30 | End: 2023-01-31

## 2023-01-30 RX ORDER — LORAZEPAM 2 MG/ML
INJECTION INTRAMUSCULAR
Status: ACTIVE
Start: 2023-01-30 | End: 2023-01-31

## 2023-01-30 RX ORDER — ASPIRIN 300 MG/1
300 SUPPOSITORY RECTAL ONCE
Status: COMPLETED | OUTPATIENT
Start: 2023-01-30 | End: 2023-01-30

## 2023-01-30 RX ORDER — HEPARIN SODIUM 5000 [USP'U]/100ML
0-30 INJECTION, SOLUTION INTRAVENOUS CONTINUOUS
Status: DISCONTINUED | OUTPATIENT
Start: 2023-01-30 | End: 2023-01-30

## 2023-01-30 RX ORDER — BUPIVACAINE HYDROCHLORIDE 2.5 MG/ML
INJECTION, SOLUTION EPIDURAL; INFILTRATION; INTRACAUDAL
Status: DISPENSED
Start: 2023-01-30 | End: 2023-01-31

## 2023-01-30 RX ORDER — HEPARIN SODIUM 1000 [USP'U]/ML
4000 INJECTION, SOLUTION INTRAVENOUS; SUBCUTANEOUS ONCE
Status: COMPLETED | OUTPATIENT
Start: 2023-01-30 | End: 2023-01-30

## 2023-01-30 RX ORDER — LORAZEPAM 2 MG/ML
4 INJECTION INTRAMUSCULAR
Status: DISCONTINUED | OUTPATIENT
Start: 2023-01-30 | End: 2023-01-30

## 2023-01-30 RX ORDER — LORAZEPAM 2 MG/1
4 TABLET ORAL
Status: DISCONTINUED | OUTPATIENT
Start: 2023-01-30 | End: 2023-01-31

## 2023-01-30 RX ORDER — CEFAZOLIN SODIUM 1 G/3ML
INJECTION, POWDER, FOR SOLUTION INTRAMUSCULAR; INTRAVENOUS
Status: DISPENSED
Start: 2023-01-30 | End: 2023-01-31

## 2023-01-30 RX ORDER — LORAZEPAM 2 MG/ML
3 INJECTION INTRAMUSCULAR
Status: DISCONTINUED | OUTPATIENT
Start: 2023-01-30 | End: 2023-01-30

## 2023-01-30 RX ORDER — IPRATROPIUM BROMIDE AND ALBUTEROL SULFATE 2.5; .5 MG/3ML; MG/3ML
3 SOLUTION RESPIRATORY (INHALATION)
Status: DISCONTINUED | OUTPATIENT
Start: 2023-01-30 | End: 2023-02-01

## 2023-01-30 RX ORDER — HEPARIN SODIUM 5000 [USP'U]/100ML
0-30 INJECTION, SOLUTION INTRAVENOUS CONTINUOUS
Status: DISCONTINUED | OUTPATIENT
Start: 2023-01-30 | End: 2023-01-31

## 2023-01-30 RX ORDER — BISACODYL 10 MG
10 SUPPOSITORY, RECTAL RECTAL
Status: DISCONTINUED | OUTPATIENT
Start: 2023-01-30 | End: 2023-02-04 | Stop reason: HOSPADM

## 2023-01-30 RX ORDER — LORAZEPAM 2 MG/ML
1 INJECTION INTRAMUSCULAR
Status: DISCONTINUED | OUTPATIENT
Start: 2023-01-30 | End: 2023-01-31

## 2023-01-30 RX ORDER — CHOLECALCIFEROL (VITAMIN D3) 125 MCG
5 CAPSULE ORAL NIGHTLY PRN
Status: DISCONTINUED | OUTPATIENT
Start: 2023-01-30 | End: 2023-02-04 | Stop reason: HOSPADM

## 2023-01-30 RX ORDER — LIDOCAINE HYDROCHLORIDE 10 MG/ML
INJECTION, SOLUTION INFILTRATION; PERINEURAL
Status: DISPENSED
Start: 2023-01-30 | End: 2023-01-31

## 2023-01-30 RX ORDER — LORAZEPAM 2 MG/1
2 TABLET ORAL
Status: DISCONTINUED | OUTPATIENT
Start: 2023-01-30 | End: 2023-01-31

## 2023-01-30 RX ORDER — LORAZEPAM 2 MG/ML
2 INJECTION INTRAMUSCULAR
Status: DISCONTINUED | OUTPATIENT
Start: 2023-01-30 | End: 2023-01-31

## 2023-01-30 RX ORDER — AZITHROMYCIN 500 MG/1
500 INJECTION, POWDER, LYOPHILIZED, FOR SOLUTION INTRAVENOUS ONCE
Status: COMPLETED | OUTPATIENT
Start: 2023-01-30 | End: 2023-01-30

## 2023-01-30 RX ORDER — NOREPINEPHRINE BITARTRATE 0.03 MG/ML
0-1 INJECTION, SOLUTION INTRAVENOUS CONTINUOUS
Status: DISCONTINUED | OUTPATIENT
Start: 2023-01-30 | End: 2023-02-01

## 2023-01-30 RX ADMIN — HEPARIN SODIUM 2000 UNITS: 1000 INJECTION, SOLUTION INTRAVENOUS; SUBCUTANEOUS at 14:02

## 2023-01-30 RX ADMIN — HEPARIN SODIUM 2000 UNITS: 200 INJECTION, SOLUTION INTRAVENOUS at 15:25

## 2023-01-30 RX ADMIN — HYDROCORTISONE SODIUM SUCCINATE 100 MG: 100 INJECTION, POWDER, FOR SOLUTION INTRAMUSCULAR; INTRAVENOUS at 20:01

## 2023-01-30 RX ADMIN — LEVOTHYROXINE SODIUM 175 MCG: 0.17 TABLET ORAL at 15:49

## 2023-01-30 RX ADMIN — LORAZEPAM 1 MG: 2 INJECTION INTRAMUSCULAR; INTRAVENOUS at 19:30

## 2023-01-30 RX ADMIN — THIAMINE HYDROCHLORIDE 100 MG: 100 INJECTION, SOLUTION INTRAMUSCULAR; INTRAVENOUS at 11:29

## 2023-01-30 RX ADMIN — IPRATROPIUM BROMIDE AND ALBUTEROL SULFATE 3 ML: 2.5; .5 SOLUTION RESPIRATORY (INHALATION) at 23:19

## 2023-01-30 RX ADMIN — CEFTRIAXONE SODIUM 1000 MG: 10 INJECTION, POWDER, FOR SOLUTION INTRAVENOUS at 04:34

## 2023-01-30 RX ADMIN — HYDROCORTISONE SODIUM SUCCINATE 100 MG: 100 INJECTION, POWDER, FOR SOLUTION INTRAMUSCULAR; INTRAVENOUS at 02:59

## 2023-01-30 RX ADMIN — IPRATROPIUM BROMIDE AND ALBUTEROL SULFATE 3 ML: 2.5; .5 SOLUTION RESPIRATORY (INHALATION) at 19:41

## 2023-01-30 RX ADMIN — IOHEXOL 85 ML: 350 INJECTION, SOLUTION INTRAVENOUS at 03:32

## 2023-01-30 RX ADMIN — IPRATROPIUM BROMIDE AND ALBUTEROL SULFATE 3 ML: 2.5; .5 SOLUTION RESPIRATORY (INHALATION) at 07:51

## 2023-01-30 RX ADMIN — HEPARIN SODIUM 10.29 UNITS/KG/HR: 1000 INJECTION, SOLUTION INTRAVENOUS; SUBCUTANEOUS at 05:23

## 2023-01-30 RX ADMIN — HUMAN ALBUMIN MICROSPHERES AND PERFLUTREN 3 ML: 10; .22 INJECTION, SOLUTION INTRAVENOUS at 07:56

## 2023-01-30 RX ADMIN — LIDOCAINE HYDROCHLORIDE: 20 INJECTION, SOLUTION INFILTRATION; PERINEURAL at 15:25

## 2023-01-30 RX ADMIN — DOBUTAMINE HYDROCHLORIDE 5 MCG/KG/MIN: 100 INJECTION INTRAVENOUS at 06:08

## 2023-01-30 RX ADMIN — SODIUM CHLORIDE 2859 ML: 9 INJECTION, SOLUTION INTRAVENOUS at 01:54

## 2023-01-30 RX ADMIN — AZITHROMYCIN MONOHYDRATE 500 MG: 500 INJECTION, POWDER, LYOPHILIZED, FOR SOLUTION INTRAVENOUS at 00:31

## 2023-01-30 RX ADMIN — MIDAZOLAM HYDROCHLORIDE 1 MG: 1 INJECTION, SOLUTION INTRAMUSCULAR; INTRAVENOUS at 15:25

## 2023-01-30 RX ADMIN — HYDROCORTISONE SODIUM SUCCINATE 100 MG: 100 INJECTION, POWDER, FOR SOLUTION INTRAMUSCULAR; INTRAVENOUS at 15:49

## 2023-01-30 RX ADMIN — NITROGLYCERIN 10 ML: 20 INJECTION INTRAVENOUS at 15:25

## 2023-01-30 RX ADMIN — DOBUTAMINE HYDROCHLORIDE 5 MCG/KG/MIN: 100 INJECTION INTRAVENOUS at 15:23

## 2023-01-30 RX ADMIN — IOHEXOL 24 ML: 350 INJECTION, SOLUTION INTRAVENOUS at 14:57

## 2023-01-30 RX ADMIN — NOREPINEPHRINE BITARTRATE 0.1 MCG/KG/MIN: 1 INJECTION, SOLUTION, CONCENTRATE INTRAVENOUS at 06:40

## 2023-01-30 RX ADMIN — INSULIN HUMAN 1 UNITS: 100 INJECTION, SOLUTION PARENTERAL at 11:27

## 2023-01-30 RX ADMIN — AMPICILLIN AND SULBACTAM 3 G: 1; 2 INJECTION, POWDER, FOR SOLUTION INTRAMUSCULAR; INTRAVENOUS at 00:25

## 2023-01-30 RX ADMIN — HEPARIN SODIUM: 1000 INJECTION, SOLUTION INTRAVENOUS; SUBCUTANEOUS at 15:26

## 2023-01-30 RX ADMIN — HEPARIN SODIUM 4000 UNITS: 1000 INJECTION, SOLUTION INTRAVENOUS; SUBCUTANEOUS at 05:18

## 2023-01-30 RX ADMIN — ASPIRIN 300 MG: 300 SUPPOSITORY RECTAL at 03:50

## 2023-01-30 RX ADMIN — FENTANYL CITRATE 25 MCG: 50 INJECTION, SOLUTION INTRAMUSCULAR; INTRAVENOUS at 15:25

## 2023-01-30 ASSESSMENT — ENCOUNTER SYMPTOMS
EYES NEGATIVE: 1
HEADACHES: 0
WEAKNESS: 1
NAUSEA: 0
DIZZINESS: 0
DIARRHEA: 0
VOMITING: 0
FEVER: 0
HEMOPTYSIS: 0
WHEEZING: 0
SHORTNESS OF BREATH: 1
ABDOMINAL PAIN: 0
PALPITATIONS: 0
COUGH: 1
GASTROINTESTINAL NEGATIVE: 1
CONSTIPATION: 0
SPUTUM PRODUCTION: 0
CHILLS: 0

## 2023-01-30 ASSESSMENT — COGNITIVE AND FUNCTIONAL STATUS - GENERAL
WALKING IN HOSPITAL ROOM: A LOT
DAILY ACTIVITIY SCORE: 18
CLIMB 3 TO 5 STEPS WITH RAILING: A LOT
MOVING FROM LYING ON BACK TO SITTING ON SIDE OF FLAT BED: A LOT
MOVING TO AND FROM BED TO CHAIR: A LOT
STANDING UP FROM CHAIR USING ARMS: A LOT
TURNING FROM BACK TO SIDE WHILE IN FLAT BAD: A LITTLE
MOBILITY SCORE: 13
DRESSING REGULAR UPPER BODY CLOTHING: A LITTLE
EATING MEALS: A LITTLE
SUGGESTED CMS G CODE MODIFIER MOBILITY: CL
SUGGESTED CMS G CODE MODIFIER DAILY ACTIVITY: CK
HELP NEEDED FOR BATHING: A LITTLE
DRESSING REGULAR LOWER BODY CLOTHING: A LITTLE
PERSONAL GROOMING: A LITTLE
TOILETING: A LITTLE

## 2023-01-30 ASSESSMENT — LIFESTYLE VARIABLES
CONSUMPTION TOTAL: POSITIVE
ON A TYPICAL DAY WHEN YOU DRINK ALCOHOL HOW MANY DRINKS DO YOU HAVE: 4
EVER FELT BAD OR GUILTY ABOUT YOUR DRINKING: YES
TOTAL SCORE: 4
HOW MANY TIMES IN THE PAST YEAR HAVE YOU HAD 5 OR MORE DRINKS IN A DAY: 365
HAVE YOU EVER FELT YOU SHOULD CUT DOWN ON YOUR DRINKING: YES
ALCOHOL_USE: YES
AVERAGE NUMBER OF DAYS PER WEEK YOU HAVE A DRINK CONTAINING ALCOHOL: 7
SUBSTANCE_ABUSE: 0
TOTAL SCORE: 4
TOTAL SCORE: 4
HAVE PEOPLE ANNOYED YOU BY CRITICIZING YOUR DRINKING: YES
DOES PATIENT WANT TO STOP DRINKING: CANNOT ASSESS
EVER HAD A DRINK FIRST THING IN THE MORNING TO STEADY YOUR NERVES TO GET RID OF A HANGOVER: YES

## 2023-01-30 ASSESSMENT — PULMONARY FUNCTION TESTS
EPAP_CMH2O: 10

## 2023-01-30 ASSESSMENT — PAIN DESCRIPTION - PAIN TYPE
TYPE: ACUTE PAIN

## 2023-01-30 ASSESSMENT — PATIENT HEALTH QUESTIONNAIRE - PHQ9
2. FEELING DOWN, DEPRESSED, IRRITABLE, OR HOPELESS: NOT AT ALL
1. LITTLE INTEREST OR PLEASURE IN DOING THINGS: NOT AT ALL
SUM OF ALL RESPONSES TO PHQ9 QUESTIONS 1 AND 2: 0

## 2023-01-30 ASSESSMENT — COPD QUESTIONNAIRES
HAVE YOU SMOKED AT LEAST 100 CIGARETTES IN YOUR ENTIRE LIFE: NO/DON'T KNOW
COPD SCREENING SCORE: 3
DURING THE PAST 4 WEEKS HOW MUCH DID YOU FEEL SHORT OF BREATH: NONE/LITTLE OF THE TIME
DO YOU EVER COUGH UP ANY MUCUS OR PHLEGM?: YES, A FEW DAYS A WEEK OR MONTH

## 2023-01-30 ASSESSMENT — FIBROSIS 4 INDEX
FIB4 SCORE: 3.9
FIB4 SCORE: 3.9
FIB4 SCORE: 3.15

## 2023-01-30 NOTE — PROGRESS NOTES
4 Eyes Skin Assessment Completed by Shanell LIN and DELIA Min.    Head WDL  Ears WDL  Nose WDL  Mouth WDL  Neck WDL  Breast/Chest WDL  Shoulder Blades WDL  Spine WDL  (R) Arm/Elbow/Hand WDL  (L) Arm/Elbow/Hand WDL  Abdomen WDL  Groin WDL  Scrotum/Coccyx/Buttocks WDL  (R) Leg Scab  (L) Leg Scab  (R) Heel/Foot/Toe Scab  (L) Heel/Foot/Toe Scab          Devices In Places ECG, Blood Pressure Cuff, Pulse Ox, Arterial Line, Central Line, and Bipap      Interventions In Place N/A    Possible Skin Injury No    Pictures Uploaded Into Epic No, needs to be completed  Wound Consult Placed N/A  RN Wound Prevention Protocol Ordered Yes

## 2023-01-30 NOTE — ED PROVIDER NOTES
ED Provider Note    CHIEF COMPLAINT  Chief Complaint   Patient presents with    Shortness of Breath       EXTERNAL RECORDS REVIEWED  Inpatient Notes discharge summary from January 3 Leni: Here for shortness of breath and hypoxia    HPI/ROS  LIMITATION TO HISTORY   Select: Dyspnea  OUTSIDE HISTORIAN(S):  EMS report directly to myself    Alberto Galicia is a 67 y.o. male who presents to the emergency department with acute shortness of breath.  Past medical history as documented below.  Patient explains he had acute worsening of shortness of breath this evening.  Does have a history of asthma.  Patient has used multiple DuoNeb treatments at home unsuccessfully ultimately called EMS.  EMS continue DuoNeb therapy and attempted CPAP however the patient did not tolerate in route.  EMS also provided IM epi, steroids and initiated magnesium prior to arrival.    At this point the patient only complaint is shortness of breath.  Denies any chest pain or palpitations.  No abdominal or back pain.  Denies any recent illness.  Does work as a  here at local [employer].  Multiple sick kid contacts with this.    Denies prior anemia or blood transfusion.    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    SURGICAL HISTORY   has a past surgical history that includes craniotomy.    FAMILY HISTORY  Family History   Problem Relation Age of Onset    Diabetes Mother     Heart Disease Father     Diabetes Brother     Alzheimer's Disease Maternal Grandmother     Cancer Maternal Grandfather     Heart Disease Paternal Grandmother     Cancer Paternal Grandfather        SOCIAL HISTORY  Social History     Tobacco Use    Smoking status: Never    Smokeless tobacco: Never   Vaping Use    Vaping Use: Never used   Substance and Sexual Activity    Alcohol use: Yes     Alcohol/week:  "12.6 oz     Types: 21 Shots of liquor per week     Comment: daily tumbler of vodka with OJ    Drug use: No    Sexual activity: Not on file       CURRENT MEDICATIONS  Home Medications       Reviewed by Mariola Srinivasan (Pharmacy Tech) on 01/30/23 at 0054  Med List Status: Complete     Medication Last Dose Status   albuterol 108 (90 Base) MCG/ACT Aero Soln inhalation aerosol UNKN Active   atorvastatin (LIPITOR) 40 MG Tab UNKN Active   calcium carbonate (TUMS) 500 MG Chew Tab UNKN Active   fexofenadine (ALLEGRA) 60 MG Tab UNKN Active   fluticasone (FLOVENT HFA) 110 MCG/ACT Aerosol UNKN Active   hydrocortisone (CORTEF) 10 MG Tab UNKN Active   ipratropium-albuterol (DUONEB) 0.5-2.5 (3) MG/3ML nebulizer solution UNKN Active   levothyroxine (SYNTHROID) 175 MCG Tab UNKN Active   melatonin 5 mg Tab UNKN Active   testosterone (TESTIM/ANDROGEL) 50 MG/5GM (1%) Gel gel UNKN Active   therapeutic multivitamin-minerals (THERAGRAN-M) Tab UNKN Active                    ALLERGIES  Allergies   Allergen Reactions    Pcn [Penicillins] Swelling       PHYSICAL EXAM  VITAL SIGNS: /59   Pulse (!) 103   Temp 36.5 °C (97.7 °F) (Core)   Resp (!) 22   Ht 1.93 m (6' 3.98\")   Wt 97.2 kg (214 lb 4.6 oz)   SpO2 95%   BMI 26.09 kg/m²        Pulse ox interpretation: Pulse ox in the 90s with supplemental oxygen  Constitutional: Acute respiratory distress  HENT: No signs of trauma, Bilateral external ears normal, Nose normal.   Eyes: Pupils are equal and reactive  Neck: Normal range of motion, No tenderness, Supple  Cardiovascular: Tachycardia, regular rate and rhythm, no murmurs.   Thorax & Lungs: Diminished, tachypneic, 1-2 word sentences  Abdomen: Bowel sounds normal, Soft, No tenderness, No masses, No pulsatile masses. No peritoneal signs.  Skin: Warm, Dry, No erythema, No rash.  Appears pale  Extremities: Intact distal pulses  Musculoskeletal: Good range of motion in all major joints. No tenderness to palpation or major " deformities noted.   Neurologic: Alert , Normal motor function, Normal sensory function, No focal deficits noted.   Psychiatric: Affect normal, Judgment normal, Mood normal.         DIAGNOSTIC STUDIES / PROCEDURES      LABS  Results for orders placed or performed during the hospital encounter of 01/29/23   EC-ECHOCARDIOGRAM COMPLETE W/ CONT   Result Value Ref Range    Left Ventrical Ejection Fraction 25    Lactic acid (lactate)   Result Value Ref Range    Lactic Acid 3.9 (H) 0.5 - 2.0 mmol/L   Lactic acid (lactate): Repeat if initial lactic acid result is greater than 2   Result Value Ref Range    Lactic Acid 3.4 (H) 0.5 - 2.0 mmol/L   Lactic acid (lactate): Repeat if initial lactic acid result is greater than 2   Result Value Ref Range    Lactic Acid 3.5 (H) 0.5 - 2.0 mmol/L   CBC With Differential   Result Value Ref Range    WBC 14.8 (H) 4.8 - 10.8 K/uL    RBC 4.78 4.70 - 6.10 M/uL    Hemoglobin 16.0 14.0 - 18.0 g/dL    Hematocrit 48.5 42.0 - 52.0 %    .5 (H) 81.4 - 97.8 fL    MCH 33.5 (H) 27.0 - 33.0 pg    MCHC 33.0 (L) 33.7 - 35.3 g/dL    RDW 47.9 35.9 - 50.0 fL    Platelet Count 338 164 - 446 K/uL    MPV 9.2 9.0 - 12.9 fL    Neutrophils-Polys 54.00 44.00 - 72.00 %    Lymphocytes 29.80 22.00 - 41.00 %    Monocytes 6.60 0.00 - 13.40 %    Eosinophils 8.80 (H) 0.00 - 6.90 %    Basophils 0.50 0.00 - 1.80 %    Immature Granulocytes 0.30 0.00 - 0.90 %    Nucleated RBC 0.10 /100 WBC    Neutrophils (Absolute) 7.98 (H) 1.82 - 7.42 K/uL    Lymphs (Absolute) 4.40 1.00 - 4.80 K/uL    Monos (Absolute) 0.97 (H) 0.00 - 0.85 K/uL    Eos (Absolute) 1.30 (H) 0.00 - 0.51 K/uL    Baso (Absolute) 0.07 0.00 - 0.12 K/uL    Immature Granulocytes (abs) 0.05 0.00 - 0.11 K/uL    NRBC (Absolute) 0.02 K/uL   Comp Metabolic Panel   Result Value Ref Range    Sodium 131 (L) 135 - 145 mmol/L    Potassium 4.3 3.6 - 5.5 mmol/L    Chloride 94 (L) 96 - 112 mmol/L    Co2 22 20 - 33 mmol/L    Anion Gap 15.0 7.0 - 16.0    Glucose 112 (H) 65 -  99 mg/dL    Bun 7 (L) 8 - 22 mg/dL    Creatinine 1.09 0.50 - 1.40 mg/dL    Calcium 9.6 8.5 - 10.5 mg/dL    AST(SGOT) 131 (H) 12 - 45 U/L    ALT(SGPT) 70 (H) 2 - 50 U/L    Alkaline Phosphatase 119 (H) 30 - 99 U/L    Total Bilirubin 1.2 0.1 - 1.5 mg/dL    Albumin 4.3 3.2 - 4.9 g/dL    Total Protein 8.0 6.0 - 8.2 g/dL    Globulin 3.7 (H) 1.9 - 3.5 g/dL    A-G Ratio 1.2 g/dL   Urinalysis    Specimen: Urine   Result Value Ref Range    Color Yellow     Character Clear     Specific Gravity 1.031 <1.035    Ph 7.5 5.0 - 8.0    Glucose Negative Negative mg/dL    Ketones 15 (A) Negative mg/dL    Protein 30 (A) Negative mg/dL    Bilirubin Negative Negative    Urobilinogen, Urine 1.0 Negative    Nitrite Negative Negative    Leukocyte Esterase Negative Negative    Occult Blood Negative Negative    Micro Urine Req Microscopic    Blood Culture    Specimen: Peripheral; Blood   Result Value Ref Range    Significant Indicator POS (POS)     Source BLD     Site PERIPHERAL     Culture Result (A)      Growth detected by [employer] instrument. 01/30/2023  21:30  Gram Stain: Gram positive cocci: Possible Staphylococcus sp.  Negative for Staphylococcus aureus and MRSA by PCR. Correlate  ongoing need for antibiotics with clinical condition.  Further report to follow.     TROPONIN   Result Value Ref Range    Troponin T 1245 (H) 6 - 19 ng/L   CoV-2, FLU A/B, and RSV by PCR (2-4 Hours CEPHEID) : Collect NP swab in VTM    Specimen: Nasopharyngeal; Respirate   Result Value Ref Range    Influenza virus A RNA Negative Negative    Influenza virus B, PCR Negative Negative    RSV, PCR Negative Negative    SARS-CoV-2 by PCR NotDetected     SARS-CoV-2 Source NP Swab    D-DIMER   Result Value Ref Range    D-Dimer 0.87 (H) 0.00 - 0.50 ug/mL (FEU)   Parasite, Gross Identification    Specimen: Other   Result Value Ref Range    Significant Indicator POS (POS)     Source OTHER     Site -     Parasite, Gross Identification Human parasite identified. (A)      Parasite, Gross Identification Cimex species (A)    CORRECTED CALCIUM   Result Value Ref Range    Correct Calcium 9.4 8.5 - 10.5 mg/dL   ESTIMATED GFR   Result Value Ref Range    GFR (CKD-EPI) 74 >60 mL/min/1.73 m 2   URINE DRUG SCREEN   Result Value Ref Range    Amphetamines Urine Negative Negative    Barbiturates Negative Negative    Benzodiazepines Negative Negative    Cocaine Metabolite Negative Negative    Methadone Negative Negative    Opiates Negative Negative    Oxycodone Negative Negative    Phencyclidine -Pcp Negative Negative    Propoxyphene Negative Negative    Cannabinoid Metab Negative Negative   PROCALCITONIN   Result Value Ref Range    Procalcitonin 0.41 (H) <0.25 ng/mL   DIAGNOSTIC ALCOHOL   Result Value Ref Range    Diagnostic Alcohol <10.1 <10.1 mg/dL   aPTT   Result Value Ref Range    APTT 31.7 24.7 - 36.0 sec   Prothrombin Time   Result Value Ref Range    PT 15.9 (H) 12.0 - 14.6 sec    INR 1.29 (H) 0.87 - 1.13   Heparin Xa (Unfractionated)   Result Value Ref Range    Heparin Xa (UFH) <0.10 IU/mL   HEPATITIS PANEL ACUTE(4 COMPONENTS)   Result Value Ref Range    Hepatitis B Surface Antigen Non-Reactive Non-Reactive    Hepatitis B Cors Ab,IgM Non-Reactive Non-Reactive    Hepatitis A Virus Ab, IgM Non-Reactive Non-Reactive    Hepatitis C Antibody Non-Reactive Non-Reactive   PROCALCITONIN   Result Value Ref Range    Procalcitonin 0.39 (H) <0.25 ng/mL   TROPONIN   Result Value Ref Range    Troponin T 674 (H) 6 - 19 ng/L   FREE THYROXINE   Result Value Ref Range    Free T-4 1.30 0.93 - 1.70 ng/dL   TSH   Result Value Ref Range    TSH 0.270 (L) 0.380 - 5.330 uIU/mL   TROPONIN   Result Value Ref Range    Troponin T 369 (H) 6 - 19 ng/L   CBC WITHOUT DIFFERENTIAL   Result Value Ref Range    WBC 9.6 4.8 - 10.8 K/uL    RBC 4.31 (L) 4.70 - 6.10 M/uL    Hemoglobin 14.7 14.0 - 18.0 g/dL    Hematocrit 43.4 42.0 - 52.0 %    .7 (H) 81.4 - 97.8 fL    MCH 34.1 (H) 27.0 - 33.0 pg    MCHC 33.9 33.7 - 35.3 g/dL     RDW 47.4 35.9 - 50.0 fL    Platelet Count 228 164 - 446 K/uL    MPV 9.6 9.0 - 12.9 fL   Heparin Anti-Xa   Result Value Ref Range    Heparin Xa (UFH) 0.24 IU/mL   LACTIC ACID   Result Value Ref Range    Lactic Acid 1.5 0.5 - 2.0 mmol/L   CBC without Differential   Result Value Ref Range    WBC 8.8 4.8 - 10.8 K/uL    RBC 4.23 (L) 4.70 - 6.10 M/uL    Hemoglobin 14.3 14.0 - 18.0 g/dL    Hematocrit 42.2 42.0 - 52.0 %    MCV 99.8 (H) 81.4 - 97.8 fL    MCH 33.8 (H) 27.0 - 33.0 pg    MCHC 33.9 33.7 - 35.3 g/dL    RDW 47.6 35.9 - 50.0 fL    Platelet Count 204 164 - 446 K/uL    MPV 9.3 9.0 - 12.9 fL   Basic Metabolic Panel (BMP)   Result Value Ref Range    Sodium 129 (L) 135 - 145 mmol/L    Potassium 4.1 3.6 - 5.5 mmol/L    Chloride 97 96 - 112 mmol/L    Co2 15 (L) 20 - 33 mmol/L    Glucose 193 (H) 65 - 99 mg/dL    Bun 11 8 - 22 mg/dL    Creatinine 0.93 0.50 - 1.40 mg/dL    Calcium 8.2 (L) 8.5 - 10.5 mg/dL    Anion Gap 17.0 (H) 7.0 - 16.0   HEPATIC FUNCTION PANEL   Result Value Ref Range    Alkaline Phosphatase 89 30 - 99 U/L    AST(SGOT) 90 (H) 12 - 45 U/L    ALT(SGPT) 48 2 - 50 U/L    Total Bilirubin 0.6 0.1 - 1.5 mg/dL    Direct Bilirubin 0.2 0.1 - 0.5 mg/dL    Indirect Bilirubin 0.4 0.0 - 1.0 mg/dL    Albumin 3.5 3.2 - 4.9 g/dL    Total Protein 6.4 6.0 - 8.2 g/dL   HEPATIC FUNCTION PANEL   Result Value Ref Range    Alkaline Phosphatase 81 30 - 99 U/L    AST(SGOT) 82 (H) 12 - 45 U/L    ALT(SGPT) 49 2 - 50 U/L    Total Bilirubin 0.5 0.1 - 1.5 mg/dL    Direct Bilirubin <0.2 0.1 - 0.5 mg/dL    Indirect Bilirubin see below 0.0 - 1.0 mg/dL    Albumin 3.3 3.2 - 4.9 g/dL    Total Protein 6.5 6.0 - 8.2 g/dL   Magnesium   Result Value Ref Range    Magnesium 1.7 1.5 - 2.5 mg/dL   PHOSPHORUS   Result Value Ref Range    Phosphorus 2.1 (L) 2.5 - 4.5 mg/dL   VITAMIN B12   Result Value Ref Range    Vitamin B12 -True Cobalamin 1134 (H) 211 - 911 pg/mL   LACTIC ACID   Result Value Ref Range    Lactic Acid 2.5 (H) 0.5 - 2.0 mmol/L    URINE MICROSCOPIC (W/UA)   Result Value Ref Range    WBC 2-5 (A) /hpf    RBC 0-2 (A) /hpf    Bacteria Negative None /hpf    Epithelial Cells Few /hpf    Epithelial Cells Renal Negative /hpf    Trans Epithelial Cells Few /hpf    Hyaline Cast 0-2 /lpf   ESTIMATED GFR   Result Value Ref Range    GFR (CKD-EPI) 89 >60 mL/min/1.73 m 2   Basic Metabolic Panel   Result Value Ref Range    Sodium 134 (L) 135 - 145 mmol/L    Potassium 3.9 3.6 - 5.5 mmol/L    Chloride 103 96 - 112 mmol/L    Co2 18 (L) 20 - 33 mmol/L    Glucose 155 (H) 65 - 99 mg/dL    Bun 12 8 - 22 mg/dL    Creatinine 0.87 0.50 - 1.40 mg/dL    Calcium 8.0 (L) 8.5 - 10.5 mg/dL    Anion Gap 13.0 7.0 - 16.0   TROPONIN   Result Value Ref Range    Troponin T 312 (H) 6 - 19 ng/L   ESTIMATED GFR   Result Value Ref Range    GFR (CKD-EPI) 94 >60 mL/min/1.73 m 2   EKG   Result Value Ref Range    Report       [facility] Emergency Dept.    Test Date:  2023  Pt Name:    MATT MOODY                   Department: ER  MRN:        [id number]                      Room:       RD 01  Gender:     Male                         Technician: [id number]  :        1955                   Requested By:ER TRIAGE PROTOCOL  Order #:    [id number]                    Reading MD:    Measurements  Intervals                                Axis  Rate:       137                          P:          95  AL:         118                          QRS:        -71  QRSD:       106                          T:          82  QT:         292  QTc:        441    Interpretive Statements  Sinus tachycardia  Atrial premature complex  Abnormal R-wave progression, early transition  Inferior infarct, acute (LCx)  Lateral leads are also involved  Compared to ECG 2023 20:36:09  Atrial premature complex(es) now present  Myocardial infarct finding now present  Ventricular premature complex(es) no longer presen t  Left anterior fascicular block no longer present  Incomplete right  bundle-branch block no longer present  Right bundle-branch block no longer present     EKG   Result Value Ref Range    Report       [facility] Emergency Dept.    Test Date:  2023  Pt Name:    MATT MOODY                   Department: ER  MRN:        [id number]                      Room:       RD 01  Gender:     Male                         Technician: [id number]  :        1955                   Requested By:DELIA PHAM  Order #:    [id number]                    Reading MD:    Measurements  Intervals                                Axis  Rate:       115                          P:          86  VT:         157                          QRS:        -72  QRSD:       102                          T:          77  QT:         356  QTc:        493    Interpretive Statements  Sinus tachycardia  Left anterior fascicular block  Abnormal R-wave progression, early transition  ST elevation, consider inferior injury  Borderline prolonged QT interval  Compared to ECG 2023 23:18:54  Left anterior fascicular block now present  ST (T wave) deviation now present  Atrial premature complex(es) no longer pr esent  Myocardial infarct finding still present     POCT arterial blood gas device results   Result Value Ref Range    Ph 7.319 (L) 7.400 - 7.500    Pco2 45.8 (H) 26.0 - 37.0 mmHg    Po2 163 (H) 64 - 87 mmHg    Tco2 25 20 - 33 mmol/L    S02 99 93 - 99 %    Hco3 23.6 17.0 - 25.0 mmol/L    BE -3 -4 - 3 mmol/L    Body Temp see below degrees    O2 Therapy 50 %    iPF Ratio 326     Specimen Arterial    POCT sodium device results   Result Value Ref Range    Istat Sodium 132 (L) 135 - 145 mmol/L   POCT potassium device results   Result Value Ref Range    Istat Potassium 4.4 3.6 - 5.5 mmol/L   POCT ionized CA device results   Result Value Ref Range    Istat Ionized Calcium 1.18 1.10 - 1.30 mmol/L   POCT arterial blood gas device results   Result Value Ref Range    Ph RR 7.400 - 7.500    Pco2 RR 26.0 - 37.0 mmHg     Po2 RR 64 - 87 mmHg    Tco2 RR 20 - 33 mmol/L    S02 RR 93 - 99 %    Hco3 RR 17.0 - 25.0 mmol/L    BE RR -4 - 3 mmol/L    Body Temp RR degrees    O2 Therapy RR %    iPF Ratio 385     Ph Temp Tunde RR 7.400 - 7.500    Pco2 Temp Co RR 26.0 - 37.0 mmHg    Po2 Temp Cor RR 64 - 87 mmHg    Specimen RR     DelSys RR    POCT venous blood gas device results   Result Value Ref Range    Ph 7.289 (L) 7.310 - 7.450    Pco2 45.7 41.0 - 51.0 mmHg    Po2 32 25 - 40 mmHg    Tco2 23 20 - 33 mmol/L    SO2 55 %    Hco3 21.9 (L) 24.0 - 28.0 mmol/L    BE -5 (L) -4 - 3 mmol/L    Body Temp 37.0 C degrees    O2 Therapy 40 %    iPF Ratio 80     Ph Temp Correc 7.289 (L) 7.310 - 7.450    Pco2 Temp Tunde 45.7 41.0 - 51.0 mmHg    Po2 Temp Corre 32 25 - 40 mmHg    Specimen Venous     DelSys NIV    POCT glucose device results   Result Value Ref Range    POC Glucose, Blood 171 (H) 65 - 99 mg/dL   POCT arterial blood gas device results   Result Value Ref Range    Ph 7.327 (L) 7.400 - 7.500    Pco2 36.2 26.0 - 37.0 mmHg    Po2 154 (H) 64 - 87 mmHg    Tco2 20 20 - 33 mmol/L    S02 99 93 - 99 %    Hco3 18.9 17.0 - 25.0 mmol/L    BE -6 (L) -4 - 3 mmol/L    Body Temp 37.3 C degrees    O2 Therapy 40 %    iPF Ratio 385     Ph Temp Tunde 7.322 (L) 7.400 - 7.500    Pco2 Temp Co 36.7 26.0 - 37.0 mmHg    Po2 Temp Cor 156 (H) 64 - 87 mmHg    Specimen Arterial    POCT lactate device results   Result Value Ref Range    iStat Lactate 1.4 0.5 - 2.0 mmol/L   POCT venous blood gas device results   Result Value Ref Range    Ph 7.362 7.310 - 7.450    Pco2 36.0 (L) 41.0 - 51.0 mmHg    Po2 38 25 - 40 mmHg    Tco2 22 20 - 33 mmol/L    SO2 70 %    Hco3 20.4 (L) 24.0 - 28.0 mmol/L    BE -4 -4 - 3 mmol/L    Body Temp 37.0 C degrees    O2 Therapy 40 %    iPF Ratio 95     Ph Temp Correc 7.362 7.310 - 7.450    Pco2 Temp Tunde 36.0 (L) 41.0 - 51.0 mmHg    Po2 Temp Corre 38 25 - 40 mmHg    Specimen Venous    POCT lactate device results   Result Value Ref Range    iStat Lactate 0.8  0.5 - 2.0 mmol/L   POCT arterial blood gas device results   Result Value Ref Range    Ph 7.391 (L) 7.400 - 7.500    Pco2 32.4 26.0 - 37.0 mmHg    Po2 84 64 - 87 mmHg    Tco2 21 20 - 33 mmol/L    S02 96 93 - 99 %    Hco3 19.6 17.0 - 25.0 mmol/L    BE -4 -4 - 3 mmol/L    Body Temp 36.5 C degrees    O2 Therapy 30 %    iPF Ratio 280     Ph Temp Tunde 7.398 (L) 7.400 - 7.500    Pco2 Temp Co 31.7 26.0 - 37.0 mmHg    Po2 Temp Cor 81 64 - 87 mmHg    Specimen Arterial    POCT lactate device results   Result Value Ref Range    iStat Lactate 0.8 0.5 - 2.0 mmol/L   POCT glucose device results   Result Value Ref Range    POC Glucose, Blood 145 (H) 65 - 99 mg/dL         RADIOLOGY  I have independently interpreted the diagnostic imaging associated with this visit and am waiting the final reading from the radiologist.   My preliminary interpretation is a follows: cxr nad  Radiologist interpretation:   DX-CHEST-LIMITED (1 VIEW)   Final Result         No acute cardiac or pulmonary abnormality is identified.      US-EXTREMITY ARTERY LOWER BILAT W/SARITA (COMBO)   Final Result      US-RUQ   Final Result      1.  Borderline cardiomegaly with densely increased echogenicity consistent with hepatocellular disease or fatty liver.   2.  Cholelithiasis.   3.  Borderline enlargement of the main portal vein (greater than 13 mm) recent possibility of portal hypertension.   4.  There is no ascites.      EC-ECHOCARDIOGRAM COMPLETE W/ CONT   Final Result      DX-CHEST-PORTABLE (1 VIEW)   Final Result         1.  No acute cardiopulmonary disease.      DX-CHEST-PORTABLE (1 VIEW)   Final Result         1.  No acute cardiopulmonary disease.      CT-CTA CHEST PULMONARY ARTERY W/ RECONS   Final Result         1.  No pulmonary embolus appreciated.   2.  Hepatomegaly with irregular hepatic contour favoring changes of cirrhosis   3.  8.0 mm nodular density in the right lung base. There is associated linear density suggesting scarring or atelectasis. See nodule  follow-up recommendations below.      Fleischner Society pulmonary nodule recommendations:      Low Risk: CT at 6-12 months, then consider CT at 18-24 months      High Risk: CT at 6-12 months, then CT at 18-24 months      Low Risk - Minimal or absent history of smoking and of other known risk factors.      High Risk - History of smoking or of other known risk factors.      Note: These recommendations do not apply to lung cancer screening, patients with immunosuppression, or patients with known primary cancer.      Fleischner Society 2017 Guidelines for Management of Incidentally Detected Pulmonary Nodules in Adults         DX-CHEST-PORTABLE (1 VIEW)   Final Result         1.  No acute cardiopulmonary disease.      CL-LEFT HEART CATHETERIZATION WITH POSSIBLE INTERVENTION    (Results Pending)       CRITICAL CARE  The very real possibilty of a deterioration of this patient's condition required the highest level of my preparedness for sudden, emergent intervention.  I provided critical care services, which included medication orders, frequent reevaluations of the patient's condition and response to treatment, ordering and reviewing test results, and discussing the case with various consultants.  The critical care time associated with the care of the patient was 45 minutes. Review chart for interventions. This time is exclusive of any other billable procedures.     COURSE & MEDICAL DECISION MAKING    ED Observation Status? No; Patient does not meet criteria for ED Observation.     INITIAL ASSESSMENT, COURSE AND PLAN  Care Narrative: Patient presented emerged part with acute respiratory distress and critically ill.        DISPOSITION AND DISCUSSIONS  I have discussed management of the patient with the following physicians and JOANA's: Intensivist and cardiologist    Patient presented emergency department with the above presentation.  Patient did not tolerate CPAP prior to arrival.  He has however tolerated BiPAP with  significant provement his overall respiratory status.  During his initial stabilization sepsis protocol has been followed.  IV fluids and antibiotics have been started.  It was further found out that the patient does have bedbugs.  This was proven to be a difficult factor the patient's care.  We currently have limitations with her CT imaging hardware.  We cannot send patient emergently to CT to rule out PE with elevated D-dimer and respiratory status secondary to this.  We have punctuated with multidisciplinary teams as to how to get patient to CT.  Ultimately we have been able to wean the patient off of BiPAP and he was unable to be decontaminated.  At this point he was unable to go to CT.  While the above is occurring I have additionally consulted with cardiology as the patient does have a significantly elevated troponin.  EKGs have been done serially without any acute changes or evidence of ST elevation MI.  There may be a component of stress related illness such as Takotsubo's.  Cardiology states that we will continue to follow and possibly consider catheterization in the morning.  At this point cardiology recommendation is for aspirin and heparin.  This has also been initiated.  Patient had another recurrence of respiratory distress/failure.  Replaced on BiPAP.  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  Patient currently on hormone support and thyroid supplementation.  This point Dr. Bazan is on board and agree with ongoing inpatient ICU care.    FINAL DIAGNOSIS  1. NSTEMI (non-ST elevated myocardial infarction) (HCC)    2. Elevated troponin    3. Cardiogenic shock (HCC)           Electronically signed by: Iglesia Lentz M.D., 1/29/2023 11:26 PM

## 2023-01-30 NOTE — PROGRESS NOTES
Brief Cardiology Progress Note    66yo male with extensive alcohol intake, panhypopituitarism, asthma here with increasing shortness of breath, severe orthopnea starting 3 nights ago. Admitted with cardiogenic shock and treated with BiPAP and inotropic support. Initial hs troponin was 1200, decreased to 600. No ACS changes on EKG.      Alberto denies chest pressure now or as outpatient. He has had off and on dyspnea and wheezing for months, in the last 5 days it got significantly worse.       This morning, patient is comfortable on biPAP, bedside RN transitioned for bipap to nasal cannula. Weaned off norepi and remains on dobutamine 5.     Plan to transition of nasal cannula, if he is able to tolerate this while laying flat will plan for coronary angiogram today (this afternoon) or tomorrow depending on availability. Please keep NPO. Continue heparin drip.   Discussed with Dr. Almaraz

## 2023-01-30 NOTE — CARE PLAN
Respiratory Update    Treatment modality: BIPAP 14/10@40%  Treatment modality: Duo Q4     Pt tolerating current treatments well with no adverse reactions.

## 2023-01-30 NOTE — CONSULTS
reason for Consult:  Asked by Dr Iglesia Bazan M.D. to see this patient with  shock with    Patient's PCP: Manjula Zaidi M.D.    CC:   Chief Complaint   Patient presents with    Shortness of Breath       HPI: This is a 59-year-old gentleman with a history of panhypopituitarism asthma was here earlier this month for shortness of breath treated for asthma but he left AGAINST MEDICAL ADVICE with dyslipidemia no known coronary disease who presents via EMS for shortness of breath he called for severe shortness of breath and was found to be tachycardic and has developed hypotension he is placed on BiPAP.  He had a CT PE protocol which suggested cirrhosis but no PE.  Labs reveal leukocytosis with normal differential, normal kidney function, mild transaminase, lactic acidosis 3.9, alcohol negative troponin initially 1200 then 600.  His EKG does not show acute changes    With lactic acidosis and hypotension [place] was placed by ICU, shows no resident was cardiac index of 2 and a cardiac output of 4.5    Urgent echocardiogram at the bedside shows EF less than 20% with LV thrombus      Medications / Drug list prior to admission:  No current facility-administered medications on file prior to encounter.     Current Outpatient Medications on File Prior to Encounter   Medication Sig Dispense Refill    testosterone (TESTIM/ANDROGEL) 50 MG/5GM (1%) Gel gel APPLY 1 PACKET (50MG) TOPICALLY ONCE DAILY 150 g 2    ipratropium-albuterol (DUONEB) 0.5-2.5 (3) MG/3ML nebulizer solution Take 3 mL by nebulization every four hours as needed for Shortness of Breath for up to 30 days. 30 Each 0    fluticasone (FLOVENT HFA) 110 MCG/ACT Aerosol Inhale 1 Puff 2 times a day for 30 days. 12 g 0    fexofenadine (ALLEGRA) 60 MG Tab Take 60 mg by mouth every day.      therapeutic multivitamin-minerals (THERAGRAN-M) Tab Take 1 Tablet by mouth every day.      albuterol 108 (90 Base) MCG/ACT Aero Soln inhalation aerosol INHALE 2  PUFFS BY MOUTH EVERY 6 HOURS AS NEEDED FOR SHORTNESS OF BREATH 9 g 0    levothyroxine (SYNTHROID) 175 MCG Tab Take 1 Tablet by mouth every morning on an empty stomach. Please make an appointment for future refills 90 Tablet 1    atorvastatin (LIPITOR) 40 MG Tab Take 1 tablet by mouth once daily 100 Tablet 2    hydrocortisone (CORTEF) 10 MG Tab Take 1 pill in the morning and 1/2 in the afternoon 90 day supply 180 Tablet 3    calcium carbonate (TUMS) 500 MG Chew Tab Chew 500 mg every day.      melatonin 5 mg Tab Take 5 mg by mouth at bedtime as needed.         Current list of administered Medications:    Current Facility-Administered Medications:     hydrocortisone sodium succinate PF (Solu-CORTEF) 100 MG injection 100 mg, 100 mg, Intravenous, Q8HRS, Stuart Bazan M.D., 100 mg at 01/30/23 0259    heparin injection 2,000 Units, 2,000 Units, Intravenous, PRN, Iglesia Lentz M.D.    cefTRIAXone (Rocephin) syringe 1,000 mg, 1,000 mg, Intravenous, Q24HRS, Stuart Bazan M.D., 1,000 mg at 01/30/23 0434    heparin infusion 25,000 units in 500 mL 0.45% NACL, 0-30 Units/kg/hr, Intravenous, Continuous, Stuart Bazan M.D., Last Rate: 20 mL/hr at 01/30/23 0523, 10.3 Units/kg/hr at 01/30/23 0523    ipratropium-albuterol (DUONEB) nebulizer solution, 3 mL, Nebulization, Q4HRS (RT), Stuart Bazan M.D.    DOBUTamine (Dobutrex) 1 mg/1 mL premix infusion, 0-20 mcg/kg/min (Ideal), Intravenous, Continuous, Stuart Bazan M.D.    norepinephrine (Levophed) 8 mg in 250 mL NS infusion (premix), 0-1 mcg/kg/min (Ideal), Intravenous, Continuous, Stuart Bazan M.D.    Past Medical History:   Diagnosis Date    Asthma     Hyperlipidemia     Pituitary disease (HCC)     Thyroid disease        Past Surgical History:   Procedure Laterality Date    CRANIOTOMY         Family History   Problem Relation Age of Onset    Diabetes Mother     Heart Disease Father     Diabetes Brother     Alzheimer's Disease Maternal Grandmother      "Cancer Maternal Grandfather     Heart Disease Paternal Grandmother     Cancer Paternal Grandfather      Patient family history was personally reviewed, no pertinent family history to current presentation    Social History     Tobacco Use    Smoking status: Never    Smokeless tobacco: Never   Vaping Use    Vaping Use: Never used   Substance Use Topics    Alcohol use: Yes     Alcohol/week: 12.6 oz     Types: 21 Shots of liquor per week     Comment: daily tumbler of vodka with OJ    Drug use: No       ALLERGIES:  Allergies   Allergen Reactions    Pcn [Penicillins] Swelling       Review of systems:  A complete review of symptoms was reviewed with patient. This is reviewed in H&P and PMH. ALL OTHERS reviewed and negative    Physical exam:  Patient Vitals for the past 24 hrs:   BP Temp Temp src Pulse Resp SpO2 Height Weight   01/30/23 0400 -- (!) 35.7 °C (96.3 °F) Temporal -- -- 99 % -- 97.2 kg (214 lb 4.6 oz)   01/30/23 0345 96/61 -- -- 93 (!) 22 98 % -- --   01/30/23 0328 -- -- -- (!) 102 (!) 26 97 % -- --   01/30/23 0315 (!) 89/60 -- -- (!) 101 (!) 25 97 % -- --   01/30/23 0200 100/57 -- -- (!) 109 (!) 27 96 % -- --   01/30/23 0125 104/59 -- -- (!) 111 (!) 31 93 % -- --   01/30/23 0115 104/59 -- -- (!) 114 (!) 33 93 % -- --   01/30/23 0100 90/55 -- -- (!) 111 (!) 25 96 % -- --   01/30/23 0045 (!) 85/51 -- -- (!) 113 (!) 29 97 % -- --   01/30/23 0030 92/54 -- -- (!) 115 (!) 33 97 % -- --   01/30/23 0015 (!) 80/50 -- -- (!) 116 (!) 33 96 % -- --   01/30/23 0000 92/50 -- -- (!) 126 (!) 47 96 % -- --   01/29/23 2345 92/59 -- -- (!) 127 (!) 44 97 % -- --   01/29/23 2340 110/70 -- -- (!) 128 (!) 43 97 % -- --   01/29/23 2330 110/70 -- -- (!) 133 (!) 44 98 % -- --   01/29/23 2316 (!) 152/95 35.9 °C (96.6 °F) Temporal (!) 137 (!) 41 95 % 1.93 m (6' 4\") 95.3 kg (210 lb)     General: Appears ill on BiPAP  EYES: no jaundice  HEENT: OP clear   Neck:  No JVD.   CVS:  RRR.   Resp: On BiPAP  Abdomen: ND,   Skin: rash  Neurological: " Alert, Moves all extremities  Extremities:   Mild edema. No cyanosis.       Data:  Laboratory studies personally reviewed by me:  Recent Results (from the past 24 hour(s))   Lactic acid (lactate)    Collection Time: 01/29/23 11:16 PM   Result Value Ref Range    Lactic Acid 3.9 (H) 0.5 - 2.0 mmol/L   CBC With Differential    Collection Time: 01/29/23 11:16 PM   Result Value Ref Range    WBC 14.8 (H) 4.8 - 10.8 K/uL    RBC 4.78 4.70 - 6.10 M/uL    Hemoglobin 16.0 14.0 - 18.0 g/dL    Hematocrit 48.5 42.0 - 52.0 %    .5 (H) 81.4 - 97.8 fL    MCH 33.5 (H) 27.0 - 33.0 pg    MCHC 33.0 (L) 33.7 - 35.3 g/dL    RDW 47.9 35.9 - 50.0 fL    Platelet Count 338 164 - 446 K/uL    MPV 9.2 9.0 - 12.9 fL    Neutrophils-Polys 54.00 44.00 - 72.00 %    Lymphocytes 29.80 22.00 - 41.00 %    Monocytes 6.60 0.00 - 13.40 %    Eosinophils 8.80 (H) 0.00 - 6.90 %    Basophils 0.50 0.00 - 1.80 %    Immature Granulocytes 0.30 0.00 - 0.90 %    Nucleated RBC 0.10 /100 WBC    Neutrophils (Absolute) 7.98 (H) 1.82 - 7.42 K/uL    Lymphs (Absolute) 4.40 1.00 - 4.80 K/uL    Monos (Absolute) 0.97 (H) 0.00 - 0.85 K/uL    Eos (Absolute) 1.30 (H) 0.00 - 0.51 K/uL    Baso (Absolute) 0.07 0.00 - 0.12 K/uL    Immature Granulocytes (abs) 0.05 0.00 - 0.11 K/uL    NRBC (Absolute) 0.02 K/uL   Comp Metabolic Panel    Collection Time: 01/29/23 11:16 PM   Result Value Ref Range    Sodium 131 (L) 135 - 145 mmol/L    Potassium 4.3 3.6 - 5.5 mmol/L    Chloride 94 (L) 96 - 112 mmol/L    Co2 22 20 - 33 mmol/L    Anion Gap 15.0 7.0 - 16.0    Glucose 112 (H) 65 - 99 mg/dL    Bun 7 (L) 8 - 22 mg/dL    Creatinine 1.09 0.50 - 1.40 mg/dL    Calcium 9.6 8.5 - 10.5 mg/dL    AST(SGOT) 131 (H) 12 - 45 U/L    ALT(SGPT) 70 (H) 2 - 50 U/L    Alkaline Phosphatase 119 (H) 30 - 99 U/L    Total Bilirubin 1.2 0.1 - 1.5 mg/dL    Albumin 4.3 3.2 - 4.9 g/dL    Total Protein 8.0 6.0 - 8.2 g/dL    Globulin 3.7 (H) 1.9 - 3.5 g/dL    A-G Ratio 1.2 g/dL   TROPONIN    Collection Time: 01/29/23  11:16 PM   Result Value Ref Range    Troponin T 1245 (H) 6 - 19 ng/L   D-DIMER    Collection Time: 23 11:16 PM   Result Value Ref Range    D-Dimer 0.87 (H) 0.00 - 0.50 ug/mL (FEU)   CORRECTED CALCIUM    Collection Time: 23 11:16 PM   Result Value Ref Range    Correct Calcium 9.4 8.5 - 10.5 mg/dL   ESTIMATED GFR    Collection Time: 23 11:16 PM   Result Value Ref Range    GFR (CKD-EPI) 74 >60 mL/min/1.73 m 2   EKG    Collection Time: 23 11:18 PM   Result Value Ref Range    Report       [facility]    Test Date:  2023  Pt Name:    MATT MOODY                   Department: ER  MRN:        [id number]                      Room:       [facility]  Gender:     Male                         Technician: [id number]  :        1955                   Requested By:ER TRIAGE PROTOCOL  Order #:    [id number]                    Reading MD:    Measurements  Intervals                                Axis  Rate:       137                          P:          95  VA:         118                          QRS:        -71  QRSD:       106                          T:          82  QT:         292  QTc:        441    Interpretive Statements  Sinus tachycardia  Atrial premature complex  Abnormal R-wave progression, early transition  Inferior infarct, acute (LCx)  Lateral leads are also involved  Compared to ECG 2023 20:36:09  Atrial premature complex(es) now present  Myocardial infarct finding now present  Ventricular premature complex(es) no longer presen t  Left anterior fascicular block no longer present  Incomplete right bundle-branch block no longer present  Right bundle-branch block no longer present     CoV-2, FLU A/B, and RSV by PCR (2-4 Hours CEPHEID) : Collect NP swab in VTM    Collection Time: 23 11:24 PM    Specimen: Nasopharyngeal; Respirate   Result Value Ref Range    Influenza virus A RNA Negative Negative    Influenza virus B, PCR Negative Negative    RSV, PCR  Negative Negative    SARS-CoV-2 by PCR NotDetected     SARS-CoV-2 Source NP Swab    Parasite, Gross Identification    Collection Time: 23 11:24 PM    Specimen: Other   Result Value Ref Range    Significant Indicator POS (POS)     Source OTHER     Site -     Parasite, Gross Identification Human parasite identified. (A)     Parasite, Gross Identification Cimex species (A)    POCT arterial blood gas device results    Collection Time: 23 11:28 PM   Result Value Ref Range    Ph 7.319 (L) 7.400 - 7.500    Pco2 45.8 (H) 26.0 - 37.0 mmHg    Po2 163 (H) 64 - 87 mmHg    Tco2 25 20 - 33 mmol/L    S02 99 93 - 99 %    Hco3 23.6 17.0 - 25.0 mmol/L    BE -3 -4 - 3 mmol/L    Body Temp see below degrees    O2 Therapy 50 %    iPF Ratio 326     Specimen Arterial    POCT sodium device results    Collection Time: 23 11:28 PM   Result Value Ref Range    Istat Sodium 132 (L) 135 - 145 mmol/L   POCT potassium device results    Collection Time: 23 11:28 PM   Result Value Ref Range    Istat Potassium 4.4 3.6 - 5.5 mmol/L   POCT ionized CA device results    Collection Time: 23 11:28 PM   Result Value Ref Range    Istat Ionized Calcium 1.18 1.10 - 1.30 mmol/L   EKG    Collection Time: 23 12:17 AM   Result Value Ref Range    Report       [facility] Emergency Dept.    Test Date:  2023  Pt Name:    MATT MOODY                   Department: ER  MRN:        [id number]                      Room:        01  Gender:     Male                         Technician: [id number]  :        1955                   Requested By:DELIA PHAM  Order #:    [id number]                    Reading MD:    Measurements  Intervals                                Axis  Rate:       115                          P:          86  NC:         157                          QRS:        -72  QRSD:       102                          T:          77  QT:         356  QTc:        493    Interpretive Statements  Sinus  tachycardia  Left anterior fascicular block  Abnormal R-wave progression, early transition  ST elevation, consider inferior injury  Borderline prolonged QT interval  Compared to ECG 01/29/2023 23:18:54  Left anterior fascicular block now present  ST (T wave) deviation now present  Atrial premature complex(es) no longer pr esent  Myocardial infarct finding still present     Lactic acid (lactate): Repeat if initial lactic acid result is greater than 2    Collection Time: 01/30/23  2:00 AM   Result Value Ref Range    Lactic Acid 3.4 (H) 0.5 - 2.0 mmol/L   PROCALCITONIN    Collection Time: 01/30/23  2:00 AM   Result Value Ref Range    Procalcitonin 0.41 (H) <0.25 ng/mL   DIAGNOSTIC ALCOHOL    Collection Time: 01/30/23  2:00 AM   Result Value Ref Range    Diagnostic Alcohol <10.1 <10.1 mg/dL   Lactic acid (lactate): Repeat if initial lactic acid result is greater than 2    Collection Time: 01/30/23  3:42 AM   Result Value Ref Range    Lactic Acid 3.5 (H) 0.5 - 2.0 mmol/L   aPTT    Collection Time: 01/30/23  3:42 AM   Result Value Ref Range    APTT 31.7 24.7 - 36.0 sec   Prothrombin Time    Collection Time: 01/30/23  3:42 AM   Result Value Ref Range    PT 15.9 (H) 12.0 - 14.6 sec    INR 1.29 (H) 0.87 - 1.13   Heparin Xa (Unfractionated)    Collection Time: 01/30/23  3:42 AM   Result Value Ref Range    Heparin Xa (UFH) <0.10 IU/mL   HEPATITIS PANEL ACUTE(4 COMPONENTS)    Collection Time: 01/30/23  3:42 AM   Result Value Ref Range    Hepatitis B Surface Antigen Non-Reactive Non-Reactive    Hepatitis B Cors Ab,IgM Non-Reactive Non-Reactive    Hepatitis A Virus Ab, IgM Non-Reactive Non-Reactive    Hepatitis C Antibody Non-Reactive Non-Reactive   PROCALCITONIN    Collection Time: 01/30/23  3:42 AM   Result Value Ref Range    Procalcitonin 0.39 (H) <0.25 ng/mL   TROPONIN    Collection Time: 01/30/23  3:42 AM   Result Value Ref Range    Troponin T 674 (H) 6 - 19 ng/L   FREE THYROXINE    Collection Time: 01/30/23  3:42 AM    Result Value Ref Range    Free T-4 1.30 0.93 - 1.70 ng/dL   TSH    Collection Time: 01/30/23  3:42 AM   Result Value Ref Range    TSH 0.270 (L) 0.380 - 5.330 uIU/mL       Imaging:  DX-CHEST-PORTABLE (1 VIEW)   Final Result         1.  No acute cardiopulmonary disease.      DX-CHEST-PORTABLE (1 VIEW)   Final Result         1.  No acute cardiopulmonary disease.      CT-CTA CHEST PULMONARY ARTERY W/ RECONS   Final Result         1.  No pulmonary embolus appreciated.   2.  Hepatomegaly with irregular hepatic contour favoring changes of cirrhosis   3.  8.0 mm nodular density in the right lung base. There is associated linear density suggesting scarring or atelectasis. See nodule follow-up recommendations below.      Fleischner Society pulmonary nodule recommendations:      Low Risk: CT at 6-12 months, then consider CT at 18-24 months      High Risk: CT at 6-12 months, then CT at 18-24 months      Low Risk - Minimal or absent history of smoking and of other known risk factors.      High Risk - History of smoking or of other known risk factors.      Note: These recommendations do not apply to lung cancer screening, patients with immunosuppression, or patients with known primary cancer.      Fleischner Society 2017 Guidelines for Management of Incidentally Detected Pulmonary Nodules in Adults         DX-CHEST-PORTABLE (1 VIEW)   Final Result         1.  No acute cardiopulmonary disease.      EC-ECHOCARDIOGRAM COMPLETE W/O CONT    (Results Pending)   US-EXTREMITY ARTERY LOWER BILAT W/SARITA (COMBO)    (Results Pending)           EKG tracings personally reviewed by me sinus tachycardia            CT chest no sig coronary calcium    All pertinent features of laboratory and imaging reviewed including primary images where applicable      Principal Problem:    Shock (HCC) POA: Yes  Active Problems:    Panhypopituitarism (HCC) POA: Yes    Elevated liver enzymes POA: Yes    Acute on chronic respiratory failure with hypoxia and  hypercapnia (HCC) POA: Unknown    Asthma with exacerbation POA: Unknown    Sepsis (HCC) POA: Unknown    Elevated troponin POA: Unknown    Elevated d-dimer POA: Unknown    Elevated lactic acid level POA: Unknown    Infestation by bed bug POA: Unknown    NSTEMI (non-ST elevated myocardial infarction) (HCC) POA: Yes    Left ventricular thrombus POA: Yes    Cardiomyopathy (HCC) EF <20% (Chronic) POA: Yes  Resolved Problems:    Lactic acidosis POA: Unknown      Assessment / Plan:  Shock with severely low ejection fraction unknown etiology his CT of the chest does not show coronary calcification so I suspect another source of his etiology does have alcohol history and cirrhosis on his CT, he also had severe asthma respiratory failure recently so 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 Dr. Romel Morrison and Iglesia Lentz    Future Appointments   Date Time Provider Department Center   2/1/2023  2:00 PM Catracho Montano M.D. [facility]       It is my pleasure to participate in the care of Mr. Galicia.  Please do not hesitate to contact me with questions or concerns.    Jamir Parr MD PhD [facility]  Cardiologist [facility]    1/30/2023    Please note that this dictation was created using voice recognition software. There may be errors I did not discover before finalizing the note.      Alberto Galicia is critically ill and he is at high risk for clinical deterioration, worsening vital organ dysfunction, and death without the above critical care interventions.  Critical care time 45 minutes due to shock. No time  overlap. Procedures were not included in this time. This time was spent at the bedside evaluating the patient's chart, their labs, imaging, physical exam and discussion with ER and ICU team as well as the bedside nurse,  and formulating an assessment and plan.

## 2023-01-30 NOTE — PROGRESS NOTES
"Critical Care Medicine Attending Note  (see full Resident note in EPIC)    I have seen and examined the patient today.  I have reviewed the medical record, laboratory data, imaging, and all relevant studies.  I have discussed the assessment and plan of care with the Internal Medicine Resident and agree with their note and plan as documented.      \"67y M hx of asthma, pituitary insufficiency, hypothyroidism, insomnia, alcohol use. That presents tonight for acute shortness of breath. He describes one week ago with sore throat that then had cough sputum production, n/v and diarrhea. No black or bloody stools. He also complains of being very thirsty. He has no hx of drug use or tobacco he developed reactive airway after moving here some time ago. He has no hx of CAD and or heart disease. He recent was admit for asthma exacerbation 1/3-1/15 and left AMA on that admission. He called EMS was given multiple nebs in route and was placed on Bipap when he arrived to ER with much improvement. He was noted to have bugs and identified as Cimex specie. His vitals afebrile hr 105-127 SBP 's satting 93-97%, ABG 7.31/45/163, CXR was unremarkable, viral panel negative, trop was 1245, ekg with no obvious findings to suggest STEMI, CMP and CBC nothing terrible concerning, mild hyponatremia and leukocytosis. He was able to get decontaminated and come off Bipap. His bedside echo appeared to have reduced LV but with no dilation of RV and collapse of his IVC but with mild increase respiratory work. His lactate was elevated at 3.9, d dimer elevate pending CTA chest .  \" H[place]ensia H&P 1/30/23    Additional history the patient tells me that he does drink up to 1/5/day for many years he is never really had alcohol withdraw he does have cravings, he lives alone, is employed.  Denies any symptoms of chest pain mostly just been exertional and positional dyspnea preceded by what sounds to be some viral syndrome symptoms including some upper " respiratory symptoms and GI symptoms over the course of a couple weeks.  His shortness of breath however seems to be potentially even longer duration.    Hospital Course  No notes on file    Interval events/data by system:  Seen and evaluated, reviewed data, admitted to ICU this morning cardiogenic shock, PA catheter in situ  Patient with markedly elevated troponin at 1245, nondiagnostic EKG, second Trope down in the 600s.    Hemodynamics show elevated right-sided filling pressures, low cardiac index.  Normal SVR.   started at 5 initially required some norepinephrine which was quickly weaned off.    Patient with elevated lactate, elevated LFTs, ANGEL.    TTE shows markedly reduced left-sided ejection fraction 20 to 30% apical hypokinesis, RV dysfunction, PASP estimated at 35 mmHg, no pericardial effusion or tamponade        Physical Exam:   Patient is awake alert, he is on nasal cannula  Lungs are clear he does have somewhat of a dry cough but I do not hear any rales or wheezes  Patient does not have a murmur he does have a narrow pulse pressure and diminished perfusion distally  Abdomen is benign  Extremities are cold, mild edema    Diagnostics:   EKG reviewed, sinus tachycardia some ST changes nonspecific, no overt STEMI  CXR reviewed without pulmonary edema or infiltrates    CT chest without pulmonary embolus, lungs are clear, does have hepatomegaly and irregular hepatic contour favoring changes of cirrhosis    TTE reviewed, results as above  WBC 8.8, hemoglobin 14.3, platelets 204,   Sodium 131, chloride 94, potassium 4.3 bicarbonate 22, glucose 112 anion gap is 15  INR 1.29    ABG 7.3/1 54/99%  Mixed venous 7.3/45/55%      Assessment/Plan:  Cardiogenic shock  New onset decompensated heart failure  ANGEL  Pituitary insufficiency  Elevated LFTs, likely related to cardiogenic shock  Hyponatremia  Alcohol use disorder      Patient's clinical history and echocardiography is suggestive of a nonischemic  cardiomyopathy affecting biventricular function.  Patient is presenting in cardiogenic shock, he is now being managed with inotropes, vasopressors for map support, PA catheter for hemodynamics, cardiology following.  We will continue to titrate inotropes and vasopressors to support cardiac output/index and power, will continue to follow markers of perfusion and clinical exam    Cardiology has been consulted on the patient and her participate in his care, will need an ischemic evaluation at some point, with rapidly downtrending troponin and no real ACS symptoms as well as findings on echocardiography we will delay PCI until this more stable.    This time being will be treated with aspirin, heparin, hold statin given LFT injury but initiate statin and ARB and GDMT as soon as hemodynamics allow.    With respect to his ANGEL this is probably hypoperfusion, from poor cardiac output, low output state, and possible some prerenal/ATN combination.  Patient have a [place] catheter placed monitor his I's and O's, avoid nephrotoxic agents, will hold ACE/ARB for the time being    Patient does have hypopituitary and is on chronic hydrocortisone, as well as levothyroxine, TSH mildly below normal but T4 in normal range and does not appear to be decompensated we will continue his home levothyroxine dose but given his shock we will give him stress dose steroids for the time being and wean appropriately.    Patient has clear chest x-ray and does not really have significant hypoxemia, but does have some dyspnea and shortness of breath and feels air hunger, he felt a lot better on BiPAP, have weaned him to nasal oxygen and continues to saturate well but occasionally will request the BiPAP, reasons are not entirely clear.  Chest x-ray does not show pneumonia, procalcitonin is 0.39, white count 8.8, he does not really have much sputum production, and does not appear to have sepsis physiology I am going to discontinue antibiotics.      As far  as hemodynamic monitoring, will leave PA catheter in for now likely remove promptly.          Discussed patient condition and risk of morbidity and/or mortality with patient, cardiology, multidisciplinary rounds, resident team.     The patient remains critically ill with a guarded prognosis.     Critical care time = 40 minutes in directly providing and coordinating critical care and extensive data review. No time overlap and excludes procedures.

## 2023-01-30 NOTE — ED NOTES
"Pt having change in mentation, agitated, attempting to get out of bed, continuously stating \"help me help me.\" ERP and MD Rodriguez called, RT called to bedside, pt placed back on BiPAP. Mentation regulating, pt became calm and cooperative, positive color change. Will continue to monitor.  "

## 2023-01-30 NOTE — PROCEDURES
Procedure: [place] Catheter Insertion  Indication: cardiogenic shock  Consent: patient    Procedure: Appropriate time out was taken, patient was on cardiac monitor and was prepped and draped in sterile fashion. Selinger technique was preformed to place cordis using sterile ultrasound probe in real time technique and confirmed placement of guidewire. All ports were flushed and tested prior PA catheter insertion. The PA catheter was inserted watching for arrhythmia and wave forms of CVP, RV and step up into pulmonary artery until wedge was achieved and wedge re-tested and catheter secured at 62 cm and locked in place. Always practicing only advancing balloon in the up position and withdrawing with balloon down.       Full parameters will be taken    Chest xray ordered to confirm placement into west zone 2 and no complication    Patient tolerate procedure well without any complication and blood loss was < 5 ml    Code # [id number]    Stuart Bazan MD  Critical Care Medicine

## 2023-01-30 NOTE — CONSULTS
Critical Care Medicine Faculty Consult Note    Consulted by: Dr Iglesia Lentz    Reason for consult: respiratory distress requiring Bipap    HPI: 67y M hx of asthma, pituitary insufficiency, hypothyroidism, insomnia, alcohol use. That presents tonight for acute shortness of breath. He describes one week ago with sore throat that then had cough sputum production, n/v and diarrhea. No black or bloody stools. He also complains of being very thirsty. He has no hx of drug use or tobacco he developed reactive airway after moving here some time ago. He has no hx of CAD and or heart disease. He recent was admit for asthma exacerbation 1/3-1/15 and left AMA on that admission. He called EMS was given multiple nebs in route and was placed on Bipap when he arrived to ER with much improvement. He was noted to have bugs and identified as [place] specie. His vitals afebrile hr 105-127 SBP 's satting 93-97%, ABG 7.31/45/163, CXR was unremarkable, viral panel negative, trop was 1245, ekg with no obvious findings to suggest STEMI, CMP and CBC nothing terrible concerning, mild hyponatremia and leukocytosis. He was able to get decontaminated and come off Bipap. His bedside echo appeared to have reduced LV but with no dilation of RV and collapse of his IVC but with mild increase respiratory work. His lactate was elevated at 3.9, d dimer elevate pending CTA chest .      Exam:Patient ill appearing unkept occasionally grunting, dry mucous membranes, lungs cta, heart tachy no murmurs, abdomen soft, ext pale cold to hands and feet, neuro intact a bit odd, but non focal on exam. Skin with picking to shins.     A/P:  Acute respiratory distress: asthma vs cardiac in etiology overall improved. Continue with stress dose steroids with hydrocortisone with his hx of pituitary insufficiency, dounebs, monitor need for Bipap/intubation    NSTEMI: concern for atypical presentation of ACS vs demand vs stress induced cardiomyopathy vs myocarditis, pe  seems unlikely when looking at his bedside echo. Cardiology consultation. Aspirin and heparin gtt until this is further delineated, serial ekg and urgent echo. Serial trop.     Pituitary insufficiency: start stress dose steroids with hydrocortisone 100mg Q8 check TSH and free T4 and replace thyroid hormones.     Lactate acidosis: concern for cardiogenic careful with fluids    Transaminase: mild serial monitor, check hepatitis panel, ? Alcohol associated.     SIRS criteria: unclear at this time, send blood cx and place on C3 until his physiology is further understood. De-escalate when appropriate.     Cold extremities: check arterial dopplers rule out PAD.      Patient remains in critical condition from acute respiratory failure and needing bipap titration of heparin gtt and serial monitoring. Critical care time provided was 49 minutes. This excludes all separate billable procedures.     Please see [facility]/NP notes for additional documentation    Stuart Bazan MD  Critical Care Medicine

## 2023-01-30 NOTE — PROGRESS NOTES
Bedside report given to Nancy LIN. Patient transferred to cardiac cath lab at this time for cardiac cath on Zoll monitor and by Nancy LIN.

## 2023-01-30 NOTE — PROGRESS NOTES
Still confused about patient hemodynamics and concerns for cardiogenic shock.     Plan placed swan ju catheter to access filling pressures and learn more about patient physiology     CVP 18 PA 36/22 not wedging, CO 4.5 CI 1.9,  will check galileo equation  Will start dobutamine at fixed amount of 5 mcg and see how he tolerates it  Will have norepinephrine on mar to keep map > 60    Updated Dr Parr of cardiology patient will first case to cath lab in am.     Patient remains in critical condition from titration of bipap, dobutamine and norepinephrine and heparin gtt. Critical care time provided was 40 additional minutes to my 49 minutes done earlier. This excludes all separate billable procedures.     Stuart Bazan MD  Critical Care Medicine

## 2023-01-30 NOTE — ED NOTES
"Med rec completed historically:     \"Med rec completed per patient  Allergies reviewed  No PO Antibiotics in the last 30 days \"  "

## 2023-01-30 NOTE — ED NOTES
Flavio from Lab called with critical result of troponin of 1245 at 0005. Critical lab result read back to Flavio.   Dr. Lentz notified of critical lab result at 0005.  Critical lab result read back by Dr. Lentz.

## 2023-01-30 NOTE — CARE PLAN
Problem: Knowledge Deficit - Standard  Goal: Patient and family/care givers will demonstrate understanding of plan of care, disease process/condition, diagnostic tests and medications  Outcome: Progressing     Problem: Skin Integrity  Goal: Skin integrity is maintained or improved  Outcome: Progressing     Problem: Fall Risk  Goal: Patient will remain free from falls  Outcome: Progressing     Problem: Pain - Standard  Goal: Alleviation of pain or a reduction in pain to the patient’s comfort goal  Outcome: Progressing   The patient is Watcher - Medium risk of patient condition declining or worsening    Shift Goals  Clinical Goals: hemodynamic stability, mobility  Patient Goals: rest, sleep  Family Goals: benigno    Progress made toward(s) clinical / shift goals:  CI at 2.7 post dobutamine infusion. Hypotension improving. Weaning off levophed.    Patient is not progressing towards the following goals:

## 2023-01-30 NOTE — ED NOTES
Pt scrubbed down and showered, no bugs noted on pt at this time. Roomed to red 2, all belongings triple bagged.

## 2023-01-30 NOTE — ED NOTES
Pt to and from CT on monitor with RT and RN, vital signs stable throughout CT. Floor aware pt is ready for transfer.

## 2023-01-30 NOTE — PROCEDURES
Cardiac Catheterization report    1/30/2023  3:05 PM    Referring MD:     Primary Care Provider: Manjula Zaidi M.D.    Indication for procedure: Cardiomyopathy    Procedures performed:   Coronary arteriograms  Left heart catheterization     Final impression:  Angiographically normal appearing coronary arteries.    Recommendations:  Guideline directed medical therapy   Cardiovascular Risk factor modification    Findings:  1.  Left main coronary artery:  Normal.  2.  Left anterior descending artery:  Normal.   3.  Left circumflex coronary artery:  Normal.   4.  Right coronary artery:  Normal.  This is a right dominant system.  5.  Left ventricular end diastolic pressure:  16 mmHg.  No signficant gradient across the aortic valve.      EBL: <10 CC    Specimens: None    Procedure details:  The risks and benefits of cardiac catheterization and possible intervention were explained to the patient including death, heart attack, stroke, and emergency surgery.  The patient verbalized understanding and wished to proceed.  The patient was brought to the cardiac catheterization laboratory in the fasting state and prepped and draped in the usual sterile fashion.  Timeout was performed.  The right wrist was locally anesthetized with lidocaine and the right radial artery was cannulated with 5/6-Japanese equipment and standard radial cocktail was given.  Coronary angiography was performed using JR 4 and JL 3.5  diagnostic catheters in the usual fashion, results mentioned below.  JR4 catheter accidentally fell into LVOT, LV pressure was obtained.    Once all the views were obtained, all wires and catheters were removed from the patient without difficulty.  A Vasc-Band was placed over the right radial artery and the radial artery sheath was removed without difficulty.      Complications:  None    Contrast: 24 cc    Sedation time:  I supervised moderate sedation over a trained independent nursing staff,  Sedation Start  time:  14:40   Sedation Stop time: 14:55      PATRICIA Villavicencio  [facility]

## 2023-01-30 NOTE — PROGRESS NOTES
Daily Progress Note:     Date of Service: 1/30/2023  Primary Team: [facility] ICU Gold Team   Attending: Romel Morrison M.D.   Senior Resident: Kyra Odonnell M.D.    Chief Complaint:   Acute on chronic respiratory failure    ID: Mr. 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 He currently is on hormone supplementation with testosterone, levothyroxine and levothyroxine and hydrocortisone. He initially came for treatment of asthma was here earlier this month for acute hypoxic respiratory failure,but left AMA on 1/23. He presented on 1/30 with acute shortness of breath and was found ot be tachycardic and hypotension. He was placed on BiPAP.   CT PE suggested cirrhosis but no PE. Labs showed leukocytosis, mild transamintitis. Lactic acidosis was present. [place] was placed in ICU.   ECHO at bedside showed cardiac output of 4.5.       Subjective:   Patient has done well overnight, he has continued on BiPAP.  Admitted for cardiogenic shock. Elevated troponin 1245, second trop 600s.   -Lactate remains elevated    -Dispo: ICU    Consultants/Specialty:  Cardiology    Review of Systems:    Review of Systems   Unable to perform ROS: Other   Constitutional:  Negative for chills and fever.   HENT: Negative.     Eyes: Negative.    Respiratory:  Positive for cough and shortness of breath.    Cardiovascular:  Positive for chest pain.   Gastrointestinal: Negative.    Difficult to perform, patient was on BiPAP    Objective:   Physical Exam:   Vitals:   Temp:  [35.7 °C (96.3 °F)-37.2 °C (99 °F)] 37.2 °C (99 °F)  Pulse:  [] 96  Resp:  [19-61] 27  BP: ()/(50-95) 119/67  SpO2:  [92 %-100 %] 94 %    Physical Exam  Constitutional:       Appearance: Normal appearance.   HENT:      Head: Normocephalic and atraumatic.      Comments: BiPAP over face  Eyes:      Extraocular Movements: Extraocular movements intact.       Pupils: Pupils are equal, round, and reactive to light.   Cardiovascular:      Rate and Rhythm: Normal rate and regular rhythm.      Pulses: Normal pulses.      Heart sounds: Normal heart sounds. No murmur heard.    No friction rub. No gallop.   Pulmonary:      Effort: No respiratory distress.      Breath sounds: No stridor. Rales present. No wheezing.   Abdominal:      General: Abdomen is flat. Bowel sounds are normal.      Tenderness: There is no right CVA tenderness or left CVA tenderness.   Musculoskeletal:      Right lower leg: No edema.      Left lower leg: No edema.   Skin:     General: Skin is warm and dry.   Neurological:      General: No focal deficit present.      Mental Status: He is alert and oriented to person, place, and time.   Psychiatric:         Mood and Affect: Mood normal.         Labs:   Recent Labs     01/29/23 2316 01/30/23  0628 01/30/23  1003   WBC 14.8* 9.6 8.8   RBC 4.78 4.31* 4.23*   HEMOGLOBIN 16.0 14.7 14.3   HEMATOCRIT 48.5 43.4 42.2   .5* 100.7* 99.8*   MCH 33.5* 34.1* 33.8*   RDW 47.9 47.4 47.6   PLATELETCT 338 228 204   MPV 9.2 9.6 9.3   NEUTSPOLYS 54.00  --   --    LYMPHOCYTES 29.80  --   --    MONOCYTES 6.60  --   --    EOSINOPHILS 8.80*  --   --    BASOPHILS 0.50  --   --      Recent Labs     01/29/23 2316 01/30/23  1003   SODIUM 131* 129*   POTASSIUM 4.3 4.1   CHLORIDE 94* 97   CO2 22 15*   GLUCOSE 112* 193*   BUN 7* 11     Recent Labs     01/29/23 2316 01/30/23  1003   ALBUMIN 4.3 3.5   TBILIRUBIN 1.2 0.6   ALKPHOSPHAT 119* 89   TOTPROTEIN 8.0 6.4   ALTSGPT 70* 48   ASTSGOT 131* 90*   CREATININE 1.09 0.93       Imaging:   US-EXTREMITY ARTERY LOWER BILAT W/SARITA (COMBO)   Final Result      US-RUQ   Final Result      1.  Borderline cardiomegaly with densely increased echogenicity consistent with hepatocellular disease or fatty liver.   2.  Cholelithiasis.   3.  Borderline enlargement of the main portal vein (greater than 13 mm) recent possibility of portal  hypertension.   4.  There is no ascites.      EC-ECHOCARDIOGRAM COMPLETE W/ CONT   Final Result      DX-CHEST-PORTABLE (1 VIEW)   Final Result         1.  No acute cardiopulmonary disease.      DX-CHEST-PORTABLE (1 VIEW)   Final Result         1.  No acute cardiopulmonary disease.      CT-CTA CHEST PULMONARY ARTERY W/ RECONS   Final Result         1.  No pulmonary embolus appreciated.   2.  Hepatomegaly with irregular hepatic contour favoring changes of cirrhosis   3.  8.0 mm nodular density in the right lung base. There is associated linear density suggesting scarring or atelectasis. See nodule follow-up recommendations below.      Fleischner Society pulmonary nodule recommendations:      Low Risk: CT at 6-12 months, then consider CT at 18-24 months      High Risk: CT at 6-12 months, then CT at 18-24 months      Low Risk - Minimal or absent history of smoking and of other known risk factors.      High Risk - History of smoking or of other known risk factors.      Note: These recommendations do not apply to lung cancer screening, patients with immunosuppression, or patients with known primary cancer.      Fleischner Society 2017 Guidelines for Management of Incidentally Detected Pulmonary Nodules in Adults         DX-CHEST-PORTABLE (1 VIEW)   Final Result         1.  No acute cardiopulmonary disease.      CL-LEFT HEART CATHETERIZATION WITH POSSIBLE INTERVENTION    (Results Pending)       Assessment and Plan:    #Nonischemic cardiomyopathy  #Elevated troponin  #Hypotension  Patient has been found to be in cardiogenic shock. Currently on vasopressors, with PA catheter for hemodynamic support. ECHO shows normal LV size with reduced ejection fraction. LVEF 20-30%. Downtrending Troponin.   -Aspirin 81 mg  -Dobutamine 1mg/1 mL infusion  -Weaned off norepi  -Heparin infusion  -left heart catheterization after off BiPAP  -Hold statin because of transaminitis    #Acute on Chronic hypoxic respiratory failure  #History of  asthma  No significant hypoxemia, will wean to nasal cannula off of bipap  -Duonebs  -RT protocol  -On BiPAP  -Duonebs    #Acute Kidney injury (resolved)  Secondary to cardiogenic shock    #Panhypopituitarism  -Stess steroids, hydrocortisone 100 mg  -Levothyroxine    #Lactic acidosis  Downtrending 2.5    #Hyperglycemia  #Type 2 Diabetes  -SSI    #Tranaminitis  Downtrending LFTs, probable improvement as a result of improved cardiogenic shock. Right upper quadrant ultra sound, showed echo dense liver with gall stones.   -Trend

## 2023-01-30 NOTE — CONSULTS
Date of Service: 1/30/2023    Date of Admission:  1/29/2023 11:14 PM    Consulting Physician: Iglesia Lentz M.D.    Chief Complaint:  Shortness of Breath      History of Present Illness: Alberto Galicia is a 67 y.o. 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  Has taken several rounds of duo nebs at home without relief.Admitted in early January 2023 presenting with shortness of breath for 2 days ,and admitted for acute hypoxic respiratory failure secondary to asthma exacerbation.  Desaturated to 88% on room air and was put on 4 L with chest x-ray and respiratory panel negative.  Treated with IV steroids and DuoNeb and notify RT protocol patient left AGAINST MEDICAL ADVICE before complete work-up and treatment was completed.  Was recommended to take guaifenesin and DuoNeb at home and adjust his fluticasone.  She was also discharged with home oxygen 5 L to go up to 6 L/min if needed.  Reports he is using home oxygen but does not know how much.  Patient reports he ran out of his inhalers and medications several weeks ago.    Oj, given 0.3 mg of epinephrine, Solu-Medrol and magnesium infusion in route to [facility].  Upon arrival to [facility] the patient was on a nonrebreather and switched to BiPAP ,and was afebrile tachycardic 120s to 130s and tachypneic into the 40s initially with blood pressure 150s/90's and down trended.  Given 3 g of Unasyn, Zithromax 500 mg and 2.859 L of IV fluids (sepsis bolus).    WBC 14.8, sodium 131, glucose 112, creatinine normal, lactate 3.9, D-dimer 0.7, troponin 1245, ABG; 7.319/45 point 8/163/20 3.6/-3, positive parasite culture (Cimex species) with blood  and urine cultures drawn, procalcitonin 0.41.    EKG; this tachycardia rate 115 with left anterior fascicular block and poor R wave progression with prolonged QTC of 493    Chest x-ray; unremarkable    CT PE; pending        Review of Systems   Constitutional:  Positive for malaise/fatigue. Negative for chills and fever.   Respiratory:  Positive for cough and shortness of breath. Negative for hemoptysis, sputum production and wheezing.    Cardiovascular:  Negative for chest pain and palpitations.   Gastrointestinal:  Negative for abdominal pain, constipation, diarrhea, nausea and vomiting.   Neurological:  Positive for weakness. Negative for dizziness and headaches.   Psychiatric/Behavioral:  Negative for substance abuse.      Home Medications       Reviewed by Mariola Srinivasan (Pharmacy Tech) on 01/30/23 at 0054  Med List Status: Complete     Medication Last Dose Status   albuterol 108 (90 Base) MCG/ACT Aero Soln inhalation aerosol UNKN Active   atorvastatin (LIPITOR) 40 MG Tab UNKN Active   calcium carbonate (TUMS) 500 MG Chew Tab UNKN Active   fexofenadine (ALLEGRA) 60 MG Tab UNKN Active   fluticasone (FLOVENT HFA) 110 MCG/ACT Aerosol UNKN Active   hydrocortisone (CORTEF) 10 MG Tab UNKN Active   ipratropium-albuterol (DUONEB) 0.5-2.5 (3) MG/3ML nebulizer solution UNKN Active   levothyroxine (SYNTHROID) 175 MCG Tab UNKN Active   melatonin 5 mg Tab UNKN Active   testosterone (TESTIM/ANDROGEL) 50 MG/5GM (1%) Gel gel UNKN Active   therapeutic multivitamin-minerals (THERAGRAN-M) Tab UNKN Active                    Social History     Tobacco Use    Smoking status: Never    Smokeless tobacco: Never   Vaping Use    Vaping Use: Never used   Substance Use Topics    Alcohol use: Yes     Alcohol/week: 12.6 oz     Types: 21 Shots of liquor per week     Comment: daily tumbler of vodka with OJ    Drug use: No        Past Medical History:   Diagnosis Date    Asthma     Hyperlipidemia     Pituitary disease (HCC)     Thyroid  "disease        Past Surgical History:   Procedure Laterality Date    CRANIOTOMY         Allergies: Pcn [penicillins]    Family History   Problem Relation Age of Onset    Diabetes Mother     Heart Disease Father     Diabetes Brother     Alzheimer's Disease Maternal Grandmother     Cancer Maternal Grandfather     Heart Disease Paternal Grandmother     Cancer Paternal Grandfather        Vitals:    01/29/23 2316 01/29/23 2330 01/29/23 2340 01/29/23 2345   Height: 1.93 m (6' 4\")      Weight: 95.3 kg (210 lb)      Weight % change since last entry.: 0 %      BP: (!) 152/95 110/70 110/70 92/59   Pulse: (!) 137 (!) 133 (!) 128 (!) 127   BMI (Calculated): 25.56      Resp: (!) 41 (!) 44 (!) 43 (!) 44   Temp: 35.9 °C (96.6 °F)      TempSrc: Temporal       01/30/23 0000 01/30/23 0125   Height:     Weight:     Weight % change since last entry.:     BP: 92/50    Pulse: (!) 126 (!) 111   BMI (Calculated):     Resp: (!) 47 (!) 31   Temp:     TempSrc:         Physical Exam  Vitals and nursing note reviewed.   Constitutional:       General: He is not in acute distress.     Appearance: He is ill-appearing. He is not toxic-appearing.   HENT:      Head: Normocephalic and atraumatic.      Mouth/Throat:      Mouth: Mucous membranes are dry.   Eyes:      Conjunctiva/sclera: Conjunctivae normal.   Cardiovascular:      Rate and Rhythm: Regular rhythm. Tachycardia present.      Heart sounds: No murmur heard.    No friction rub. No gallop.   Pulmonary:      Effort: No respiratory distress.      Breath sounds: No stridor. Wheezing and rhonchi present. No rales.   Abdominal:      General: Bowel sounds are normal. There is no distension.      Palpations: Abdomen is soft.      Tenderness: There is no abdominal tenderness.   Musculoskeletal:      Cervical back: Neck supple. No tenderness.      Right lower leg: No edema.      Left lower leg: No edema.   Skin:     General: Skin is dry.      Findings: Lesion present. No erythema or rash.      Comments: " Fuhs bilateral upper and lower extremity erythematous papules that are open without discharge and and with excoriation but no tenderness appreciated.  Not warm to touch no induration or fluctuance appreciated.   Neurological:      General: No focal deficit present.      Mental Status: He is oriented to person, place, and time.      Motor: Weakness present.   Psychiatric:         Mood and Affect: Mood is anxious.         Behavior: Behavior is cooperative.         No intake or output data in the 24 hours ending 01/30/23 0050    Recent Labs     01/29/23  2316   WBC 14.8*   NEUTSPOLYS 54.00   LYMPHOCYTES 29.80   MONOCYTES 6.60   EOSINOPHILS 8.80*   BASOPHILS 0.50   ASTSGOT 131*   ALTSGPT 70*   ALKPHOSPHAT 119*   TBILIRUBIN 1.2     Recent Labs     01/29/23  2316   SODIUM 131*   POTASSIUM 4.3   CHLORIDE 94*   CO2 22   BUN 7*   CREATININE 1.09   CALCIUM 9.6     Recent Labs     01/29/23  2316   ALTSGPT 70*   ASTSGOT 131*   ALKPHOSPHAT 119*   TBILIRUBIN 1.2   GLUCOSE 112*     Recent Labs     01/29/23  2328   ISTATAPH 7.319*   ISTATAPCO2 45.8*   ISTATAPO2 163*   ISTATATCO2 25   [id number] 99   ISTATARTHCO3 23.6   ISTATARTBE -3   ISTATTEMP see below   ISTATFIO2 50   ISTATSPEC Arterial     DX-CHEST-PORTABLE (1 VIEW)   Final Result         1.  No acute cardiopulmonary disease.      CT-CTA CHEST PULMONARY ARTERY W/ RECONS    (Results Pending)       Patient Active Problem List   Diagnosis    Secondary hypothyroidism    Hyperlipidemia    Pituitary adenoma (HCC)    Pollen allergies    Mild intermittent asthma without complication    Elevated liver function tests    Secondary male hypogonadism    Secondary adrenal insufficiency (HCC)    Meningioma (HCC)    Panhypopituitarism (HCC)    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HCC)    Macrocytosis    Seasonal allergies    Moderate persistent asthma without complication    Elevated liver enzymes    Acute on chronic respiratory failure with hypoxia and hypercapnia (HCC)    Asthma with exacerbation     Sepsis (HCC)    Elevated troponin    Elevated d-dimer    Elevated lactic acid level    Infestation by bed bug       Assessment and Plan:    #Acute on chronic hypoxic and hypercapnic respiratory failure  #Asthma exacerbation  #Sepsis: 3/4 SIRS criteria  #Elevated troponin, suspect demand ischemia  #Elevated D-dimer  #Hyperglycemia  #Elevated liver enzymes  #Elevated lactic acid  #Panhypopituitarism  -Admit to the ICU  -Concern for stress cardiomyopathy versus viral myocarditis given bedside ultrasound with Dr. Bazan demonstrated reduced left ventricular ejection fraction.  We will start on heparin drip, aspirin and consider cardiology consult in the a.m.  -Follow-up CT PE for concern for pulmonary embolism but given bedside ultrasound did not demonstrate right ventricular dysfunction or heart strain do not suspect PE.  -Recommend IV steroids and duo nebs scheduled and albuterol as needed  - RT protocol  -start C3, elevated procalcitonin  -stress dose steroids: Hydrocortisone 100 mg IV every 8 hours  -Trend lactate and troponin and get a BNP  -consider echocardiogram  -Bedbugs patient was treated in the emergency with heat treatment for infestation. If having severe itching consider topical corticosteroids  -Continue BiPAP at this time  -Goal Saturation >88%  -consider repeat ABG if respiratory status worsens  -CXR PRN: monitor lung volumes and tube/line placement  -Head of bed > 30 degree  -Continue home Synthroid, testosterone, atorvastatin and Flovent once stabilized            Alejandro Rodriguez Jr., M.D.  [facility] PGY3    Critical Care Time not including procedures: 45 minutes

## 2023-01-30 NOTE — PROCEDURES
Central Line Insertion    Date/Time: 1/30/2023 5:27 AM  Performed by: Stuart Bazan M.D.  Authorized by: Stuart Bazan M.D.     Consent:     Consent obtained:  Verbal    Consent given by:  Patient    Risks discussed:  Arterial puncture, bleeding, infection, pneumothorax and incorrect placement    Alternatives discussed:  No treatment  Pre-procedure details:     Hand hygiene: Hand hygiene performed prior to insertion      Skin preparation:  2% chlorhexidine  Sedation:     Sedation type:  None  Anesthesia:     Anesthesia method:  Local infiltration    Local anesthetic:  Lidocaine 1% w/o epi  Procedure details:     Location:  R internal jugular    Patient position:  Reverse Trendelenburg    Procedural supplies:  Triple lumen (MAC catheter)    Catheter size:  9 Fr    Landmarks identified: yes      Ultrasound guidance: yes      Sterile ultrasound techniques: Sterile gel and sterile probe covers were used      Number of attempts:  1    Successful placement: yes    Post-procedure details:     Post-procedure:  Dressing applied    Guidewire: guidewire removal confirmed      Assessment:  Blood return through all ports, free fluid flow and no pneumothorax on x-ray    Patient tolerance of procedure:  Tolerated well, no immediate complications  Comments:      US guided right IJ central line for swan catheter placement

## 2023-01-31 PROBLEM — I24.89 DEMAND ISCHEMIA (HCC): Status: ACTIVE | Noted: 2023-01-31

## 2023-01-31 LAB
ALBUMIN SERPL BCP-MCNC: 3.3 G/DL (ref 3.2–4.9)
ALBUMIN SERPL BCP-MCNC: 3.4 G/DL (ref 3.2–4.9)
ALBUMIN SERPL BCP-MCNC: 3.5 G/DL (ref 3.2–4.9)
ALBUMIN SERPL BCP-MCNC: 3.7 G/DL (ref 3.2–4.9)
ALP SERPL-CCNC: 78 U/L (ref 30–99)
ALP SERPL-CCNC: 79 U/L (ref 30–99)
ALP SERPL-CCNC: 82 U/L (ref 30–99)
ALP SERPL-CCNC: 84 U/L (ref 30–99)
ALT SERPL-CCNC: 38 U/L (ref 2–50)
ALT SERPL-CCNC: 41 U/L (ref 2–50)
ALT SERPL-CCNC: 42 U/L (ref 2–50)
ALT SERPL-CCNC: 43 U/L (ref 2–50)
ANION GAP SERPL CALC-SCNC: 10 MMOL/L (ref 7–16)
ANION GAP SERPL CALC-SCNC: 10 MMOL/L (ref 7–16)
ANION GAP SERPL CALC-SCNC: 11 MMOL/L (ref 7–16)
ANION GAP SERPL CALC-SCNC: 16 MMOL/L (ref 7–16)
AST SERPL-CCNC: 57 U/L (ref 12–45)
AST SERPL-CCNC: 58 U/L (ref 12–45)
AST SERPL-CCNC: 62 U/L (ref 12–45)
AST SERPL-CCNC: 72 U/L (ref 12–45)
BACTERIA BLD CULT: ABNORMAL
BACTERIA BLD CULT: ABNORMAL
BASE EXCESS BLDA CALC-SCNC: -3 MMOL/L (ref -4–3)
BASE EXCESS BLDV CALC-SCNC: -4 MMOL/L (ref -4–3)
BILIRUB CONJ SERPL-MCNC: <0.2 MG/DL (ref 0.1–0.5)
BILIRUB INDIRECT SERPL-MCNC: ABNORMAL MG/DL (ref 0–1)
BILIRUB SERPL-MCNC: 0.3 MG/DL (ref 0.1–1.5)
BILIRUB SERPL-MCNC: 0.3 MG/DL (ref 0.1–1.5)
BILIRUB SERPL-MCNC: 0.4 MG/DL (ref 0.1–1.5)
BILIRUB SERPL-MCNC: 0.4 MG/DL (ref 0.1–1.5)
BODY TEMPERATURE: ABNORMAL DEGREES
BODY TEMPERATURE: ABNORMAL DEGREES
BUN SERPL-MCNC: 13 MG/DL (ref 8–22)
BUN SERPL-MCNC: 14 MG/DL (ref 8–22)
CALCIUM SERPL-MCNC: 8 MG/DL (ref 8.5–10.5)
CALCIUM SERPL-MCNC: 8.1 MG/DL (ref 8.5–10.5)
CALCIUM SERPL-MCNC: 8.3 MG/DL (ref 8.5–10.5)
CALCIUM SERPL-MCNC: 8.3 MG/DL (ref 8.5–10.5)
CHLORIDE SERPL-SCNC: 101 MMOL/L (ref 96–112)
CHLORIDE SERPL-SCNC: 102 MMOL/L (ref 96–112)
CHLORIDE SERPL-SCNC: 103 MMOL/L (ref 96–112)
CHLORIDE SERPL-SCNC: 104 MMOL/L (ref 96–112)
CHOLEST SERPL-MCNC: 151 MG/DL (ref 100–199)
CO2 BLDA-SCNC: 22 MMOL/L (ref 20–33)
CO2 BLDV-SCNC: 22 MMOL/L (ref 20–33)
CO2 SERPL-SCNC: 19 MMOL/L (ref 20–33)
CO2 SERPL-SCNC: 20 MMOL/L (ref 20–33)
CO2 SERPL-SCNC: 20 MMOL/L (ref 20–33)
CO2 SERPL-SCNC: 22 MMOL/L (ref 20–33)
CREAT SERPL-MCNC: 0.73 MG/DL (ref 0.5–1.4)
CREAT SERPL-MCNC: 0.75 MG/DL (ref 0.5–1.4)
CREAT SERPL-MCNC: 0.77 MG/DL (ref 0.5–1.4)
CREAT SERPL-MCNC: 0.78 MG/DL (ref 0.5–1.4)
DELSYS IDSYS: ABNORMAL
ERYTHROCYTE [DISTWIDTH] IN BLOOD BY AUTOMATED COUNT: 48.5 FL (ref 35.9–50)
GFR SERPLBLD CREATININE-BSD FMLA CKD-EPI: 97 ML/MIN/1.73 M 2
GFR SERPLBLD CREATININE-BSD FMLA CKD-EPI: 97 ML/MIN/1.73 M 2
GFR SERPLBLD CREATININE-BSD FMLA CKD-EPI: 98 ML/MIN/1.73 M 2
GFR SERPLBLD CREATININE-BSD FMLA CKD-EPI: 99 ML/MIN/1.73 M 2
GLUCOSE BLD STRIP.AUTO-MCNC: 158 MG/DL (ref 65–99)
GLUCOSE BLD STRIP.AUTO-MCNC: 162 MG/DL (ref 65–99)
GLUCOSE BLD STRIP.AUTO-MCNC: 176 MG/DL (ref 65–99)
GLUCOSE BLD STRIP.AUTO-MCNC: 177 MG/DL (ref 65–99)
GLUCOSE SERPL-MCNC: 164 MG/DL (ref 65–99)
GLUCOSE SERPL-MCNC: 169 MG/DL (ref 65–99)
GLUCOSE SERPL-MCNC: 185 MG/DL (ref 65–99)
GLUCOSE SERPL-MCNC: 187 MG/DL (ref 65–99)
HCO3 BLDA-SCNC: 20.7 MMOL/L (ref 17–25)
HCO3 BLDV-SCNC: 21 MMOL/L (ref 24–28)
HCT VFR BLD AUTO: 38.3 % (ref 42–52)
HDLC SERPL-MCNC: 49 MG/DL
HGB BLD-MCNC: 12.9 G/DL (ref 14–18)
HOROWITZ INDEX BLDA+IHG-RTO: 380 MM[HG]
LDLC SERPL CALC-MCNC: 88 MG/DL
MAGNESIUM SERPL-MCNC: 1.8 MG/DL (ref 1.5–2.5)
MCH RBC QN AUTO: 33.8 PG (ref 27–33)
MCHC RBC AUTO-ENTMCNC: 33.7 G/DL (ref 33.7–35.3)
MCV RBC AUTO: 100.3 FL (ref 81.4–97.8)
O2/TOTAL GAS SETTING VFR VENT: 30 %
PCO2 BLDA: 33.8 MMHG (ref 26–37)
PCO2 BLDV: 35.7 MMHG (ref 41–51)
PCO2 TEMP ADJ BLDA: 32.4 MMHG (ref 26–37)
PCO2 TEMP ADJ BLDV: 34.4 MMHG (ref 41–51)
PH BLDA: 7.4 [PH] (ref 7.4–7.5)
PH BLDV: 7.38 [PH] (ref 7.31–7.45)
PH TEMP ADJ BLDA: 7.41 [PH] (ref 7.4–7.5)
PH TEMP ADJ BLDV: 7.39 [PH] (ref 7.31–7.45)
PHOSPHATE SERPL-MCNC: 1.8 MG/DL (ref 2.5–4.5)
PLATELET # BLD AUTO: 203 K/UL (ref 164–446)
PMV BLD AUTO: 9.7 FL (ref 9–12.9)
PO2 BLDA: 114 MMHG (ref 64–87)
PO2 BLDV: 53 MMHG (ref 25–40)
PO2 TEMP ADJ BLDA: 108 MMHG (ref 64–87)
PO2 TEMP ADJ BLDV: 50 MMHG (ref 25–40)
POTASSIUM SERPL-SCNC: 3.6 MMOL/L (ref 3.6–5.5)
POTASSIUM SERPL-SCNC: 3.7 MMOL/L (ref 3.6–5.5)
POTASSIUM SERPL-SCNC: 3.8 MMOL/L (ref 3.6–5.5)
POTASSIUM SERPL-SCNC: 3.9 MMOL/L (ref 3.6–5.5)
PROT SERPL-MCNC: 6.3 G/DL (ref 6–8.2)
PROT SERPL-MCNC: 6.4 G/DL (ref 6–8.2)
PROT SERPL-MCNC: 6.4 G/DL (ref 6–8.2)
PROT SERPL-MCNC: 6.6 G/DL (ref 6–8.2)
RBC # BLD AUTO: 3.82 M/UL (ref 4.7–6.1)
SAO2 % BLDA: 99 % (ref 93–99)
SAO2 % BLDV: 86 %
SIGNIFICANT IND 70042: ABNORMAL
SITE SITE: ABNORMAL
SODIUM SERPL-SCNC: 134 MMOL/L (ref 135–145)
SODIUM SERPL-SCNC: 136 MMOL/L (ref 135–145)
SOURCE SOURCE: ABNORMAL
SPECIMEN DRAWN FROM PATIENT: ABNORMAL
SPECIMEN DRAWN FROM PATIENT: ABNORMAL
TRIGL SERPL-MCNC: 69 MG/DL (ref 0–149)
UFH PPP CHRO-ACNC: 0.25 IU/ML
UFH PPP CHRO-ACNC: 0.28 IU/ML
UFH PPP CHRO-ACNC: 0.5 IU/ML
WBC # BLD AUTO: 18.1 K/UL (ref 4.8–10.8)

## 2023-01-31 PROCEDURE — 700102 HCHG RX REV CODE 250 W/ 637 OVERRIDE(OP): Performed by: STUDENT IN AN ORGANIZED HEALTH CARE EDUCATION/TRAINING PROGRAM

## 2023-01-31 PROCEDURE — 80061 LIPID PANEL: CPT

## 2023-01-31 PROCEDURE — 96375 TX/PRO/DX INJ NEW DRUG ADDON: CPT

## 2023-01-31 PROCEDURE — 94660 CPAP INITIATION&MGMT: CPT

## 2023-01-31 PROCEDURE — 99291 CRITICAL CARE FIRST HOUR: CPT | Performed by: EMERGENCY MEDICINE

## 2023-01-31 PROCEDURE — 700105 HCHG RX REV CODE 258: Performed by: EMERGENCY MEDICINE

## 2023-01-31 PROCEDURE — 96365 THER/PROPH/DIAG IV INF INIT: CPT

## 2023-01-31 PROCEDURE — 84100 ASSAY OF PHOSPHORUS: CPT

## 2023-01-31 PROCEDURE — 83735 ASSAY OF MAGNESIUM: CPT

## 2023-01-31 PROCEDURE — 700101 HCHG RX REV CODE 250: Performed by: EMERGENCY MEDICINE

## 2023-01-31 PROCEDURE — 700111 HCHG RX REV CODE 636 W/ 250 OVERRIDE (IP): Performed by: STUDENT IN AN ORGANIZED HEALTH CARE EDUCATION/TRAINING PROGRAM

## 2023-01-31 PROCEDURE — 94640 AIRWAY INHALATION TREATMENT: CPT

## 2023-01-31 PROCEDURE — 82803 BLOOD GASES ANY COMBINATION: CPT | Mod: 91

## 2023-01-31 PROCEDURE — 99233 SBSQ HOSP IP/OBS HIGH 50: CPT | Mod: FS | Performed by: INTERNAL MEDICINE

## 2023-01-31 PROCEDURE — 36415 COLL VENOUS BLD VENIPUNCTURE: CPT

## 2023-01-31 PROCEDURE — 82962 GLUCOSE BLOOD TEST: CPT | Mod: 91

## 2023-01-31 PROCEDURE — A9270 NON-COVERED ITEM OR SERVICE: HCPCS | Performed by: PHYSICIAN ASSISTANT

## 2023-01-31 PROCEDURE — A9270 NON-COVERED ITEM OR SERVICE: HCPCS | Performed by: EMERGENCY MEDICINE

## 2023-01-31 PROCEDURE — 700102 HCHG RX REV CODE 250 W/ 637 OVERRIDE(OP): Performed by: PHYSICIAN ASSISTANT

## 2023-01-31 PROCEDURE — 80048 BASIC METABOLIC PNL TOTAL CA: CPT | Mod: 91

## 2023-01-31 PROCEDURE — 700105 HCHG RX REV CODE 258: Performed by: INTERNAL MEDICINE

## 2023-01-31 PROCEDURE — 770022 HCHG ROOM/CARE - ICU (200)

## 2023-01-31 PROCEDURE — 85520 HEPARIN ASSAY: CPT

## 2023-01-31 PROCEDURE — 700102 HCHG RX REV CODE 250 W/ 637 OVERRIDE(OP): Performed by: EMERGENCY MEDICINE

## 2023-01-31 PROCEDURE — 96368 THER/DIAG CONCURRENT INF: CPT

## 2023-01-31 PROCEDURE — 85027 COMPLETE CBC AUTOMATED: CPT

## 2023-01-31 PROCEDURE — 80076 HEPATIC FUNCTION PANEL: CPT | Mod: 91

## 2023-01-31 PROCEDURE — 700105 HCHG RX REV CODE 258: Performed by: STUDENT IN AN ORGANIZED HEALTH CARE EDUCATION/TRAINING PROGRAM

## 2023-01-31 PROCEDURE — 700101 HCHG RX REV CODE 250: Performed by: INTERNAL MEDICINE

## 2023-01-31 PROCEDURE — 700111 HCHG RX REV CODE 636 W/ 250 OVERRIDE (IP): Performed by: EMERGENCY MEDICINE

## 2023-01-31 PROCEDURE — 700111 HCHG RX REV CODE 636 W/ 250 OVERRIDE (IP): Performed by: INTERNAL MEDICINE

## 2023-01-31 PROCEDURE — HZ2ZZZZ DETOXIFICATION SERVICES FOR SUBSTANCE ABUSE TREATMENT: ICD-10-PCS | Performed by: EMERGENCY MEDICINE

## 2023-01-31 PROCEDURE — A9270 NON-COVERED ITEM OR SERVICE: HCPCS | Performed by: STUDENT IN AN ORGANIZED HEALTH CARE EDUCATION/TRAINING PROGRAM

## 2023-01-31 PROCEDURE — 37799 UNLISTED PX VASCULAR SURGERY: CPT

## 2023-01-31 RX ORDER — MAGNESIUM SULFATE HEPTAHYDRATE 40 MG/ML
2 INJECTION, SOLUTION INTRAVENOUS ONCE
Status: COMPLETED | OUTPATIENT
Start: 2023-01-31 | End: 2023-01-31

## 2023-01-31 RX ORDER — HEPARIN SODIUM 5000 [USP'U]/100ML
0-30 INJECTION, SOLUTION INTRAVENOUS CONTINUOUS
Status: DISCONTINUED | OUTPATIENT
Start: 2023-01-31 | End: 2023-01-31

## 2023-01-31 RX ORDER — BUDESONIDE AND FORMOTEROL FUMARATE DIHYDRATE 160; 4.5 UG/1; UG/1
2 AEROSOL RESPIRATORY (INHALATION)
Status: DISCONTINUED | OUTPATIENT
Start: 2023-01-31 | End: 2023-02-01

## 2023-01-31 RX ORDER — DIAZEPAM 5 MG/1
10 TABLET ORAL ONCE
Status: COMPLETED | OUTPATIENT
Start: 2023-01-31 | End: 2023-01-31

## 2023-01-31 RX ORDER — IPRATROPIUM BROMIDE AND ALBUTEROL SULFATE 2.5; .5 MG/3ML; MG/3ML
3 SOLUTION RESPIRATORY (INHALATION)
Status: DISCONTINUED | OUTPATIENT
Start: 2023-01-31 | End: 2023-02-04 | Stop reason: HOSPADM

## 2023-01-31 RX ORDER — GAUZE BANDAGE 2" X 2"
100 BANDAGE TOPICAL DAILY
Status: COMPLETED | OUTPATIENT
Start: 2023-02-01 | End: 2023-02-01

## 2023-01-31 RX ORDER — METHYLPREDNISOLONE SODIUM SUCCINATE 125 MG/2ML
62.5 INJECTION, POWDER, LYOPHILIZED, FOR SOLUTION INTRAMUSCULAR; INTRAVENOUS EVERY 6 HOURS
Status: DISCONTINUED | OUTPATIENT
Start: 2023-01-31 | End: 2023-02-01

## 2023-01-31 RX ADMIN — DIAZEPAM 10 MG: 5 TABLET ORAL at 13:52

## 2023-01-31 RX ADMIN — LEVOTHYROXINE SODIUM 175 MCG: 0.17 TABLET ORAL at 04:06

## 2023-01-31 RX ADMIN — IPRATROPIUM BROMIDE AND ALBUTEROL SULFATE 3 ML: 2.5; .5 SOLUTION RESPIRATORY (INHALATION) at 14:11

## 2023-01-31 RX ADMIN — APIXABAN 10 MG: 5 TABLET, FILM COATED ORAL at 17:16

## 2023-01-31 RX ADMIN — IPRATROPIUM BROMIDE AND ALBUTEROL SULFATE 3 ML: 2.5; .5 SOLUTION RESPIRATORY (INHALATION) at 22:38

## 2023-01-31 RX ADMIN — BUDESONIDE AND FORMOTEROL FUMARATE DIHYDRATE 2 PUFF: 160; 4.5 AEROSOL RESPIRATORY (INHALATION) at 14:17

## 2023-01-31 RX ADMIN — INSULIN HUMAN 1 UNITS: 100 INJECTION, SOLUTION PARENTERAL at 06:00

## 2023-01-31 RX ADMIN — THERA TABS 1 TABLET: TAB at 06:00

## 2023-01-31 RX ADMIN — METHYLPREDNISOLONE SODIUM SUCCINATE 62.5 MG: 125 INJECTION, POWDER, FOR SOLUTION INTRAMUSCULAR; INTRAVENOUS at 17:08

## 2023-01-31 RX ADMIN — INSULIN HUMAN 1 UNITS: 100 INJECTION, SOLUTION PARENTERAL at 12:57

## 2023-01-31 RX ADMIN — THIAMINE HYDROCHLORIDE 100 MG: 100 INJECTION, SOLUTION INTRAMUSCULAR; INTRAVENOUS at 04:08

## 2023-01-31 RX ADMIN — METHYLPREDNISOLONE SODIUM SUCCINATE 62.5 MG: 125 INJECTION, POWDER, FOR SOLUTION INTRAMUSCULAR; INTRAVENOUS at 23:50

## 2023-01-31 RX ADMIN — POTASSIUM PHOSPHATE, MONOBASIC AND POTASSIUM PHOSPHATE, DIBASIC 30 MMOL: 224; 236 INJECTION, SOLUTION, CONCENTRATE INTRAVENOUS at 10:26

## 2023-01-31 RX ADMIN — SENNOSIDES AND DOCUSATE SODIUM 2 TABLET: 50; 8.6 TABLET ORAL at 17:08

## 2023-01-31 RX ADMIN — IPRATROPIUM BROMIDE AND ALBUTEROL SULFATE 3 ML: 2.5; .5 SOLUTION RESPIRATORY (INHALATION) at 11:28

## 2023-01-31 RX ADMIN — INSULIN HUMAN 1 UNITS: 100 INJECTION, SOLUTION PARENTERAL at 00:00

## 2023-01-31 RX ADMIN — HEPARIN SODIUM 12.3 UNITS/KG/HR: 1000 INJECTION, SOLUTION INTRAVENOUS; SUBCUTANEOUS at 04:24

## 2023-01-31 RX ADMIN — BUDESONIDE AND FORMOTEROL FUMARATE DIHYDRATE 2 PUFF: 160; 4.5 AEROSOL RESPIRATORY (INHALATION) at 19:39

## 2023-01-31 RX ADMIN — HYDROCORTISONE SODIUM SUCCINATE 100 MG: 100 INJECTION, POWDER, FOR SOLUTION INTRAMUSCULAR; INTRAVENOUS at 04:05

## 2023-01-31 RX ADMIN — METHYLPREDNISOLONE SODIUM SUCCINATE 62.5 MG: 125 INJECTION, POWDER, FOR SOLUTION INTRAMUSCULAR; INTRAVENOUS at 12:57

## 2023-01-31 RX ADMIN — HEPARIN SODIUM 14.3 UNITS/KG/HR: 1000 INJECTION, SOLUTION INTRAVENOUS; SUBCUTANEOUS at 14:03

## 2023-01-31 RX ADMIN — IPRATROPIUM BROMIDE AND ALBUTEROL SULFATE 3 ML: 2.5; .5 SOLUTION RESPIRATORY (INHALATION) at 02:37

## 2023-01-31 RX ADMIN — DOBUTAMINE HYDROCHLORIDE 5 MCG/KG/MIN: 100 INJECTION INTRAVENOUS at 09:57

## 2023-01-31 RX ADMIN — INSULIN HUMAN 1 UNITS: 100 INJECTION, SOLUTION PARENTERAL at 17:08

## 2023-01-31 RX ADMIN — INSULIN HUMAN 1 UNITS: 100 INJECTION, SOLUTION PARENTERAL at 23:56

## 2023-01-31 RX ADMIN — IPRATROPIUM BROMIDE AND ALBUTEROL SULFATE 3 ML: 2.5; .5 SOLUTION RESPIRATORY (INHALATION) at 19:37

## 2023-01-31 RX ADMIN — SENNOSIDES AND DOCUSATE SODIUM 2 TABLET: 50; 8.6 TABLET ORAL at 04:06

## 2023-01-31 RX ADMIN — HEPARIN SODIUM 2000 UNITS: 1000 INJECTION, SOLUTION INTRAVENOUS; SUBCUTANEOUS at 08:32

## 2023-01-31 RX ADMIN — DOBUTAMINE HYDROCHLORIDE 5 MCG/KG/MIN: 100 INJECTION INTRAVENOUS at 00:17

## 2023-01-31 RX ADMIN — MAGNESIUM SULFATE HEPTAHYDRATE 2 G: 40 INJECTION, SOLUTION INTRAVENOUS at 10:20

## 2023-01-31 RX ADMIN — IPRATROPIUM BROMIDE AND ALBUTEROL SULFATE 3 ML: 2.5; .5 SOLUTION RESPIRATORY (INHALATION) at 08:33

## 2023-01-31 ASSESSMENT — ENCOUNTER SYMPTOMS
ORTHOPNEA: 0
DIZZINESS: 0
SHORTNESS OF BREATH: 1

## 2023-01-31 ASSESSMENT — PAIN DESCRIPTION - PAIN TYPE
TYPE: ACUTE PAIN
TYPE: CHRONIC PAIN
TYPE: ACUTE PAIN

## 2023-01-31 ASSESSMENT — PULMONARY FUNCTION TESTS: EPAP_CMH2O: 10

## 2023-01-31 ASSESSMENT — PATIENT HEALTH QUESTIONNAIRE - PHQ9
2. FEELING DOWN, DEPRESSED, IRRITABLE, OR HOPELESS: NOT AT ALL
SUM OF ALL RESPONSES TO PHQ9 QUESTIONS 1 AND 2: 0
1. LITTLE INTEREST OR PLEASURE IN DOING THINGS: NOT AT ALL

## 2023-01-31 ASSESSMENT — FIBROSIS 4 INDEX: FIB4 SCORE: 2.95

## 2023-01-31 NOTE — PROGRESS NOTES
Cardiology SAGAR Contreras rounded on patient this AM, ordered to run [place] numbers @ 1000 instead of 0800 due to recent Levophed titration. Cardiology MD then rounded and ordered to titrate Dobutamine to 4 and run [place] numbers @1030 instead.     Cardiology MD rounded again at 1030 and decreased Dobutamine drip to 2.5. Per kymberly Jean to remove PA catheter at this time.

## 2023-01-31 NOTE — CARE PLAN
Problem: Knowledge Deficit - Standard  Goal: Patient and family/care givers will demonstrate understanding of plan of care, disease process/condition, diagnostic tests and medications  Outcome: Progressing     Problem: Skin Integrity  Goal: Skin integrity is maintained or improved  Outcome: Progressing     Problem: Fall Risk  Goal: Patient will remain free from falls  Outcome: Progressing     Problem: Pain - Standard  Goal: Alleviation of pain or a reduction in pain to the patient’s comfort goal  Outcome: Progressing     Problem: Optimal Care for Alcohol Withdrawal  Goal: Optimal Care for the alcohol withdrawal patient  Outcome: Progressing     Problem: Seizure Precautions  Goal: Implementation of seizure precautions  Outcome: Progressing     Problem: Lifestyle Changes  Goal: Patient's ability to identify lifestyle changes and available resources to help reduce recurrence of condition will improve  Outcome: Progressing     Problem: Psychosocial  Goal: Patient's level of anxiety will decrease  Outcome: Progressing  Goal: Spiritual and cultural needs incorporated into hospitalization  Outcome: Progressing     Problem: Risk for Aspiration  Goal: Patient's risk for aspiration will be absent or decrease  Outcome: Progressing   The patient is Watcher - Medium risk of patient condition declining or worsening    Shift Goals  Clinical Goals: hemodynamic stability, monitor O2  Patient Goals: sleep  Family Goals: GRISEL    Progress made toward(s) clinical / shift goals:  [place] in place, monitor hemodynamics, maintain O2 sats above 90%.     Patient is not progressing towards the following goals:

## 2023-01-31 NOTE — PROGRESS NOTES
Patient arrives from cardiac cath lab. Report received from cardiac cath lab DELIA Cruz. Right groin sheath site clean, dry, and soft. Dressing dry and intact.

## 2023-01-31 NOTE — PROGRESS NOTES
[facility] Progress Note      Admit Date: 1/29/2023    Resident(s): Bj Payne M.D.   Attending:  RUBEN ZAVALA/ Dr. Morrison    Patient ID:    Name:  Alberto Galicia   YOB: 1955  Age:  67 y.o.  male   MRN:  [id number]    Hospital Course:  Alberto Galicia is our 67 year old male with PMH of panhypopituitarism after pituitary surgery for macroadenoma and radiation in the 1970s (now on hormone supplementation with testosterone, levothyroxine, hydrocortisone), asthma who presented on 1/20 with acute SOB and found to be tachycardic/hypotensive. Initially placed on BiPAP.    CT PE suggested cirrhosis but no PE. Labs showed leukocytosis, mild transamintitis. Lactic acidosis was present. Cameron was placed in ICU. ECHO at bedside showed cardiac output of 4.5; now on dobutamine - weaning    Left Heart Cath normal    Consultants:  Critical Care  Caridology    Interval Events:  No acute overnight events  [place] 850cc UOP overnight  AAOx4    Vitals Range last 24h:  Temp:  [36.4 °C (97.6 °F)-37.2 °C (99 °F)] 36.4 °C (97.6 °F)  Pulse:  [] 92  Resp:  [16-46] 19  BP: ()/(53-95) 118/66  SpO2:  [91 %-100 %] 99 %      Intake/Output Summary (Last 24 hours) at 1/31/2023 1100  Last data filed at 1/31/2023 1017  Gross per 24 hour   Intake 1620.36 ml   Output 1360 ml   Net 260.36 ml        ROS   Constitutional:  Negative for chills and fever.   HENT: Negative.     Eyes: Negative.    Respiratory:  Positive for cough and shortness of breath.    Cardiovascular:  Positive for chest pain.   Gastrointestinal: Negative.     PHYSICAL EXAM:  Vitals:    01/31/23 0700 01/31/23 0713 01/31/23 0800 01/31/23 0900   BP: 99/53  104/57 118/66   Pulse:  75  92   Resp: (!) 21 (!) 22 17 19   Temp:   36.4 °C (97.6 °F)    TempSrc:   Temporal    SpO2:  96% 98% 99%   Weight:       Height:        Body mass index is 27.17 kg/m².    O2 therapy: Pulse Oximetry: 99 %, O2 (LPM): 3, O2 Delivery Device: BIPAP    Date 01/31/23 0700 - 02/01/23  0659   Shift 4605-2850 1362-5658 9538-9342 24 Hour Total   INTAKE   P.O. 120   120     P.O. 120   120   I.V. 753.9   753.9     Heparin Volume 332   332     Dobutamine Volume 421.9   421.9   IV Piggyback 98.5   98.5     Volume (mL) (thiamine (B-1) IVPB 100 mg in 100 mL D5W (premix)) 98.5   98.5   Shift Total 972.4   972.4   OUTPUT   Urine 75   75     Output (mL) (Urethral Catheter Coude 18 Fr.) 75   75   Stool         Number of Times Stooled 0 x   0 x   Shift Total 75   75   .4   897.4        Physical Exam  HENT:      Head: Normocephalic and atraumatic.  Eyes:      Extraocular Movements: Extraocular movements intact.      Pupils: Pupils are equal, round, and reactive to light.   Cardiovascular:      Rate and Rhythm: Normal rate and regular rhythm.      Pulses: Normal pulses.      Heart sounds: Normal heart sounds. No murmur heard.    No friction rub. No gallop.   Pulmonary:      Effort: No respiratory distress.      Breath sounds: No stridor. Rhales/wheezing  Abdominal:      General: Abdomen is flat. Bowel sounds are normal.      Tenderness: There is no right CVA tenderness or left CVA tenderness.   Musculoskeletal:      Right lower leg: No edema.      Left lower leg: No edema.   Skin:     General: Skin is warm and dry.   Neurological:      General: No focal deficit present.      Mental Status: He is alert and oriented to person, place, and time.   Psychiatric:         Mood and Affect: Mood normal.     Recent Labs     01/30/23  0633 01/30/23  0648 01/30/23  1609 01/30/23  1615 01/31/23  0847 01/31/23  0857   ISTATAPH RR  --   --  7.391*  --  7.396*   ISTATAPCO2 RR  --   --  32.4  --  33.8   ISTATAPO2 RR  --   --  84  --  114*   ISTATATCO2 RR  --   --  21  --  22   [id number] RR  --   --  96  --  99   ISTATARTHCO3 RR  --   --  19.6  --  20.7   ISTATARTBE RR  --   --  -4  --  -3   ISTATTEMP RR   < > 37.0 C 36.5 C 36.1 C 36.0 C   ISTATFIO2 RR   < > 40 30  --  30   ISTATSPEC RR   < > Venous Arterial Venous  Arterial   ISTATAPHTC RR  --   --  7.398*  --  7.410   [id number] RR  --   --  81  --  108*    < > = values in this interval not displayed.     Recent Labs     01/30/23  1003 01/30/23  1546 01/30/23 2140 01/31/23 0411 01/31/23  0835   SODIUM 129*   < > 134* 134* 134*   POTASSIUM 4.1   < > 4.0 3.7 3.6   CHLORIDE 97   < > 102 103 104   CO2 15*   < > 17* 20 20   BUN 11   < > 13 14 13   CREATININE 0.93   < > 0.87 0.78 0.75   MAGNESIUM 1.7  --   --  1.8  --    PHOSPHORUS 2.1*  --   --  1.8*  --    CALCIUM 8.2*   < > 8.1* 8.0* 8.1*    < > = values in this interval not displayed.     Recent Labs     01/30/23 1546 01/30/23 2140 01/31/23 0004 01/31/23 0411 01/31/23  0835   ALTSGPT 49  --  43 42  --    ASTSGOT 82*  --  72* 57*  --    ALKPHOSPHAT 81  --  82 79  --    TBILIRUBIN 0.5  --  0.4 0.4  --    DBILIRUBIN <0.2  --  <0.2 <0.2  --    GLUCOSE 155* 186*  --  164* 169*     Recent Labs     01/30/23 0342 01/30/23 0628 01/30/23 1003 01/31/23 0411   RBC  --  4.31* 4.23* 3.82*   HEMOGLOBIN  --  14.7 14.3 12.9*   HEMATOCRIT  --  43.4 42.2 38.3*   PLATELETCT  --  228 204 203   PROTHROMBTM 15.9*  --   --   --    APTT 31.7  --   --   --    INR 1.29*  --   --   --      Recent Labs     01/29/23  2316 01/30/23 0628 01/30/23 1003 01/30/23 1546 01/31/23 0004 01/31/23 0411   WBC 14.8* 9.6 8.8  --   --  18.1*   NEUTSPOLYS 54.00  --   --   --   --   --    LYMPHOCYTES 29.80  --   --   --   --   --    MONOCYTES 6.60  --   --   --   --   --    EOSINOPHILS 8.80*  --   --   --   --   --    BASOPHILS 0.50  --   --   --   --   --    ASTSGOT 131*  --  90* 82* 72* 57*   ALTSGPT 70*  --  48 49 43 42   ALKPHOSPHAT 119*  --  89 81 82 79   TBILIRUBIN 1.2  --  0.6 0.5 0.4 0.4       Meds:   magnesium sulfate  2 g 2 g (01/31/23 1020)    potassium phosphate  30 mmol 30 mmol (01/31/23 1026)    budesonide-formoterol  2 Puff      ipratropium-albuterol  3 mL      methylPREDNISolone  62.5 mg      heparin  2,000 Units      heparin  0-30 Units/kg/hr  14.3 Units/kg/hr (01/31/23 0832)    ipratropium-albuterol  3 mL      DOBUTamine in D5W  0-20 mcg/kg/min (Ideal) 4 mcg/kg/min (01/31/23 1009)    NORepinephrine  0-1 mcg/kg/min (Ideal) Stopped (01/31/23 0715)    senna-docusate  2 Tablet      And    polyethylene glycol/lytes  1 Packet      And    magnesium hydroxide  30 mL      And    bisacodyl  10 mg      insulin regular  1-6 Units      And    dextrose bolus  25 g      thiamine  100 mg Stopped (01/31/23 3836)    multivitamin  1 Tablet      levothyroxine  175 mcg      melatonin  5 mg        Imaging:  DX-CHEST-LIMITED (1 VIEW)   Final Result         No acute cardiac or pulmonary abnormality is identified.      US-EXTREMITY ARTERY LOWER BILAT W/SARITA (COMBO)   Final Result      US-RUQ   Final Result      1.  Borderline cardiomegaly with densely increased echogenicity consistent with hepatocellular disease or fatty liver.   2.  Cholelithiasis.   3.  Borderline enlargement of the main portal vein (greater than 13 mm) recent possibility of portal hypertension.   4.  There is no ascites.      EC-ECHOCARDIOGRAM COMPLETE W/ CONT   Final Result      DX-CHEST-PORTABLE (1 VIEW)   Final Result         1.  No acute cardiopulmonary disease.      DX-CHEST-PORTABLE (1 VIEW)   Final Result         1.  No acute cardiopulmonary disease.      CT-CTA CHEST PULMONARY ARTERY W/ RECONS   Final Result         1.  No pulmonary embolus appreciated.   2.  Hepatomegaly with irregular hepatic contour favoring changes of cirrhosis   3.  8.0 mm nodular density in the right lung base. There is associated linear density suggesting scarring or atelectasis. See nodule follow-up recommendations below.      Fleischner Society pulmonary nodule recommendations:      Low Risk: CT at 6-12 months, then consider CT at 18-24 months      High Risk: CT at 6-12 months, then CT at 18-24 months      Low Risk - Minimal or absent history of smoking and of other known risk factors.      High Risk - History of smoking or of  other known risk factors.      Note: These recommendations do not apply to lung cancer screening, patients with immunosuppression, or patients with known primary cancer.      Fleischner Society 2017 Guidelines for Management of Incidentally Detected Pulmonary Nodules in Adults         DX-CHEST-PORTABLE (1 VIEW)   Final Result         1.  No acute cardiopulmonary disease.      CL-LEFT HEART CATHETERIZATION WITH POSSIBLE INTERVENTION    (Results Pending)       ASSESSEMENT and PLAN:    #Nonischemic cardiomyopathy  #Elevated troponin  #Hypotension  Patient was found to be in cardiogenic shock. Vasopressors, with PA catheter for hemodynamic support. ECHO shows normal LV size with reduced ejection fraction. (LVEF 20-30%) Downtrended Troponin.   -Aspirin 81 mg (discontinued)  -Dobutamine 1mg/1 mL infusion  -Weaned off norepi  -Per cardio, consider discontinuing Hep gtt, started Eliquis 5mg BID per cardio recs; should continue for 3 months to be followed by TTE  -Hold statin because of transaminitis  -Off BiPAP  -Left heart cath normal (1/30/23)  -1/4 BC coag (-) staph likely contaminent  -Following Urine Culture     #Acute on Chronic hypoxic respiratory failure  #History of asthma  No significant hypoxemia, weaning NC  -Duonebs Q4hr -> Q2hr  -RT protocol  -Started with Symbicort inhaled corticosteroid BID (1/31/23)     #Acute Kidney injury (resolved)  -Secondary to cardiogenic shock     #Panhypopituitarism  -Stess steroids  -Transitioning form hydrocortisone to Methylprednisolone 62.5mg Q6hr  -Levothyroxine 175 mcg     #Lactic acidosis  -Resolved 1.5     #Hyperglycemia  #Type 2 Diabetes  -SSI     #Tranaminitis  Downtrending LFTs, probable improvement as a result of improved cardiogenic shock. Right upper quadrant ultra sound, showed echo dense liver with gall stones.   -Trend; AST 57, SLT 42, Alk phos 79    Bj Payne M.D.

## 2023-01-31 NOTE — DIETARY
"Nutrition services: Day 1 of admit. Alberto Galicia is a 67 y.o. male with admitting DX of NSTEMI.    Consult received for MST score of 3 for unsure wt loss and decreased appetite.    Assessment:  Height: 193 cm (6' 3.98\")  Weight: 101 kg (223 lb 1.7 oz)  Body mass index is 27.17 kg/m²., BMI classification: Overweight  Diet/Intake: Cardiac / PO % x 2    Evaluation:   At bedside, pt reported gradual & unintentional wt loss over past couple of years, giving 233 lbs as his UBW before this loss, and estimating his current wt to be b/w 210-217 lbs.  Per EMR, pt weighed ~233 lbs 2 years ago.  Pt reports typically eating 3 small meals a day and 1 snack, as well as alcoholic beverages, specifically 3 shots of vodka with orange juice and ice.  Pt stated he sometimes goes 3-4 days w/out eating r/t decreased appetite and a preference for ETOH over food at times.  Pt appeared adequately nourished. Anticipate pt will continue to eat well.  Labs: Sodium 134 (L), POC Glucose 158 (H), Calcium 8.1 (L), Phosphorous 1.8 (L)  Meds: SSI, Solu-Medrol, multivitamin, Levophed, potassium phosphate, thiamine, BM protocol  LBM: PTA     Malnutrition Risk: Pt does not meet ASPEN criteria.    Recommendations/Plan:  Encourage intake of meals.  Document intake of all meals as % taken in ADL's to provide interdisciplinary communication across all shifts.   Monitor weight.  Nutrition rep will continue to see patient for ongoing meal and snack preferences.     RD available PRN    "

## 2023-01-31 NOTE — PROGRESS NOTES
"Critical Care Medicine Attending Note  (see full Resident note in EPIC)    I have seen and examined the patient today.  I have reviewed the medical record, laboratory data, imaging, and all relevant studies.  I have discussed the assessment and plan of care with the Internal Medicine Resident and agree with their note and plan as documented.      \"67y M hx of asthma, pituitary insufficiency, hypothyroidism, insomnia, alcohol use. That presents tonight for acute shortness of breath. He describes one week ago with sore throat that then had cough sputum production, n/v and diarrhea. No black or bloody stools. He also complains of being very thirsty. He has no hx of drug use or tobacco he developed reactive airway after moving here some time ago. He has no hx of CAD and or heart disease. He recent was admit for asthma exacerbation 1/3-1/15 and left AMA on that admission. He called EMS was given multiple nebs in route and was placed on Bipap when he arrived to ER with much improvement. He was noted to have bugs and identified as Cimex specie. His vitals afebrile hr 105-127 SBP 's satting 93-97%, ABG 7.31/45/163, CXR was unremarkable, viral panel negative, trop was 1245, ekg with no obvious findings to suggest STEMI, CMP and CBC nothing terrible concerning, mild hyponatremia and leukocytosis. He was able to get decontaminated and come off Bipap. His bedside echo appeared to have reduced LV but with no dilation of RV and collapse of his IVC but with mild increase respiratory work. His lactate was elevated at 3.9, d dimer elevate pending CTA chest .  \" Hortensia H&P 1/30/23     Additional history the patient tells me that he does drink up to 1/5/day for many years he is never really had alcohol withdraw he does have cravings, he lives alone, is employed.  Denies any symptoms of chest pain mostly just been exertional and positional dyspnea preceded by what sounds to be some viral syndrome symptoms including some upper " respiratory symptoms and GI symptoms over the course of a couple weeks.  His shortness of breath however seems to be potentially even longer duration.    Hospital Course  No notes on file    Interval events/data by system:  Left heart cath last night with clean coronaries    Neuro:   RASS 0  RASS elevated yesterday 1 mg of Ativan, CIWA protocol ordered    CV:  HR 70s to 90  Maps 60-70  SBP 70-1 10     @ 5  NE @up to 6 mics overnight off this morning    PAC-  0400-CO 8.0, CI 3.5, CVP 7, PCWP 12,   0015-CO 7.4, CI 3.2, CVP 7, PCWP 16,     MVB.36/36/70%    Resp:    BiPAP overnight   SPO2 96%  CXR clear  ABG 7.44/32/80 4/96%    GI: Cardiac diet    I/O: Net -1117  UOP: 1765    Tmax: 36.9    Heme:   WBC 18.1 from 8.8    Abx: None    Endo: BG okay    LDA: PIV's, right IJ introducer sheath with PA catheter, right radial A-line, Stratton catheter  SUP: N/I  VTE: Heparin drip  Diet: Cardiac        Physical Exam:   Patient awake alert no distress  On BiPAP scant wheeze mostly expiratory here and there no real significant work of breathing  Warm well perfused no murmur good cap refill  Abdomen is benign  Extremities with mild edema      Diagnostics:   Serum sodium 134 potassium 3.7 bicarb 20 serum creatinine 1.78  Lactate 1.5      Assessment/Plan:  Cardiogenic shock  Nonischemic cardiomyopathy with biventricular heart failure  New onset decompensated heart failure  ANGEL  Pituitary insufficiency  Elevated LFTs, likely related to cardiogenic shock  Hyponatremia  Alcohol use disorder  Type 2 diabetes  Acute exacerbation of asthma    Patient per this morning with significant provement in hemodynamics.  Cardiac output/index improved significantly on dobutamine, was on norepinephrine transiently through the night weaned off this morning lactate normalized, most recent mixed venous O2 sat 86%.  LFTs, serum creatinine improving, urine output adequate not requiring diuresis.  CVP down to 8.    Will trial wean of  inotrope support today and consideration to remove PA catheter.    Patient with downtrending SVR, in the setting of improved cardiac output, does not have a fever or any source of infection does not have any sepsis physiology specifically, white count that is elevated but also on corticosteroids cultures with 1 bottle of coag negative GPC's likely contaminate.  We will continue to monitor for sepsis and hold antibiotics for the time being.    Patient does have some wheezing and some intermittent needs for bilevel NIV mostly for somewhat subjective dyspneic sensation on the part of the patient he does not actually look like he is working to breathe he does have some scant wheezes this morning we will add inhaled corticosteroid and as needed duo nebs.  Also transition from hydrocortisone to methylprednisolone.    Patient placed on CIWA precautions overnight with only 1 dose of Ativan given, does not really have any suggestion of alcohol withdrawal syndrome at this time going to discontinue CIWA to avoid inadvertent administration of benzodiazepines.  Will reinitiate and follow clinically.    In discussion with cardiology still concern for possible LV thrombus, will transition from heparin drip to DOAC as we wean hemodynamic support.      Discussed patient condition and risk of morbidity and/or mortality with cardiology, MDR's, bedside and patient.     The patient remains critically ill with a guarded prognosis.     Critical care time = 40 minutes in directly providing and coordinating critical care and extensive data review. No time overlap and excludes procedures.

## 2023-01-31 NOTE — PROGRESS NOTES
Cardiology Progress Note    Name:   Alberto Galicia   YOB: 1955  Age:   67 y.o.  male   MRN:   [id number]    Attending Cardiologist: Dr Almaraz    Cc: Shortness of Breath       History of Present Illness  68yo male with extensive alcohol intake, panhypopituitarism, asthma here with increasing shortness of breath, severe orthopnea starting 3 nights ago. Admitted with cardiogenic shock and treated with BiPAP and inotropic support. Initial hs troponin was 1200, decreased to 600. No ACS changes on EKG.   TTE shows severely reduced LV systolic function, possible LV thrombus. Referred for coronary angiogram which showed no obstructive CAD.      Interim Events   Weaned off norepi this morning. Down titrating dobutamine. Continues to complain of chest congestion when off the bipap without hyoxia. Using bipap for comfort. No bleeding or swelling at radial site.       Review of Systems   Respiratory:  Positive for shortness of breath.    Cardiovascular:  Negative for chest pain, orthopnea and leg swelling.   Neurological:  Negative for dizziness.     Medical History  Past Surgical History:   Procedure Laterality Date    CRANIOTOMY         Family History   Problem Relation Age of Onset    Diabetes Mother     Heart Disease Father     Diabetes Brother     Alzheimer's Disease Maternal Grandmother     Cancer Maternal Grandfather     Heart Disease Paternal Grandmother     Cancer Paternal Grandfather        Social History     Tobacco Use   Smoking Status Never   Smokeless Tobacco Never       Allergies   Allergen Reactions    Pcn [Penicillins] Swelling         Medications   Scheduled Medications   Medication Dose Frequency    magnesium sulfate  2 g Once    potassium phosphate  30 mmol Once    budesonide-formoterol  2 Puff BID (RT)    methylPREDNISolone  62.5 mg Q6HRS    ipratropium-albuterol  3 mL Q4HRS (RT)    senna-docusate  2 Tablet BID    insulin regular  1-6 Units Q6HRS    thiamine  100 mg DAILY     "multivitamin  1 Tablet DAILY    levothyroxine  175 mcg AM ES           Physical Exam  /66   Pulse (!) 101   Temp 36.4 °C (97.6 °F) (Temporal)   Resp (!) 21   Ht 1.93 m (6' 3.98\")   Wt 101 kg (223 lb 1.7 oz)   SpO2 100%     Physical Exam  Cardiovascular:      Rate and Rhythm: Normal rate and regular rhythm.      Heart sounds: No murmur heard.  Pulmonary:      Breath sounds: Wheezing (right posterior fields) present.   Abdominal:      General: There is no distension.   Musculoskeletal:      Right lower leg: No edema.      Left lower leg: No edema.   Skin:     General: Skin is warm and dry.   Psychiatric:         Judgment: Judgment normal.         Labs (personally reviewed):     Lab Results   Component Value Date/Time    SODIUM 134 (L) 01/31/2023 08:35 AM    POTASSIUM 3.6 01/31/2023 08:35 AM    CHLORIDE 104 01/31/2023 08:35 AM    CO2 20 01/31/2023 08:35 AM    GLUCOSE 169 (H) 01/31/2023 08:35 AM    BUN 13 01/31/2023 08:35 AM    CREATININE 0.75 01/31/2023 08:35 AM     Lab Results   Component Value Date/Time    CHOLSTRLTOT 151 01/31/2023 12:04 AM    LDL 88 01/31/2023 12:04 AM    HDL 49 01/31/2023 12:04 AM    TRIGLYCERIDE 69 01/31/2023 12:04 AM     No results found for: BNPBTYPENAT      Cardiac Imaging and Procedures Review      Telemetry Review  sinus      Assessment and Medical Decision Making:  Cardiogenic shock, resolved  Acute HFrEF Stage C NYHA III on admission, new diagnosis  Nonischemic cardiomyopathy, presuming ETOH  - weaning of dobutamine for high SVO2 and CO.   - remains euvolemic, no diuretics have been given  - ok to remove PA catheter, will monitor clinically and can check SVo2, CVP with central line  if needed.   - will start to add GDMT for HF after dobutamine has been successfully weaned. He has low SVR so may not tolerate much afterload reduction    Presumed LV Thrombus  - can transition for heparin to thrombus dosing of NOAC, continue for 3 months then repeat TTE     Please see Dr. Almaraz's " attestation for additions and further recommendations.    Diane Wade PA-C  [facility]

## 2023-02-01 PROBLEM — I50.20 HEART FAILURE WITH REDUCED EJECTION FRACTION (HCC): Status: ACTIVE | Noted: 2023-02-01

## 2023-02-01 LAB
ALBUMIN SERPL BCP-MCNC: 3.4 G/DL (ref 3.2–4.9)
ALBUMIN SERPL BCP-MCNC: 3.6 G/DL (ref 3.2–4.9)
ALBUMIN SERPL BCP-MCNC: 3.6 G/DL (ref 3.2–4.9)
ALBUMIN SERPL BCP-MCNC: 3.7 G/DL (ref 3.2–4.9)
ALP SERPL-CCNC: 80 U/L (ref 30–99)
ALP SERPL-CCNC: 81 U/L (ref 30–99)
ALP SERPL-CCNC: 82 U/L (ref 30–99)
ALP SERPL-CCNC: 84 U/L (ref 30–99)
ALT SERPL-CCNC: 34 U/L (ref 2–50)
ALT SERPL-CCNC: 37 U/L (ref 2–50)
ALT SERPL-CCNC: 39 U/L (ref 2–50)
ALT SERPL-CCNC: 41 U/L (ref 2–50)
ANION GAP SERPL CALC-SCNC: 10 MMOL/L (ref 7–16)
ANION GAP SERPL CALC-SCNC: 11 MMOL/L (ref 7–16)
ANION GAP SERPL CALC-SCNC: 11 MMOL/L (ref 7–16)
AST SERPL-CCNC: 45 U/L (ref 12–45)
AST SERPL-CCNC: 49 U/L (ref 12–45)
AST SERPL-CCNC: 52 U/L (ref 12–45)
AST SERPL-CCNC: 56 U/L (ref 12–45)
BACTERIA UR CULT: NORMAL
BILIRUB CONJ SERPL-MCNC: <0.2 MG/DL (ref 0.1–0.5)
BILIRUB INDIRECT SERPL-MCNC: ABNORMAL MG/DL (ref 0–1)
BILIRUB INDIRECT SERPL-MCNC: NORMAL MG/DL (ref 0–1)
BILIRUB SERPL-MCNC: 0.3 MG/DL (ref 0.1–1.5)
BILIRUB SERPL-MCNC: 0.3 MG/DL (ref 0.1–1.5)
BILIRUB SERPL-MCNC: 0.4 MG/DL (ref 0.1–1.5)
BILIRUB SERPL-MCNC: 0.4 MG/DL (ref 0.1–1.5)
BUN SERPL-MCNC: 13 MG/DL (ref 8–22)
BUN SERPL-MCNC: 14 MG/DL (ref 8–22)
BUN SERPL-MCNC: 15 MG/DL (ref 8–22)
CALCIUM SERPL-MCNC: 8.4 MG/DL (ref 8.5–10.5)
CALCIUM SERPL-MCNC: 8.6 MG/DL (ref 8.5–10.5)
CALCIUM SERPL-MCNC: 8.7 MG/DL (ref 8.5–10.5)
CHLORIDE SERPL-SCNC: 103 MMOL/L (ref 96–112)
CHLORIDE SERPL-SCNC: 104 MMOL/L (ref 96–112)
CHLORIDE SERPL-SCNC: 107 MMOL/L (ref 96–112)
CO2 SERPL-SCNC: 20 MMOL/L (ref 20–33)
CO2 SERPL-SCNC: 21 MMOL/L (ref 20–33)
CO2 SERPL-SCNC: 21 MMOL/L (ref 20–33)
CREAT SERPL-MCNC: 0.64 MG/DL (ref 0.5–1.4)
CREAT SERPL-MCNC: 0.69 MG/DL (ref 0.5–1.4)
CREAT SERPL-MCNC: 0.69 MG/DL (ref 0.5–1.4)
ERYTHROCYTE [DISTWIDTH] IN BLOOD BY AUTOMATED COUNT: 51 FL (ref 35.9–50)
EST. AVERAGE GLUCOSE BLD GHB EST-MCNC: 114 MG/DL
GFR SERPLBLD CREATININE-BSD FMLA CKD-EPI: 100 ML/MIN/1.73 M 2
GFR SERPLBLD CREATININE-BSD FMLA CKD-EPI: 100 ML/MIN/1.73 M 2
GFR SERPLBLD CREATININE-BSD FMLA CKD-EPI: 103 ML/MIN/1.73 M 2
GLUCOSE BLD STRIP.AUTO-MCNC: 157 MG/DL (ref 65–99)
GLUCOSE BLD STRIP.AUTO-MCNC: 161 MG/DL (ref 65–99)
GLUCOSE BLD STRIP.AUTO-MCNC: 201 MG/DL (ref 65–99)
GLUCOSE SERPL-MCNC: 171 MG/DL (ref 65–99)
GLUCOSE SERPL-MCNC: 189 MG/DL (ref 65–99)
GLUCOSE SERPL-MCNC: 217 MG/DL (ref 65–99)
HBA1C MFR BLD: 5.6 % (ref 4–5.6)
HCT VFR BLD AUTO: 37.9 % (ref 42–52)
HGB BLD-MCNC: 12.5 G/DL (ref 14–18)
MAGNESIUM SERPL-MCNC: 2.5 MG/DL (ref 1.5–2.5)
MCH RBC QN AUTO: 33.7 PG (ref 27–33)
MCHC RBC AUTO-ENTMCNC: 33 G/DL (ref 33.7–35.3)
MCV RBC AUTO: 102.2 FL (ref 81.4–97.8)
PHOSPHATE SERPL-MCNC: 1.8 MG/DL (ref 2.5–4.5)
PLATELET # BLD AUTO: 179 K/UL (ref 164–446)
PMV BLD AUTO: 9.8 FL (ref 9–12.9)
POTASSIUM SERPL-SCNC: 3.8 MMOL/L (ref 3.6–5.5)
POTASSIUM SERPL-SCNC: 4 MMOL/L (ref 3.6–5.5)
POTASSIUM SERPL-SCNC: 4.4 MMOL/L (ref 3.6–5.5)
PROCALCITONIN SERPL-MCNC: 0.23 NG/ML
PROT SERPL-MCNC: 6.1 G/DL (ref 6–8.2)
PROT SERPL-MCNC: 6.6 G/DL (ref 6–8.2)
PROT SERPL-MCNC: 6.6 G/DL (ref 6–8.2)
PROT SERPL-MCNC: 6.7 G/DL (ref 6–8.2)
RBC # BLD AUTO: 3.71 M/UL (ref 4.7–6.1)
SIGNIFICANT IND 70042: NORMAL
SITE SITE: NORMAL
SODIUM SERPL-SCNC: 135 MMOL/L (ref 135–145)
SODIUM SERPL-SCNC: 135 MMOL/L (ref 135–145)
SODIUM SERPL-SCNC: 138 MMOL/L (ref 135–145)
SOURCE SOURCE: NORMAL
WBC # BLD AUTO: 16.2 K/UL (ref 4.8–10.8)

## 2023-02-01 PROCEDURE — 99291 CRITICAL CARE FIRST HOUR: CPT | Performed by: EMERGENCY MEDICINE

## 2023-02-01 PROCEDURE — 82962 GLUCOSE BLOOD TEST: CPT | Mod: 91

## 2023-02-01 PROCEDURE — 700102 HCHG RX REV CODE 250 W/ 637 OVERRIDE(OP): Performed by: EMERGENCY MEDICINE

## 2023-02-01 PROCEDURE — 83735 ASSAY OF MAGNESIUM: CPT

## 2023-02-01 PROCEDURE — 85027 COMPLETE CBC AUTOMATED: CPT

## 2023-02-01 PROCEDURE — A9270 NON-COVERED ITEM OR SERVICE: HCPCS | Performed by: EMERGENCY MEDICINE

## 2023-02-01 PROCEDURE — 80076 HEPATIC FUNCTION PANEL: CPT

## 2023-02-01 PROCEDURE — 84100 ASSAY OF PHOSPHORUS: CPT

## 2023-02-01 PROCEDURE — 99222 1ST HOSP IP/OBS MODERATE 55: CPT | Performed by: INTERNAL MEDICINE

## 2023-02-01 PROCEDURE — 94640 AIRWAY INHALATION TREATMENT: CPT

## 2023-02-01 PROCEDURE — 80048 BASIC METABOLIC PNL TOTAL CA: CPT

## 2023-02-01 PROCEDURE — 700111 HCHG RX REV CODE 636 W/ 250 OVERRIDE (IP): Performed by: EMERGENCY MEDICINE

## 2023-02-01 PROCEDURE — A9270 NON-COVERED ITEM OR SERVICE: HCPCS | Performed by: PHYSICIAN ASSISTANT

## 2023-02-01 PROCEDURE — 83036 HEMOGLOBIN GLYCOSYLATED A1C: CPT

## 2023-02-01 PROCEDURE — 94760 N-INVAS EAR/PLS OXIMETRY 1: CPT

## 2023-02-01 PROCEDURE — 700102 HCHG RX REV CODE 250 W/ 637 OVERRIDE(OP): Performed by: PHYSICIAN ASSISTANT

## 2023-02-01 PROCEDURE — 700111 HCHG RX REV CODE 636 W/ 250 OVERRIDE (IP)

## 2023-02-01 PROCEDURE — 700101 HCHG RX REV CODE 250: Performed by: INTERNAL MEDICINE

## 2023-02-01 PROCEDURE — 84145 PROCALCITONIN (PCT): CPT

## 2023-02-01 PROCEDURE — 99232 SBSQ HOSP IP/OBS MODERATE 35: CPT | Mod: FS | Performed by: PHYSICIAN ASSISTANT

## 2023-02-01 PROCEDURE — 770000 HCHG ROOM/CARE - INTERMEDIATE ICU *

## 2023-02-01 RX ORDER — IPRATROPIUM BROMIDE AND ALBUTEROL SULFATE 2.5; .5 MG/3ML; MG/3ML
3 SOLUTION RESPIRATORY (INHALATION)
Status: DISCONTINUED | OUTPATIENT
Start: 2023-02-02 | End: 2023-02-02

## 2023-02-01 RX ORDER — LOSARTAN POTASSIUM 25 MG/1
25 TABLET ORAL
Status: DISCONTINUED | OUTPATIENT
Start: 2023-02-02 | End: 2023-02-04 | Stop reason: HOSPADM

## 2023-02-01 RX ORDER — LOSARTAN POTASSIUM 25 MG/1
12.5 TABLET ORAL 2 TIMES DAILY
Status: COMPLETED | OUTPATIENT
Start: 2023-02-01 | End: 2023-02-02

## 2023-02-01 RX ORDER — BUDESONIDE AND FORMOTEROL FUMARATE DIHYDRATE 160; 4.5 UG/1; UG/1
2 AEROSOL RESPIRATORY (INHALATION) 2 TIMES DAILY
Status: DISCONTINUED | OUTPATIENT
Start: 2023-02-02 | End: 2023-02-04 | Stop reason: HOSPADM

## 2023-02-01 RX ORDER — SPIRONOLACTONE 25 MG/1
25 TABLET ORAL
Status: DISCONTINUED | OUTPATIENT
Start: 2023-02-01 | End: 2023-02-04 | Stop reason: HOSPADM

## 2023-02-01 RX ORDER — LOSARTAN POTASSIUM 25 MG/1
12.5 TABLET ORAL
Status: DISCONTINUED | OUTPATIENT
Start: 2023-02-01 | End: 2023-02-01

## 2023-02-01 RX ADMIN — INSULIN HUMAN 1 UNITS: 100 INJECTION, SOLUTION PARENTERAL at 05:46

## 2023-02-01 RX ADMIN — INSULIN HUMAN 2 UNITS: 100 INJECTION, SOLUTION PARENTERAL at 11:28

## 2023-02-01 RX ADMIN — BUDESONIDE AND FORMOTEROL FUMARATE DIHYDRATE 2 PUFF: 160; 4.5 AEROSOL RESPIRATORY (INHALATION) at 21:19

## 2023-02-01 RX ADMIN — SPIRONOLACTONE 25 MG: 25 TABLET ORAL at 13:57

## 2023-02-01 RX ADMIN — SENNOSIDES AND DOCUSATE SODIUM 2 TABLET: 50; 8.6 TABLET ORAL at 05:43

## 2023-02-01 RX ADMIN — APIXABAN 10 MG: 5 TABLET, FILM COATED ORAL at 05:43

## 2023-02-01 RX ADMIN — DIBASIC SODIUM PHOSPHATE, MONOBASIC POTASSIUM PHOSPHATE AND MONOBASIC SODIUM PHOSPHATE 500 MG: 852; 155; 130 TABLET ORAL at 13:57

## 2023-02-01 RX ADMIN — THERA TABS 1 TABLET: TAB at 05:43

## 2023-02-01 RX ADMIN — PREDNISONE 60 MG: 20 TABLET ORAL at 11:09

## 2023-02-01 RX ADMIN — LOSARTAN POTASSIUM 12.5 MG: 25 TABLET, FILM COATED ORAL at 09:00

## 2023-02-01 RX ADMIN — THIAMINE HCL TAB 100 MG 100 MG: 100 TAB at 05:43

## 2023-02-01 RX ADMIN — APIXABAN 10 MG: 5 TABLET, FILM COATED ORAL at 17:02

## 2023-02-01 RX ADMIN — DIBASIC SODIUM PHOSPHATE, MONOBASIC POTASSIUM PHOSPHATE AND MONOBASIC SODIUM PHOSPHATE 500 MG: 852; 155; 130 TABLET ORAL at 21:19

## 2023-02-01 RX ADMIN — IPRATROPIUM BROMIDE AND ALBUTEROL SULFATE 3 ML: 2.5; .5 SOLUTION RESPIRATORY (INHALATION) at 06:33

## 2023-02-01 RX ADMIN — DIBASIC SODIUM PHOSPHATE, MONOBASIC POTASSIUM PHOSPHATE AND MONOBASIC SODIUM PHOSPHATE 500 MG: 852; 155; 130 TABLET ORAL at 17:02

## 2023-02-01 RX ADMIN — IPRATROPIUM BROMIDE AND ALBUTEROL SULFATE 3 ML: 2.5; .5 SOLUTION RESPIRATORY (INHALATION) at 02:37

## 2023-02-01 RX ADMIN — Medication 5 MG: at 21:26

## 2023-02-01 RX ADMIN — DIBASIC SODIUM PHOSPHATE, MONOBASIC POTASSIUM PHOSPHATE AND MONOBASIC SODIUM PHOSPHATE 500 MG: 852; 155; 130 TABLET ORAL at 09:37

## 2023-02-01 RX ADMIN — INSULIN HUMAN 1 UNITS: 100 INJECTION, SOLUTION PARENTERAL at 17:03

## 2023-02-01 RX ADMIN — LEVOTHYROXINE SODIUM 175 MCG: 0.17 TABLET ORAL at 05:43

## 2023-02-01 RX ADMIN — IPRATROPIUM BROMIDE AND ALBUTEROL SULFATE 3 ML: 2.5; .5 SOLUTION RESPIRATORY (INHALATION) at 16:22

## 2023-02-01 RX ADMIN — METHYLPREDNISOLONE SODIUM SUCCINATE 62.5 MG: 125 INJECTION, POWDER, FOR SOLUTION INTRAMUSCULAR; INTRAVENOUS at 05:42

## 2023-02-01 RX ADMIN — LOSARTAN POTASSIUM 12.5 MG: 25 TABLET, FILM COATED ORAL at 17:02

## 2023-02-01 RX ADMIN — SENNOSIDES AND DOCUSATE SODIUM 2 TABLET: 50; 8.6 TABLET ORAL at 17:02

## 2023-02-01 ASSESSMENT — PATIENT HEALTH QUESTIONNAIRE - PHQ9
1. LITTLE INTEREST OR PLEASURE IN DOING THINGS: NOT AT ALL
2. FEELING DOWN, DEPRESSED, IRRITABLE, OR HOPELESS: NOT AT ALL
SUM OF ALL RESPONSES TO PHQ9 QUESTIONS 1 AND 2: 0
SUM OF ALL RESPONSES TO PHQ9 QUESTIONS 1 AND 2: 0
1. LITTLE INTEREST OR PLEASURE IN DOING THINGS: NOT AT ALL
2. FEELING DOWN, DEPRESSED, IRRITABLE, OR HOPELESS: NOT AT ALL

## 2023-02-01 ASSESSMENT — ENCOUNTER SYMPTOMS
TINGLING: 0
WHEEZING: 1
FEVER: 0
COUGH: 1
ORTHOPNEA: 0
MYALGIAS: 0
CONSTIPATION: 0
SPEECH CHANGE: 0
VOMITING: 0
HALLUCINATIONS: 0
NERVOUS/ANXIOUS: 1
SENSORY CHANGE: 0
WEIGHT LOSS: 0
BLOOD IN STOOL: 0
BACK PAIN: 0
MUSCULOSKELETAL NEGATIVE: 1
PHOTOPHOBIA: 0
PSYCHIATRIC NEGATIVE: 1
NECK PAIN: 0
COUGH: 0
GASTROINTESTINAL NEGATIVE: 1
DOUBLE VISION: 0
BLURRED VISION: 0
EYES NEGATIVE: 1
FOCAL WEAKNESS: 0
EYE PAIN: 0
CHILLS: 0
PND: 0
HEADACHES: 0
SPUTUM PRODUCTION: 0
PALPITATIONS: 0
SHORTNESS OF BREATH: 1
ABDOMINAL PAIN: 0
TREMORS: 0
DIZZINESS: 0
SHORTNESS OF BREATH: 0
NAUSEA: 0
NEUROLOGICAL NEGATIVE: 1
DIARRHEA: 0

## 2023-02-01 ASSESSMENT — PAIN DESCRIPTION - PAIN TYPE
TYPE: ACUTE PAIN

## 2023-02-01 ASSESSMENT — LIFESTYLE VARIABLES: SUBSTANCE_ABUSE: 1

## 2023-02-01 ASSESSMENT — FIBROSIS 4 INDEX: FIB4 SCORE: 3

## 2023-02-01 NOTE — PROGRESS NOTES
"Critical Care Medicine Attending Note  (see full Resident note in EPIC)    I have seen and examined the patient today.  I have reviewed the medical record, laboratory data, imaging, and all relevant studies.  I have discussed the assessment and plan of care with the Internal Medicine Resident and agree with their note and plan as documented.      \"67y M hx of asthma, pituitary insufficiency, hypothyroidism, insomnia, alcohol use. That presents tonight for acute shortness of breath. He describes one week ago with sore throat that then had cough sputum production, n/v and diarrhea. No black or bloody stools. He also complains of being very thirsty. He has no hx of drug use or tobacco he developed reactive airway after moving here some time ago. He has no hx of CAD and or heart disease. He recent was admit for asthma exacerbation 1/3-1/15 and left AMA on that admission. He called EMS was given multiple nebs in route and was placed on Bipap when he arrived to ER with much improvement. He was noted to have bugs and identified as Cimex specie. His vitals afebrile hr 105-127 SBP 's satting 93-97%, ABG 7.31/45/163, CXR was unremarkable, viral panel negative, trop was 1245, ekg with no obvious findings to suggest STEMI, CMP and CBC nothing terrible concerning, mild hyponatremia and leukocytosis. He was able to get decontaminated and come off Bipap. His bedside echo appeared to have reduced LV but with no dilation of RV and collapse of his IVC but with mild increase respiratory work. His lactate was elevated at 3.9, d dimer elevate pending CTA chest .  \" Hortensia H&P 1/30/23     Additional history the patient tells me that he does drink up to 1/5/day for many years he is never really had alcohol withdraw he does have cravings, he lives alone, is employed.  Denies any symptoms of chest pain mostly just been exertional and positional dyspnea preceded by what sounds to be some viral syndrome symptoms including some upper " respiratory symptoms and GI symptoms over the course of a couple weeks.  His shortness of breath however seems to be potentially even longer duration.    Hospital Course  1/30: C clean, off AC.    Interval events/data by system:  Patient without complaints today, he says his breathing feels better still dry cough but improved he is eating breakfast when I see him.  He has no real complaints today    Off of vasoactive's this morning    Neuro:   RASS 0 to -1    CV:  Heart rate 80s to 100  -150  Off vasoactive's  Apixaban for LV thrombus    Resp:   6 L by mask  93 to 98%  Symbicort twice daily  Duo every 4  Methylpred 60 every 6    GI: Taking orals  No bowel movement    I/O: +1205, 1440 at that oral    UOP: 1940    Tmax: Afebrile    Heme:   WBC 16.2    Abx: No antibiotics    Endo: BG's within target  On Methylpred for asthma    LDA: Right IJ introducer sheath, Stratton catheter, PIV's  SUP: Not indicated  VTE: Apixaban for LV thrombus  Diet: Cardiac        Physical Exam:     Awake alert, in good spirits  Lungs still sound somewhat wheezy mostly expiratory, he is able to take deep breath speak in full sentences his work of breathing is improved from yesterday  Cardiac exam shows no murmurs, he does not have JVD or peripheral edema  He is warm and well-perfused  Abdomen is benign  Neuro exam is nonfocal    Diagnostics:   White blood cell count 16 from 18, platelets 179, hemoglobin 12.5  Electrolytes normal  LFTs normalized  Eosinophils elevated  TSH normal    Assessment/Plan:  Cardiogenic shock  Nonischemic cardiomyopathy with biventricular heart failure  New onset decompensated heart failure  Acute exacerbation of asthma  ANGEL  Pituitary insufficiency  Elevated LFTs, likely related to cardiogenic shock  Hyponatremia  Alcohol use disorder    Patient doing well today off of vasoactive's, well-perfused, warm, with resolution of his liver injury and kidney injury.  As far as his new onset heart failure I think it is  okay now to add GDMT starting with ARB given resolution of his ANGEL.  Has been transition to oral anticoagulation for presumed LV thrombus.    No fever overnight, white count is downtrending, going to continue to hold empiric antibiotics no cultures suggestive of sepsis, he has 1 blood culture with coag negative staph probable contaminant    He continues to have some bronchospasm, is on inhaled corticosteroids I think he is improved from yesterday but still having some bronchospasm.  We will continue DuoNeb every 4, inhaled corticosteroid methylprednisolone is at 60 IV will transition to oral prednisone.  Was able to wean him from intermittent BiPAP to mask and now to nasal cannula.  Still requires a little bit of oxygen and desaturates into the upper 80s while eating off oxygen.    Has not had any overt signs of alcohol withdraw physiology, still at risk we will consider adding prophylactic gabapentin.  Would continue thiamine orally, as well as multivitamin, MCV is markedly elevated may consider B12.    Patient is okay to transfer to [facility] today, have discussed the case with the hospitalist, CCM will sign off at this point.      Discussed patient condition and risk of morbidity and/or mortality with JASON, bedside RN, and patient himself, as well as [facility] hospitalist.       Critical care time = 35 minutes in directly providing and coordinating critical care and extensive data review. No time overlap and excludes procedures.

## 2023-02-01 NOTE — DISCHARGE PLANNING
Referral: RNCM received a referral from patient's bedside RN stating patient's rent is due today, 2/1/23, and patient is anxious about being delinquent.      Intervention: Patient provided name of property: [employer] but does not have a phone number for the .  RNCM was able to find the following number for Samuel [employer]h: [phone] and left a voicemail for a call back to T6 [id number] [phone].  Case management is able to provide documentation that patient is hospitalized if needed.      Update @ 0830: RNCM spoke to , Lori [phone], who stated there is a 4 day sergio period for rent and it's considered late on day 5 with a $50 fee.  Lori provided with RNCM email and RNCM will provide documentation supporting patient's admission in an effort to remove any delinquency penalties/fines etc.      Case management will continue to follow for discharge planning/support.

## 2023-02-01 NOTE — ASSESSMENT & PLAN NOTE
He found to have heart failure with reduced ejection fraction, likely to his ETOH use, clean cath during admission   EF 25%  GDMT with losartan, metoprolol, aldactone, statin  Will need HF follow up

## 2023-02-01 NOTE — ASSESSMENT & PLAN NOTE
Cardiogenic shock, Now resolved  Echo 25% EF, global hypokinesis   Clean coronary cath, likely due to alcohol use   BP improved but soft   GDMT for HF as BP allows   Continue tele monitoring

## 2023-02-01 NOTE — CARE PLAN
The patient is Stable - Low risk of patient condition declining or worsening    Shift Goals  Clinical Goals: Improved wOB, hemodynamic stability  Patient Goals: Sleep  Family Goals: GRISEL    Progress made toward(s) clinical / shift goals:    Problem: Skin Integrity  Goal: Skin integrity is maintained or improved  Outcome: Progressing     Problem: Fall Risk  Goal: Patient will remain free from falls  Outcome: Progressing     Problem: Pain - Standard  Goal: Alleviation of pain or a reduction in pain to the patient’s comfort goal  Outcome: Progressing     Problem: Lifestyle Changes  Goal: Patient's ability to identify lifestyle changes and available resources to help reduce recurrence of condition will improve  Outcome: Progressing     Problem: Psychosocial  Goal: Patient's level of anxiety will decrease  Outcome: Progressing

## 2023-02-01 NOTE — PROGRESS NOTES
[facility] Progress Note      Admit Date: 1/29/2023    Resident(s): Kyra Odonnell M.D.   Attending:  RUBEN ZAVALA/ Dr. Morrison    Patient ID:    Name:  Alberto Galicia   YOB: 1955  Age:  67 y.o.  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 Initially placed on BiPAP.    CT PE suggested cirrhosis but no PE. Labs showed leukocytosis, mild transamintitis. Lactic acidosis was present. [place] was placed in ICU. ECHO at bedside showed cardiac output of 4.5; Left Heart Cath normal. No longer on dobutamine, on oxy mask, has some wheezing but hemodynamically stable. Will be changed over to oral steroids, have lines removed and then transferred to [facility].       Consultants:  Critical Care  Caridology    Interval Events:  No acute overnight events  [place] 850cc UOP overnight  AAOx4    Vitals Range last 24h:  Temp:  [36.1 °C (96.9 °F)-36.8 °C (98.3 °F)] 36.7 °C (98 °F)  Pulse:  [] 105  Resp:  [20-39] 20  BP: (102-168)/(59-91) 137/86  SpO2:  [91 %-100 %] 92 %      Intake/Output Summary (Last 24 hours) at 2/1/2023 1117  Last data filed at 2/1/2023 1000  Gross per 24 hour   Intake 2119.36 ml   Output 1865 ml   Net 254.36 ml          Review of Systems   Constitutional:  Positive for malaise/fatigue. Negative for chills and fever.   HENT:          Wheezing, hoarse voice   Eyes: Negative.    Respiratory:  Positive for cough, shortness of breath and wheezing.    Cardiovascular:  Negative for chest pain and palpitations.   Gastrointestinal: Negative.    Genitourinary: Negative.    Musculoskeletal: Negative.    Neurological: Negative.    Endo/Heme/Allergies: Negative.    Psychiatric/Behavioral: Negative.       Constitutional:  Negative for chills and fever.   HENT: Negative.     Eyes: Negative.    Respiratory:  Positive for cough and shortness of breath.    Cardiovascular:  Positive for chest pain.   Gastrointestinal: Negative.     PHYSICAL EXAM:  Vitals:    02/01/23 0700 02/01/23 0800 02/01/23 0900 02/01/23 1000   BP: 132/86 (!) 146/91 139/76 137/86   Pulse: (!) 104 (!) 111 93 (!) 105   Resp: (!) 24 (!) 22 20 20   Temp:  36.4 °C (97.6 °F)  36.7 °C (98 °F)   TempSrc:  Temporal  Temporal   SpO2: 97% 94% 96% 92%   Weight:       Height:        Body mass index is 25.99 kg/m².    O2 therapy: Pulse Oximetry: 92 %, O2 (LPM): 5, O2 Delivery Device: Silicone Nasal Cannula    Date 02/01/23 0700 - 02/02/23 0659   Shift 3795-5083 7660-6022 2556-9478 24 Hour Total   INTAKE   P.O. 360   360     P.O. 360   360   Shift Total 360   360   OUTPUT   Urine 225   225     Output (mL) (Urethral Catheter Coude 18 Fr.) 225   225   Stool         Number of Times Stooled 0 x   0 x   Shift Total 225   225      135          Physical Exam    HENT:      Head: Normocephalic and atraumatic.  Eyes:      Extraocular Movements: Extraocular movements intact.      Pupils: Pupils are equal, round, and reactive to light.   Cardiovascular:      Rate and Rhythm: Normal rate and regular rhythm.      Pulses: Normal pulses.      Heart sounds: Normal heart sounds. No murmur heard.    No friction rub. No gallop.   Pulmonary:      Effort: No respiratory distress.      Breath sounds: No stridor. Rhales/wheezing present  Abdominal:      General: Abdomen is flat. Bowel sounds are normal.      Tenderness: There is no right CVA tenderness or left CVA tenderness.   Musculoskeletal:      Right lower leg: No edema.      Left lower leg: No edema.   Skin:     General: Skin is warm and dry.   Neurological:      General: No focal deficit present.      Mental Status: He is alert and oriented to person, place, and time.   Psychiatric:         Mood and Affect: Mood normal.      Recent Labs     01/30/23  0633 01/30/23  0648 01/30/23  1609 01/30/23  1615 01/31/23  0847 01/31/23  0857   ISTATAPH RR  --   --  7.391*  --  7.396*   ISTATAPCO2 RR  --   --  32.4  --  33.8   ISTATAPO2 RR  --   --  84  --  114*   ISTATATCO2 RR  --   --  21  --  22   VCBZTRH8VOK RR  --   --  96  --  99   ISTATARTHCO3 RR  --   --  19.6  --  20.7   ISTATARTBE RR  --   --  -4  --  -3   ISTATTEMP RR   < > 37.0 C 36.5 C 36.1 C 36.0 C   ISTATFIO2 RR   < > 40 30  --  30   ISTATSPEC RR   < > Venous Arterial Venous Arterial   ISTATAPHTC RR  --   --  7.398*  --  7.410   [id number] RR  --   --  81  --  108*    < > = values in this interval not displayed.       Recent Labs     01/30/23  1003 01/30/23  1546 01/31/23  0411 01/31/23  0835 01/31/23  1350 01/31/23  2110 02/01/23  0305 02/01/23  0540   SODIUM 129*   < > 134*   < > 136 134* 135  --    POTASSIUM 4.1   < > 3.7   < > 3.8 3.9 4.0  --    CHLORIDE 97   < > 103   < > 101 102 103  --    CO2 15*   < > 20   < > 19* 22 21  --    BUN 11   < > 14   < > 13 13 13  --    CREATININE 0.93   < > 0.78   < > 0.73 0.77 0.64  --    MAGNESIUM 1.7  --  1.8  --   --   --   --  2.5   PHOSPHORUS 2.1*  --  1.8*  --   --   --   --  1.8*   CALCIUM 8.2*   < > 8.0*   < > 8.3* 8.3* 8.4*  --     < > = values in this interval not displayed.       Recent Labs     01/31/23  1350 01/31/23  1705 01/31/23  2110 01/31/23  2350 02/01/23  0305 02/01/23  0540   ALTSGPT  --  41  --  39  --  34   ASTSGOT  --  62*  --  56*  --  45   ALKPHOSPHAT  --  84  --  82  --  80   TBILIRUBIN  --  0.3  --  0.3  --  0.3   DBILIRUBIN  --  <0.2  --  <0.2  --  <0.2   GLUCOSE 185*  --  187*  --  171*  --        Recent Labs     01/30/23  0342 01/30/23  0628 01/30/23  1003 01/31/23  0411 02/01/23  0540   RBC  --    < > 4.23* 3.82* 3.71*   HEMOGLOBIN  --    < > 14.3 12.9* 12.5*   HEMATOCRIT  --    < > 42.2 38.3* 37.9*   PLATELETCT  --    < > 204 203 179   PROTHROMBTM 15.9*  --   --   --   --    APTT 31.7  --   --   --   --    INR  1.29*  --   --   --   --     < > = values in this interval not displayed.       Recent Labs     01/29/23  2316 01/30/23  0628 01/30/23  1003 01/30/23  1546 01/31/23  0411 01/31/23  1250 01/31/23  1705 01/31/23  2350 02/01/23  0540   WBC 14.8*   < > 8.8  --  18.1*  --   --   --  16.2*   NEUTSPOLYS 54.00  --   --   --   --   --   --   --   --    LYMPHOCYTES 29.80  --   --   --   --   --   --   --   --    MONOCYTES 6.60  --   --   --   --   --   --   --   --    EOSINOPHILS 8.80*  --   --   --   --   --   --   --   --    BASOPHILS 0.50  --   --   --   --   --   --   --   --    ASTSGOT 131*  --  90*   < > 57*   < > 62* 56* 45   ALTSGPT 70*  --  48   < > 42   < > 41 39 34   ALKPHOSPHAT 119*  --  89   < > 79   < > 84 82 80   TBILIRUBIN 1.2  --  0.6   < > 0.4   < > 0.3 0.3 0.3    < > = values in this interval not displayed.         Meds:   phosphorus  500 mg      predniSONE  60 mg      losartan  12.5 mg      [START ON 2/2/2023] losartan  25 mg      spironolactone  25 mg      budesonide-formoterol  2 Puff      ipratropium-albuterol  3 mL      apixaban  10 mg      Followed by    [START ON 2/7/2023] apixaban  5 mg      ipratropium-albuterol  3 mL      senna-docusate  2 Tablet      And    polyethylene glycol/lytes  1 Packet      And    magnesium hydroxide  30 mL      And    bisacodyl  10 mg      insulin regular  1-6 Units      And    dextrose bolus  25 g      multivitamin  1 Tablet      levothyroxine  175 mcg      melatonin  5 mg        Imaging:  DX-CHEST-LIMITED (1 VIEW)   Final Result         No acute cardiac or pulmonary abnormality is identified.      US-EXTREMITY ARTERY LOWER BILAT W/SARITA (COMBO)   Final Result      US-RUQ   Final Result      1.  Borderline cardiomegaly with densely increased echogenicity consistent with hepatocellular disease or fatty liver.   2.  Cholelithiasis.   3.  Borderline enlargement of the main portal vein (greater than 13 mm) recent possibility of portal hypertension.   4.  There is no ascites.       EC-ECHOCARDIOGRAM COMPLETE W/ CONT   Final Result      DX-CHEST-PORTABLE (1 VIEW)   Final Result         1.  No acute cardiopulmonary disease.      DX-CHEST-PORTABLE (1 VIEW)   Final Result         1.  No acute cardiopulmonary disease.      CT-CTA CHEST PULMONARY ARTERY W/ RECONS   Final Result         1.  No pulmonary embolus appreciated.   2.  Hepatomegaly with irregular hepatic contour favoring changes of cirrhosis   3.  8.0 mm nodular density in the right lung base. There is associated linear density suggesting scarring or atelectasis. See nodule follow-up recommendations below.      Fleischner Society pulmonary nodule recommendations:      Low Risk: CT at 6-12 months, then consider CT at 18-24 months      High Risk: CT at 6-12 months, then CT at 18-24 months      Low Risk - Minimal or absent history of smoking and of other known risk factors.      High Risk - History of smoking or of other known risk factors.      Note: These recommendations do not apply to lung cancer screening, patients with immunosuppression, or patients with known primary cancer.      Fleischner Society 2017 Guidelines for Management of Incidentally Detected Pulmonary Nodules in Adults         DX-CHEST-PORTABLE (1 VIEW)   Final Result         1.  No acute cardiopulmonary disease.      CL-LEFT HEART CATHETERIZATION WITH POSSIBLE INTERVENTION    (Results Pending)       ASSESSEMENT and PLAN:    #Nonischemic cardiomyopathy  #Elevated troponin  #Hypotension  Patient was found to be in cardiogenic shock. Vasopressors, with PA catheter for hemodynamic support. ECHO shows normal LV size with reduced ejection fraction. (LVEF 20-30%) Downtrended Troponin.   -Aspirin 81 mg (discontinued)  - Stopped Dobutamine 1mg/1 mL infusion  -Continue Eliquis 5mg BID per cardio recs; should continue for 3 months to be followed by TTE  -Hold statin because of transaminitis  -Off BiPAP  -Left heart cath normal (1/30/23)  -1/4 BC coag (-) staph likely  contaminent  -Following Urine Culture     #Acute on Chronic hypoxic respiratory failure  #History of asthma  No significant hypoxemia, weaning NC  -RT protocol  -Started with Symbicort inhaled corticosteroid BID (1/31/23)     #Acute Kidney injury (resolved)  -Secondary to cardiogenic shock     #Panhypopituitarism  -Stess steroids  -Transitioned to oral prednisone 60 mg, daily  -Levothyroxine 175 mcg     #Lactic acidosis  -Resolved 1.5     #Hyperglycemia  #Type 2 Diabetes  -SSI     #Tranaminitis  Downtrending LFTs, probable improvement as a result of improved cardiogenic shock. Right upper quadrant ultra sound, showed echo dense liver with gall stones.   -Trend; AST 57, SLT 42, Alk phos 79    Kyra Odonnell M.D.

## 2023-02-01 NOTE — ASSESSMENT & PLAN NOTE
Unable to rule out LV thrombus on echo however noted to be less likely   Cardiology commended anticoagulation with apixaban with therapeutic dose.  Continue Eliquis and monitor for bleeding.

## 2023-02-01 NOTE — CONSULTS
Hospital Medicine Consultation    Date of Service  2/1/2023    Referring Physician  Romel Morrison M.D.    Consulting Physician  Lorraine Mullen M.D.    Reason for Consultation  Continue management for acute heart failure with reduced ejection fraction management    History of Presenting Illness  67 y.o. 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  In the to the hospital patient received 1.3 mg of epinephrine, Solu-Medrol and magnesium infusion.  On arrival patient was on nonrebreather and he was switched to BiPAP.  During his hospitalization patient also reported that he drinks 1/5 per day for many years he is never developed alcohol withdrawal.  Patient found to have elevated troponin, hypotension and found to have LVEF of 20 to 30%.  Cardiology was consulted.  Patient also found to have LV thrombus and he was placed on an event repeat TTE.    Today on Feb 1, 2023 intensivist Dr. Mora requested hospitalist service for transfer of care and continue management for acute heart failure with reduced ejection fraction management.    I evaluated and examined him at the bedside.  He reported his shortness of breath is significantly improved.  He is using 5 L of nasal cannula to maintain oxygen saturation.  Currently he is mildly tachycardic with current heart rate 105.  Current lab work-up showed white blood cell count of 16.2, hemoglobin 12.5 and platelet count of 179.  He found to have sodium of 135, potassium of 3.8 and glucose of 217.  He found to have AST of 49 and ALT of 37.  He found to have low Phos of 1.8.  Cardiology has been following him.        Review of  Systems  Review of Systems   Constitutional:  Negative for chills, fever and weight loss.   HENT:  Negative for hearing loss and tinnitus.    Eyes:  Negative for blurred vision, double vision, photophobia and pain.   Respiratory:  Positive for shortness of breath (Improvement). Negative for cough and sputum production.    Cardiovascular:  Negative for chest pain, palpitations, orthopnea and leg swelling.   Gastrointestinal:  Negative for abdominal pain, constipation, diarrhea, nausea and vomiting.   Genitourinary:  Negative for dysuria, frequency and urgency.   Musculoskeletal:  Negative for back pain, joint pain, myalgias and neck pain.   Skin:  Negative for rash.   Neurological:  Negative for dizziness, tingling, tremors, sensory change, speech change, focal weakness and headaches.   Psychiatric/Behavioral:  Positive for substance abuse (Alcohol). Negative for hallucinations.    All other systems reviewed and are negative.    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    Surgical History   has a past surgical history that includes craniotomy.    Family History  family history includes Alzheimer's Disease in his maternal grandmother; Cancer in his maternal grandfather and paternal grandfather; Diabetes in his brother and mother; Heart Disease in his father and paternal grandmother.    Social History   reports that he has never smoked. He has never used smokeless tobacco. He reports current alcohol use of about 12.6 oz per week. He reports that he does not use drugs.    Medications  Prior to Admission Medications   Prescriptions Last Dose Informant Patient Reported? Taking?   albuterol 108 (90 Base) MCG/ACT Aero Soln inhalation aerosol UNKN at UNKN Historical No No   Sig: INHALE 2 PUFFS BY MOUTH EVERY 6 HOURS AS NEEDED FOR SHORTNESS OF BREATH   atorvastatin  (LIPITOR) 40 MG Tab UNKN at UNKN Historical No No   Sig: Take 1 tablet by mouth once daily   calcium carbonate (TUMS) 500 MG Chew Tab UNKN at UNKN Historical Yes No   Sig: Chew 500 mg every day.   fexofenadine (ALLEGRA) 60 MG Tab UNKN at UNKN Historical Yes No   Sig: Take 60 mg by mouth every day.   fluticasone (FLOVENT HFA) 110 MCG/ACT Aerosol UNKN at UNKN Historical No No   Sig: Inhale 1 Puff 2 times a day for 30 days.   hydrocortisone (CORTEF) 10 MG Tab UNKN at UNKN Historical No No   Sig: Take 1 pill in the morning and 1/2 in the afternoon 90 day supply   ipratropium-albuterol (DUONEB) 0.5-2.5 (3) MG/3ML nebulizer solution UNKN at UNKN Historical No No   Sig: Take 3 mL by nebulization every four hours as needed for Shortness of Breath for up to 30 days.   levothyroxine (SYNTHROID) 175 MCG Tab UNKN at UNKN Historical No No   Sig: Take 1 Tablet by mouth every morning on an empty stomach. Please make an appointment for future refills   melatonin 5 mg Tab UNKN at UNKN Historical Yes No   Sig: Take 5 mg by mouth at bedtime as needed.   testosterone (TESTIM/ANDROGEL) 50 MG/5GM (1%) Gel gel UNKN at UNKN Historical No No   Sig: APPLY 1 PACKET (50MG) TOPICALLY ONCE DAILY   therapeutic multivitamin-minerals (THERAGRAN-M) Tab UNKN at UNKN Historical Yes No   Sig: Take 1 Tablet by mouth every day.      Facility-Administered Medications: None       Allergies  Allergies   Allergen Reactions    Pcn [Penicillins] Swelling       Physical Exam  Temp:  [36.1 °C (96.9 °F)-36.8 °C (98.3 °F)] 36.7 °C (98 °F)  Pulse:  [] 105  Resp:  [20-35] 20  BP: (102-168)/(59-91) 137/86  SpO2:  [92 %-98 %] 92 %    Physical Exam  Vitals reviewed.   Constitutional:       General: He is not in acute distress.  HENT:      Head: Normocephalic and atraumatic. No contusion.      Right Ear: External ear normal.      Left Ear: External ear normal.      Nose: Nose normal.      Comments: Oxygen nasal cannula     Mouth/Throat:      Pharynx: No  oropharyngeal exudate.   Eyes:      General:         Right eye: No discharge.         Left eye: No discharge.      Pupils: Pupils are equal, round, and reactive to light.   Cardiovascular:      Rate and Rhythm: Normal rate and regular rhythm.      Heart sounds: No murmur heard.    No friction rub. No gallop.   Pulmonary:      Effort: Pulmonary effort is normal.      Breath sounds: Wheezing present. No rhonchi.   Abdominal:      General: Bowel sounds are normal. There is no distension.      Palpations: Abdomen is soft.      Tenderness: There is no abdominal tenderness. There is no rebound.   Musculoskeletal:         General: No swelling or tenderness. Normal range of motion.      Cervical back: No rigidity. No muscular tenderness.      Right lower leg: Edema present.      Left lower leg: Edema present.   Skin:     General: Skin is warm and dry.      Coloration: Skin is not jaundiced.      Findings: Rash (Bilateral lower extremities) present.   Neurological:      General: No focal deficit present.      Mental Status: He is alert.      Cranial Nerves: No cranial nerve deficit.      Sensory: No sensory deficit.   Psychiatric:         Mood and Affect: Mood normal.       Fluids  Date 02/01/23 0700 - 02/02/23 0659   Shift 0635-7967 6297-6565 5483-4073 24 Hour Total   INTAKE   P.O. 360   360   Shift Total 360   360   OUTPUT   Urine 225   225   Shift Total 225   225   Weight (kg) 96.8 96.8 96.8 96.8       Laboratory  Recent Labs     01/30/23  1003 01/31/23  0411 02/01/23  0540   WBC 8.8 18.1* 16.2*   RBC 4.23* 3.82* 3.71*   HEMOGLOBIN 14.3 12.9* 12.5*   HEMATOCRIT 42.2 38.3* 37.9*   MCV 99.8* 100.3* 102.2*   MCH 33.8* 33.8* 33.7*   MCHC 33.9 33.7 33.0*   RDW 47.6 48.5 51.0*   PLATELETCT 204 203 179   MPV 9.3 9.7 9.8     Recent Labs     01/31/23  2110 02/01/23  0305 02/01/23  1125   SODIUM 134* 135 135   POTASSIUM 3.9 4.0 3.8   CHLORIDE 102 103 104   CO2 22 21 20   GLUCOSE 187* 171* 217*   BUN 13 13 14   CREATININE 0.77 0.64  0.69   CALCIUM 8.3* 8.4* 8.7     Recent Labs     01/30/23  0342   APTT 31.7   INR 1.29*          Recent Labs     01/31/23  0004   TRIGLYCERIDE 69   HDL 49   LDL 88        Imaging  DX-CHEST-LIMITED (1 VIEW)   Final Result         No acute cardiac or pulmonary abnormality is identified.      US-EXTREMITY ARTERY LOWER BILAT W/SARITA (COMBO)   Final Result      US-RUQ   Final Result      1.  Borderline cardiomegaly with densely increased echogenicity consistent with hepatocellular disease or fatty liver.   2.  Cholelithiasis.   3.  Borderline enlargement of the main portal vein (greater than 13 mm) recent possibility of portal hypertension.   4.  There is no ascites.      EC-ECHOCARDIOGRAM COMPLETE W/ CONT   Final Result      DX-CHEST-PORTABLE (1 VIEW)   Final Result         1.  No acute cardiopulmonary disease.      DX-CHEST-PORTABLE (1 VIEW)   Final Result         1.  No acute cardiopulmonary disease.      CT-CTA CHEST PULMONARY ARTERY W/ RECONS   Final Result         1.  No pulmonary embolus appreciated.   2.  Hepatomegaly with irregular hepatic contour favoring changes of cirrhosis   3.  8.0 mm nodular density in the right lung base. There is associated linear density suggesting scarring or atelectasis. See nodule follow-up recommendations below.      Fleischner Society pulmonary nodule recommendations:      Low Risk: CT at 6-12 months, then consider CT at 18-24 months      High Risk: CT at 6-12 months, then CT at 18-24 months      Low Risk - Minimal or absent history of smoking and of other known risk factors.      High Risk - History of smoking or of other known risk factors.      Note: These recommendations do not apply to lung cancer screening, patients with immunosuppression, or patients with known primary cancer.      Fleischner Society 2017 Guidelines for Management of Incidentally Detected Pulmonary Nodules in Adults         DX-CHEST-PORTABLE (1 VIEW)   Final Result         1.  No acute cardiopulmonary disease.       CL-LEFT HEART CATHETERIZATION WITH POSSIBLE INTERVENTION    (Results Pending)       Assessment/Plan  * Shock (HCC)- (present on admission)  Assessment & Plan    Now resolved.    Heart failure with reduced ejection fraction (HCC)  Assessment & Plan  He found to have heart failure with reduced ejection fraction.  Currently is on valsartan and spironolactone.  He found to have a left ventricular ejection fraction of 20 to 30%   Cardiology has been following him.  Continue monitor closely in IMCU.    Left ventricular thrombus- (present on admission)  Assessment & Plan  Cardiology commended anticoagulation with apixaban with therapeutic dose.  Continue Eliquis and monitor for bleeding.    Acute on chronic respiratory failure with hypoxia and hypercapnia (HCC)  Assessment & Plan  He has history of asthma.  Continue steroids  Titrate down  oxygen as tolerated.    Elevated liver enzymes- (present on admission)  Assessment & Plan  Most likely secondary to alcohol use but  His liver enzyme has been trending down.    Panhypopituitarism (HCC)- (present on admission)  Assessment & Plan  Continue steroids.  He will need outpatient follow-up with          I personally discussed Mr. Galicia current medical condition with intensivist Dr. Morrison

## 2023-02-01 NOTE — PROGRESS NOTES
Cardiology MD rounded on patient, ordered to decrease Dobutamine gtt to 1 now and turn off at 1800.

## 2023-02-01 NOTE — PROGRESS NOTES
Cardiology Progress Note    Name:   Alberto Galicia   YOB: 1955  Age:   67 y.o.  male   MRN:   [id number]    Attending Cardiologist: Dr Almaraz    Cc: Shortness of Breath       History of Present Illness  68yo male with extensive alcohol intake, panhypopituitarism, asthma here with increasing shortness of breath, severe orthopnea starting 3 nights ago. Admitted with cardiogenic shock and treated with BiPAP and inotropic support. Initial hs troponin was 1200, decreased to 600. No ACS changes on EKG.   TTE shows severely reduced LV systolic function, possible LV thrombus. Referred for coronary angiogram which showed no obstructive CAD. First norepi then dobutamine have been successfully weaned off as of 1/31 1700.      Interim Events   Denies orthopnea or PND. No leg swelling.   He is feeling well off BiPAP, getting frequent duo nebs and scheduled symbicort, he is most concerned about the plan for his asthma treated as an outpatient. He has a lot of concerns about affording his medications.   He denies any bleeding complications over night. We discuss need for complete alcohol cessation which he asks many questions about.         Review of Systems   Respiratory:  Negative for shortness of breath.    Cardiovascular:  Negative for chest pain, orthopnea, leg swelling and PND.   Gastrointestinal:  Negative for blood in stool and melena.   Genitourinary:  Negative for hematuria.   Neurological:  Negative for dizziness.   Psychiatric/Behavioral:  The patient is nervous/anxious.      Medical History  Past Surgical History:   Procedure Laterality Date    CRANIOTOMY         Family History   Problem Relation Age of Onset    Diabetes Mother     Heart Disease Father     Diabetes Brother     Alzheimer's Disease Maternal Grandmother     Cancer Maternal Grandfather     Heart Disease Paternal Grandmother     Cancer Paternal Grandfather        Social History     Tobacco Use   Smoking Status Never   Smokeless  "Tobacco Never       Allergies   Allergen Reactions    Pcn [Penicillins] Swelling         Medications   Scheduled Medications   Medication Dose Frequency    losartan  12.5 mg Q DAY    budesonide-formoterol  2 Puff BID (RT)    methylPREDNISolone  62.5 mg Q6HRS    apixaban  10 mg BID    Followed by    [START ON 2/7/2023] apixaban  5 mg BID    ipratropium-albuterol  3 mL Q4HRS (RT)    senna-docusate  2 Tablet BID    insulin regular  1-6 Units Q6HRS    multivitamin  1 Tablet DAILY    levothyroxine  175 mcg AM ES           Physical Exam  /59   Pulse (!) 105   Temp 36.5 °C (97.7 °F) (Temporal)   Resp (!) 25   Ht 1.93 m (6' 3.98\")   Wt 96.8 kg (213 lb 6.5 oz)   SpO2 96%     Physical Exam  Vitals reviewed.   HENT:      Head: Normocephalic and atraumatic.   Neck:      Comments: No JVD  Cardiovascular:      Rate and Rhythm: Normal rate and regular rhythm.   Pulmonary:      Effort: Pulmonary effort is normal.      Breath sounds: No wheezing or rales.   Abdominal:      General: There is no distension.   Musculoskeletal:      Right lower leg: No edema.      Left lower leg: No edema.      Comments: Right wrist is soft without hematoma or active bleeding. Distal radial pulse is 2+. Normal range of motion and sensation of right hand.      Skin:     General: Skin is warm and dry.   Neurological:      Mental Status: He is alert.   Psychiatric:         Judgment: Judgment normal.         Labs (personally reviewed):     Lab Results   Component Value Date/Time    SODIUM 135 02/01/2023 03:05 AM    POTASSIUM 4.0 02/01/2023 03:05 AM    CHLORIDE 103 02/01/2023 03:05 AM    CO2 21 02/01/2023 03:05 AM    GLUCOSE 171 (H) 02/01/2023 03:05 AM    BUN 13 02/01/2023 03:05 AM    CREATININE 0.64 02/01/2023 03:05 AM     Lab Results   Component Value Date/Time    CHOLSTRLTOT 151 01/31/2023 12:04 AM    LDL 88 01/31/2023 12:04 AM    HDL 49 01/31/2023 12:04 AM    TRIGLYCERIDE 69 01/31/2023 12:04 AM     No results found for: " BNPBTYPENAT      Cardiac Imaging and Procedures Review      Telemetry Review  SR-ST 's      Assessment and Medical Decision Making:  Cardiogenic shock, resolved  Acute HFrEF Stage C NYHA III on admission, new diagnosis  Nonischemic cardiomyopathy, presumed ETOH  - off intropic support for the last 12 hours. Blood pressures elevated which could indicate signs of withdrawal, HR 90-100s sinus rhythm.     - start losartan 12.5mg this morning and can re dose later today if no change in BP  - start spironolactone 25mg today  - hold beta blocker therapy for another 24hours or so    LV Thrombus  -  thrombus dosing of NOAC, continue for 3 months then repeat TTE   - I discussed anticoagulant options with Alberto including warfarin which is guideline recommended for LV thrombus versus NOAC. He prefers NOAC due to ease of dosing as long as insurance will cover the cost.     Primary Prevention  - normal A1c, LDL 88, normal coronary arteries, no statin recommended    ETOH dependency  - monitor for withdrawal, defer to primary team    Expect transfer to step down unit today. Slow addition of HF GDMT as above.   Please see Dr. Almaraz's attestation for additions and further recommendations.    Diane Wade PA-C  [facility]    I personally spent a total of 15 minutes which includes face-to-face time and non-face-to-face time spent on preparing to see the patient, reviewing hospital notes and tests, obtaining history from the patient, performing a medically appropriate exam, counseling and educating the patient, ordering medications/tests/procedures/referrals as clinically indicated, and documenting information in the electronic medical record.

## 2023-02-01 NOTE — CARE PLAN
The patient is Watcher - Medium risk of patient condition declining or worsening    Shift Goals  Clinical Goals: hemodynamic stability, improved O2  Patient Goals: Sleep  Family Goals: GRISEL    Progress made toward(s) clinical / shift goals:    Problem: Skin Integrity  Goal: Skin integrity is maintained or improved  Outcome: Progressing     Problem: Fall Risk  Goal: Patient will remain free from falls  Outcome: Progressing     Problem: Pain - Standard  Goal: Alleviation of pain or a reduction in pain to the patient’s comfort goal  Outcome: Progressing

## 2023-02-01 NOTE — PROGRESS NOTES
Leadership Skin Rounding completed.   New Wounds found: none  Interventions implemented:  grey foams added to oxymask tubing to offload ears

## 2023-02-01 NOTE — CARE PLAN
Problem: Bronchoconstriction  Goal: Improve in air movement and diminished wheezing  Description: Target End Date:  2 to 3 days    1.  Implement inhaled treatments  2.  Evaluate and manage medication effects  Outcome: Not Met   Sym   Duo 4

## 2023-02-02 PROBLEM — I21.4 NSTEMI (NON-ST ELEVATED MYOCARDIAL INFARCTION) (HCC): Status: RESOLVED | Noted: 2023-01-30 | Resolved: 2023-02-02

## 2023-02-02 PROBLEM — N17.9 AKI (ACUTE KIDNEY INJURY) (HCC): Status: ACTIVE | Noted: 2023-02-02

## 2023-02-02 LAB
ALBUMIN SERPL BCP-MCNC: 3.6 G/DL (ref 3.2–4.9)
ALBUMIN SERPL BCP-MCNC: 3.6 G/DL (ref 3.2–4.9)
ALP SERPL-CCNC: 79 U/L (ref 30–99)
ALP SERPL-CCNC: 80 U/L (ref 30–99)
ALT SERPL-CCNC: 39 U/L (ref 2–50)
ALT SERPL-CCNC: 42 U/L (ref 2–50)
ANION GAP SERPL CALC-SCNC: 8 MMOL/L (ref 7–16)
AST SERPL-CCNC: 57 U/L (ref 12–45)
AST SERPL-CCNC: 57 U/L (ref 12–45)
BILIRUB CONJ SERPL-MCNC: <0.2 MG/DL (ref 0.1–0.5)
BILIRUB CONJ SERPL-MCNC: <0.2 MG/DL (ref 0.1–0.5)
BILIRUB INDIRECT SERPL-MCNC: ABNORMAL MG/DL (ref 0–1)
BILIRUB INDIRECT SERPL-MCNC: ABNORMAL MG/DL (ref 0–1)
BILIRUB SERPL-MCNC: 0.4 MG/DL (ref 0.1–1.5)
BILIRUB SERPL-MCNC: 0.4 MG/DL (ref 0.1–1.5)
BUN SERPL-MCNC: 17 MG/DL (ref 8–22)
CALCIUM SERPL-MCNC: 8.5 MG/DL (ref 8.5–10.5)
CHLORIDE SERPL-SCNC: 102 MMOL/L (ref 96–112)
CO2 SERPL-SCNC: 23 MMOL/L (ref 20–33)
CREAT SERPL-MCNC: 0.65 MG/DL (ref 0.5–1.4)
ERYTHROCYTE [DISTWIDTH] IN BLOOD BY AUTOMATED COUNT: 52.7 FL (ref 35.9–50)
GFR SERPLBLD CREATININE-BSD FMLA CKD-EPI: 102 ML/MIN/1.73 M 2
GLUCOSE BLD STRIP.AUTO-MCNC: 124 MG/DL (ref 65–99)
GLUCOSE BLD STRIP.AUTO-MCNC: 145 MG/DL (ref 65–99)
GLUCOSE BLD STRIP.AUTO-MCNC: 149 MG/DL (ref 65–99)
GLUCOSE BLD STRIP.AUTO-MCNC: 186 MG/DL (ref 65–99)
GLUCOSE SERPL-MCNC: 161 MG/DL (ref 65–99)
HCT VFR BLD AUTO: 39.7 % (ref 42–52)
HGB BLD-MCNC: 13.1 G/DL (ref 14–18)
MAGNESIUM SERPL-MCNC: 2.4 MG/DL (ref 1.5–2.5)
MCH RBC QN AUTO: 34.1 PG (ref 27–33)
MCHC RBC AUTO-ENTMCNC: 33 G/DL (ref 33.7–35.3)
MCV RBC AUTO: 103.4 FL (ref 81.4–97.8)
PHOSPHATE SERPL-MCNC: 2.2 MG/DL (ref 2.5–4.5)
PLATELET # BLD AUTO: 168 K/UL (ref 164–446)
PMV BLD AUTO: 9.8 FL (ref 9–12.9)
POTASSIUM SERPL-SCNC: 4.2 MMOL/L (ref 3.6–5.5)
PROT SERPL-MCNC: 6.2 G/DL (ref 6–8.2)
PROT SERPL-MCNC: 6.2 G/DL (ref 6–8.2)
RBC # BLD AUTO: 3.84 M/UL (ref 4.7–6.1)
SODIUM SERPL-SCNC: 133 MMOL/L (ref 135–145)
WBC # BLD AUTO: 14.6 K/UL (ref 4.8–10.8)

## 2023-02-02 PROCEDURE — 99232 SBSQ HOSP IP/OBS MODERATE 35: CPT | Mod: FS | Performed by: NURSE PRACTITIONER

## 2023-02-02 PROCEDURE — 700102 HCHG RX REV CODE 250 W/ 637 OVERRIDE(OP): Performed by: INTERNAL MEDICINE

## 2023-02-02 PROCEDURE — 700102 HCHG RX REV CODE 250 W/ 637 OVERRIDE(OP): Performed by: EMERGENCY MEDICINE

## 2023-02-02 PROCEDURE — 84100 ASSAY OF PHOSPHORUS: CPT

## 2023-02-02 PROCEDURE — 80048 BASIC METABOLIC PNL TOTAL CA: CPT

## 2023-02-02 PROCEDURE — A9270 NON-COVERED ITEM OR SERVICE: HCPCS | Performed by: INTERNAL MEDICINE

## 2023-02-02 PROCEDURE — 770020 HCHG ROOM/CARE - TELE (206)

## 2023-02-02 PROCEDURE — 700111 HCHG RX REV CODE 636 W/ 250 OVERRIDE (IP)

## 2023-02-02 PROCEDURE — A9270 NON-COVERED ITEM OR SERVICE: HCPCS | Performed by: NURSE PRACTITIONER

## 2023-02-02 PROCEDURE — A9270 NON-COVERED ITEM OR SERVICE: HCPCS | Performed by: STUDENT IN AN ORGANIZED HEALTH CARE EDUCATION/TRAINING PROGRAM

## 2023-02-02 PROCEDURE — 80076 HEPATIC FUNCTION PANEL: CPT | Mod: 91

## 2023-02-02 PROCEDURE — 700102 HCHG RX REV CODE 250 W/ 637 OVERRIDE(OP): Performed by: NURSE PRACTITIONER

## 2023-02-02 PROCEDURE — 82962 GLUCOSE BLOOD TEST: CPT | Mod: 91

## 2023-02-02 PROCEDURE — A9270 NON-COVERED ITEM OR SERVICE: HCPCS | Performed by: PHYSICIAN ASSISTANT

## 2023-02-02 PROCEDURE — A9270 NON-COVERED ITEM OR SERVICE: HCPCS | Performed by: EMERGENCY MEDICINE

## 2023-02-02 PROCEDURE — 85027 COMPLETE CBC AUTOMATED: CPT

## 2023-02-02 PROCEDURE — 700101 HCHG RX REV CODE 250: Performed by: EMERGENCY MEDICINE

## 2023-02-02 PROCEDURE — 99232 SBSQ HOSP IP/OBS MODERATE 35: CPT | Performed by: INTERNAL MEDICINE

## 2023-02-02 PROCEDURE — 700102 HCHG RX REV CODE 250 W/ 637 OVERRIDE(OP): Performed by: PHYSICIAN ASSISTANT

## 2023-02-02 PROCEDURE — 99232 SBSQ HOSP IP/OBS MODERATE 35: CPT | Mod: FS | Performed by: INTERNAL MEDICINE

## 2023-02-02 PROCEDURE — 700102 HCHG RX REV CODE 250 W/ 637 OVERRIDE(OP): Performed by: STUDENT IN AN ORGANIZED HEALTH CARE EDUCATION/TRAINING PROGRAM

## 2023-02-02 PROCEDURE — 83735 ASSAY OF MAGNESIUM: CPT

## 2023-02-02 RX ORDER — BENZONATATE 100 MG/1
100 CAPSULE ORAL 3 TIMES DAILY PRN
Status: DISCONTINUED | OUTPATIENT
Start: 2023-02-02 | End: 2023-02-04 | Stop reason: HOSPADM

## 2023-02-02 RX ORDER — CALCIUM CARBONATE 500 MG/1
500 TABLET, CHEWABLE ORAL 3 TIMES DAILY PRN
Status: DISCONTINUED | OUTPATIENT
Start: 2023-02-02 | End: 2023-02-04 | Stop reason: HOSPADM

## 2023-02-02 RX ORDER — DAPAGLIFLOZIN 10 MG/1
10 TABLET, FILM COATED ORAL DAILY
Status: DISCONTINUED | OUTPATIENT
Start: 2023-02-02 | End: 2023-02-04 | Stop reason: HOSPADM

## 2023-02-02 RX ORDER — METOPROLOL SUCCINATE 25 MG/1
12.5 TABLET, EXTENDED RELEASE ORAL EVERY EVENING
Status: DISCONTINUED | OUTPATIENT
Start: 2023-02-02 | End: 2023-02-03

## 2023-02-02 RX ORDER — TAMSULOSIN HYDROCHLORIDE 0.4 MG/1
0.4 CAPSULE ORAL
Status: DISCONTINUED | OUTPATIENT
Start: 2023-02-02 | End: 2023-02-04 | Stop reason: HOSPADM

## 2023-02-02 RX ADMIN — LOSARTAN POTASSIUM 25 MG: 25 TABLET, FILM COATED ORAL at 05:19

## 2023-02-02 RX ADMIN — APIXABAN 10 MG: 5 TABLET, FILM COATED ORAL at 16:54

## 2023-02-02 RX ADMIN — SPIRONOLACTONE 25 MG: 25 TABLET ORAL at 05:20

## 2023-02-02 RX ADMIN — DIBASIC SODIUM PHOSPHATE, MONOBASIC POTASSIUM PHOSPHATE AND MONOBASIC SODIUM PHOSPHATE 500 MG: 852; 155; 130 TABLET ORAL at 16:54

## 2023-02-02 RX ADMIN — BUDESONIDE AND FORMOTEROL FUMARATE DIHYDRATE 2 PUFF: 160; 4.5 AEROSOL RESPIRATORY (INHALATION) at 05:20

## 2023-02-02 RX ADMIN — DAPAGLIFLOZIN 10 MG: 10 TABLET, FILM COATED ORAL at 13:52

## 2023-02-02 RX ADMIN — LEVOTHYROXINE SODIUM 175 MCG: 0.17 TABLET ORAL at 05:19

## 2023-02-02 RX ADMIN — SENNOSIDES AND DOCUSATE SODIUM 2 TABLET: 50; 8.6 TABLET ORAL at 20:06

## 2023-02-02 RX ADMIN — APIXABAN 10 MG: 5 TABLET, FILM COATED ORAL at 05:19

## 2023-02-02 RX ADMIN — INSULIN HUMAN 1 UNITS: 100 INJECTION, SOLUTION PARENTERAL at 12:10

## 2023-02-02 RX ADMIN — PREDNISONE 60 MG: 20 TABLET ORAL at 05:19

## 2023-02-02 RX ADMIN — DIBASIC SODIUM PHOSPHATE, MONOBASIC POTASSIUM PHOSPHATE AND MONOBASIC SODIUM PHOSPHATE 500 MG: 852; 155; 130 TABLET ORAL at 12:10

## 2023-02-02 RX ADMIN — METOPROLOL SUCCINATE 12.5 MG: 25 TABLET, EXTENDED RELEASE ORAL at 18:42

## 2023-02-02 RX ADMIN — ANTACID TABLETS 500 MG: 500 TABLET, CHEWABLE ORAL at 18:42

## 2023-02-02 RX ADMIN — DIBASIC SODIUM PHOSPHATE, MONOBASIC POTASSIUM PHOSPHATE AND MONOBASIC SODIUM PHOSPHATE 500 MG: 852; 155; 130 TABLET ORAL at 20:05

## 2023-02-02 RX ADMIN — TAMSULOSIN HYDROCHLORIDE 0.4 MG: 0.4 CAPSULE ORAL at 13:52

## 2023-02-02 RX ADMIN — LOSARTAN POTASSIUM 12.5 MG: 25 TABLET, FILM COATED ORAL at 06:00

## 2023-02-02 RX ADMIN — THERA TABS 1 TABLET: TAB at 05:20

## 2023-02-02 RX ADMIN — SENNOSIDES AND DOCUSATE SODIUM 2 TABLET: 50; 8.6 TABLET ORAL at 05:20

## 2023-02-02 RX ADMIN — BUDESONIDE AND FORMOTEROL FUMARATE DIHYDRATE 2 PUFF: 160; 4.5 AEROSOL RESPIRATORY (INHALATION) at 20:06

## 2023-02-02 RX ADMIN — BENZONATATE 100 MG: 100 CAPSULE ORAL at 03:47

## 2023-02-02 RX ADMIN — IPRATROPIUM BROMIDE AND ALBUTEROL SULFATE 3 ML: 2.5; .5 SOLUTION RESPIRATORY (INHALATION) at 00:38

## 2023-02-02 ASSESSMENT — ENCOUNTER SYMPTOMS
NECK PAIN: 0
ABDOMINAL PAIN: 0
SPUTUM PRODUCTION: 0
EYE PAIN: 0
WEIGHT LOSS: 0
TREMORS: 0
VOMITING: 0
WHEEZING: 1
DIZZINESS: 0
SHORTNESS OF BREATH: 0
DIARRHEA: 0
FATIGUE: 1
MYALGIAS: 0
FEVER: 0
SPEECH CHANGE: 0
TINGLING: 0
PALPITATIONS: 0
CHILLS: 0
FOCAL WEAKNESS: 0
COUGH: 1
BACK PAIN: 0
SENSORY CHANGE: 0
BLURRED VISION: 0
ORTHOPNEA: 0
HALLUCINATIONS: 0
STRIDOR: 0
CHEST TIGHTNESS: 0
PHOTOPHOBIA: 0
CHOKING: 0
DOUBLE VISION: 0
SHORTNESS OF BREATH: 1
APNEA: 0
NAUSEA: 0
HEADACHES: 0
COUGH: 0
CONSTIPATION: 0

## 2023-02-02 ASSESSMENT — PAIN DESCRIPTION - PAIN TYPE
TYPE: ACUTE PAIN

## 2023-02-02 ASSESSMENT — PATIENT HEALTH QUESTIONNAIRE - PHQ9
1. LITTLE INTEREST OR PLEASURE IN DOING THINGS: NOT AT ALL
2. FEELING DOWN, DEPRESSED, IRRITABLE, OR HOPELESS: NOT AT ALL
SUM OF ALL RESPONSES TO PHQ9 QUESTIONS 1 AND 2: 0

## 2023-02-02 ASSESSMENT — FIBROSIS 4 INDEX: FIB4 SCORE: 3.09

## 2023-02-02 ASSESSMENT — LIFESTYLE VARIABLES: SUBSTANCE_ABUSE: 0

## 2023-02-02 NOTE — PROGRESS NOTES
Patient only able to dribble when trying to void. Reports feeling urge to void and burning sensation when attempted. Bladder scan revealed 591mL. Order for straight cath obtained from Dr. Severino. Patient straight cathed by this RN using sterile technique, moderate amount of resistance met. 525mL clear straw-yellow colored urine returned.

## 2023-02-02 NOTE — CARE PLAN
The patient is Stable - Low risk of patient condition declining or worsening    Shift Goals  Clinical Goals: hemodynamic stability  Patient Goals: rest  Family Goals: GRISEL    Progress made toward(s) clinical / shift goals:      Problem: Knowledge Deficit - Standard  Goal: Patient and family/care givers will demonstrate understanding of plan of care, disease process/condition, diagnostic tests and medications  Outcome: Progressing     Problem: Skin Integrity  Goal: Skin integrity is maintained or improved  Outcome: Progressing     Problem: Fall Risk  Goal: Patient will remain free from falls  Outcome: Progressing     Problem: Pain - Standard  Goal: Alleviation of pain or a reduction in pain to the patient’s comfort goal  Outcome: Progressing     Problem: Optimal Care for Alcohol Withdrawal  Goal: Optimal Care for the alcohol withdrawal patient  Outcome: Progressing     Problem: Psychosocial  Goal: Patient's level of anxiety will decrease  Outcome: Progressing     Problem: Risk for Aspiration  Goal: Patient's risk for aspiration will be absent or decrease  Outcome: Progressing       Patient is not progressing towards the following goals:

## 2023-02-02 NOTE — PROGRESS NOTES
Patient's vazquez was removed at 1200 today and patient has not urinated since. Patient educated on the need to bladder scan and potentially straight cath if unable to urinate within 6 hours. Patient refusing all interventions at this time.

## 2023-02-02 NOTE — PROGRESS NOTES
Cardiology Follow Up Progress Note    Date of Service  2/2/2023    Attending Physician  SHILOH Calvert*    Chief Complaint   Cardiology consultation for evaluation for cardiogenic shock.  Treated with BiPAP and dobutamine support.  Elevated troponins.  No ACS on EKG.  Echo revealed LVEF 20 to 30%.  LV thrombus    HPI  Alberto Galicia is a 67 y.o. male with no prior cardiac history, extensive alcohol use/asthma, hypothyroid, pituitary insufficiency admitted 1/29/2023 with acute respiratory distress.    Interim Events    No overnight cardiac events  BP appropriate  Telemetry-SR  Encouraged out of bed  Standing weight pending  Denies orthopnea, no lower extremity edema.    Review of Systems  Review of Systems   Constitutional:  Positive for fatigue.   Respiratory:  Positive for cough and wheezing. Negative for apnea, choking, chest tightness, shortness of breath and stridor.      Vital signs in last 24 hours  Temp:  [35.7 °C (96.3 °F)-36.7 °C (98 °F)] 35.7 °C (96.3 °F)  Pulse:  [] 78  Resp:  [16-34] 27  BP: (110-154)/(73-93) 125/78  SpO2:  [90 %-99 %] 99 %    Physical Exam  Physical Exam  Cardiovascular:      Rate and Rhythm: Normal rate and regular rhythm.      Pulses: Normal pulses.   Pulmonary:      Effort: Pulmonary effort is normal.      Breath sounds: Wheezing present.   Skin:     General: Skin is warm.      Comments: Right radial cath site without evidence of hematoma   Neurological:      Mental Status: He is alert. Mental status is at baseline.   Psychiatric:         Mood and Affect: Mood normal.       Lab Review  Lab Results   Component Value Date/Time    WBC 14.6 (H) 02/02/2023 05:10 AM    RBC 3.84 (L) 02/02/2023 05:10 AM    HEMOGLOBIN 13.1 (L) 02/02/2023 05:10 AM    HEMATOCRIT 39.7 (L) 02/02/2023 05:10 AM    .4 (H) 02/02/2023 05:10 AM    MCH 34.1 (H) 02/02/2023 05:10 AM    MCHC 33.0 (L) 02/02/2023 05:10 AM    MPV 9.8 02/02/2023 05:10 AM      Lab Results   Component Value Date/Time     SODIUM 133 (L) 02/02/2023 05:10 AM    POTASSIUM 4.2 02/02/2023 05:10 AM    CHLORIDE 102 02/02/2023 05:10 AM    CO2 23 02/02/2023 05:10 AM    GLUCOSE 161 (H) 02/02/2023 05:10 AM    BUN 17 02/02/2023 05:10 AM    CREATININE 0.65 02/02/2023 05:10 AM      Lab Results   Component Value Date/Time    ASTSGOT 57 (H) 02/02/2023 05:10 AM    ALTSGPT 42 02/02/2023 05:10 AM     Lab Results   Component Value Date/Time    CHOLSTRLTOT 151 01/31/2023 12:04 AM    LDL 88 01/31/2023 12:04 AM    HDL 49 01/31/2023 12:04 AM    TRIGLYCERIDE 69 01/31/2023 12:04 AM    TROPONINT 312 (H) 01/30/2023 03:46 PM       No results for input(s): NTPROBNP in the last 72 hours.    Cardiac Imaging and Procedures Review  EKG:  My personal interpretation of the EKG dated 1/29/2023 is sinus tach.    Echocardiogram:  1/30/23  No prior study is available for comparison.   Normal LV size with reduced LV systolic function and estimated LVEF 20-30%.  Global hypokinesis with akinesis of the mid-distal septal, anterior and apical segments.  LV thrombus can not be accurtaley excluded on this study, less likely.Reduced right ventricular systolic function  Reduced estimated CO 4L/min and CI 1.7 L/min.m2.  Mild-moderate TR  RVSP estimated at 35mmHg  Dilated IVC  No pericardial effusion          Cardiac Catheterization:    Final impression:  Angiographically normal appearing coronary arteries.       Imaging  Chest X-Ray:   No acute cardiac or pulmonary abnormality is identified.    Stress Test:  n/a            CTA chest 1/30/23  1.  No pulmonary embolus appreciated.  2.  Hepatomegaly with irregular hepatic contour favoring changes of cirrhosis  3.  8.0 mm nodular density in the right lung base. There is associated linear density suggesting scarring or atelectasis. See nodule follow-up recommendations below.      Assessment/Plan    #Cardiogenic shock, resolved  #Severe nonischemic cardiomyopathy LVEF 20 to 30%, new diagnosis.  #HFrEF, acute NYHA class III stage C  #New  findings of LV thrombus  #Elevated troponins due to demand ischemia  #C 1/30/2023 revealed angiographically normal-appearing coronary arteries.  #History of extensive alcohol use.  #Elevated liver enzyme  #Pituitary insufficiency  #Asthma    Recommendations  -For LV thrombus continue apixaban bolus x 7 days followed by 5 twice daily.  -GDMT for severe cardiomyopathy continue losartan 25 (eventually Entresto), spironolactone 25 mg.  -Initiate farxiga 10 mg daily  -Initiate low dose BB  -We discussed complete abstinence from alcohol, patient is in agreement.      No further cardiac work-up  Cardiology will sign off  Follow-up appointments in heart failure clinic 2/15/2023 at 3 PM      I personally spent a total of 25 minutes which includes face-to-face time and non-face-to-face time spent on preparing to see the patient, reviewing hospital notes and tests, obtaining history from the patient, performing a medically appropriate exam, counseling and educating the patient, ordering medications/tests/procedures/referrals as clinically indicated, and documenting information in the electronic medical record.       Thank you for allowing me to participate in the care of this patient.  I will continue to follow this patient    Please contact me with any questions.    LINA Grimaldo.   Cardiologist, [facility]  [phone]

## 2023-02-02 NOTE — PROGRESS NOTES
Hospital Medicine Daily Progress Note    Date of Service  2/2/2023    Chief Complaint  Alberto Galicia is a 67 y.o. male admitted 1/29/2023 with shortness of breath    Hospital Course    67 y.o. 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  In the to the hospital patient received 1.3 mg of epinephrine, Solu-Medrol and magnesium infusion.  On arrival patient was on nonrebreather and he was switched to BiPAP.  During his hospitalization patient also reported that he drinks 1/5 per day for many years he is never developed alcohol withdrawal.  Patient found to have elevated troponin, hypotension and found to have LVEF of 20 to 30%.  Cardiology was consulted.  Patient also found to have LV thrombus and he was placed on an event repeat TTE.     on Feb 1, 2023 intensivist Dr. Mora requested hospitalist service for transfer of care and continue management for acute heart failure with reduced ejection fraction management.     I evaluated and examined him at the bedside.  He reported his shortness of breath is significantly improved.  He is using 5 L of nasal cannula to maintain oxygen saturation.  Currently he is mildly tachycardic with current heart rate 105.  Current lab work-up showed white blood cell count of 16.2, hemoglobin 12.5 and platelet count of 179.  He found to have sodium of 135, potassium of 3.8 and glucose of 217.  He found to have AST of 49 and ALT of 37.  He found to have low Phos of 1.8.  Cardiology has been following him.    Interval Problem Update  02/02/23    I evaluated and examined him at the bedside.  He reported that he is feeling better and his shortness of  breath has been improving.  I discussed plan of care with cardiology if he remains stable plan is to discharge him tomorrow  I discussed plan of care with patient and answered all his questions were  Discussed plan of care with cardiology team.    I have discussed this patient's plan of care and discharge plan at IDT rounds today with Case Management, Nursing, Nursing leadership, and other members of the IDT team.    Consultants/Specialty  cardiology and critical care    Code Status  Full Code    Disposition  Patient is not medically cleared for discharge.   Anticipate discharge to to home with close outpatient follow-up.  I have placed the appropriate orders for post-discharge needs.    Review of Systems  Review of Systems   Constitutional:  Negative for chills, fever and weight loss.   HENT:  Negative for hearing loss and tinnitus.    Eyes:  Negative for blurred vision, double vision, photophobia and pain.   Respiratory:  Positive for shortness of breath (Improving). Negative for cough and sputum production.    Cardiovascular:  Negative for chest pain, palpitations, orthopnea and leg swelling.   Gastrointestinal:  Negative for abdominal pain, constipation, diarrhea, nausea and vomiting.   Genitourinary:  Negative for dysuria, frequency and urgency.   Musculoskeletal:  Negative for back pain, joint pain, myalgias and neck pain.   Skin:  Negative for rash.   Neurological:  Negative for dizziness, tingling, tremors, sensory change, speech change, focal weakness and headaches.   Psychiatric/Behavioral:  Negative for hallucinations and substance abuse.    All other systems reviewed and are negative.     Physical Exam  Temp:  [35.7 °C (96.3 °F)-36.7 °C (98 °F)] 35.7 °C (96.3 °F)  Pulse:  [] 83  Resp:  [16-34] 26  BP: (107-137)/(67-84) 135/84  SpO2:  [90 %-99 %] 94 %    Physical Exam  Vitals reviewed.   Constitutional:       General: He is not in acute distress.  HENT:      Head: Normocephalic and atraumatic. No  contusion.      Right Ear: External ear normal.      Left Ear: External ear normal.      Nose: Nose normal.      Comments: Oxygen nasal cannula     Mouth/Throat:      Pharynx: No oropharyngeal exudate.   Eyes:      General:         Right eye: No discharge.         Left eye: No discharge.      Pupils: Pupils are equal, round, and reactive to light.   Cardiovascular:      Rate and Rhythm: Normal rate and regular rhythm.      Heart sounds: No murmur heard.    No friction rub. No gallop.   Pulmonary:      Effort: Pulmonary effort is normal.      Breath sounds: Wheezing (Mild) present. No rhonchi.   Abdominal:      General: Bowel sounds are normal. There is no distension.      Palpations: Abdomen is soft.      Tenderness: There is no abdominal tenderness. There is no rebound.   Musculoskeletal:         General: No swelling or tenderness. Normal range of motion.      Cervical back: No rigidity. No muscular tenderness.      Right lower leg: Edema present.      Left lower leg: Edema present.   Skin:     General: Skin is warm and dry.      Coloration: Skin is not jaundiced.   Neurological:      General: No focal deficit present.      Mental Status: He is alert.      Cranial Nerves: No cranial nerve deficit.      Sensory: No sensory deficit.   Psychiatric:         Mood and Affect: Mood normal.       Fluids    Intake/Output Summary (Last 24 hours) at 2/2/2023 1534  Last data filed at 2/2/2023 1300  Gross per 24 hour   Intake 840 ml   Output 625 ml   Net 215 ml       Laboratory  Recent Labs     01/31/23  0411 02/01/23  0540 02/02/23  0510   WBC 18.1* 16.2* 14.6*   RBC 3.82* 3.71* 3.84*   HEMOGLOBIN 12.9* 12.5* 13.1*   HEMATOCRIT 38.3* 37.9* 39.7*   .3* 102.2* 103.4*   MCH 33.8* 33.7* 34.1*   MCHC 33.7 33.0* 33.0*   RDW 48.5 51.0* 52.7*   PLATELETCT 203 179 168   MPV 9.7 9.8 9.8     Recent Labs     02/01/23  1125 02/01/23  2134 02/02/23  0510   SODIUM 135 138 133*   POTASSIUM 3.8 4.4 4.2   CHLORIDE 104 107 102   CO2 20 21  23   GLUCOSE 217* 189* 161*   BUN 14 15 17   CREATININE 0.69 0.69 0.65   CALCIUM 8.7 8.6 8.5             Recent Labs     01/31/23  0004   TRIGLYCERIDE 69   HDL 49   LDL 88       Imaging  DX-CHEST-LIMITED (1 VIEW)   Final Result         No acute cardiac or pulmonary abnormality is identified.      US-EXTREMITY ARTERY LOWER BILAT W/SARITA (COMBO)   Final Result      US-RUQ   Final Result      1.  Borderline cardiomegaly with densely increased echogenicity consistent with hepatocellular disease or fatty liver.   2.  Cholelithiasis.   3.  Borderline enlargement of the main portal vein (greater than 13 mm) recent possibility of portal hypertension.   4.  There is no ascites.      EC-ECHOCARDIOGRAM COMPLETE W/ CONT   Final Result      DX-CHEST-PORTABLE (1 VIEW)   Final Result         1.  No acute cardiopulmonary disease.      DX-CHEST-PORTABLE (1 VIEW)   Final Result         1.  No acute cardiopulmonary disease.      CT-CTA CHEST PULMONARY ARTERY W/ RECONS   Final Result         1.  No pulmonary embolus appreciated.   2.  Hepatomegaly with irregular hepatic contour favoring changes of cirrhosis   3.  8.0 mm nodular density in the right lung base. There is associated linear density suggesting scarring or atelectasis. See nodule follow-up recommendations below.      Fleischner Society pulmonary nodule recommendations:      Low Risk: CT at 6-12 months, then consider CT at 18-24 months      High Risk: CT at 6-12 months, then CT at 18-24 months      Low Risk - Minimal or absent history of smoking and of other known risk factors.      High Risk - History of smoking or of other known risk factors.      Note: These recommendations do not apply to lung cancer screening, patients with immunosuppression, or patients with known primary cancer.      Fleischner Society 2017 Guidelines for Management of Incidentally Detected Pulmonary Nodules in Adults         DX-CHEST-PORTABLE (1 VIEW)   Final Result         1.  No acute cardiopulmonary  disease.      CL-LEFT HEART CATHETERIZATION WITH POSSIBLE INTERVENTION    (Results Pending)        Assessment/Plan  * Shock (HCC)- (present on admission)  Assessment & Plan    Now resolved.    Heart failure with reduced ejection fraction (HCC)  Assessment & Plan  He found to have heart failure with reduced ejection fraction.  Currently is on valsartan and spironolactone.  He found to have a left ventricular ejection fraction of 20 to 30%   I discussed plan of care with cardiology per  Started him on small dose of beta-blocker metoprolol XL 12.5 mg  If he remains stable plan is to discharge him tomorrow.      Left ventricular thrombus- (present on admission)  Assessment & Plan  Cardiology commended anticoagulation with apixaban with therapeutic dose.  Continue Eliquis and monitor for bleeding.    Acute on chronic respiratory failure with hypoxia and hypercapnia (HCC)  Assessment & Plan  He has history of asthma.  Continue steroids  Titrate down  oxygen as tolerated.    Elevated liver enzymes- (present on admission)  Assessment & Plan  Most likely secondary to alcohol use but  His liver enzyme has been trending down.    Panhypopituitarism (HCC)- (present on admission)  Assessment & Plan  Continue steroids.  He will need outpatient follow-up with      I personally discussed case with cardiologist Dr. Almaraz regarding patient's current medical condition and plan of care Twila    I discussed plan of care during multidisciplinary rounds regarding patient's current medical condition and plan of care.      VTE prophylaxis: therapeutic anticoagulation with Eliquis    I have performed a physical exam and reviewed and updated ROS and Plan today (2/2/2023). In review of yesterday's note (2/1/2023), there are no changes except as documented above.

## 2023-02-03 PROBLEM — I48.91 ATRIAL FIBRILLATION WITH RAPID VENTRICULAR RESPONSE (HCC): Status: ACTIVE | Noted: 2023-02-03

## 2023-02-03 LAB
ALBUMIN SERPL BCP-MCNC: 2.9 G/DL (ref 3.2–4.9)
ALBUMIN SERPL BCP-MCNC: 3.1 G/DL (ref 3.2–4.9)
ALBUMIN SERPL BCP-MCNC: 3.4 G/DL (ref 3.2–4.9)
ALBUMIN SERPL BCP-MCNC: 3.8 G/DL (ref 3.2–4.9)
ALBUMIN/GLOB SERPL: 1.3 G/DL
ALP SERPL-CCNC: 86 U/L (ref 30–99)
ALP SERPL-CCNC: 87 U/L (ref 30–99)
ALP SERPL-CCNC: 88 U/L (ref 30–99)
ALP SERPL-CCNC: 93 U/L (ref 30–99)
ALT SERPL-CCNC: 53 U/L (ref 2–50)
ALT SERPL-CCNC: 58 U/L (ref 2–50)
ALT SERPL-CCNC: 63 U/L (ref 2–50)
ALT SERPL-CCNC: 64 U/L (ref 2–50)
ANION GAP SERPL CALC-SCNC: 11 MMOL/L (ref 7–16)
AST SERPL-CCNC: 74 U/L (ref 12–45)
AST SERPL-CCNC: 85 U/L (ref 12–45)
AST SERPL-CCNC: 89 U/L (ref 12–45)
AST SERPL-CCNC: 91 U/L (ref 12–45)
BILIRUB CONJ SERPL-MCNC: 0.2 MG/DL (ref 0.1–0.5)
BILIRUB CONJ SERPL-MCNC: <0.2 MG/DL (ref 0.1–0.5)
BILIRUB INDIRECT SERPL-MCNC: 0.4 MG/DL (ref 0–1)
BILIRUB INDIRECT SERPL-MCNC: ABNORMAL MG/DL (ref 0–1)
BILIRUB INDIRECT SERPL-MCNC: ABNORMAL MG/DL (ref 0–1)
BILIRUB INDIRECT SERPL-MCNC: NORMAL MG/DL (ref 0–1)
BILIRUB SERPL-MCNC: 0.4 MG/DL (ref 0.1–1.5)
BILIRUB SERPL-MCNC: 0.4 MG/DL (ref 0.1–1.5)
BILIRUB SERPL-MCNC: 0.5 MG/DL (ref 0.1–1.5)
BILIRUB SERPL-MCNC: 0.6 MG/DL (ref 0.1–1.5)
BUN SERPL-MCNC: 19 MG/DL (ref 8–22)
CALCIUM ALBUM COR SERPL-MCNC: 9.2 MG/DL (ref 8.5–10.5)
CALCIUM SERPL-MCNC: 9 MG/DL (ref 8.5–10.5)
CHLORIDE SERPL-SCNC: 105 MMOL/L (ref 96–112)
CO2 SERPL-SCNC: 23 MMOL/L (ref 20–33)
CREAT SERPL-MCNC: 0.7 MG/DL (ref 0.5–1.4)
EKG IMPRESSION: NORMAL
ERYTHROCYTE [DISTWIDTH] IN BLOOD BY AUTOMATED COUNT: 53 FL (ref 35.9–50)
GFR SERPLBLD CREATININE-BSD FMLA CKD-EPI: 100 ML/MIN/1.73 M 2
GLOBULIN SER CALC-MCNC: 3 G/DL (ref 1.9–3.5)
GLUCOSE BLD STRIP.AUTO-MCNC: 103 MG/DL (ref 65–99)
GLUCOSE BLD STRIP.AUTO-MCNC: 109 MG/DL (ref 65–99)
GLUCOSE BLD STRIP.AUTO-MCNC: 110 MG/DL (ref 65–99)
GLUCOSE BLD STRIP.AUTO-MCNC: 168 MG/DL (ref 65–99)
GLUCOSE SERPL-MCNC: 112 MG/DL (ref 65–99)
HCT VFR BLD AUTO: 45.1 % (ref 42–52)
HGB BLD-MCNC: 14.5 G/DL (ref 14–18)
MAGNESIUM SERPL-MCNC: 2.4 MG/DL (ref 1.5–2.5)
MCH RBC QN AUTO: 33.3 PG (ref 27–33)
MCHC RBC AUTO-ENTMCNC: 32.2 G/DL (ref 33.7–35.3)
MCV RBC AUTO: 103.7 FL (ref 81.4–97.8)
PHOSPHATE SERPL-MCNC: 3 MG/DL (ref 2.5–4.5)
PLATELET # BLD AUTO: 197 K/UL (ref 164–446)
PMV BLD AUTO: 9.9 FL (ref 9–12.9)
POTASSIUM SERPL-SCNC: 4.1 MMOL/L (ref 3.6–5.5)
PROCALCITONIN SERPL-MCNC: 0.08 NG/ML
PROT SERPL-MCNC: 5.6 G/DL (ref 6–8.2)
PROT SERPL-MCNC: 5.9 G/DL (ref 6–8.2)
PROT SERPL-MCNC: 6 G/DL (ref 6–8.2)
PROT SERPL-MCNC: 6.8 G/DL (ref 6–8.2)
RBC # BLD AUTO: 4.35 M/UL (ref 4.7–6.1)
SODIUM SERPL-SCNC: 139 MMOL/L (ref 135–145)
WBC # BLD AUTO: 15.2 K/UL (ref 4.8–10.8)

## 2023-02-03 PROCEDURE — 84145 PROCALCITONIN (PCT): CPT

## 2023-02-03 PROCEDURE — 700105 HCHG RX REV CODE 258: Performed by: NURSE PRACTITIONER

## 2023-02-03 PROCEDURE — 99291 CRITICAL CARE FIRST HOUR: CPT | Performed by: INTERNAL MEDICINE

## 2023-02-03 PROCEDURE — 700101 HCHG RX REV CODE 250: Performed by: STUDENT IN AN ORGANIZED HEALTH CARE EDUCATION/TRAINING PROGRAM

## 2023-02-03 PROCEDURE — 700111 HCHG RX REV CODE 636 W/ 250 OVERRIDE (IP)

## 2023-02-03 PROCEDURE — 700102 HCHG RX REV CODE 250 W/ 637 OVERRIDE(OP): Performed by: PHYSICIAN ASSISTANT

## 2023-02-03 PROCEDURE — A9270 NON-COVERED ITEM OR SERVICE: HCPCS | Performed by: NURSE PRACTITIONER

## 2023-02-03 PROCEDURE — 93005 ELECTROCARDIOGRAM TRACING: CPT | Performed by: STUDENT IN AN ORGANIZED HEALTH CARE EDUCATION/TRAINING PROGRAM

## 2023-02-03 PROCEDURE — 83735 ASSAY OF MAGNESIUM: CPT

## 2023-02-03 PROCEDURE — 770020 HCHG ROOM/CARE - TELE (206)

## 2023-02-03 PROCEDURE — 82248 BILIRUBIN DIRECT: CPT

## 2023-02-03 PROCEDURE — 80053 COMPREHEN METABOLIC PANEL: CPT

## 2023-02-03 PROCEDURE — 93010 ELECTROCARDIOGRAM REPORT: CPT | Performed by: INTERNAL MEDICINE

## 2023-02-03 PROCEDURE — 82962 GLUCOSE BLOOD TEST: CPT | Mod: 91

## 2023-02-03 PROCEDURE — A9270 NON-COVERED ITEM OR SERVICE: HCPCS | Performed by: EMERGENCY MEDICINE

## 2023-02-03 PROCEDURE — A9270 NON-COVERED ITEM OR SERVICE: HCPCS | Performed by: PHYSICIAN ASSISTANT

## 2023-02-03 PROCEDURE — 700111 HCHG RX REV CODE 636 W/ 250 OVERRIDE (IP): Performed by: NURSE PRACTITIONER

## 2023-02-03 PROCEDURE — 85027 COMPLETE CBC AUTOMATED: CPT

## 2023-02-03 PROCEDURE — 700102 HCHG RX REV CODE 250 W/ 637 OVERRIDE(OP): Performed by: INTERNAL MEDICINE

## 2023-02-03 PROCEDURE — 80076 HEPATIC FUNCTION PANEL: CPT | Mod: 91

## 2023-02-03 PROCEDURE — A9270 NON-COVERED ITEM OR SERVICE: HCPCS | Performed by: INTERNAL MEDICINE

## 2023-02-03 PROCEDURE — 99291 CRITICAL CARE FIRST HOUR: CPT | Performed by: STUDENT IN AN ORGANIZED HEALTH CARE EDUCATION/TRAINING PROGRAM

## 2023-02-03 PROCEDURE — 84100 ASSAY OF PHOSPHORUS: CPT

## 2023-02-03 PROCEDURE — 700101 HCHG RX REV CODE 250

## 2023-02-03 PROCEDURE — 700102 HCHG RX REV CODE 250 W/ 637 OVERRIDE(OP): Performed by: NURSE PRACTITIONER

## 2023-02-03 PROCEDURE — 700102 HCHG RX REV CODE 250 W/ 637 OVERRIDE(OP): Performed by: EMERGENCY MEDICINE

## 2023-02-03 RX ORDER — DIGOXIN 0.25 MG/ML
250 INJECTION INTRAMUSCULAR; INTRAVENOUS EVERY 6 HOURS
Status: DISCONTINUED | OUTPATIENT
Start: 2023-02-03 | End: 2023-02-03

## 2023-02-03 RX ORDER — ADENOSINE 3 MG/ML
6 INJECTION, SOLUTION INTRAVENOUS ONCE
Status: DISCONTINUED | OUTPATIENT
Start: 2023-02-03 | End: 2023-02-03

## 2023-02-03 RX ORDER — METOPROLOL TARTRATE 1 MG/ML
5 INJECTION, SOLUTION INTRAVENOUS
Status: COMPLETED | OUTPATIENT
Start: 2023-02-03 | End: 2023-02-03

## 2023-02-03 RX ORDER — DEXTROSE MONOHYDRATE 50 MG/ML
INJECTION, SOLUTION INTRAVENOUS CONTINUOUS
Status: DISCONTINUED | OUTPATIENT
Start: 2023-02-03 | End: 2023-02-04

## 2023-02-03 RX ORDER — METOPROLOL TARTRATE 1 MG/ML
INJECTION, SOLUTION INTRAVENOUS
Status: COMPLETED
Start: 2023-02-03 | End: 2023-02-03

## 2023-02-03 RX ORDER — DIGOXIN 0.25 MG/ML
500 INJECTION INTRAMUSCULAR; INTRAVENOUS ONCE
Status: COMPLETED | OUTPATIENT
Start: 2023-02-03 | End: 2023-02-03

## 2023-02-03 RX ORDER — METOPROLOL TARTRATE 1 MG/ML
5 INJECTION, SOLUTION INTRAVENOUS
Status: DISCONTINUED | OUTPATIENT
Start: 2023-02-03 | End: 2023-02-04 | Stop reason: HOSPADM

## 2023-02-03 RX ORDER — DIGOXIN 0.25 MG/ML
INJECTION INTRAMUSCULAR; INTRAVENOUS
Status: COMPLETED
Start: 2023-02-03 | End: 2023-02-03

## 2023-02-03 RX ORDER — METOPROLOL TARTRATE 1 MG/ML
5 INJECTION, SOLUTION INTRAVENOUS ONCE
Status: COMPLETED | OUTPATIENT
Start: 2023-02-03 | End: 2023-02-03

## 2023-02-03 RX ADMIN — METOPROLOL TARTRATE 5 MG: 1 INJECTION, SOLUTION INTRAVENOUS at 10:38

## 2023-02-03 RX ADMIN — SPIRONOLACTONE 25 MG: 25 TABLET ORAL at 04:12

## 2023-02-03 RX ADMIN — DIGOXIN 500 MCG: 0.25 INJECTION INTRAMUSCULAR; INTRAVENOUS at 10:55

## 2023-02-03 RX ADMIN — TAMSULOSIN HYDROCHLORIDE 0.4 MG: 0.4 CAPSULE ORAL at 08:28

## 2023-02-03 RX ADMIN — THERA TABS 1 TABLET: TAB at 04:13

## 2023-02-03 RX ADMIN — AMIODARONE HYDROCHLORIDE 1 MG/MIN: 1.8 INJECTION, SOLUTION INTRAVENOUS at 13:01

## 2023-02-03 RX ADMIN — LEVOTHYROXINE SODIUM 175 MCG: 0.17 TABLET ORAL at 04:13

## 2023-02-03 RX ADMIN — ANTACID TABLETS 500 MG: 500 TABLET, CHEWABLE ORAL at 15:21

## 2023-02-03 RX ADMIN — INSULIN HUMAN 2 UNITS: 100 INJECTION, SOLUTION PARENTERAL at 11:26

## 2023-02-03 RX ADMIN — METOPROLOL TARTRATE 5 MG: 1 INJECTION, SOLUTION INTRAVENOUS at 11:17

## 2023-02-03 RX ADMIN — BUDESONIDE AND FORMOTEROL FUMARATE DIHYDRATE 2 PUFF: 160; 4.5 AEROSOL RESPIRATORY (INHALATION) at 17:13

## 2023-02-03 RX ADMIN — BUDESONIDE AND FORMOTEROL FUMARATE DIHYDRATE 2 PUFF: 160; 4.5 AEROSOL RESPIRATORY (INHALATION) at 04:13

## 2023-02-03 RX ADMIN — SENNOSIDES AND DOCUSATE SODIUM 2 TABLET: 50; 8.6 TABLET ORAL at 17:13

## 2023-02-03 RX ADMIN — METOPROLOL TARTRATE 25 MG: 25 TABLET, FILM COATED ORAL at 13:48

## 2023-02-03 RX ADMIN — PREDNISONE 60 MG: 20 TABLET ORAL at 04:13

## 2023-02-03 RX ADMIN — APIXABAN 10 MG: 5 TABLET, FILM COATED ORAL at 17:13

## 2023-02-03 RX ADMIN — DEXTROSE MONOHYDRATE: 50 INJECTION, SOLUTION INTRAVENOUS at 12:28

## 2023-02-03 RX ADMIN — APIXABAN 10 MG: 5 TABLET, FILM COATED ORAL at 04:13

## 2023-02-03 RX ADMIN — LOSARTAN POTASSIUM 25 MG: 25 TABLET, FILM COATED ORAL at 04:13

## 2023-02-03 RX ADMIN — METOPROLOL TARTRATE 5 MG: 1 INJECTION, SOLUTION INTRAVENOUS at 10:48

## 2023-02-03 RX ADMIN — DAPAGLIFLOZIN 10 MG: 10 TABLET, FILM COATED ORAL at 04:13

## 2023-02-03 RX ADMIN — AMIODARONE HYDROCHLORIDE 0.5 MG/MIN: 1.8 INJECTION, SOLUTION INTRAVENOUS at 18:24

## 2023-02-03 RX ADMIN — DEXTROSE MONOHYDRATE 150 MG: 50 INJECTION, SOLUTION INTRAVENOUS at 12:47

## 2023-02-03 ASSESSMENT — ENCOUNTER SYMPTOMS
SPUTUM PRODUCTION: 0
PHOTOPHOBIA: 0
VOMITING: 0
TREMORS: 0
SHORTNESS OF BREATH: 1
PALPITATIONS: 0
TINGLING: 0
ABDOMINAL PAIN: 0
DIZZINESS: 0
BACK PAIN: 0
DIARRHEA: 0
CONSTIPATION: 0
COUGH: 0
ORTHOPNEA: 0
NECK PAIN: 0
FOCAL WEAKNESS: 0
WEIGHT LOSS: 0
SENSORY CHANGE: 0
FEVER: 0
MYALGIAS: 0
CHILLS: 0
BLURRED VISION: 0
HEADACHES: 0
NAUSEA: 0
DOUBLE VISION: 0
SPEECH CHANGE: 0
EYE PAIN: 0
HALLUCINATIONS: 0

## 2023-02-03 ASSESSMENT — COGNITIVE AND FUNCTIONAL STATUS - GENERAL
SUGGESTED CMS G CODE MODIFIER MOBILITY: CH
DAILY ACTIVITIY SCORE: 24
MOBILITY SCORE: 24
SUGGESTED CMS G CODE MODIFIER DAILY ACTIVITY: CH

## 2023-02-03 ASSESSMENT — PAIN DESCRIPTION - PAIN TYPE: TYPE: ACUTE PAIN

## 2023-02-03 ASSESSMENT — FIBROSIS 4 INDEX: FIB4 SCORE: 3.35

## 2023-02-03 ASSESSMENT — LIFESTYLE VARIABLES: SUBSTANCE_ABUSE: 0

## 2023-02-03 NOTE — DISCHARGE PLANNING
HTH/SCP TCN chart review completed. Collaborated with SWATHI Patel as well as bedside RN. The most current review of medical record, knowledge of pt's PLOF and social support, LACE+ score of 82, 6 clicks scores of 24 mobility were considered.      Pt seen at bedside. Introduced TCN program. Provided education regarding post acute levels of care. Discussed HTH/SCP plan benefits (Meds to Beds, medical uber and [facility] transitional care). Pt verbalizes understanding.     He reports no functional concerns with dc to home once medically cleared. He reports planning outpatient follow ups as indicated and is aware of PCP follow up 2/6 and declined [facility] services. He does however report concerns with transportation to home as he does not have anyone to assist and notes that he arrived to hospital via EMT. He does report once home, he has no concerns with transportation. Note that pt's clothes were in biohazard bag 2' to bed bugs and pt will need clothes for transport home. He may need portable 02 to dc to home (pt is on service with [employer] and has concentrator at home though does not have portable per report). Discussed with SWATHI as well and TCN obtained DME choice for portable 02 if needed dependent on 02 testing.    No additional provider consults requested and no choice needed at this time. TCN will continue to follow and collaborate with discharge planning team as additional post acute needs arise. Thank you.     Completed today:  Choice obtained: DME (o2) 2/3  [facility] referral not sent; pt declines/plans outpatient

## 2023-02-03 NOTE — HEART FAILURE PROGRAM
Appointments appropriate at this time.    GDMT addressed by cardiology  No DM dx  No atrial arrhythmia diagnosed  Per admit navigator, patient has already received influenza vaccine this season  Prior pneumococcal vaccines

## 2023-02-03 NOTE — DISCHARGE PLANNING
Case Management Discharge Planning    Admission Date: 1/29/2023  GMLOS: 5.2  ALOS: 4    6-Clicks ADL Score: 24  6-Clicks Mobility Score: 24    Patient had Rapid response, a-fib with RVR, discharge canceled . Case management team will continue to follow for dc needs.

## 2023-02-03 NOTE — DISCHARGE PLANNING
Case Management Discharge Planning    Admission Date: 1/29/2023  GMLOS: 5.2  ALOS: 4    6-Clicks ADL Score: 18  6-Clicks Mobility Score: 13  PT and/or OT Eval ordered: No  Post-acute Referrals Ordered: No  Post-acute Choice Obtained: Yes  Has referral(s) been sent to post-acute provider:  Yes  Patient lives alone in [place] , has home oxygen however he did not bring an potable . Choice obtained  he is currently on service with [facility].     Anticipated Discharge Dispo:      DME Needed: Yes    DME Ordered: Yes patient a home portable     Action(s) Taken: Choice obtained and Referral(s) sent    Escalations Completed: Provider    Medically Clear: No    Next Steps: Plan to provide Taxi voucher for ride home if discharged ,    Barriers to Discharge: Medical clearance    Is the patient up for discharge today : Yes    Is transport arranged for discharge disposition: Yes taxi voucher

## 2023-02-03 NOTE — CARE PLAN
The patient is Watcher - Medium risk of patient condition declining or worsening    Shift Goals  Clinical Goals: monitor resp and cardio status, walking o2, poss dc home  Patient Goals: sleep, DC tomorrow  Family Goals: n/a    Progress made toward(s) clinical / shift goals:    Problem: Psychosocial  Goal: Patient's level of anxiety will decrease  Outcome: Progressing     Problem: Communication  Goal: The ability to communicate needs accurately and effectively will improve  Outcome: Progressing     Problem: Discharge Barriers/Planning  Goal: Patient's continuum of care needs are met  Outcome: Progressing       Patient is not progressing towards the following goals:      Problem: Hemodynamics  Goal: Patient's hemodynamics, fluid balance and neurologic status will be stable or improve  Outcome: Not Progressing   Afib rvr 190s

## 2023-02-03 NOTE — ASSESSMENT & PLAN NOTE
New development of atrial fibrillation with rapid ventricular response started today 2/3/2023  -Heart rates 160s to 190s with soft BP  -Patient treated with multiple doses of IV metoprolol without improvement  -Given digoxin load we will continue with to 50 MCG every 6 hours x2  -Amiodarone also started due to persistent tachycardia  -Patient is on Eliquis  -Cardiology following, appreciate their help with management

## 2023-02-03 NOTE — PROGRESS NOTES
Assumed care of patient, bedside report received from José Miguel FONTAINE RN. Updated on plan of care, call light within reach and fall precautions are in place and bed lock and in lowest position. Patient instructed to call for assistance before getting out of bed. All questions answered at this time. No other needs.

## 2023-02-03 NOTE — HEART FAILURE PROGRAM
Patient appears to be in AF c RVR. I've asked schedulers to push out the PCP appt to the end of the week.

## 2023-02-03 NOTE — PROGRESS NOTES
4 Eyes Skin Assessment Completed by DELIA Boss and DELIA Camacho.    Head WDL  Ears Redness and Blanching  Nose WDL  Mouth WDL  Neck Incision central line site  Breast/Chest WDL  Shoulder Blades WDL  Spine WDL  (R) Arm/Elbow/Hand Redness, Blanching, and Bruising  (L) Arm/Elbow/Hand Redness, Blanching, and Bruising  Abdomen WDL  Groin Redness Cath site  Scrotum/Coccyx/Buttocks Redness and Blanching  (R) Leg Redness and Scab  (L) Leg Redness and Scab  (R) Heel/Foot/Toe Redness and Blanching  (L) Heel/Foot/Toe Redness and Blanching          Devices In Places Tele Box, Blood Pressure Cuff, and Pulse Ox      Interventions In Place Gray Ear Foams, Q2 Turns, and Pressure Redistribution Mattress    Possible Skin Injury No    Pictures Uploaded Into Epic N/A  Wound Consult Placed N/A  RN Wound Prevention Protocol Ordered Yes

## 2023-02-03 NOTE — CONSULTS
Cardiology Consult Note:    Jamir Hale M.D.  Date & Time note created:    2/3/2023   11:36 AM     Referring MD:  Dr. Aguilar    Patient ID:   Name:             Alberto Galicia   YOB: 1955  Age:                 67 y.o.  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  He had an echocardiogram at admission showing severe biventricular heart failure with questionable LV thrombus.  Was started on Eliquis.  He is approaching discharge.  Today while walking in the hallway he developed atrial fibrillation with rapid ventricular response with heart rates into the 170s.  He was hemodynamically borderline but not complaining of any symptoms.  I was paged urgently for rapid response due to this.  He is not complaining of any chest pain or worsening shortness of breath.  His ECG showed atrial fibrillation with rapid ventricular response.  I pushed to 5 mg IV boluses of metoprolol 5 minutes apart.  His heart rate went from 170s to 180s down to the 130s to 140s.  His blood pressure was low normal in the 100s.  I then loaded him with 500 mcg of IV digoxin.    Review of Systems:      Constitutional: Denies fevers, Denies weight changes  Eyes: Denies changes in vision, no eye pain  Ears/Nose/Throat/Mouth: Denies nasal congestion or sore throat   Cardiovascular: no chest pain, no palpitations   Respiratory: no shortness of breath , Denies cough  Gastrointestinal/Hepatic: Denies abdominal pain, nausea, vomiting, diarrhea, constipation or GI bleeding   Genitourinary: Denies dysuria or frequency  Musculoskeletal/Rheum: Denies  joint pain and swelling, no edema  Skin: Denies rash  Neurological: Denies headache, confusion, memory loss or focal weakness/parasthesias  Psychiatric:  denies mood disorder   Endocrine: Risa thyroid problems  Heme/Oncology/Lymph Nodes: Denies enlarged lymph nodes, denies brusing or known bleeding disorder  All other systems were reviewed and are negative (AMA/CMS criteria)                Past Medical History:   Past Medical History:   Diagnosis Date    Asthma     Cardiomyopathy (HCC) EF <20% 1/30/2023    Hyperlipidemia     Left ventricular thrombus 1/30/2023    Pituitary disease (HCC)     Thyroid disease      Active Hospital Problems    Diagnosis     ANGEL (acute kidney injury) (HCC) [N17.9]     Heart failure with reduced ejection fraction (HCC) [I50.20]     Demand ischemia (HCC) [I24.8]     Acute on chronic respiratory failure with hypoxia and hypercapnia (HCC) [J96.21, J96.22]     Asthma with exacerbation [J45.901]     Sepsis (HCC) [A41.9]     Elevated troponin [R77.8]     Elevated d-dimer [R79.89]     Elevated lactic acid level [R79.89]     Infestation by bed bug [B88.8]     Left ventricular thrombus [I51.3]     Cardiomyopathy (HCC) EF <20% [I42.9]     Shock (HCC) [R57.9]     Elevated liver enzymes [R74.8]     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.0]        Past Surgical History:  Past Surgical History:   Procedure Laterality Date    CRANIOTOMY         Hospital Medications:  Current Facility-Administered Medications   Medication Dose    Metoprolol Tartrate (LOPRESSOR) injection 5 mg  5 mg    digoxin (Lanoxin) injection 250 mcg  250 mcg    benzonatate (TESSALON) capsule 100 mg  100 mg    metoprolol SR (TOPROL XL) tablet 12.5 mg  12.5 mg    dapagliflozin propanediol (Farxiga) tablet 10 mg  10 mg    tamsulosin (FLOMAX) capsule 0.4 mg  0.4 mg    calcium carbonate (Tums) chewable tab 500 mg  500 mg    predniSONE (DELTASONE) tablet 60 mg  60 mg    losartan (COZAAR) tablet 25 mg  25 mg    spironolactone (ALDACTONE) tablet 25 mg  25 mg    budesonide-formoterol (SYMBICORT) 160-4.5 MCG/ACT inhaler 2 Puff  2 Puff    ipratropium-albuterol (DUONEB) nebulizer solution  3 mL     apixaban (ELIQUIS) tablet 10 mg  10 mg    Followed by    [START ON 2/7/2023] apixaban (ELIQUIS) tablet 5 mg  5 mg    senna-docusate (PERICOLACE or SENOKOT S) 8.6-50 MG per tablet 2 Tablet  2 Tablet    And    polyethylene glycol/lytes (MIRALAX) PACKET 1 Packet  1 Packet    And    magnesium hydroxide (MILK OF MAGNESIA) suspension 30 mL  30 mL    And    bisacodyl (DULCOLAX) suppository 10 mg  10 mg    insulin regular (HumuLIN R,NovoLIN R) injection  1-6 Units    And    dextrose 10 % BOLUS 25 g  25 g    multivitamin tablet 1 Tablet  1 Tablet    levothyroxine (SYNTHROID) tablet 175 mcg  175 mcg    melatonin tablet 5 mg  5 mg         Current Outpatient Medications:  Medications Prior to Admission   Medication Sig Dispense Refill Last Dose    testosterone (TESTIM/ANDROGEL) 50 MG/5GM (1%) Gel gel APPLY 1 PACKET (50MG) TOPICALLY ONCE DAILY 150 g 2 UNKN at UNKN    ipratropium-albuterol (DUONEB) 0.5-2.5 (3) MG/3ML nebulizer solution Take 3 mL by nebulization every four hours as needed for Shortness of Breath for up to 30 days. 30 Each 0 UNKN at UNKN    fluticasone (FLOVENT HFA) 110 MCG/ACT Aerosol Inhale 1 Puff 2 times a day for 30 days. 12 g 0 UNKN at UNKN    fexofenadine (ALLEGRA) 60 MG Tab Take 60 mg by mouth every day.   UNKN at UNKN    therapeutic multivitamin-minerals (THERAGRAN-M) Tab Take 1 Tablet by mouth every day.   UNKN at UNKN    albuterol 108 (90 Base) MCG/ACT Aero Soln inhalation aerosol INHALE 2 PUFFS BY MOUTH EVERY 6 HOURS AS NEEDED FOR SHORTNESS OF BREATH 9 g 0 UNKN at UNKN    levothyroxine (SYNTHROID) 175 MCG Tab Take 1 Tablet by mouth every morning on an empty stomach. Please make an appointment for future refills 90 Tablet 1 UNKN at UNKN    atorvastatin (LIPITOR) 40 MG Tab Take 1 tablet by mouth once daily 100 Tablet 2 UNKN at UNKN    hydrocortisone (CORTEF) 10 MG Tab Take 1 pill in the morning and 1/2 in the afternoon 90 day supply 180 Tablet 3 UNKN at UNKN    calcium carbonate (TUMS) 500 MG Chew Tab Chew  "500 mg every day.   UNKN at UNKN    melatonin 5 mg Tab Take 5 mg by mouth at bedtime as needed.   UNKN at UNKN       Medication Allergy:  Allergies   Allergen Reactions    Pcn [Penicillins] Swelling       Family History:  Family History   Problem Relation Age of Onset    Diabetes Mother     Heart Disease Father     Diabetes Brother     Alzheimer's Disease Maternal Grandmother     Cancer Maternal Grandfather     Heart Disease Paternal Grandmother     Cancer Paternal Grandfather        Social History:  Social History     Socioeconomic History    Marital status: Single     Spouse name: Not on file    Number of children: Not on file    Years of education: Not on file    Highest education level: Not on file   Occupational History    Not on file   Tobacco Use    Smoking status: Never    Smokeless tobacco: Never   Vaping Use    Vaping Use: Never used   Substance and Sexual Activity    Alcohol use: Yes     Alcohol/week: 12.6 oz     Types: 21 Shots of liquor per week     Comment: daily tumbler of vodka with OJ    Drug use: No    Sexual activity: Not on file   Other Topics Concern    Not on file   Social History Narrative    Not on file     Social Determinants of Health     Financial Resource Strain: Not on file   Food Insecurity: Not on file   Transportation Needs: Not on file   Physical Activity: Not on file   Stress: Not on file   Social Connections: Not on file   Intimate Partner Violence: Not on file   Housing Stability: Not on file         Physical Exam:  Vitals/ General Appearance:   Weight/BMI: Body mass index is 25.16 kg/m².  BP 99/77   Pulse (!) 139   Temp 36.7 °C (98.1 °F) (Temporal)   Resp 20   Ht 1.93 m (6' 3.98\")   Wt 93.7 kg (206 lb 9.1 oz)   SpO2 95%   Vitals:    02/03/23 1114 02/03/23 1116 02/03/23 1128 02/03/23 1133   BP: 102/73 103/70 115/79 99/77   Pulse: (!) 146 (!) 145 (!) 135 (!) 139   Resp:   20    Temp:   36.7 °C (98.1 °F)    TempSrc:   Temporal    SpO2: 94% 94% 95% 95%   Weight:       Height:   "       Oxygen Therapy:  Pulse Oximetry: 95 %, O2 (LPM): 4, O2 Delivery Device: Silicone Nasal Cannula    Constitutional:   Well developed, Well nourished, No acute distress  HENMT:  Normocephalic, Atraumatic, Oropharynx moist mucous membranes, No oral exudates, Nose normal.  No thyromegaly.  Eyes:  EOMI, Conjunctiva normal, No discharge.  Neck:  Normal range of motion, No cervical tenderness,  no JVD.  Cardiovascular:  Normal heart rate, Normal rhythm, No murmurs, No rubs, No gallops.   Extremitites with intact distal pulses, no cyanosis, or edema.  Lungs:  Normal breath sounds, breath sounds clear to auscultation bilaterally,  no crackles, no wheezing.   Abdomen: Bowel sounds normal, Soft, No tenderness, No guarding, No rebound, No masses, No hepatosplenomegaly.  Skin: Warm, Dry, No erythema, No rash, no induration.  Neurologic: Alert & oriented x 3, No focal deficits noted, cranial nerves II through X are grossly intact.  Psychiatric: Affect normal, Judgment normal, Mood normal.      MDM (Data Review):     Records reviewed and summarized in current documentation    Lab Data Review:  Recent Results (from the past 24 hour(s))   POCT glucose device results    Collection Time: 02/02/23 12:04 PM   Result Value Ref Range    POC Glucose, Blood 186 (H) 65 - 99 mg/dL   POCT glucose device results    Collection Time: 02/02/23  7:55 PM   Result Value Ref Range    POC Glucose, Blood 124 (H) 65 - 99 mg/dL   POCT glucose device results    Collection Time: 02/03/23  1:05 AM   Result Value Ref Range    POC Glucose, Blood 110 (H) 65 - 99 mg/dL   CBC without Differential    Collection Time: 02/03/23  1:11 AM   Result Value Ref Range    WBC 15.2 (H) 4.8 - 10.8 K/uL    RBC 4.35 (L) 4.70 - 6.10 M/uL    Hemoglobin 14.5 14.0 - 18.0 g/dL    Hematocrit 45.1 42.0 - 52.0 %    .7 (H) 81.4 - 97.8 fL    MCH 33.3 (H) 27.0 - 33.0 pg    MCHC 32.2 (L) 33.7 - 35.3 g/dL    RDW 53.0 (H) 35.9 - 50.0 fL    Platelet Count 197 164 - 446 K/uL    MPV  9.9 9.0 - 12.9 fL   Comp Metabolic Panel    Collection Time: 02/03/23  1:11 AM   Result Value Ref Range    Sodium 139 135 - 145 mmol/L    Potassium 4.1 3.6 - 5.5 mmol/L    Chloride 105 96 - 112 mmol/L    Co2 23 20 - 33 mmol/L    Anion Gap 11.0 7.0 - 16.0    Glucose 112 (H) 65 - 99 mg/dL    Bun 19 8 - 22 mg/dL    Creatinine 0.70 0.50 - 1.40 mg/dL    Calcium 9.0 8.5 - 10.5 mg/dL    AST(SGOT) 91 (H) 12 - 45 U/L    ALT(SGPT) 63 (H) 2 - 50 U/L    Alkaline Phosphatase 93 30 - 99 U/L    Total Bilirubin 0.5 0.1 - 1.5 mg/dL    Albumin 3.8 3.2 - 4.9 g/dL    Total Protein 6.8 6.0 - 8.2 g/dL    Globulin 3.0 1.9 - 3.5 g/dL    A-G Ratio 1.3 g/dL   Magnesium    Collection Time: 02/03/23  1:11 AM   Result Value Ref Range    Magnesium 2.4 1.5 - 2.5 mg/dL   Procalcitonin    Collection Time: 02/03/23  1:11 AM   Result Value Ref Range    Procalcitonin 0.08 <0.25 ng/mL   HEPATIC FUNCTION PANEL    Collection Time: 02/03/23  1:11 AM   Result Value Ref Range    Direct Bilirubin <0.2 0.1 - 0.5 mg/dL    Indirect Bilirubin see below 0.0 - 1.0 mg/dL   CORRECTED CALCIUM    Collection Time: 02/03/23  1:11 AM   Result Value Ref Range    Correct Calcium 9.2 8.5 - 10.5 mg/dL   ESTIMATED GFR    Collection Time: 02/03/23  1:11 AM   Result Value Ref Range    GFR (CKD-EPI) 100 >60 mL/min/1.73 m 2   PHOSPHORUS    Collection Time: 02/03/23  5:11 AM   Result Value Ref Range    Phosphorus 3.0 2.5 - 4.5 mg/dL   HEPATIC FUNCTION PANEL    Collection Time: 02/03/23  5:11 AM   Result Value Ref Range    Alkaline Phosphatase 88 30 - 99 U/L    AST(SGOT) 85 (H) 12 - 45 U/L    ALT(SGPT) 53 (H) 2 - 50 U/L    Total Bilirubin 0.4 0.1 - 1.5 mg/dL    Direct Bilirubin <0.2 0.1 - 0.5 mg/dL    Indirect Bilirubin see below 0.0 - 1.0 mg/dL    Albumin 2.9 (L) 3.2 - 4.9 g/dL    Total Protein 5.6 (L) 6.0 - 8.2 g/dL   POCT glucose device results    Collection Time: 02/03/23  7:24 AM   Result Value Ref Range    POC Glucose, Blood 109 (H) 65 - 99 mg/dL   EKG    Collection Time:  23 10:33 AM   Result Value Ref Range    Report       [facility]    Test Date:  2023  Pt Name:    MATT MOODY                   Department: 183  MRN:        [id number]                      Room:       T806  Gender:     Male                         Technician: ODALYS  :        1955                   Requested By:RACHEL CALIX  Order #:    [id number]                    Reading MD:    Measurements  Intervals                                Axis  Rate:       174                          P:  VT:                                      QRS:        -64  QRSD:       106                          T:          97  QT:         287  QTc:        489    Interpretive Statements  ATRIAL FIBRILLATION WITH RAPID V-RATE  LEFT ANTERIOR FASCICULAR BLOCK  ABNORMAL R-WAVE PROGRESSION, EARLY TRANSITION  NONSPECIFIC T ABNRM, ANTEROLATERAL LEADS  Compared to ECG 2023 00:17:37  Sinus tachycardia no longer present  ST (T wave) deviation no longer present  Myocardial infarct finding no longer present         Imaging/Procedures Review:    Chest Xray:  Reviewed    EKG:   Dated 2/3/2023 personally viewed inter myself showing atrial fibrillation with rapid ventricular response.    ECHO:  Dated 2023 personally viewed inter myself showing biventricular heart failure questionable LV thrombus though nothing definitively noted.  No significant valvular disease.  MDM (Assessment and Plan):     Active Hospital Problems    Diagnosis     ANGEL (acute kidney injury) ([facility]) [N17.9]     Heart failure with reduced ejection fraction ([facility]) [I50.20]     Demand ischemia ([facility]) [I24.8]     Acute on chronic respiratory failure with hypoxia and hypercapnia ([facility]) [J96.21, J96.22]     Asthma with exacerbation [J45.901]     Sepsis (HCC) [A41.9]     Elevated troponin [R77.8]     Elevated d-dimer [R79.89]     Elevated lactic acid level [R79.89]     Infestation by bed bug [B88.8]     Left ventricular thrombus [I51.3]     Cardiomyopathy (HCC) EF <20%  [I42.9]     Shock (HCC) [R57.9]     Elevated liver enzymes [R74.8]     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.0]      67-year-old male with biventricular heart failure with now atrial fibrillation with rapid ventricular response.  I have given him 2 mg of IV metoprolol and IV digoxin.  We will continue to follow him.  If his blood pressure continues to be low we will consider urgent DC cardioversion.  If his blood pressure improves we will likely load him with amiodarone.  Please continue the loading bolus of digoxin with 250 mcg in 6 hours followed by an additional 250 mcg and 6 hours after that.        This patient is critically ill with a life threatening illness as noted above requiring my direct presence, involvement and maximal preparedness. I have personally spend 35 minutes providing critical care to this patient. Time spend on critical care excludes time spend on procedures.

## 2023-02-03 NOTE — PROGRESS NOTES
Report called to DELIA Boss on T8. All questions answered. Patient left room T632 by wheelchair accompanied by TESHA Ly in no acute distress. All belongings accounted for.

## 2023-02-03 NOTE — CARE PLAN
Problem: Knowledge Deficit - Standard  Goal: Patient and family/care givers will demonstrate understanding of plan of care, disease process/condition, diagnostic tests and medications  Outcome: Progressing     Problem: Skin Integrity  Goal: Skin integrity is maintained or improved  Outcome: Progressing  Note: 4 eye skin check performed, patient has generalized scabbing     Problem: Fall Risk  Goal: Patient will remain free from falls  Outcome: Progressing  Note: Patient is a moderate fall risk. Patient has been educated on fall precaution.     Problem: Pain - Standard  Goal: Alleviation of pain or a reduction in pain to the patient’s comfort goal  Outcome: Progressing     Problem: Lifestyle Changes  Goal: Patient's ability to identify lifestyle changes and available resources to help reduce recurrence of condition will improve  Outcome: Progressing  Note: Patient states that he wishes to slow down on drinking.      Problem: Psychosocial  Goal: Patient's level of anxiety will decrease  Outcome: Progressing   The patient is Stable - Low risk of patient condition declining or worsening    Shift Goals  Clinical Goals: monitorlabs, o2  Patient Goals: sleep, DC tomorrow  Family Goals: n/a    Progress made toward(s) clinical / shift goals:  hope for D/C tomorrow    Patient is not progressing towards the following goals:

## 2023-02-03 NOTE — PROGRESS NOTES
[facility] Medicine Daily Progress Note    Date of Service  2/3/2023    Chief Complaint  Alberto Galicia is a 67 y.o. male admitted 1/29/2023 with shortness of breath    Hospital Course    67 y.o. 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  In the to the hospital patient received 1.3 mg of epinephrine, Solu-Medrol and magnesium infusion.  On arrival patient was on nonrebreather and he was switched to BiPAP and admitted to the ICU.  During his hospitalization patient also reported that he drinks 1/5 per day for many years he is never developed alcohol withdrawal.  Patient found to have elevated troponin, hypotension and found to have LVEF of 25% to 30%, LV thrombus could not be ruled out.  Cardiology was consulted. Patient underwent cardiac catheterization which showed no significant CAD.    Patient's respiratory symptoms improved and he was taken off BiPAP and titrated back down to his home oxygen of 3 L.  He was transferred initially to the IMCU and since to the telemetry floor.       Interval Problem Update    At approximately 10:30 AM monitor checked called on patient. Presented to bedside, pt in narrow complex tachycardia. Vasal vagal maneuvers ineffective.   -190's, SBP 90's initially. Patient denied symptoms. Saturating well on 3L, speaking in complete sentences.   - stat EKG with A.fib RVR,   - pt given 5 mg IV metoprolol x 2 with little effect   - called cardiology to bedside due to concern for decompensation   - 500 mcg IV digoxin load given, some improvement in 's, BP soft but stable. Additional 5 mg metoprolol  - will complete dig load 250 mcg x 2 every  6 hours  - HR >120, amiodarone bolus/ggt started   - continue GDMT as BP allows   - monitor O2      Re-evaluated patient several times throughout the day. Converted to SB-SR at 15:30 pm. Pt stable and asymptomatic.     I have discussed this patient's plan of care and discharge plan at IDT rounds today with Case Management, Nursing, Nursing leadership, and other members of the IDT team.    Consultants/Specialty  cardiology and critical care    Code Status  Full Code    Disposition  Patient is not medically cleared for discharge.   Anticipate discharge to to home with close outpatient follow-up.  I have placed the appropriate orders for post-discharge needs.    Review of Systems  Review of Systems   Constitutional:  Negative for chills, fever and weight loss.   HENT:  Negative for hearing loss and tinnitus.    Eyes:  Negative for blurred vision, double vision, photophobia and pain.   Respiratory:  Positive for shortness of breath (Improving). Negative for cough and sputum production.    Cardiovascular:  Negative for chest pain, palpitations, orthopnea and leg swelling.   Gastrointestinal:  Negative for abdominal pain, constipation, diarrhea, nausea and vomiting.   Genitourinary:  Negative for dysuria, frequency and urgency.   Musculoskeletal:  Negative for back pain, joint pain, myalgias and neck pain.   Skin:  Negative for rash.   Neurological:  Negative for dizziness, tingling, tremors, sensory change, speech change, focal weakness and headaches.   Psychiatric/Behavioral:  Negative for hallucinations and substance abuse.    All other systems reviewed and are negative.     Physical Exam  Temp:  [36.2 °C (97.1 °F)-36.7 °C (98.1 °F)] 36.7 °C (98.1 °F)  Pulse:  [] 60  Resp:  [20-22] 20  BP: ()/(62-99) 105/66  SpO2:  [93 %-97 %] 97 %    Physical Exam  Vitals reviewed.   Constitutional:       General: He is not in acute distress.  HENT:      Head: Normocephalic and atraumatic. No contusion.      Right Ear:  External ear normal.      Left Ear: External ear normal.      Nose: Nose normal.      Comments: Oxygen nasal cannula     Mouth/Throat:      Pharynx: No oropharyngeal exudate.   Eyes:      General:         Right eye: No discharge.         Left eye: No discharge.      Conjunctiva/sclera: Conjunctivae normal.      Comments: Strabismus of left eye   Cardiovascular:      Rate and Rhythm: Tachycardia present. Rhythm irregular.      Heart sounds: No murmur heard.    No friction rub. No gallop.   Pulmonary:      Effort: Pulmonary effort is normal.      Breath sounds: Wheezing (Mild expiratory) present. No rhonchi.   Abdominal:      General: Bowel sounds are normal. There is no distension.      Palpations: Abdomen is soft.      Tenderness: There is no abdominal tenderness. There is no rebound.   Musculoskeletal:         General: No swelling or tenderness. Normal range of motion.      Cervical back: No rigidity. No muscular tenderness.      Right lower leg: Edema present.      Left lower leg: Edema present.   Skin:     General: Skin is warm and dry.      Coloration: Skin is not jaundiced.      Comments: Excoriations of BLE    Neurological:      General: No focal deficit present.      Mental Status: He is alert.      Cranial Nerves: No cranial nerve deficit.      Sensory: No sensory deficit.   Psychiatric:         Mood and Affect: Mood normal.       Fluids    Intake/Output Summary (Last 24 hours) at 2/3/2023 1534  Last data filed at 2/3/2023 1400  Gross per 24 hour   Intake 920 ml   Output 1100 ml   Net -180 ml       Laboratory  Recent Labs     02/01/23  0540 02/02/23  0510 02/03/23  0111   WBC 16.2* 14.6* 15.2*   RBC 3.71* 3.84* 4.35*   HEMOGLOBIN 12.5* 13.1* 14.5   HEMATOCRIT 37.9* 39.7* 45.1   .2* 103.4* 103.7*   MCH 33.7* 34.1* 33.3*   MCHC 33.0* 33.0* 32.2*   RDW 51.0* 52.7* 53.0*   PLATELETCT 179 168 197   MPV 9.8 9.8 9.9     Recent Labs     02/01/23  2134 02/02/23  0510 02/03/23  0111   SODIUM 138 133* 139    POTASSIUM 4.4 4.2 4.1   CHLORIDE 107 102 105   CO2 21 23 23   GLUCOSE 189* 161* 112*   BUN 15 17 19   CREATININE 0.69 0.65 0.70   CALCIUM 8.6 8.5 9.0                     Imaging  DX-CHEST-LIMITED (1 VIEW)   Final Result         No acute cardiac or pulmonary abnormality is identified.      US-EXTREMITY ARTERY LOWER BILAT W/SARITA (COMBO)   Final Result      US-RUQ   Final Result      1.  Borderline cardiomegaly with densely increased echogenicity consistent with hepatocellular disease or fatty liver.   2.  Cholelithiasis.   3.  Borderline enlargement of the main portal vein (greater than 13 mm) recent possibility of portal hypertension.   4.  There is no ascites.      EC-ECHOCARDIOGRAM COMPLETE W/ CONT   Final Result      DX-CHEST-PORTABLE (1 VIEW)   Final Result         1.  No acute cardiopulmonary disease.      DX-CHEST-PORTABLE (1 VIEW)   Final Result         1.  No acute cardiopulmonary disease.      CT-CTA CHEST PULMONARY ARTERY W/ RECONS   Final Result         1.  No pulmonary embolus appreciated.   2.  Hepatomegaly with irregular hepatic contour favoring changes of cirrhosis   3.  8.0 mm nodular density in the right lung base. There is associated linear density suggesting scarring or atelectasis. See nodule follow-up recommendations below.      Fleischner Society pulmonary nodule recommendations:      Low Risk: CT at 6-12 months, then consider CT at 18-24 months      High Risk: CT at 6-12 months, then CT at 18-24 months      Low Risk - Minimal or absent history of smoking and of other known risk factors.      High Risk - History of smoking or of other known risk factors.      Note: These recommendations do not apply to lung cancer screening, patients with immunosuppression, or patients with known primary cancer.      Fleischner Society 2017 Guidelines for Management of Incidentally Detected Pulmonary Nodules in Adults         DX-CHEST-PORTABLE (1 VIEW)   Final Result         1.  No acute cardiopulmonary  disease.      CL-LEFT HEART CATHETERIZATION WITH POSSIBLE INTERVENTION    (Results Pending)        Assessment/Plan  * Shock (HCC)- (present on admission)  Assessment & Plan  Cardiogenic shock, Now resolved  Echo 25% EF, global hypokinesis   Clean coronary cath, likely due to alcohol use   BP improved but soft   GDMT for HF as BP allows   Continue tele monitoring     Atrial fibrillation with rapid ventricular response (HCC)  Assessment & Plan  New development of atrial fibrillation with rapid ventricular response started today 2/3/2023  -Heart rates 160s to 190s with soft BP  -Patient treated with multiple doses of IV metoprolol without improvement  -Given digoxin load we will continue with to 50 MCG every 6 hours x2  -Amiodarone also started due to persistent tachycardia  -Patient is on Eliquis  -Cardiology following, appreciate their help with management    Heart failure with reduced ejection fraction (HCC)  Assessment & Plan  He found to have heart failure with reduced ejection fraction, likely to his ETOH use, clean cath during admission   EF 25%  GDMT with losartan, metoprolol, aldactone, statin  Will need HF follow up    Left ventricular thrombus- (present on admission)  Assessment & Plan  Unable to rule out LV thrombus on echo however noted to be less likely   Cardiology commended anticoagulation with apixaban with therapeutic dose.  Continue Eliquis and monitor for bleeding.    Asthma with exacerbation  Assessment & Plan  On admission, treated with steroids/duonebs, no improved    Acute on chronic respiratory failure with hypoxia and hypercapnia (HCC)  Assessment & Plan  He has history of asthma.  Continue steroids  Titrate down  oxygen as tolerated.    Elevated liver enzymes- (present on admission)  Assessment & Plan  Most likely secondary to alcohol use but  His liver enzyme has been trending down.    Panhypopituitarism (HCC)- (present on admission)  Assessment & Plan  Continue steroids.  He will need  outpatient follow-up with          VTE prophylaxis: therapeutic anticoagulation with Eliquis    I have performed a physical exam and reviewed and updated ROS and Plan today (2/3/2023). In review of yesterday's note (2/2/2023), there are no changes except as documented above.    Patient is critically ill.   The patient continues to have: unstable rapid atrial fibrillation   The vital organ system that is affected is the: cardiac  If untreated there is a high chance of deterioration into: cardiogenic shock   And eventually death.   The critical care that I am providing today is: acute bedside evaluation and treatment of unstable rapid atrial fibrillation, with on hand interpretation of EKG, ordering of IV medication and consultation with cardiology   The critical that has been undertaken is medically complex.   There has been no overlap in critical care time.   Critical Care Time not including procedures: 36

## 2023-02-03 NOTE — CARE PLAN
The patient is Watcher - Medium risk of patient condition declining or worsening    Shift Goals  Clinical Goals: hemodynamic stability  Patient Goals: Go Home  Family Goals: GRISEL    Progress made toward(s) clinical / shift goals:    Problem: Knowledge Deficit - Standard  Goal: Patient and family/care givers will demonstrate understanding of plan of care, disease process/condition, diagnostic tests and medications  Outcome: Progressing     Problem: Skin Integrity  Goal: Skin integrity is maintained or improved  Outcome: Progressing     Problem: Fall Risk  Goal: Patient will remain free from falls  Outcome: Progressing     Problem: Pain - Standard  Goal: Alleviation of pain or a reduction in pain to the patient’s comfort goal  Outcome: Progressing       Patient is not progressing towards the following goals:

## 2023-02-04 ENCOUNTER — PHARMACY VISIT (OUTPATIENT)
Dept: PHARMACY | Facility: MEDICAL CENTER | Age: 68
End: 2023-02-04
Payer: COMMERCIAL

## 2023-02-04 VITALS
BODY MASS INDEX: 26.44 KG/M2 | OXYGEN SATURATION: 99 % | TEMPERATURE: 97.5 F | RESPIRATION RATE: 18 BRPM | HEART RATE: 52 BPM | SYSTOLIC BLOOD PRESSURE: 122 MMHG | DIASTOLIC BLOOD PRESSURE: 76 MMHG | WEIGHT: 217.15 LBS | HEIGHT: 76 IN

## 2023-02-04 PROBLEM — A41.9 SEPSIS (HCC): Status: RESOLVED | Noted: 2023-01-30 | Resolved: 2023-02-04

## 2023-02-04 PROBLEM — R57.9 SHOCK (HCC): Status: RESOLVED | Noted: 2023-01-30 | Resolved: 2023-02-04

## 2023-02-04 LAB
ALBUMIN SERPL BCP-MCNC: 3.1 G/DL (ref 3.2–4.9)
ALBUMIN SERPL BCP-MCNC: 3.2 G/DL (ref 3.2–4.9)
ALBUMIN SERPL BCP-MCNC: 3.3 G/DL (ref 3.2–4.9)
ALP SERPL-CCNC: 75 U/L (ref 30–99)
ALP SERPL-CCNC: 79 U/L (ref 30–99)
ALP SERPL-CCNC: 81 U/L (ref 30–99)
ALT SERPL-CCNC: 55 U/L (ref 2–50)
ALT SERPL-CCNC: 60 U/L (ref 2–50)
ALT SERPL-CCNC: 85 U/L (ref 2–50)
ANION GAP SERPL CALC-SCNC: 8 MMOL/L (ref 7–16)
AST SERPL-CCNC: 104 U/L (ref 12–45)
AST SERPL-CCNC: 62 U/L (ref 12–45)
AST SERPL-CCNC: 73 U/L (ref 12–45)
BACTERIA BLD CULT: NORMAL
BILIRUB CONJ SERPL-MCNC: <0.2 MG/DL (ref 0.1–0.5)
BILIRUB INDIRECT SERPL-MCNC: ABNORMAL MG/DL (ref 0–1)
BILIRUB SERPL-MCNC: 0.4 MG/DL (ref 0.1–1.5)
BILIRUB SERPL-MCNC: 0.4 MG/DL (ref 0.1–1.5)
BILIRUB SERPL-MCNC: 0.6 MG/DL (ref 0.1–1.5)
BUN SERPL-MCNC: 15 MG/DL (ref 8–22)
CALCIUM SERPL-MCNC: 8.5 MG/DL (ref 8.5–10.5)
CHLORIDE SERPL-SCNC: 103 MMOL/L (ref 96–112)
CO2 SERPL-SCNC: 25 MMOL/L (ref 20–33)
CREAT SERPL-MCNC: 0.78 MG/DL (ref 0.5–1.4)
GFR SERPLBLD CREATININE-BSD FMLA CKD-EPI: 97 ML/MIN/1.73 M 2
GLUCOSE BLD STRIP.AUTO-MCNC: 148 MG/DL (ref 65–99)
GLUCOSE BLD STRIP.AUTO-MCNC: 95 MG/DL (ref 65–99)
GLUCOSE BLD STRIP.AUTO-MCNC: 99 MG/DL (ref 65–99)
GLUCOSE SERPL-MCNC: 106 MG/DL (ref 65–99)
MAGNESIUM SERPL-MCNC: 2.2 MG/DL (ref 1.5–2.5)
PHOSPHATE SERPL-MCNC: 3 MG/DL (ref 2.5–4.5)
POTASSIUM SERPL-SCNC: 4.2 MMOL/L (ref 3.6–5.5)
PROT SERPL-MCNC: 5.7 G/DL (ref 6–8.2)
PROT SERPL-MCNC: 5.7 G/DL (ref 6–8.2)
PROT SERPL-MCNC: 6 G/DL (ref 6–8.2)
SIGNIFICANT IND 70042: NORMAL
SITE SITE: NORMAL
SODIUM SERPL-SCNC: 136 MMOL/L (ref 135–145)
SOURCE SOURCE: NORMAL

## 2023-02-04 PROCEDURE — 700102 HCHG RX REV CODE 250 W/ 637 OVERRIDE(OP): Performed by: INTERNAL MEDICINE

## 2023-02-04 PROCEDURE — 700102 HCHG RX REV CODE 250 W/ 637 OVERRIDE(OP): Performed by: PHYSICIAN ASSISTANT

## 2023-02-04 PROCEDURE — 83735 ASSAY OF MAGNESIUM: CPT

## 2023-02-04 PROCEDURE — 82962 GLUCOSE BLOOD TEST: CPT | Mod: 91

## 2023-02-04 PROCEDURE — A9270 NON-COVERED ITEM OR SERVICE: HCPCS | Performed by: INTERNAL MEDICINE

## 2023-02-04 PROCEDURE — A9270 NON-COVERED ITEM OR SERVICE: HCPCS | Performed by: EMERGENCY MEDICINE

## 2023-02-04 PROCEDURE — 700111 HCHG RX REV CODE 636 W/ 250 OVERRIDE (IP)

## 2023-02-04 PROCEDURE — 99239 HOSP IP/OBS DSCHRG MGMT >30: CPT | Performed by: STUDENT IN AN ORGANIZED HEALTH CARE EDUCATION/TRAINING PROGRAM

## 2023-02-04 PROCEDURE — 700102 HCHG RX REV CODE 250 W/ 637 OVERRIDE(OP): Performed by: EMERGENCY MEDICINE

## 2023-02-04 PROCEDURE — 700111 HCHG RX REV CODE 636 W/ 250 OVERRIDE (IP): Performed by: NURSE PRACTITIONER

## 2023-02-04 PROCEDURE — A9270 NON-COVERED ITEM OR SERVICE: HCPCS | Performed by: NURSE PRACTITIONER

## 2023-02-04 PROCEDURE — 80076 HEPATIC FUNCTION PANEL: CPT | Mod: 91

## 2023-02-04 PROCEDURE — RXMED WILLOW AMBULATORY MEDICATION CHARGE: Performed by: STUDENT IN AN ORGANIZED HEALTH CARE EDUCATION/TRAINING PROGRAM

## 2023-02-04 PROCEDURE — 80048 BASIC METABOLIC PNL TOTAL CA: CPT

## 2023-02-04 PROCEDURE — 84100 ASSAY OF PHOSPHORUS: CPT

## 2023-02-04 PROCEDURE — 700102 HCHG RX REV CODE 250 W/ 637 OVERRIDE(OP): Performed by: NURSE PRACTITIONER

## 2023-02-04 PROCEDURE — 99232 SBSQ HOSP IP/OBS MODERATE 35: CPT | Mod: FS | Performed by: NURSE PRACTITIONER

## 2023-02-04 PROCEDURE — A9270 NON-COVERED ITEM OR SERVICE: HCPCS | Performed by: PHYSICIAN ASSISTANT

## 2023-02-04 RX ORDER — AMIODARONE HYDROCHLORIDE 200 MG/1
200 TABLET ORAL DAILY
Qty: 30 TABLET | Refills: 1 | Status: SHIPPED | OUTPATIENT
Start: 2023-02-10 | End: 2023-04-03 | Stop reason: SDUPTHER

## 2023-02-04 RX ORDER — AMIODARONE HYDROCHLORIDE 200 MG/1
200 TABLET ORAL DAILY
Status: DISCONTINUED | OUTPATIENT
Start: 2023-02-10 | End: 2023-02-04 | Stop reason: HOSPADM

## 2023-02-04 RX ORDER — METOPROLOL SUCCINATE 25 MG/1
25 TABLET, EXTENDED RELEASE ORAL EVERY EVENING
Status: DISCONTINUED | OUTPATIENT
Start: 2023-02-04 | End: 2023-02-04 | Stop reason: HOSPADM

## 2023-02-04 RX ORDER — AMIODARONE HYDROCHLORIDE 200 MG/1
400 TABLET ORAL TWICE DAILY
Status: DISCONTINUED | OUTPATIENT
Start: 2023-02-04 | End: 2023-02-04 | Stop reason: HOSPADM

## 2023-02-04 RX ORDER — AMIODARONE HYDROCHLORIDE 200 MG/1
400 TABLET ORAL 2 TIMES DAILY
Qty: 70 TABLET | Refills: 0 | Status: SHIPPED | OUTPATIENT
Start: 2023-02-04 | End: 2023-03-16

## 2023-02-04 RX ORDER — METOPROLOL SUCCINATE 25 MG/1
25 TABLET, EXTENDED RELEASE ORAL EVERY EVENING
Qty: 30 TABLET | Refills: 1 | Status: SHIPPED | OUTPATIENT
Start: 2023-02-04 | End: 2023-08-14 | Stop reason: SDUPTHER

## 2023-02-04 RX ORDER — FLUTICASONE PROPIONATE AND SALMETEROL 100; 50 UG/1; UG/1
1 POWDER RESPIRATORY (INHALATION) 2 TIMES DAILY
Qty: 60 EACH | Refills: 1 | Status: SHIPPED | OUTPATIENT
Start: 2023-02-04 | End: 2023-04-03

## 2023-02-04 RX ORDER — SPIRONOLACTONE 25 MG/1
25 TABLET ORAL DAILY
Qty: 30 TABLET | Refills: 3 | Status: ON HOLD | OUTPATIENT
Start: 2023-02-05 | End: 2023-04-06

## 2023-02-04 RX ORDER — LOSARTAN POTASSIUM 25 MG/1
25 TABLET ORAL DAILY
Qty: 30 TABLET | Refills: 1 | Status: SHIPPED | OUTPATIENT
Start: 2023-02-05 | End: 2023-03-20

## 2023-02-04 RX ORDER — TAMSULOSIN HYDROCHLORIDE 0.4 MG/1
0.4 CAPSULE ORAL
Qty: 30 CAPSULE | Refills: 1 | Status: ON HOLD | OUTPATIENT
Start: 2023-02-05 | End: 2023-04-06

## 2023-02-04 RX ORDER — DAPAGLIFLOZIN 10 MG/1
10 TABLET, FILM COATED ORAL DAILY
Qty: 30 TABLET | Refills: 1 | Status: ON HOLD | OUTPATIENT
Start: 2023-02-05 | End: 2023-04-06

## 2023-02-04 RX ADMIN — THERA TABS 1 TABLET: TAB at 05:36

## 2023-02-04 RX ADMIN — BUDESONIDE AND FORMOTEROL FUMARATE DIHYDRATE 2 PUFF: 160; 4.5 AEROSOL RESPIRATORY (INHALATION) at 05:34

## 2023-02-04 RX ADMIN — LEVOTHYROXINE SODIUM 175 MCG: 0.17 TABLET ORAL at 05:30

## 2023-02-04 RX ADMIN — SPIRONOLACTONE 25 MG: 25 TABLET ORAL at 05:37

## 2023-02-04 RX ADMIN — AMIODARONE HYDROCHLORIDE 400 MG: 200 TABLET ORAL at 12:04

## 2023-02-04 RX ADMIN — METOPROLOL TARTRATE 25 MG: 25 TABLET, FILM COATED ORAL at 09:29

## 2023-02-04 RX ADMIN — LOSARTAN POTASSIUM 25 MG: 25 TABLET, FILM COATED ORAL at 05:36

## 2023-02-04 RX ADMIN — APIXABAN 10 MG: 5 TABLET, FILM COATED ORAL at 05:36

## 2023-02-04 RX ADMIN — ANTACID TABLETS 500 MG: 500 TABLET, CHEWABLE ORAL at 00:12

## 2023-02-04 RX ADMIN — ANTACID TABLETS 500 MG: 500 TABLET, CHEWABLE ORAL at 12:10

## 2023-02-04 RX ADMIN — PREDNISONE 60 MG: 20 TABLET ORAL at 05:36

## 2023-02-04 RX ADMIN — TAMSULOSIN HYDROCHLORIDE 0.4 MG: 0.4 CAPSULE ORAL at 09:29

## 2023-02-04 RX ADMIN — AMIODARONE HYDROCHLORIDE 0.5 MG/MIN: 1.8 INJECTION, SOLUTION INTRAVENOUS at 05:49

## 2023-02-04 RX ADMIN — DAPAGLIFLOZIN 10 MG: 10 TABLET, FILM COATED ORAL at 05:36

## 2023-02-04 ASSESSMENT — ENCOUNTER SYMPTOMS
FATIGUE: 1
STRIDOR: 0
CHEST TIGHTNESS: 0
CHOKING: 0
SHORTNESS OF BREATH: 0
COUGH: 0
APNEA: 0

## 2023-02-04 NOTE — PROGRESS NOTES
Patient sent to discharge lounge with medications and cab voucher. Patient is waiting for an oxygen tank to be delivered for his ride home. Patient sent to discharge lounge fully clothed and 3 PIVs intact. DL RN notified.

## 2023-02-04 NOTE — DISCHARGE PLANNING
Case Management Discharge Planning    0800 - CM met with pt at bedside per nurse pt has financial concerns regarding paying his rents. The concern is that the rent was due on 2/1 and it's now 2/4 and pt wants to get home get it paid so that he doesn't lose the apartment.  SWATHI Garrettah Dudley had spoke with  on 2/1 (see her note). Pt is waiting on MD to come see him this am to see if he can be discharged home. 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 He has O2 already at home that he uses, he just won't have anything to get him from hospital to home. He is already in service with [employer] and signed choice yesterday so that we can get him O2 to get him home safely.     1300 - Cab voucher given to bedside nurse for pt to get home. Waiting on MD to put in the O2 orders so that a portable tank can be ordered for pt to discharge home to.

## 2023-02-04 NOTE — DISCHARGE INSTRUCTIONS
Have been diagnosed with congestive heart failure as well as atrial fibrillation this will require close cardiac follow-up your appointments are scheduled below    Take your medications as directed, you have multiple new medications that will need to be closely monitored  If you develop any chest pain, shortness of breath, palpitations or dizziness seek urgent medical attention  It is important that you do not drink alcohol

## 2023-02-04 NOTE — DISCHARGE SUMMARY
Discharge Summary    CHIEF COMPLAINT ON ADMISSION  Chief Complaint   Patient presents with    Shortness of Breath       Reason for Admission  EMS     Admission Date  1/29/2023    CODE STATUS  Prior    HPI & HOSPITAL COURSE  This is a 67 y.o. 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  In the to the hospital patient received 1.3 mg of epinephrine, Solu-Medrol and magnesium infusion.  On arrival patient was on nonrebreather and he was switched to BiPAP and admitted to the ICU.  During his hospitalization patient also reported that he drinks 1/5 per day for many years he is never developed alcohol withdrawal.  Patient found to have elevated troponin, hypotension and found to have LVEF of 25% to 30% consistent with cardiogenic shock, he was requiring dobutamine and epinephrine, LV thrombus could not be ruled out on echo thus he was started on heparin ggt which was converted to oral eliquis.   Cardiology was following. Once patient was stabilized, he underwent cardiac catheterization which showed no significant CAD.    Patient's respiratory symptoms and blood pressure improved and he was taken off BiPAP and titrated back down to his home oxygen of 3 L and started on goal directed medical therapy for heart failure.  He was transferred initially to the IMCU and then to telemetry floor.  Day following his transfer, home oxygen test was conducted and patient went into rapid atrial fibrillation with -200's and down trending BP. He was treated with IV metoprolol without significant effect, he was loaded with digoxin and then amiodarone due to  persistent rapid A.fib. ultimately patient converted back to sinus rhythm with bradycardia. IV amiodarone was transitioned to oral with taper, he will continue farxiga, losartan, metoprolol and aldactone.     At the time of discharge patient was hemodynamically stable, on his baseline and oxygen and eager for discharge. He was comfortable with plan.   He was extensively educated about his diagnosis, importance of medication compliance, follow up with cardiology (which was scheduled prior to discharge) and complete cessation of alcohol. He was also educated regarding symptoms that should prompt him to return including but not limited to chest pain, worsening shorntess of breath, swelling, palpitations, lightheadedness or dizziness. Patient verbalized understanding.      Therefore, he is discharged in fair and stable condition to home with close outpatient follow-up.    The patient met 2-midnight criteria for an inpatient stay at the time of discharge.    Discharge Date  2/4/2023    FOLLOW UP ITEMS POST DISCHARGE  HF clinic  Heart rate/blood pressure   Chronic medical conditions   Anticoagulation started, monitor     DISCHARGE DIAGNOSES  Principal Problem (Resolved):    Shock (HCC) POA: Yes  Active Problems:    Panhypopituitarism (HCC) POA: Yes    Elevated liver enzymes POA: Yes    Acute on chronic respiratory failure with hypoxia and hypercapnia (HCC) POA: Unknown    Asthma with exacerbation POA: Unknown    Elevated troponin POA: Unknown    Elevated d-dimer POA: Unknown    Elevated lactic acid level POA: Unknown    Infestation by bed bug POA: Unknown    Left ventricular thrombus POA: Yes    Cardiomyopathy (HCC) EF <20% (Chronic) POA: Yes    Demand ischemia (HCC) POA: Unknown    Heart failure with reduced ejection fraction (HCC) POA: Unknown    ANGEL (acute kidney injury) (HCC) POA: Unknown    Atrial fibrillation with rapid ventricular response (HCC) POA: No  Resolved Problems:    Sepsis (HCC) POA: Unknown    Lactic  acidosis POA: Unknown    NSTEMI (non-ST elevated myocardial infarction) (HCC) POA: Yes      FOLLOW UP  Future Appointments   Date Time Provider Department Center   2/9/2023  4:40 PM Manjula Zaidi M.D. [address]   2/15/2023  3:00 PM Zena Tavares P.A.-C. [facility] None     Manjula Zaidi M.D.  [address]  [facility] [address]  [facility] [address]  [phone]    Follow up        MEDICATIONS ON DISCHARGE     Medication List        START taking these medications        Instructions   Advair Diskus 100-50 MCG/ACT Aepb  Generic drug: fluticasone-salmeterol   Inhale 1 Puff 2 times a day.  Dose: 1 Puff     * amiodarone 200 MG Tabs  Commonly known as: Cordarone   Take 2 Tablets by mouth 2 times a day for 10 days, THEN take 1 tablet daily thereafter.  Dose: 400 mg     * amiodarone 200 MG Tabs  Start taking on: February 10, 2023  Commonly known as: Cordarone   Take 1 Tablet by mouth every day.  Dose: 200 mg     * Eliquis 5mg Tabs  Generic drug: apixaban   Take 2 Tablets by mouth 2 times a day for 3 days, THEN take 1 tablet 2 times a day thereafter. Indications: DVT/PE  Dose: 10 mg     * apixaban 5mg Tabs  Start taking on: February 7, 2023  Commonly known as: ELIQUIS   Take 1 Tablet by mouth 2 times a day. Indications: DVT/PE  Dose: 5 mg     Farxiga 10 MG Tabs  Generic drug: dapagliflozin propanediol   Take 1 Tablet by mouth every day.  Dose: 10 mg     losartan 25 MG Tabs  Commonly known as: COZAAR   Take 1 Tablet by mouth every day.  Dose: 25 mg     metoprolol SR 25 MG Tb24  Commonly known as: TOPROL XL   Take 1 Tablet by mouth every evening.  Dose: 25 mg     spironolactone 25 MG Tabs  Commonly known as: ALDACTONE   Take 1 Tablet by mouth every day.  Dose: 25 mg     tamsulosin 0.4 MG capsule  Commonly known as: FLOMAX   Take 1 Capsule by mouth 1/2 hour after breakfast.  Dose: 0.4 mg           * This list has 4 medication(s) that are the same as other medications prescribed for you. Read the directions  carefully, and ask your doctor or other care provider to review them with you.                CONTINUE taking these medications        Instructions   albuterol 108 (90 Base) MCG/ACT Aers inhalation aerosol   INHALE 2 PUFFS BY MOUTH EVERY 6 HOURS AS NEEDED FOR SHORTNESS OF BREATH     atorvastatin 40 MG Tabs  Commonly known as: LIPITOR   Take 1 tablet by mouth once daily     calcium carbonate 500 MG Chew  Commonly known as: Tums   Chew 500 mg every day.  Dose: 500 mg     fexofenadine 60 MG Tabs  Commonly known as: ALLEGRA   Take 60 mg by mouth every day.  Dose: 60 mg     hydrocortisone 10 MG Tabs  Commonly known as: CORTEF   Take 1 pill in the morning and 1/2 in the afternoon 90 day supply     levothyroxine 175 MCG Tabs  Commonly known as: SYNTHROID   Take 1 Tablet by mouth every morning on an empty stomach. Please make an appointment for future refills  Dose: 175 mcg     melatonin 5 mg Tabs   Take 5 mg by mouth at bedtime as needed.  Dose: 5 mg     testosterone 50 MG/5GM (1%) Gel gel  Commonly known as: TESTIM/ANDROGEL   Doctor's comments: Patient needs to keep appt with prescriber  APPLY 1 PACKET (50MG) TOPICALLY ONCE DAILY     therapeutic multivitamin-minerals Tabs   Take 1 Tablet by mouth every day.  Dose: 1 Tablet            ASK your doctor about these medications        Instructions   fluticasone 110 MCG/ACT Aero  Commonly known as: Flovent HFA  Ask about: Should I take this medication?   Inhale 1 Puff 2 times a day for 30 days.  Dose: 1 Puff     ipratropium-albuterol 0.5-2.5 (3) MG/3ML nebulizer solution  Commonly known as: DUONEB  Ask about: Should I take this medication?   Take 3 mL by nebulization every four hours as needed for Shortness of Breath for up to 30 days.  Dose: 3 mL              Allergies  Allergies   Allergen Reactions    Pcn [Penicillins] Swelling       DIET  No orders of the defined types were placed in this encounter.      ACTIVITY  As tolerated.      CONSULTATIONS  Critical Care   Cardiology      PROCEDURES  Central line 1/30/2023  [place] catheter insertion 1/30/2023  Arterial line 1/30/2023  Cardiac catheterization 1/30/2023      LABORATORY  Lab Results   Component Value Date    SODIUM 136 02/04/2023    POTASSIUM 4.2 02/04/2023    CHLORIDE 103 02/04/2023    CO2 25 02/04/2023    GLUCOSE 106 (H) 02/04/2023    BUN 15 02/04/2023    CREATININE 0.78 02/04/2023        Lab Results   Component Value Date    WBC 15.2 (H) 02/03/2023    HEMOGLOBIN 14.5 02/03/2023    HEMATOCRIT 45.1 02/03/2023    PLATELETCT 197 02/03/2023        Total time of the discharge process exceeds 40 minutes.

## 2023-02-04 NOTE — CARE PLAN
The patient is Watcher - Medium risk of patient condition declining or worsening    Shift Goals  Clinical Goals: monitor vital signs, cardio status  Patient Goals: sleep, DC tomorrow  Family Goals: n/a    Progress made toward(s) clinical / shift goals:    Problem: Care Map:  Day Before Discharge Optimal Outcome for the Heart Failure Patient  Goal: Day Before Discharge:  Optimal Care of the heart failure patient  Outcome: Progressing  Note: Daily weight taken on standing scale. Patient was educated on recognizing signs and symptoms of heart failure, on recording daily weight, low sodium diet, fluid restriction, and when to call the doctor     Problem: Hemodynamics  Goal: Patient's hemodynamics, fluid balance and neurologic status will be stable or improve  Outcome: Progressing  Note: Patient's VSS have been stable.      Problem: Mobility  Goal: Patient's capacity to carry out activities will improve  Outcome: Progressing         Patient is not progressing towards the following goals:

## 2023-02-04 NOTE — PROGRESS NOTES
Monitor Summary  Rhythm: SR, SVT in 200s,  Afib rvr, SR  Rate:   Ectopy: rare PVC  .- / .08 / .-

## 2023-02-04 NOTE — PROGRESS NOTES
Cardiology Follow Up Progress Note    Date of Service  2/4/2023    Attending Physician  Tara Aguilar M.D.    Chief Complaint     Cardiology consultation for evaluation of A-fib RVR, heart rate 170s    HPI  Alberto Galicia is a 67 y.o. 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  He was approaching discharge but suffered A-fib RVR,cardiology was called back due to new diagnosis of A-fib RVR, heart rate 170s.      Interim Events    No overnight cardiac events  Telemetry-SR, reverted from A-fib to SR 2/3/2023   On IV amiodarone  Transition to oral amiodarone  BP improved.  Review of Systems  Review of Systems   Constitutional:  Positive for fatigue.   Respiratory:  Negative for apnea, cough, choking, chest tightness, shortness of breath and stridor.      Vital signs in last 24 hours  Temp:  [36.3 °C (97.3 °F)-36.7 °C (98.1 °F)] 36.3 °C (97.3 °F)  Pulse:  [] 69  Resp:  [18-20] 18  BP: ()/(59-91) 127/77  SpO2:  [93 %-98 %] 93 %    Physical Exam  Physical Exam  Cardiovascular:      Rate and Rhythm: Normal rate and regular rhythm.   Pulmonary:      Effort: Pulmonary effort is normal.   Skin:     General: Skin is warm.   Neurological:      Mental Status: He is alert. Mental status is at baseline.   Psychiatric:         Mood and Affect: Mood normal.       Lab Review  Lab Results   Component Value Date/Time    WBC 15.2 (H) 02/03/2023 01:11 AM    RBC 4.35 (L) 02/03/2023 01:11 AM    HEMOGLOBIN 14.5 02/03/2023 01:11 AM    HEMATOCRIT 45.1 02/03/2023 01:11 AM    .7 (H) 02/03/2023 01:11 AM    MCH 33.3 (H) 02/03/2023 01:11 AM    MCHC 32.2 (L) 02/03/2023 01:11 AM    MPV 9.9 02/03/2023 01:11 AM      Lab Results   Component Value Date/Time    SODIUM 136 02/04/2023 12:20 AM    POTASSIUM 4.2 02/04/2023 12:20 AM    CHLORIDE 103 02/04/2023 12:20 AM    CO2 25 02/04/2023 12:20 AM    GLUCOSE 106 (H) 02/04/2023 12:20 AM    BUN 15 02/04/2023 12:20 AM    CREATININE 0.78  02/04/2023 12:20 AM      Lab Results   Component Value Date/Time    ASTSGOT 73 (H) 02/04/2023 06:16 AM    ALTSGPT 60 (H) 02/04/2023 06:16 AM     Lab Results   Component Value Date/Time    CHOLSTRLTOT 151 01/31/2023 12:04 AM    LDL 88 01/31/2023 12:04 AM    HDL 49 01/31/2023 12:04 AM    TRIGLYCERIDE 69 01/31/2023 12:04 AM    TROPONINT 312 (H) 01/30/2023 03:46 PM       No results for input(s): NTPROBNP in the last 72 hours.    Cardiac Imaging and Procedures Review  EKG:  My personal interpretation of the EKG dated 2/3/2023   is A-fib RVR, heart rate 170s      Echocardiogram: 1/30/2023  Normal LV size with reduced LV systolic function and estimated LVEF 20-  30%.  Global hypokinesis with akinesis of the mid-distal septal, anterior and   apical segments.  LV thrombus can not be accurtaley excluded on this study, less likely.  Reduced right ventricular systolic function  Reduced estimated CO 4L/min and CI 1.7 L/min.m2.  Mild-moderate TR  RVSP estimated at 35mmHg  Dilated IVC  No pericardial effusion          Cardiac Catheterization: 1/30/2023  Final impression:  Angiographically normal appearing coronary arteries.  Findings:  1.  Left main coronary artery:  Normal.  2.  Left anterior descending artery:  Normal.   3.  Left circumflex coronary artery:  Normal.   4.  Right coronary artery:  Normal.  This is a right dominant system.  5.  Left ventricular end diastolic pressure:  16 mmHg.  No signficant gradient across the aortic valve.     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 amiodarone 400 BID x 10  days then 200 daily.  -Continue apixaban  -GDMT for severe cardiomyopathy  -Currently on Farxiga, losartan, Toprol-XL, spironolactone  -No further cardiac work-up    Follow-up appointment in cardiology clinic 2/15/2023 at 3 PM  Cardiology will sign off    I personally spent a total of 25 minutes which includes face-to-face time and non-face-to-face time spent on preparing to see the patient, reviewing hospital notes and tests, obtaining history from the patient, performing a medically appropriate exam, counseling and educating the patient, ordering medications/tests/procedures/referrals as clinically indicated, and documenting information in the electronic medical record.         Thank you for allowing me to participate in the care of this patient.  I will continue to follow this patient    Please contact me with any questions.    LINA Grimaldo.   Cardiologist, [facility]  [phone]

## 2023-02-05 NOTE — PROGRESS NOTES
Pt DC'd. IV removed, discharge instructions provided to patient, pt verbalizes understanding. Pt states all questions have been answered. Copy of discharge paperwork provided to pt, signed copy in chart. Pt states all belongings in possession, including portable oxygen tank. Pt escorted off unit by CNA without incident.

## 2023-02-06 ENCOUNTER — APPOINTMENT (OUTPATIENT)
Dept: MEDICAL GROUP | Facility: MEDICAL CENTER | Age: 68
End: 2023-02-06
Payer: MEDICARE

## 2023-02-06 ENCOUNTER — PATIENT OUTREACH (OUTPATIENT)
Dept: MEDICAL GROUP | Facility: MEDICAL CENTER | Age: 68
End: 2023-02-06

## 2023-02-06 LAB — GLUCOSE BLD STRIP.AUTO-MCNC: 206 MG/DL (ref 65–99)

## 2023-02-06 NOTE — PROGRESS NOTES
Patient has been having diarrhea (multiple times) and vomiting (x1) after taking medication with orange juice, he had not eaten. He did not sound like he was in any acute distress. Advised to try to eat first before taking his medications, and to take them with water.   Breathing fine. Using oxygen at night.   Confirmed f/u appointments with patient. He will drive himself.   Reviewed medications and noted dosage changes per d/c instructions for eliquis and amiodarone.   Unable to complete entire med review, patient needed to get off the phone. I also did not get the chance to introduce the CCM program. I will route the chart to the CHW to reach out.

## 2023-02-09 ENCOUNTER — PATIENT OUTREACH (OUTPATIENT)
Dept: HEALTH INFORMATION MANAGEMENT | Facility: OTHER | Age: 68
End: 2023-02-09

## 2023-02-09 DIAGNOSIS — I50.20 HEART FAILURE WITH REDUCED EJECTION FRACTION (HCC): ICD-10-CM

## 2023-02-09 NOTE — PROGRESS NOTES
CHW Willie called to introduce the CCM program to the pt. Pt did not answer and this CHW left a VM asking for a return call. This CHW will also pt the pt on the follow up call list for 1 week out.

## 2023-02-14 NOTE — DOCUMENTATION QUERY
[facility]                                                                       Query Response Note      PATIENT:               MATT MOODY  ACCT #:                  [id number]  MRN:                     [id number]  :                      1955  ADMIT DATE:       2023 11:14 PM  DISCH DATE:        2023 5:14 PM  RESPONDING  PROVIDER #:        [id number]           QUERY TEXT:    There is conflicting documentation in the  Cardiology PN and subsequent cardiology PN's of  Acute on chronic systolic heart failure vs Acute HFrEF.  Based on the diagnostic and clinical findings documented below, can this documentation be further clarified?    The patient's Clinical Indicators include:   -  CXR - no acute cardiopulmonary disease   Echocardiogram - No prior study is available for comparison... reduced LV systolic function and estimated LVEF 20-30%.  Reduced right ventricular systolic function.     Cardiology PN - extensive alcohol intake, ...increasing shortness of breath, severe orthopnea. Admitted with cardiogenic shock and treated with BiPAP and inotropic support; Attestation - # Acute on chronic systolic heart failure, NYHA class IIIb, Stage C; Assessment and MDM - Acute HFrEF Stage C NYHA III on admission, new diagnosis  DC Summary - found to have LVEF of 25% to 30% consistent with cardiogenic shock; Dx Heart failure w/ reduced ejection fraction    Treatment - Echocardiogram; IV Digoxin; PO Losartan, Metoprolol, Spironolactone, Farxiga    Risk factors -  LVEF 20-30% on     Thank you,  Hilda Meng BSN  Clinical   Connect via [employer]  Options provided:   -- Acute systolic heart failure is ruled in   -- Acute on chronic systolic heart failure is ruled in   -- Other explanation, (please specify the other explanation)   -- Unable to determine      Query created by: Hilda Meng  on 2/13/2023 3:39 PM    RESPONSE TEXT:    Acute systolic heart failure is ruled in          Electronically signed by:  JEFRY RILEY MD 2/14/2023 9:59 AM

## 2023-02-15 NOTE — DOCUMENTATION QUERY
[facility]                                                                       Query Response Note      PATIENT:               MATT MOODY  ACCT #:                  [id number]  MRN:                     [id number]  :                      1955  ADMIT DATE:       2023 11:14 PM  DISCH DATE:        2023 5:14 PM  RESPONDING  PROVIDER #:        [id number]           QUERY TEXT:    Based on the diagnostic and clinical indicators documented below, can the status of NSTEMI be further clarified?    NOTE:  If an appropriate response is not listed below, please respond with a new note.      The patient's clinical indicators include:   EKG (Pending)- Sinus tachycardia; Atrial premature complex; Abnormal R-wave progression, early transition Inferior infarct, acute (LCx); Lateral leads are also involved   EKG (Pending) - Sinus tachycardia; Left anterior fascicular block; Abnormal R-wave progression, early transition; ST elevation, consider inferior injury; Borderline prolonged QT interval   Echo - LV thrombus can not be accurately excluded on this study    ED note -  only complaint is shortness of breath.  Denies any chest pain or palpitations   Cardiology PN - coronary angiogram which showed no obstructive CAD   Cardiology PN - No ACS on EKG   Cardiology PN - Problem list - Demand ischemia, LV thrombus...LV thrombus can not be accurately excluded on this study, less likely...Questionable LV thrombus  DC Summary - found to have elevated troponin, hypotension and found to have LVEF of 25% to 30% consistent with cardiogenic shock...underwent cardiac catheterization which showed no significant CAD...Active problem NSTEMI    Treatment - EKG; Cardiac angiogram; IV Heparin; IV Nitroglycerin; PO Eliquis, Metoprolol    Risk factors - Cardiogenic shock, hyperlipidemia, LV thrombus cannot be ruled out, DM T2    Thank you,  Hilda  Yusra AGUILLONN  Clinical   Connect via Vo[employer] Messenger  Options provided:   -- NSTEMI ruled out   -- NSTEMI confirmed   -- Other explanation, (please specify the other explanation)   -- Unable to Determine      Query created by: Hilda Meng on 2/15/2023 11:52 AM    RESPONSE TEXT:    NSTEMI confirmed          Electronically signed by:  RACHEL CALIX MD 2/15/2023 3:19 PM

## 2023-02-22 ENCOUNTER — OFFICE VISIT (OUTPATIENT)
Dept: MEDICAL GROUP | Facility: MEDICAL CENTER | Age: 68
End: 2023-02-22
Payer: MEDICARE

## 2023-02-22 VITALS
WEIGHT: 210.54 LBS | SYSTOLIC BLOOD PRESSURE: 128 MMHG | HEART RATE: 63 BPM | OXYGEN SATURATION: 96 % | RESPIRATION RATE: 18 BRPM | TEMPERATURE: 98.6 F | DIASTOLIC BLOOD PRESSURE: 74 MMHG | BODY MASS INDEX: 25.64 KG/M2 | HEIGHT: 76 IN

## 2023-02-22 DIAGNOSIS — I48.91 ATRIAL FIBRILLATION, UNSPECIFIED TYPE (HCC): ICD-10-CM

## 2023-02-22 DIAGNOSIS — Z09 HOSPITAL DISCHARGE FOLLOW-UP: ICD-10-CM

## 2023-02-22 DIAGNOSIS — D32.9 MENINGIOMA (HCC): ICD-10-CM

## 2023-02-22 DIAGNOSIS — I10 HYPERTENSION, UNSPECIFIED TYPE: ICD-10-CM

## 2023-02-22 DIAGNOSIS — I50.20 HEART FAILURE WITH REDUCED EJECTION FRACTION (HCC): ICD-10-CM

## 2023-02-22 PROCEDURE — 99214 OFFICE O/P EST MOD 30 MIN: CPT | Performed by: FAMILY MEDICINE

## 2023-02-22 ASSESSMENT — FIBROSIS 4 INDEX: FIB4 SCORE: 3.89

## 2023-02-23 NOTE — PROGRESS NOTES
CC: Hospital discharge follow-up    HPI:   Alberto presents today for posthospitalization follow-up      Patient was admitted to [facility] on 1/29/2023.  He presented with shortness of breath.  In the to the hospital patient received 1.3 mg of epinephrine, Solu-Medrol and magnesium infusion.  On arrival patient was on nonrebreather and he was switched to BiPAP and admitted to the ICU.  During his hospitalization patient also reported that he drinks 1/5 per day for many years he is never developed alcohol withdrawal.  Patient found to have elevated troponin, hypotension and found to have LVEF of 25% to 30% consistent with cardiogenic shock, he was requiring dobutamine and epinephrine, LV thrombus could not be ruled out on echo thus he was started on heparin ggt which was converted to oral eliquis.   Cardiology was following. Once patient was stabilized, he underwent cardiac catheterization which showed no significant CAD.    Patient's respiratory symptoms and blood pressure improved and he was taken off BiPAP and titrated back down to his home oxygen of 3 L and started on goal directed medical therapy for heart failure.  He was transferred initially to the [facility] and then to telemetry floor.  Day following his transfer, home oxygen test was conducted and patient went into rapid atrial fibrillation with -200's and down trending BP. He was treated with IV metoprolol without significant effect, he was loaded with digoxin and then amiodarone due to persistent rapid A.fib. ultimately patient converted back to sinus rhythm with bradycardia. IV amiodarone was transitioned to oral with taper, he also has been on farxiga 10 mg daily, losartan 25 mg daily, metoprolol SR 25 mg daily and aldactone 25 mg daily.      At the time of discharge patient was hemodynamically stable, on his baseline and oxygen and eager for discharge.  Patient was discharged on.  He was extensively educated about his diagnosis, importance of  medication compliance, follow up with cardiology and complete cessation of alcohol.   Came in today for follow-up.  Denies any palpitation, chest pain, shortness of breath, leg swelling.  Patient has been tolerating new medications(amiodarone, Eliquis, Tresiba, spironolactone, metoprolol).  He has been following up with cardiology, supposed to repeat his echocardiogram in 3 months.      Patient Active Problem List    Diagnosis Date Noted    Atrial fibrillation with rapid ventricular response ([facility]) 02/03/2023    ANGEL (acute kidney injury) (HCC) 02/02/2023    Heart failure with reduced ejection fraction (HCC) 02/01/2023    Demand ischemia ([facility]) 01/31/2023    Acute on chronic respiratory failure with hypoxia and hypercapnia ([facility]) 01/30/2023    Asthma with exacerbation 01/30/2023    Elevated troponin 01/30/2023    Elevated d-dimer 01/30/2023    Elevated lactic acid level 01/30/2023    Infestation by bed bug 01/30/2023    Left ventricular thrombus 01/30/2023    Cardiomyopathy ([facility]) EF <20% 01/30/2023    Moderate persistent asthma without complication 01/13/2022    Elevated liver enzymes 01/13/2022    Macrocytosis 07/27/2021    Seasonal allergies 07/27/2021    Meningioma ([facility]) 09/02/2020    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 70 Tablet 0    amiodarone (CORDARONE) 200 MG Tab Take 1 Tablet by mouth every day. 30 Tablet 1    apixaban (ELIQUIS) 5mg Tab Take  2 Tablets by mouth 2 times a day for 3 days, THEN take 1 tablet 2 times a day thereafter. Indications: DVT/PE 72 Tablet 0    apixaban (ELIQUIS) 5mg Tab Take 1 Tablet by mouth 2 times a day. Indications: DVT/PE 60 Tablet 1    fluticasone-salmeterol (ADVAIR DISKUS) 100-50 MCG/ACT AEROSOL POWDER, BREATH ACTIVATED Inhale 1 Puff 2 times a day. 60 Each 1    dapagliflozin propanediol (FARXIGA) 10 MG Tab Take 1 Tablet by mouth every day. 30 Tablet 1    losartan (COZAAR) 25 MG Tab Take 1 Tablet by mouth every day. 30 Tablet 1    metoprolol SR (TOPROL XL) 25 MG TABLET SR 24 HR Take 1 Tablet by mouth every evening. 30 Tablet 1    spironolactone (ALDACTONE) 25 MG Tab Take 1 Tablet by mouth every day. 30 Tablet 3    tamsulosin (FLOMAX) 0.4 MG capsule Take 1 Capsule by mouth 1/2 hour after breakfast. 30 Capsule 1    fexofenadine (ALLEGRA) 60 MG Tab Take 60 mg by mouth every day.      therapeutic multivitamin-minerals (THERAGRAN-M) Tab Take 1 Tablet by mouth every day.      albuterol 108 (90 Base) MCG/ACT Aero Soln inhalation aerosol INHALE 2 PUFFS BY MOUTH EVERY 6 HOURS AS NEEDED FOR SHORTNESS OF BREATH 9 g 0    levothyroxine (SYNTHROID) 175 MCG Tab Take 1 Tablet by mouth every morning on an empty stomach. Please make an appointment for future refills 90 Tablet 1    atorvastatin (LIPITOR) 40 MG Tab Take 1 tablet by mouth once daily 100 Tablet 2    hydrocortisone (CORTEF) 10 MG Tab Take 1 pill in the morning and 1/2 in the afternoon 90 day supply 180 Tablet 3    calcium carbonate (TUMS) 500 MG Chew Tab Chew 500 mg every day.      melatonin 5 mg Tab Take 5 mg by mouth at bedtime as needed.       No current facility-administered medications for this visit.         Allergies as of 02/22/2023 - Reviewed 02/22/2023   Allergen Reaction Noted    Pcn [penicillins] Swelling 06/30/2015        ROS: Denies any chest pain, Shortness of breath, Changes bowel or bladder, Lower extremity edema.    Physical Exam:  /74 (BP Location: Right  "arm, Patient Position: Sitting, BP Cuff Size: Adult)   Pulse 63   Temp 37 °C (98.6 °F) (Temporal)   Resp 18   Ht 1.925 m (6' 3.8\")   Wt 95.5 kg (210 lb 8.6 oz)   SpO2 96%   BMI 25.76 kg/m²   Gen.: Well-developed, well-nourished, no apparent distress,pleasant and cooperative with the examination  Skin:  Warm and dry with good turgor. No rashes or suspicious lesions in visible areas  HEENT:Sinuses nontender with palpation, TMs clear, nares patent with pink mucosa and clear rhinorrhea,no septal deviation ,polyps or lesions. lips without lesions, oropharynx clear.  Neck: Trachea midline,no masses or adenopathy. No JVD.  Cor: Regular rate and rhythm without murmur, gallop or rub.  Lungs: Respirations unlabored.Clear to auscultation with equal breath sounds bilaterally. No wheezes, rhonchi.  Extremities: No cyanosis, clubbing or edema.      Assessment and Plan.   67 y.o. male     1. Hospital discharge follow-up  Hospital discharge reviewed.  Currently asymptomatic    2. Atrial fibrillation, unspecified type (HCC)  No RVR.  Continue on metoprolol SR 25 mg daily, continued on 200 mg daily, and Eliquis 25 mg twice a day    3. Heart failure with reduced ejection fraction (HCC)  Currently stable.  Asymptomatic/euvolemic  Continue on spironolactone 25 mg daily, metoprolol SR 25 mg daily, and losartan 25 mg daily  Continue follow-up with cardiology  Will need to repeat echocardiogram in 3-6 months    4. Meningioma (HCC)  Previous MRI showed:  Empty sella noted without evidence for residual or recurrent tumor. Inferior displacement of the optic chiasm noted.  Small meningioma along the dorsal aspect of the right dorsum sellae, stable in appearance.  Bilateral small frontal subdural hygromas, stable from the previous study.  Small left anterior temporal subcortical cavernoma.    Patient has been asymptomatic    5. Hypertension, unspecified type  Controlled.  Continue on losartan 25 mg, metoprolol 25 mg daily, and " spironolactone 25 mg daily

## 2023-02-24 ENCOUNTER — PATIENT OUTREACH (OUTPATIENT)
Dept: HEALTH INFORMATION MANAGEMENT | Facility: OTHER | Age: 68
End: 2023-02-24
Payer: MEDICARE

## 2023-02-24 ENCOUNTER — PATIENT MESSAGE (OUTPATIENT)
Dept: HEALTH INFORMATION MANAGEMENT | Facility: OTHER | Age: 68
End: 2023-02-24

## 2023-02-24 DIAGNOSIS — I50.20 HEART FAILURE WITH REDUCED EJECTION FRACTION (HCC): ICD-10-CM

## 2023-02-24 NOTE — PROGRESS NOTES
CHW shante tried to call the pt to introduce the CCM program to the pt. Pt number was not in service and this CHW could not leave a message. This CHW sent  [employer] message to the pt. This CHW will try for the 3rd time to introduce to the pt.

## 2023-03-02 ENCOUNTER — TELEPHONE (OUTPATIENT)
Dept: HEALTH INFORMATION MANAGEMENT | Facility: OTHER | Age: 68
End: 2023-03-02
Payer: MEDICARE

## 2023-03-02 ENCOUNTER — PATIENT OUTREACH (OUTPATIENT)
Dept: HEALTH INFORMATION MANAGEMENT | Facility: OTHER | Age: 68
End: 2023-03-02
Payer: MEDICARE

## 2023-03-02 DIAGNOSIS — I50.20 HEART FAILURE WITH REDUCED EJECTION FRACTION (HCC): ICD-10-CM

## 2023-03-02 NOTE — PROGRESS NOTES
BREA Tapia Called the pt for the 3rd time and the pt did not answer. BREA left the pt a VM. This CHW will no longer follow at this time.

## 2023-03-06 ENCOUNTER — APPOINTMENT (OUTPATIENT)
Dept: CARDIOLOGY | Facility: MEDICAL CENTER | Age: 68
End: 2023-03-06
Payer: MEDICARE

## 2023-03-20 ENCOUNTER — OFFICE VISIT (OUTPATIENT)
Dept: CARDIOLOGY | Facility: MEDICAL CENTER | Age: 68
End: 2023-03-20
Payer: MEDICARE

## 2023-03-20 VITALS
HEART RATE: 63 BPM | DIASTOLIC BLOOD PRESSURE: 74 MMHG | HEIGHT: 75 IN | RESPIRATION RATE: 18 BRPM | SYSTOLIC BLOOD PRESSURE: 122 MMHG | WEIGHT: 208 LBS | OXYGEN SATURATION: 93 % | BODY MASS INDEX: 25.86 KG/M2

## 2023-03-20 DIAGNOSIS — I50.20 ACC/AHA STAGE C SYSTOLIC HEART FAILURE (HCC): ICD-10-CM

## 2023-03-20 DIAGNOSIS — I42.8 NON-ISCHEMIC CARDIOMYOPATHY (HCC): ICD-10-CM

## 2023-03-20 DIAGNOSIS — I48.0 PAF (PAROXYSMAL ATRIAL FIBRILLATION) (HCC): ICD-10-CM

## 2023-03-20 DIAGNOSIS — I48.91 ATRIAL FIBRILLATION WITH RAPID VENTRICULAR RESPONSE (HCC): ICD-10-CM

## 2023-03-20 DIAGNOSIS — Z79.899 HIGH RISK MEDICATION USE: ICD-10-CM

## 2023-03-20 DIAGNOSIS — D68.69 HYPERCOAGULABILITY DUE TO ATRIAL FIBRILLATION (HCC): ICD-10-CM

## 2023-03-20 DIAGNOSIS — I48.91 HYPERCOAGULABILITY DUE TO ATRIAL FIBRILLATION (HCC): ICD-10-CM

## 2023-03-20 DIAGNOSIS — R06.09 DYSPNEA ON EXERTION: ICD-10-CM

## 2023-03-20 DIAGNOSIS — I51.89 LEFT VENTRICULAR SYSTOLIC DYSFUNCTION, NYHA CLASS 2: ICD-10-CM

## 2023-03-20 DIAGNOSIS — F10.10 ETOH ABUSE: ICD-10-CM

## 2023-03-20 LAB — EKG IMPRESSION: NORMAL

## 2023-03-20 PROCEDURE — 99215 OFFICE O/P EST HI 40 MIN: CPT | Mod: 25 | Performed by: INTERNAL MEDICINE

## 2023-03-20 PROCEDURE — 94618 PULMONARY STRESS TESTING: CPT | Performed by: INTERNAL MEDICINE

## 2023-03-20 PROCEDURE — 93000 ELECTROCARDIOGRAM COMPLETE: CPT | Mod: 59 | Performed by: INTERNAL MEDICINE

## 2023-03-20 RX ORDER — SACUBITRIL AND VALSARTAN 49; 51 MG/1; MG/1
1 TABLET, FILM COATED ORAL 2 TIMES DAILY
Qty: 60 TABLET | Refills: 11 | Status: ON HOLD | OUTPATIENT
Start: 2023-03-20 | End: 2023-04-04

## 2023-03-20 RX ORDER — VERICIGUAT 2.5 MG/1
2.5 TABLET, FILM COATED ORAL DAILY
Qty: 30 TABLET | Refills: 3 | Status: ON HOLD | OUTPATIENT
Start: 2023-03-20 | End: 2023-04-04

## 2023-03-20 ASSESSMENT — MINNESOTA LIVING WITH HEART FAILURE QUESTIONNAIRE (MLHF)
SWELLING IN ANKLES OR LEGS: 1
GIVING YOU SIDE EFFECTS FROM TREATMENTS: 1
WORKING AROUND THE HOUSE OR YARD DIFFICULT: 5
TOTAL_SCORE: 72
DIFFICULTY SLEEPING WELL AT NIGHT: 2
LOSS OF SELF CONTROL IN YOUR LIFE: 4
EATING LESS FOODS YOU LIKE: 2
MAKING YOU SHORT OF BREATH: 5
DIFFICULTY WITH RECREATIONAL PASTIMES, SPORTS, HOBBIES: 5
DIFFICULTY WITH SEXUAL ACTIVITIES: 5
DIFFICULTY GOING AWAY FROM HOME: 5
MAKING YOU STAY IN A HOSPITAL: 0
DIFFICULTY WORKING TO EARN A LIVING: 5
WALKING ABOUT OR CLIMBING STAIRS DIFFICULT: 5
DIFFICULTY TO CONCENTRATE OR REMEMBERING THINGS: 1
COSTING YOU MONEY FOR MEDICAL CARE: 1
MAKING YOU WORRY: 5
DIFFICULTY SOCIALIZING WITH FAMILY OR FRIENDS: 5
HAVING TO SIT OR LIE DOWN DURING THE DAY: 5
FEELING LIKE A BURDEN TO FAMILY AND FRIENDS: 4
MAKING YOU FEEL DEPRESSED: 1
TIRED, FATIGUED OR LOW ON ENERGY: 5

## 2023-03-20 ASSESSMENT — ENCOUNTER SYMPTOMS
HEADACHES: 0
SHORTNESS OF BREATH: 1
MEMORY LOSS: 0
DOUBLE VISION: 0
ABDOMINAL PAIN: 0
SENSORY CHANGE: 0
COUGH: 0
PALPITATIONS: 0
BLURRED VISION: 0
DIAPHORESIS: 0
MYALGIAS: 0
BRUISES/BLEEDS EASILY: 0
DEPRESSION: 0
FALLS: 0
FEVER: 0
DIZZINESS: 0

## 2023-03-20 ASSESSMENT — FIBROSIS 4 INDEX: FIB4 SCORE: 3.89

## 2023-03-20 ASSESSMENT — 6 MINUTE WALK TEST (6MWT): TOTAL DISTANCE WALKED (METERS): 416.05

## 2023-03-20 NOTE — PROGRESS NOTES
Chief Complaint   Patient presents with    Atrial Fibrillation    Congestive Heart Failure       Subjective     Alberto Galicia is a 67 y.o. male who presents today for cardiac care and evaluation in the heart failure clinic because of recent hospitalization due to heart failure exacerbation. Patient did have a coronary angiogram, which showed non-obstructive coronary arterial disease. The diagnosis of HF was made in 01/2023. It was presumed to be non-ischemic in nature. The left ventricular systolic function was documented to be at 20%. Patient was diuresed in the hospital and discharged home with guidelines directed medical therapy. Patient was found to be in atrial fibrillation as well and medically converted. LV thrombus was also seen.    I have personally interpreted EKG today with patient, there is no evidence of acute coronary syndrome, no evidence of prior infarct, normal WA and QT interval, no significant conduction disease. Sinus rhythm.    Patient is feeling better these days. Does get winded upon walking up inclines or for distance. No symptoms at rest or with daily living activities.    Patient was able to complete 417 m during his 6 minute walk test. his O2 saturation at baseline was 93% and at the end of the test, the O2 saturation was 96%.     Patient was also drinking vodka in the past but has since stopped after his recent hospitalization.      Past Medical History:   Diagnosis Date    Asthma     Cardiomyopathy (HCC) EF <20% 1/30/2023    Hyperlipidemia     Left ventricular thrombus 1/30/2023    Pituitary disease (HCC)     Thyroid disease      Past Surgical History:   Procedure Laterality Date    CRANIOTOMY       Family History   Problem Relation Age of Onset    Diabetes Mother     Heart Disease Father     Diabetes Brother     Alzheimer's Disease Maternal Grandmother     Cancer Maternal Grandfather     Heart Disease Paternal Grandmother     Cancer Paternal Grandfather      Social History      Socioeconomic History    Marital status: Single     Spouse name: Not on file    Number of children: Not on file    Years of education: Not on file    Highest education level: Not on file   Occupational History    Not on file   Tobacco Use    Smoking status: Never    Smokeless tobacco: Never   Vaping Use    Vaping Use: Never used   Substance and Sexual Activity    Alcohol use: Yes     Alcohol/week: 12.6 oz     Types: 21 Shots of liquor per week     Comment: daily tumbler of vodka with OJ    Drug use: No    Sexual activity: Not on file   Other Topics Concern    Not on file   Social History Narrative    Not on file     Social Determinants of Health     Financial Resource Strain: Not on file   Food Insecurity: Not on file   Transportation Needs: Not on file   Physical Activity: Not on file   Stress: Not on file   Social Connections: Not on file   Intimate Partner Violence: Not on file   Housing Stability: Not on file     Allergies   Allergen Reactions    Pcn [Penicillins] Swelling     Outpatient Encounter Medications as of 3/20/2023   Medication Sig Dispense Refill    amiodarone (CORDARONE) 200 MG Tab Take 1 Tablet by mouth every day. 30 Tablet 1    apixaban (ELIQUIS) 5mg Tab Take 1 Tablet by mouth 2 times a day. Indications: DVT/PE 60 Tablet 1    fluticasone-salmeterol (ADVAIR DISKUS) 100-50 MCG/ACT AEROSOL POWDER, BREATH ACTIVATED Inhale 1 Puff 2 times a day. 60 Each 1    dapagliflozin propanediol (FARXIGA) 10 MG Tab Take 1 Tablet by mouth every day. 30 Tablet 1    metoprolol SR (TOPROL XL) 25 MG TABLET SR 24 HR Take 1 Tablet by mouth every evening. 30 Tablet 1    spironolactone (ALDACTONE) 25 MG Tab Take 1 Tablet by mouth every day. 30 Tablet 3    tamsulosin (FLOMAX) 0.4 MG capsule Take 1 Capsule by mouth 1/2 hour after breakfast. 30 Capsule 1    fexofenadine (ALLEGRA) 60 MG Tab Take 60 mg by mouth every day.      therapeutic multivitamin-minerals (THERAGRAN-M) Tab Take 1 Tablet by mouth every day.      albuterol  "108 (90 Base) MCG/ACT Aero Soln inhalation aerosol INHALE 2 PUFFS BY MOUTH EVERY 6 HOURS AS NEEDED FOR SHORTNESS OF BREATH 9 g 0    levothyroxine (SYNTHROID) 175 MCG Tab Take 1 Tablet by mouth every morning on an empty stomach. Please make an appointment for future refills 90 Tablet 1    atorvastatin (LIPITOR) 40 MG Tab Take 1 tablet by mouth once daily 100 Tablet 2    hydrocortisone (CORTEF) 10 MG Tab Take 1 pill in the morning and 1/2 in the afternoon 90 day supply 180 Tablet 3    calcium carbonate (TUMS) 500 MG Chew Tab Chew 500 mg every day.      melatonin 5 mg Tab Take 5 mg by mouth at bedtime as needed.      [DISCONTINUED] losartan (COZAAR) 25 MG Tab Take 1 Tablet by mouth every day. 30 Tablet 1     No facility-administered encounter medications on file as of 3/20/2023.     Review of Systems   Constitutional:  Negative for diaphoresis and fever.   HENT:  Negative for nosebleeds.    Eyes:  Negative for blurred vision and double vision.   Respiratory:  Positive for shortness of breath. Negative for cough.    Cardiovascular:  Negative for chest pain and palpitations.   Gastrointestinal:  Negative for abdominal pain.   Genitourinary:  Negative for dysuria and frequency.   Musculoskeletal:  Negative for falls and myalgias.   Skin:  Negative for rash.   Neurological:  Negative for dizziness, sensory change and headaches.   Endo/Heme/Allergies:  Does not bruise/bleed easily.   Psychiatric/Behavioral:  Negative for depression and memory loss.             Objective     /74 (BP Location: Left arm, Patient Position: Sitting, BP Cuff Size: Adult)   Pulse 63   Resp 18   Ht 1.905 m (6' 3\")   Wt 94.3 kg (208 lb)   SpO2 93%   BMI 26.00 kg/m²     Physical Exam  Vitals and nursing note reviewed.   Constitutional:       General: He is not in acute distress.     Appearance: He is not diaphoretic.   HENT:      Head: Normocephalic and atraumatic.      Right Ear: External ear normal.      Left Ear: External ear normal. "      Nose: No congestion or rhinorrhea.   Eyes:      General:         Right eye: No discharge.         Left eye: No discharge.   Neck:      Thyroid: No thyromegaly.      Vascular: No JVD.   Cardiovascular:      Rate and Rhythm: Normal rate and regular rhythm.      Pulses: Normal pulses.   Pulmonary:      Effort: No respiratory distress.   Abdominal:      General: There is no distension.      Tenderness: There is no abdominal tenderness.   Musculoskeletal:         General: No swelling or tenderness.      Right lower leg: No edema.      Left lower leg: No edema.   Skin:     General: Skin is warm and dry.   Neurological:      Mental Status: He is alert and oriented to person, place, and time.      Cranial Nerves: No cranial nerve deficit.   Psychiatric:         Behavior: Behavior normal.              Assessment & Plan     1. ACC/AHA stage C systolic heart failure (HCC)        2. Left ventricular systolic dysfunction, NYHA class 2        3. Atrial fibrillation with rapid ventricular response (HCC)  EKG      4. Non-ischemic cardiomyopathy (HCC)        5. PAF (paroxysmal atrial fibrillation) ([facility])        6. ETOH abuse        7. Hypercoagulability due to atrial fibrillation (HCC)        8. Dyspnea on exertion        9. High risk medication use            Medical Decision Making: Today's Assessment/Status/Plan:   Dilated Cardiomyopathy:  Chronically illed condition which requires ongoing close monitoring and treatment to improve survival rate along with decreasing risk of clinical decompensation sudden cardiac death and hospitalization.    Today, based on physical examination findings, patient is euvolemic. No JVD, lungs are clear to auscultation, no pitting edema in bilateral lower extremities, no ascites.     Dry weight is 208 lbs.    Likely alcohol induced, myopathy.  Advised patient to completely stop drinking alcohol.  He has done so so far.    Based on the overall clinical history and profile, patient is a good  candidate for Entresto therapy (with superiority over ACE-I therapy in terms of survival benefits and prevention of future hospitalization).  We will stop ARB therapy.  Will start Entresto therapy at 24/26 mg po bid.  Patient was given written instruction about the protocol of initiating Entresto therapy with wash out period.     Continue Toprol XL 25 mg daily.      Aldactone antagonist with Spironolactone 25 mg daily.    Continue Farxiga 10 mg p.o. once a day.    Based on recent data on Vericiguat's FDA approval and indication for symptomatic HF patients with recent hospitalization (within the last 6 months) and LVEF of less than 45%, at this time, I will start patient on Verquvo 2.5 mg once a day and titrate up to goal of 10 mg once a day via protocol for potential benefits of cardiovascular risk reduction in mortality and heart failure hospitalization.    We will consider ICD placement for primary prevention in 3 months if left ventricular systolic function remains less than 35% after optimization of evidence based heart failure medical regimen.    Continue Eliquis for LV thrombus.  Off label use.    Paroxysmal atrial fibrillation:  Anticoagulation with Eliquis mg 5x daily.  Currently in sinus rhythm today.    Hypertension:  Optimize control using cardiomyopathy medical regimen as well.    Hyperlipidemia:  Optimize statin as within guidelines of CAD treatment as above.       Of note, during the care of this patient, I spent a significant amount of time explaining the nature of the disease process, reviewing all possible imaging studies, blood test results to patient.  The overall care of this patient require a higher level of care than usual due to the multiple comorbidities along with ongoing issues of congestive heart failure that put this patient at risk for sudden cardiac death, increased mortality, and hospitalization.  This patient also requires close monitoring with at least monthly blood test to monitor  renal function and electrolytes due to the ongoing dynamic changes of medical therapy titration protocol to ensure optimal benefits for the overall care of this patient.

## 2023-03-23 ENCOUNTER — TELEPHONE (OUTPATIENT)
Dept: CARDIOLOGY | Facility: MEDICAL CENTER | Age: 68
End: 2023-03-23
Payer: MEDICARE

## 2023-03-23 NOTE — TELEPHONE ENCOUNTER
SAGAR Galicia  Calderon: [id number]    help_outline NEED HELP? [phone]  error Pharmacy claim for Verquvo 2.5MG tablets, prescriber To, has been rejected and requires prior authorization for coverage.  Non-preferred medications may have a higher patient co-pay than the health insurance plan's preferred medications.  Please research and evaluate therapy options for this patient. You can learn more about alternative medications by calling [employer] at [phone], or checking their online formulary.

## 2023-03-23 NOTE — TELEPHONE ENCOUNTER
PA sent to frank Galicia Key: [id number] - PA Case ID: [id number] - Rx #: [id number] help? Call us at [phone]  Status  Sent to [employer]  Drug  Verquvo 2.5MG tablets  Form  [employer] ePA Form 2017 NCPDP  Original Claim Info  MR,[id number]

## 2023-03-30 NOTE — TELEPHONE ENCOUNTER
PA approved through 3/22/2024.    Alberto Galicia Calderon: [id number] - PA Case ID: [id number] - Rx #: [id number]  Need help? Call us at [phone]  Outcome   Appro[employer]on March 23  The request has been approved. The authorization is effective from 03/23/2023 to 03/22/2024, as long as the member is enrolled in their current health plan. The request was reviewed and approved by a licensed clinical pharmacist. A written notification letter will follow with additional details.  Drug    Verquvo 2.5MG tablets   Form    [employer] ePA Form 2017 NCPDP           Original Claim Info    MR,[id number]

## 2023-04-03 ENCOUNTER — TELEPHONE (OUTPATIENT)
Dept: CARDIOLOGY | Facility: MEDICAL CENTER | Age: 68
End: 2023-04-03
Payer: MEDICARE

## 2023-04-03 ENCOUNTER — APPOINTMENT (OUTPATIENT)
Dept: RADIOLOGY | Facility: MEDICAL CENTER | Age: 68
DRG: 312 | End: 2023-04-03
Attending: EMERGENCY MEDICINE
Payer: MEDICARE

## 2023-04-03 ENCOUNTER — HOSPITAL ENCOUNTER (INPATIENT)
Facility: MEDICAL CENTER | Age: 68
LOS: 3 days | DRG: 312 | End: 2023-04-06
Attending: EMERGENCY MEDICINE | Admitting: INTERNAL MEDICINE
Payer: MEDICARE

## 2023-04-03 DIAGNOSIS — J96.22 ACUTE ON CHRONIC RESPIRATORY FAILURE WITH HYPOXIA AND HYPERCAPNIA (HCC): ICD-10-CM

## 2023-04-03 DIAGNOSIS — J96.21 ACUTE ON CHRONIC RESPIRATORY FAILURE WITH HYPOXIA AND HYPERCAPNIA (HCC): ICD-10-CM

## 2023-04-03 DIAGNOSIS — I48.91 ATRIAL FIBRILLATION WITH RAPID VENTRICULAR RESPONSE (HCC): ICD-10-CM

## 2023-04-03 DIAGNOSIS — J45.41 MODERATE PERSISTENT ASTHMA WITH ACUTE EXACERBATION: ICD-10-CM

## 2023-04-03 DIAGNOSIS — R55 SYNCOPE, UNSPECIFIED SYNCOPE TYPE: ICD-10-CM

## 2023-04-03 DIAGNOSIS — E23.0 PANHYPOPITUITARISM (HCC): ICD-10-CM

## 2023-04-03 DIAGNOSIS — J45.901 ASTHMA WITH ACUTE EXACERBATION, UNSPECIFIED ASTHMA SEVERITY, UNSPECIFIED WHETHER PERSISTENT: ICD-10-CM

## 2023-04-03 PROBLEM — I95.9 HYPOTENSION: Status: ACTIVE | Noted: 2023-04-03

## 2023-04-03 LAB
ALBUMIN SERPL BCP-MCNC: 4.2 G/DL (ref 3.2–4.9)
ALBUMIN/GLOB SERPL: 1.3 G/DL
ALP SERPL-CCNC: 94 U/L (ref 30–99)
ALT SERPL-CCNC: 22 U/L (ref 2–50)
ANION GAP SERPL CALC-SCNC: 12 MMOL/L (ref 7–16)
AST SERPL-CCNC: 26 U/L (ref 12–45)
BASOPHILS # BLD AUTO: 0.7 % (ref 0–1.8)
BASOPHILS # BLD: 0.06 K/UL (ref 0–0.12)
BILIRUB SERPL-MCNC: 0.3 MG/DL (ref 0.1–1.5)
BUN SERPL-MCNC: 14 MG/DL (ref 8–22)
CALCIUM ALBUM COR SERPL-MCNC: 9.5 MG/DL (ref 8.5–10.5)
CALCIUM SERPL-MCNC: 9.7 MG/DL (ref 8.5–10.5)
CHLORIDE SERPL-SCNC: 98 MMOL/L (ref 96–112)
CO2 SERPL-SCNC: 25 MMOL/L (ref 20–33)
CREAT SERPL-MCNC: 1.16 MG/DL (ref 0.5–1.4)
EKG IMPRESSION: NORMAL
EOSINOPHIL # BLD AUTO: 0.4 K/UL (ref 0–0.51)
EOSINOPHIL NFR BLD: 4.5 % (ref 0–6.9)
ERYTHROCYTE [DISTWIDTH] IN BLOOD BY AUTOMATED COUNT: 44.8 FL (ref 35.9–50)
GFR SERPLBLD CREATININE-BSD FMLA CKD-EPI: 68 ML/MIN/1.73 M 2
GLOBULIN SER CALC-MCNC: 3.2 G/DL (ref 1.9–3.5)
GLUCOSE SERPL-MCNC: 96 MG/DL (ref 65–99)
HCT VFR BLD AUTO: 48.2 % (ref 42–52)
HGB BLD-MCNC: 15.8 G/DL (ref 14–18)
IMM GRANULOCYTES # BLD AUTO: 0.02 K/UL (ref 0–0.11)
IMM GRANULOCYTES NFR BLD AUTO: 0.2 % (ref 0–0.9)
LYMPHOCYTES # BLD AUTO: 1.49 K/UL (ref 1–4.8)
LYMPHOCYTES NFR BLD: 16.7 % (ref 22–41)
MCH RBC QN AUTO: 32.3 PG (ref 27–33)
MCHC RBC AUTO-ENTMCNC: 32.8 G/DL (ref 33.7–35.3)
MCV RBC AUTO: 98.6 FL (ref 81.4–97.8)
MONOCYTES # BLD AUTO: 0.53 K/UL (ref 0–0.85)
MONOCYTES NFR BLD AUTO: 5.9 % (ref 0–13.4)
NEUTROPHILS # BLD AUTO: 6.44 K/UL (ref 1.82–7.42)
NEUTROPHILS NFR BLD: 72 % (ref 44–72)
NRBC # BLD AUTO: 0 K/UL
NRBC BLD-RTO: 0 /100 WBC
PHOSPHATE SERPL-MCNC: 3.4 MG/DL (ref 2.5–4.5)
PLATELET # BLD AUTO: 312 K/UL (ref 164–446)
PMV BLD AUTO: 9.5 FL (ref 9–12.9)
POTASSIUM SERPL-SCNC: 4.8 MMOL/L (ref 3.6–5.5)
PROT SERPL-MCNC: 7.4 G/DL (ref 6–8.2)
RBC # BLD AUTO: 4.89 M/UL (ref 4.7–6.1)
SODIUM SERPL-SCNC: 135 MMOL/L (ref 135–145)
T4 FREE SERPL-MCNC: 1.94 NG/DL (ref 0.93–1.7)
TROPONIN T SERPL-MCNC: 9 NG/L (ref 6–19)
TROPONIN T SERPL-MCNC: <6 NG/L (ref 6–19)
TSH SERPL DL<=0.005 MIU/L-ACNC: 0.18 UIU/ML (ref 0.38–5.33)
TSH SERPL DL<=0.005 MIU/L-ACNC: 0.18 UIU/ML (ref 0.38–5.33)
WBC # BLD AUTO: 8.9 K/UL (ref 4.8–10.8)

## 2023-04-03 PROCEDURE — 70486 CT MAXILLOFACIAL W/O DYE: CPT

## 2023-04-03 PROCEDURE — 99223 1ST HOSP IP/OBS HIGH 75: CPT | Mod: AI | Performed by: INTERNAL MEDICINE

## 2023-04-03 PROCEDURE — 700105 HCHG RX REV CODE 258: Performed by: INTERNAL MEDICINE

## 2023-04-03 PROCEDURE — 84443 ASSAY THYROID STIM HORMONE: CPT | Mod: 91

## 2023-04-03 PROCEDURE — 70450 CT HEAD/BRAIN W/O DYE: CPT

## 2023-04-03 PROCEDURE — 84484 ASSAY OF TROPONIN QUANT: CPT

## 2023-04-03 PROCEDURE — 71045 X-RAY EXAM CHEST 1 VIEW: CPT

## 2023-04-03 PROCEDURE — 85025 COMPLETE CBC W/AUTO DIFF WBC: CPT

## 2023-04-03 PROCEDURE — 93005 ELECTROCARDIOGRAM TRACING: CPT

## 2023-04-03 PROCEDURE — 93005 ELECTROCARDIOGRAM TRACING: CPT | Performed by: EMERGENCY MEDICINE

## 2023-04-03 PROCEDURE — 700102 HCHG RX REV CODE 250 W/ 637 OVERRIDE(OP): Performed by: NURSE PRACTITIONER

## 2023-04-03 PROCEDURE — 84100 ASSAY OF PHOSPHORUS: CPT

## 2023-04-03 PROCEDURE — 80053 COMPREHEN METABOLIC PANEL: CPT

## 2023-04-03 PROCEDURE — 99285 EMERGENCY DEPT VISIT HI MDM: CPT

## 2023-04-03 PROCEDURE — 700105 HCHG RX REV CODE 258: Performed by: NURSE PRACTITIONER

## 2023-04-03 PROCEDURE — 36415 COLL VENOUS BLD VENIPUNCTURE: CPT

## 2023-04-03 PROCEDURE — 770020 HCHG ROOM/CARE - TELE (206)

## 2023-04-03 PROCEDURE — 84439 ASSAY OF FREE THYROXINE: CPT

## 2023-04-03 PROCEDURE — A9270 NON-COVERED ITEM OR SERVICE: HCPCS | Performed by: NURSE PRACTITIONER

## 2023-04-03 RX ORDER — SODIUM CHLORIDE, SODIUM LACTATE, POTASSIUM CHLORIDE, CALCIUM CHLORIDE 600; 310; 30; 20 MG/100ML; MG/100ML; MG/100ML; MG/100ML
INJECTION, SOLUTION INTRAVENOUS CONTINUOUS
Status: DISCONTINUED | OUTPATIENT
Start: 2023-04-03 | End: 2023-04-06 | Stop reason: HOSPADM

## 2023-04-03 RX ORDER — BUDESONIDE AND FORMOTEROL FUMARATE DIHYDRATE 80; 4.5 UG/1; UG/1
2 AEROSOL RESPIRATORY (INHALATION) 2 TIMES DAILY
Status: DISCONTINUED | OUTPATIENT
Start: 2023-04-03 | End: 2023-04-06 | Stop reason: HOSPADM

## 2023-04-03 RX ORDER — ALBUTEROL SULFATE 90 UG/1
2 AEROSOL, METERED RESPIRATORY (INHALATION) EVERY 6 HOURS PRN
Status: DISCONTINUED | OUTPATIENT
Start: 2023-04-03 | End: 2023-04-06 | Stop reason: HOSPADM

## 2023-04-03 RX ORDER — AMIODARONE HYDROCHLORIDE 200 MG/1
200 TABLET ORAL DAILY
Qty: 90 TABLET | Refills: 1 | Status: ON HOLD | OUTPATIENT
Start: 2023-04-03 | End: 2023-04-06 | Stop reason: SDUPTHER

## 2023-04-03 RX ORDER — ACETAMINOPHEN 325 MG/1
650 TABLET ORAL EVERY 6 HOURS PRN
Status: DISCONTINUED | OUTPATIENT
Start: 2023-04-03 | End: 2023-04-06 | Stop reason: HOSPADM

## 2023-04-03 RX ORDER — M-VIT,TX,IRON,MINS/CALC/FOLIC 27MG-0.4MG
1 TABLET ORAL DAILY
Status: DISCONTINUED | OUTPATIENT
Start: 2023-04-03 | End: 2023-04-03

## 2023-04-03 RX ORDER — CHOLECALCIFEROL (VITAMIN D3) 125 MCG
5 CAPSULE ORAL NIGHTLY PRN
Status: DISCONTINUED | OUTPATIENT
Start: 2023-04-03 | End: 2023-04-03

## 2023-04-03 RX ORDER — SODIUM CHLORIDE 9 MG/ML
250 INJECTION, SOLUTION INTRAVENOUS ONCE
Status: COMPLETED | OUTPATIENT
Start: 2023-04-03 | End: 2023-04-03

## 2023-04-03 RX ORDER — TAMSULOSIN HYDROCHLORIDE 0.4 MG/1
0.4 CAPSULE ORAL
Status: DISCONTINUED | OUTPATIENT
Start: 2023-04-04 | End: 2023-04-06 | Stop reason: HOSPADM

## 2023-04-03 RX ADMIN — SODIUM CHLORIDE 250 ML: 9 INJECTION, SOLUTION INTRAVENOUS at 17:49

## 2023-04-03 RX ADMIN — SODIUM CHLORIDE, POTASSIUM CHLORIDE, SODIUM LACTATE AND CALCIUM CHLORIDE: 600; 310; 30; 20 INJECTION, SOLUTION INTRAVENOUS at 17:33

## 2023-04-03 RX ADMIN — APIXABAN 5 MG: 5 TABLET, FILM COATED ORAL at 17:33

## 2023-04-03 RX ADMIN — ACETAMINOPHEN 650 MG: 325 TABLET, FILM COATED ORAL at 17:33

## 2023-04-03 ASSESSMENT — COGNITIVE AND FUNCTIONAL STATUS - GENERAL
SUGGESTED CMS G CODE MODIFIER DAILY ACTIVITY: CH
DAILY ACTIVITIY SCORE: 24
WALKING IN HOSPITAL ROOM: A LITTLE
WALKING IN HOSPITAL ROOM: A LITTLE
SUGGESTED CMS G CODE MODIFIER DAILY ACTIVITY: CH
MOBILITY SCORE: 22
CLIMB 3 TO 5 STEPS WITH RAILING: A LITTLE
SUGGESTED CMS G CODE MODIFIER MOBILITY: CJ
DAILY ACTIVITIY SCORE: 24
CLIMB 3 TO 5 STEPS WITH RAILING: A LITTLE

## 2023-04-03 ASSESSMENT — ENCOUNTER SYMPTOMS
VOMITING: 0
BRUISES/BLEEDS EASILY: 0
HEMOPTYSIS: 0
HALLUCINATIONS: 0
NERVOUS/ANXIOUS: 0
HEARTBURN: 0
HEADACHES: 0
SPEECH CHANGE: 0
BACK PAIN: 0
PHOTOPHOBIA: 0
BLURRED VISION: 0
POLYDIPSIA: 0
FLANK PAIN: 0
DIZZINESS: 1
FEVER: 0
COUGH: 0
FOCAL WEAKNESS: 0
LOSS OF CONSCIOUSNESS: 1
FALLS: 1
SPUTUM PRODUCTION: 0
DOUBLE VISION: 0
TREMORS: 0
CHILLS: 0
ORTHOPNEA: 0
NECK PAIN: 0
PALPITATIONS: 0
NAUSEA: 0
WEIGHT LOSS: 0

## 2023-04-03 ASSESSMENT — LIFESTYLE VARIABLES
TOTAL SCORE: 0
CONSUMPTION TOTAL: NEGATIVE
SUBSTANCE_ABUSE: 0
HAVE YOU EVER FELT YOU SHOULD CUT DOWN ON YOUR DRINKING: NO
DOES PATIENT WANT TO TALK TO SOMEONE ABOUT QUITTING: NO
EVER FELT BAD OR GUILTY ABOUT YOUR DRINKING: NO
ON A TYPICAL DAY WHEN YOU DRINK ALCOHOL HOW MANY DRINKS DO YOU HAVE: 0
HAVE PEOPLE ANNOYED YOU BY CRITICIZING YOUR DRINKING: NO
ALCOHOL_USE: NO
DOES PATIENT WANT TO STOP DRINKING: YES
TOTAL SCORE: 0
TOTAL SCORE: 0
HOW MANY TIMES IN THE PAST YEAR HAVE YOU HAD 5 OR MORE DRINKS IN A DAY: 0
AVERAGE NUMBER OF DAYS PER WEEK YOU HAVE A DRINK CONTAINING ALCOHOL: 0
EVER HAD A DRINK FIRST THING IN THE MORNING TO STEADY YOUR NERVES TO GET RID OF A HANGOVER: NO

## 2023-04-03 ASSESSMENT — FIBROSIS 4 INDEX: FIB4 SCORE: 3.89

## 2023-04-03 NOTE — TELEPHONE ENCOUNTER
TT    Pt called in on pump line, stated he doesn't have active symptoms but he keeps fainting. Is out of 2 scripts, and was started on different scripts from last visit with Dr. Hauser. Not sure if that is why he is fainting. Has a big bump on his head from one of the falls. Needs to go to work but is scared for the fainting reason. Would like a cb ASAP. Would like to go to work tomorrow. Fainting spells stared Thursday.   Would also like a dr note for today, and possibly tomorrow. Depending on what the RN says.     Thank you!

## 2023-04-03 NOTE — ED PROVIDER NOTES
ED Provider Note    CHIEF COMPLAINT  Chief Complaint   Patient presents with    Syncope     Pt complains of multiple episodes of syncope x1 week when he stands up. Pt states he ran out of Eliquis and Amiodarone approximately a weeks ago. Pt states he was also recently started on Entresto and Verquvo       EXTERNAL RECORDS REVIEWED  Inpatient Notes discharge summary from January 20, 1923 with the patient was admitted for dyspnea and Outpatient Notes cardiology notes from Dr. Arriola evaluated from March 20, 2023: AHA stage C systolic heart failure, LV systolic dysfunction, NYHA class II, A-fib with RVR, nonischemic cardiomyopathy, paroxysmal A-fib, chronic alcohol abuse (currently sober)    HPI/ROS  LIMITATION TO HISTORY   Select: : None  OUTSIDE HISTORIAN(S):  None    Alberto Galicia is a 67 y.o. male who presents to the emergency department after recurrent syncopal episodes.  Past medical history as document below.  Patient explains his complex medical history to primarily include his cardiac history and ongoing following by cardiology team to include recent medication changes.  Over this last week he has had multiple syncopal episodes.  Notes he gets significant lightheaded dizzy when ambulating or standing for prolonged periods of time.  For this reason he is placed multiple chairs around his house so he can sit urgently.  Most recent fall today while in bathroom.  He says that he hit his head each time with a syncopal episode.  Denies any significant headache or vision changes.  Currently asymptomatic but he recounts that he is currently sitting and not ambulating which is his provoking factor.    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    SURGICAL HISTORY   has a past surgical history that includes craniotomy.    FAMILY HISTORY  Family History   Problem  "Relation Age of Onset    Diabetes Mother     Heart Disease Father     Diabetes Brother     Alzheimer's Disease Maternal Grandmother     Cancer Maternal Grandfather     Heart Disease Paternal Grandmother     Cancer Paternal Grandfather        SOCIAL HISTORY  Social History     Tobacco Use    Smoking status: Never    Smokeless tobacco: Never   Vaping Use    Vaping Use: Never used   Substance and Sexual Activity    Alcohol use: Yes     Alcohol/week: 12.6 oz     Types: 21 Shots of liquor per week     Comment: daily tumbler of vodka with OJ    Drug use: No    Sexual activity: Not on file       CURRENT MEDICATIONS  Home Medications       Reviewed by Mariola Pang (Pharmacy Tech) on 04/03/23 at 1542  Med List Status: Complete     Medication Last Dose Status   amiodarone (CORDARONE) 200 MG Tab 3/23/2023 Active   apixaban (ELIQUIS) 5mg Tab 3/23/2023 Active   atorvastatin (LIPITOR) 40 MG Tab 4/2/2023 Active   dapagliflozin propanediol (FARXIGA) 10 MG Tab 4/3/2023 Active   hydrocortisone (CORTEF) 10 MG Tab 4/3/2023 Active   levothyroxine (SYNTHROID) 175 MCG Tab 4/3/2023 Active   metoprolol SR (TOPROL XL) 25 MG TABLET SR 24 HR 4/2/2023 Active   sacubitril-valsartan (ENTRESTO) 49-51 MG Tab 4/3/2023 Active   spironolactone (ALDACTONE) 25 MG Tab 4/3/2023 Active   tamsulosin (FLOMAX) 0.4 MG capsule 4/3/2023 Active   Vericiguat (VERQUVO) 2.5 MG Tab 4/3/2023 Active                    ALLERGIES  Allergies   Allergen Reactions    Pcn [Penicillins] Swelling     * age 24 years old*       PHYSICAL EXAM  VITAL SIGNS: BP 93/51   Pulse 68   Temp 36.4 °C (97.5 °F) (Temporal)   Resp 16   Ht 1.905 m (6' 3\")   Wt 94.3 kg (207 lb 14.3 oz)   SpO2 94%   BMI 25.98 kg/m²        Pulse ox interpretation: I interpret this pulse ox as normal.  Constitutional: Alert in no apparent distress.  HENT: Bilateral external ears normal, Nose normal.  Ecchymosis below bilateral eyes.  Eyes: Pupils are equal and reactive, no entrapment  Neck: Normal " range of motion, No tenderness, Supple  Cardiovascular: Regular rate and rhythm, no murmurs.   Thorax & Lungs: Normal breath sounds, No respiratory distress, No wheezing, No chest tenderness.   Abdomen: Bowel sounds normal, Soft, No tenderness  Skin: Warm, Dry, No erythema, No rash.   Back: No bony tenderness, No CVA tenderness.   Extremities: Intact distal pulses, No edema, No tenderness, No cyanosis,  Negative Lorenzo's sign.   Musculoskeletal: Good range of motion in all major joints. No tenderness to palpation or major deformities noted.   Neurologic: Alert , Normal motor function, Normal sensory function, No focal deficits noted.   Psychiatric: Affect normal, Judgment normal, Mood normal.         DIAGNOSTIC STUDIES / PROCEDURES      LABS  Results for orders placed or performed during the hospital encounter of 04/03/23   CBC with Differential   Result Value Ref Range    WBC 8.9 4.8 - 10.8 K/uL    RBC 4.89 4.70 - 6.10 M/uL    Hemoglobin 15.8 14.0 - 18.0 g/dL    Hematocrit 48.2 42.0 - 52.0 %    MCV 98.6 (H) 81.4 - 97.8 fL    MCH 32.3 27.0 - 33.0 pg    MCHC 32.8 (L) 33.7 - 35.3 g/dL    RDW 44.8 35.9 - 50.0 fL    Platelet Count 312 164 - 446 K/uL    MPV 9.5 9.0 - 12.9 fL    Neutrophils-Polys 72.00 44.00 - 72.00 %    Lymphocytes 16.70 (L) 22.00 - 41.00 %    Monocytes 5.90 0.00 - 13.40 %    Eosinophils 4.50 0.00 - 6.90 %    Basophils 0.70 0.00 - 1.80 %    Immature Granulocytes 0.20 0.00 - 0.90 %    Nucleated RBC 0.00 /100 WBC    Neutrophils (Absolute) 6.44 1.82 - 7.42 K/uL    Lymphs (Absolute) 1.49 1.00 - 4.80 K/uL    Monos (Absolute) 0.53 0.00 - 0.85 K/uL    Eos (Absolute) 0.40 0.00 - 0.51 K/uL    Baso (Absolute) 0.06 0.00 - 0.12 K/uL    Immature Granulocytes (abs) 0.02 0.00 - 0.11 K/uL    NRBC (Absolute) 0.00 K/uL   Complete Metabolic Panel (CMP)   Result Value Ref Range    Sodium 135 135 - 145 mmol/L    Potassium 4.8 3.6 - 5.5 mmol/L    Chloride 98 96 - 112 mmol/L    Co2 25 20 - 33 mmol/L    Anion Gap 12.0 7.0 - 16.0     Glucose 96 65 - 99 mg/dL    Bun 14 8 - 22 mg/dL    Creatinine 1.16 0.50 - 1.40 mg/dL    Calcium 9.7 8.5 - 10.5 mg/dL    AST(SGOT) 26 12 - 45 U/L    ALT(SGPT) 22 2 - 50 U/L    Alkaline Phosphatase 94 30 - 99 U/L    Total Bilirubin 0.3 0.1 - 1.5 mg/dL    Albumin 4.2 3.2 - 4.9 g/dL    Total Protein 7.4 6.0 - 8.2 g/dL    Globulin 3.2 1.9 - 3.5 g/dL    A-G Ratio 1.3 g/dL   Troponins NOW   Result Value Ref Range    Troponin T 9 6 - 19 ng/L   CORRECTED CALCIUM   Result Value Ref Range    Correct Calcium 9.5 8.5 - 10.5 mg/dL   ESTIMATED GFR   Result Value Ref Range    GFR (CKD-EPI) 68 >60 mL/min/1.73 m 2   TSH   Result Value Ref Range    TSH 0.180 (L) 0.380 - 5.330 uIU/mL   PHOSPHORUS   Result Value Ref Range    Phosphorus 3.4 2.5 - 4.5 mg/dL   EKG   Result Value Ref Range    Report       [facility]    Test Date:  2023  Pt Name:    MATT MOODY                   Department: ER  MRN:        [id number]                      Room:       [facility]  Gender:     Male                         Technician: [id number]  :        1955                   Requested By:ER TRIAGE PROTOCOL  Order #:    [id number]                    Reading MD: Iglesia Lentz    Measurements  Intervals                                Axis  Rate:       72                           P:          11  HI:         181                          QRS:        -58  QRSD:       111                          T:          73  QT:         415  QTc:        455    Interpretive Statements  Sinus rhythm  Incomplete left bundle branch block  ST elevation, consider inferior injury  Baseline wander in lead(s) V2,V5,V6  Compared to ECG 2023 16:27:09  Left bundle-branch block now present  ST (T wave) deviation now present  Myocardial infarct finding now present  Sinus bradycardia no longer presen t  Electronically Signed On 4-3-2023 14:54:48 PDT by Iglesia Lentz           RADIOLOGY  I have independently interpreted the diagnostic imaging associated  with this visit and am waiting the final reading from the radiologist.   My preliminary interpretation is as follows: Chest x-ray: NAD    Radiologist interpretation:   CT-MAXILLOFACIAL W/O PLUS RECONS   Final Result      1.  Negative maxillofacial/paranasal sinuses CT scan without contrast.   2.  Findings of sinusitis as described above.      CT-HEAD W/O   Final Result      1.  No evidence of acute territorial infarct, intracranial hemorrhage or mass lesion.   2.  Mild diffuse cerebral substance loss.   3.  Mild microangiopathic ischemic change versus demyelination or gliosis.         DX-CHEST-PORTABLE (1 VIEW)   Final Result      No acute cardiopulmonary disease evident.            COURSE & MEDICAL DECISION MAKING    ED Observation Status? No; Patient does not meet criteria for ED Observation.     INITIAL ASSESSMENT, COURSE AND PLAN  Care Narrative: Patient presenting emergency department with recurrent syncopal episode.  History as above.  Patient followed by cardiology.  His symptoms sound somewhat orthostatic.  Currently asymptomatic.  Work-up will include hematology and imaging.    DISPOSITION AND DISCUSSIONS  I have discussed management of the patient with the following physicians and JOANA's: Hospitalist    Discussion of management with other QHP or appropriate source(s): None     Decision tools and prescription drugs considered including, but not limited to: NIH Stroke Scale 0 and HEART Score low ..    67-year-old male presented to the emergency department with recurrent syncopal episodes.  Evaluation as above is reassuring and that he does not have any leukocytosis.  No anemia.  No significant electrolyte derangement or renal dysfunction.  At this point his troponin is negative.  TSH is mildly deranged.  CT imaging was completed due to the multi head trauma and fortunately no acute intracranial hemorrhage or fracture.  Chest x-ray is also benign from a cardiopulmonary etiology.  Again orthostasis or  dysrhythmia are at the top of my current differential.  I discussed case with the hospitalist nurse practitioner which revealed ongoing patient care currently.    FINAL DIAGNOSIS  1. Syncope, unspecified syncope type    History of systolic heart failure  History of cardiomyopathy  History of paroxysmal A-fib       Electronically signed by: Iglesia Lentz M.D., 4/3/2023 2:44 PM

## 2023-04-03 NOTE — ASSESSMENT & PLAN NOTE
Recurrent orthostatic dizziness near syncope and syncopal episodes since Thursday.. Recently started on entresto and verkuvo, which is most likely the cause.  Mildly hypotensive in ER.  No focal weakness. CT head is negative for acute findings.   - hold BP meds (entresto, vergura).  Hold Flomax as well  Orthostatic positive again  Increase cortef  2D echo, EKG ordered  Recommend bed alarm

## 2023-04-03 NOTE — TELEPHONE ENCOUNTER
Returned pt's call. He has had several syncope episodes since Thursday. Pt states he has been off of Eliquis and Amio since then as he ran out of the meds. He does not have any BP equipment so he does not know what it has been ranging. The syncope occurs after standing up, he feels fine when sitting or lying. Advised pt to go to the ER, informed him that he should not drive himself

## 2023-04-03 NOTE — ED TRIAGE NOTES
Chief Complaint   Patient presents with    Syncope     Pt complains of multiple episodes of syncope x1 week when he stands up. Pt states he ran out of Eliquis and Amiodarone approximately a weeks ago. Pt states he was also recently started on Entresto and Verquvo     Pt AOx4, GCS 15 at this time.    Pt educated upon triage process and told to inform  staff of any changes in condition so that Pt may be reassessed. No further questions at this time. Pt sitting out in lobby.

## 2023-04-03 NOTE — ED NOTES
Med rec updated and complete. Allergies reviewed. Confirmed name and date of birth. Pt  ran out of the amiodarone and apixaban . Last doses 03/23/23.  No antibiotic use in last 30 days.          Home pharmacy [facility]  [phone]

## 2023-04-03 NOTE — H&P
Hospital Medicine History & Physical Note    Date of Service  4/3/2023    Primary Care Physician  Manjula Zaidi M.D.        Code Status  Full Code    Chief Complaint  Chief Complaint   Patient presents with    Syncope     Pt complains of multiple episodes of syncope x1 week when he stands up. Pt states he ran out of Eliquis and Amiodarone approximately a weeks ago. Pt states he was also recently started on Entresto and Verquvo       History of Presenting Illness  Alberto Galicia is a 67 y.o. male with past medical history of cardiomyopathy with EF 20%, paroxysmal A-fib on apixaban, left ventricular thrombus, dyslipidemia, pituitary disease, hypothyroidism, who presented 4/3/2023 with complaints of orthostatic dizziness and recurrent syncopal episode in the position standing up, that started on Thursday.  Most recently he passed out in the morning today when he is stand up to go to the bathroom.  He started on Entresto and Verguro by Dr. Hauser about 1 week ago, last dose this morning..  While he is laying flat, he denies any dizziness.  He denies any chest pain, shortness of breath, lower extremity edema or palpitation.  Denies nausea vomiting or diarrhea, melena or blood in stool.  In ER his blood pressure is as low as 85/50 while laying.  CT head without contrast is negative for acute findings.  Chest x-ray is negative.  EKG without acute changes.  Mild sinus bradycardia in 50s.    I discussed the plan of care with patient.    Review of Systems  Review of Systems   Constitutional:  Negative for chills, fever and weight loss.   HENT:  Negative for ear pain, hearing loss and tinnitus.    Eyes:  Negative for blurred vision, double vision and photophobia.   Respiratory:  Negative for cough, hemoptysis and sputum production.    Cardiovascular:  Negative for chest pain, palpitations and orthopnea.   Gastrointestinal:  Negative for heartburn, nausea and vomiting.   Genitourinary:  Negative for dysuria, flank pain,  frequency and hematuria.   Musculoskeletal:  Positive for falls. Negative for back pain, joint pain and neck pain.   Skin:  Negative for itching and rash.   Neurological:  Positive for dizziness and loss of consciousness. Negative for tremors, speech change, focal weakness and headaches.   Endo/Heme/Allergies:  Negative for environmental allergies and polydipsia. Does not bruise/bleed easily.   Psychiatric/Behavioral:  Negative for hallucinations and substance abuse. The patient is not nervous/anxious.      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    Surgical History   has a past surgical history that includes craniotomy.     Family History  family history includes Alzheimer's Disease in his maternal grandmother; Cancer in his maternal grandfather and paternal grandfather; Diabetes in his brother and mother; Heart Disease in his father and paternal grandmother.   Family history reviewed with patient. There is no family history that is pertinent to the chief complaint.     Social History   reports that he has never smoked. He has never used smokeless tobacco. He reports current alcohol use of about 12.6 oz per week. He reports that he does not use drugs.    Allergies  Allergies   Allergen Reactions    Pcn [Penicillins] Swelling     * age 24 years old*       Medications  Prior to Admission Medications   Prescriptions Last Dose Informant Patient Reported? Taking?   Vericiguat (VERQUVO) 2.5 MG Tab 4/3/2023 at 1000 Patient No No   Sig: Take 2.5 mg by mouth every day.   amiodarone (CORDARONE) 200 MG Tab 3/23/2023 at ran out Patient No No   Sig: Take 1 Tablet by mouth every day.   apixaban (ELIQUIS) 5mg Tab 3/23/2023 at ran out Patient No No   Sig: Take 1 Tablet by mouth 2 times a day. Indications: DVT/PE   atorvastatin (LIPITOR) 40 MG Tab 4/2/2023 at 2200  Patient No No   Sig: Take 1 tablet by mouth once daily   dapagliflozin propanediol (FARXIGA) 10 MG Tab 4/3/2023 at 1000 Patient No No   Sig: Take 1 Tablet by mouth every day.   hydrocortisone (CORTEF) 10 MG Tab 4/3/2023 at 1000 Patient No No   Sig: Take 1 pill in the morning and 1/2 in the afternoon 90 day supply   levothyroxine (SYNTHROID) 175 MCG Tab 4/3/2023 at 0500 Patient No No   Sig: Take 1 Tablet by mouth every morning on an empty stomach. Please make an appointment for future refills   metoprolol SR (TOPROL XL) 25 MG TABLET SR 24 HR 4/2/2023 at 2200 Patient No No   Sig: Take 1 Tablet by mouth every evening.   sacubitril-valsartan (ENTRESTO) 49-51 MG Tab 4/3/2023 at 1000 Patient No No   Sig: Take 1 Tablet by mouth 2 times a day.   spironolactone (ALDACTONE) 25 MG Tab 4/3/2023 at 1000 Patient No No   Sig: Take 1 Tablet by mouth every day.   tamsulosin (FLOMAX) 0.4 MG capsule 4/3/2023 at 1000 Patient No No   Sig: Take 1 Capsule by mouth 1/2 hour after breakfast.      Facility-Administered Medications: None       Physical Exam  Temp:  [36.4 °C (97.5 °F)] 36.4 °C (97.5 °F)  Pulse:  [68] 68  Resp:  [16] 16  BP: (93)/(51) 93/51  SpO2:  [94 %] 94 %  Blood Pressure : 93/51   Temperature: 36.4 °C (97.5 °F)   Pulse: 68   Respiration: 16   Pulse Oximetry: 94 %       Physical Exam  Vitals and nursing note reviewed.   Constitutional:       General: He is not in acute distress.     Appearance: Normal appearance.   HENT:      Head: Normocephalic and atraumatic.      Nose: Nose normal.      Mouth/Throat:      Mouth: Mucous membranes are moist.   Eyes:      Extraocular Movements: Extraocular movements intact.      Pupils: Pupils are equal, round, and reactive to light.   Cardiovascular:      Rate and Rhythm: Normal rate and regular rhythm.   Pulmonary:      Effort: Pulmonary effort is normal.      Breath sounds: Normal breath sounds.   Abdominal:      General: Abdomen is flat. There is no distension.      Tenderness: There is  no abdominal tenderness.   Musculoskeletal:         General: No swelling or deformity. Normal range of motion.      Cervical back: Normal range of motion and neck supple.   Skin:     General: Skin is warm and dry.   Neurological:      General: No focal deficit present.      Mental Status: He is alert and oriented to person, place, and time.   Psychiatric:         Mood and Affect: Mood normal.         Behavior: Behavior normal.       Laboratory:  Recent Labs     04/03/23  1356   WBC 8.9   RBC 4.89   HEMOGLOBIN 15.8   HEMATOCRIT 48.2   MCV 98.6*   MCH 32.3   MCHC 32.8*   RDW 44.8   PLATELETCT 312   MPV 9.5     Recent Labs     04/03/23  1356   SODIUM 135   POTASSIUM 4.8   CHLORIDE 98   CO2 25   GLUCOSE 96   BUN 14   CREATININE 1.16   CALCIUM 9.7     Recent Labs     04/03/23  1356   ALTSGPT 22   ASTSGOT 26   ALKPHOSPHAT 94   TBILIRUBIN 0.3   GLUCOSE 96         No results for input(s): NTPROBNP in the last 72 hours.      Recent Labs     04/03/23  1356   TROPONINT 9       Imaging:  CT-MAXILLOFACIAL W/O PLUS RECONS   Final Result      1.  Negative maxillofacial/paranasal sinuses CT scan without contrast.   2.  Findings of sinusitis as described above.      CT-HEAD W/O   Final Result      1.  No evidence of acute territorial infarct, intracranial hemorrhage or mass lesion.   2.  Mild diffuse cerebral substance loss.   3.  Mild microangiopathic ischemic change versus demyelination or gliosis.         DX-CHEST-PORTABLE (1 VIEW)   Final Result      No acute cardiopulmonary disease evident.          X-Ray:  I have personally reviewed the images and compared with prior images.    Assessment/Plan:  Justification for Admission Status  I anticipate this patient is appropriate for observation status at this time because syncope    Patient will need a Telemetry bed on MEDICAL service .  The need is secondary to syncope.    Syncope  Assessment & Plan  Recurrent orthostatic dizziness near syncope and syncopal episodes since Thursday..  Recently started on entresto and verkuvo, which is most likely the cause.  Mildly hypotensive in ER.  No focal weakness. CT head is negative for acute findings.   - hold BP meds (entresto, vergura).  Hold Flomax as well  - orthostatic BP q 8 hrs  - IV fluids x 1 L  Monitor on telemetry for arrhythmia  Check cortisol  Cardiology follow-up as outpatient    Hyperthyroidism  Assessment & Plan  Mild.  Iatrogenic, on levothyroxine 175 mcg.  Will reduce dose to 150 mcg.      Heart failure with reduced ejection fraction (HCC)- (present on admission)  Assessment & Plan  Echo in January 2023::Normal LV size with reduced LV systolic function and estimated LVEF 20- 30%.  Global hypokinesis with akinesis of the mid-distal septal, anterior and   apical segments.  Possible LV thrombus.  Holding spironolactone, Entresto, metoprolol, amiodarone due to hypotension.  No evidence of acute heart failure: Denies shortness of breath, no lower extremity edema.    Left ventricular thrombus- (present on admission)  Assessment & Plan  On eliquis which he has been out of for a couple days.   - resume eliquis    Panhypopituitarism (HCC)- (present on admission)  Assessment & Plan  Noted.  On thyroid supplementation  We will check cortisol.    Mild intermittent asthma without complication- (present on admission)  Assessment & Plan  Continue Symbicort  Albuterol as needed    Hyperlipidemia- (present on admission)  Assessment & Plan  - continue statin        VTE prophylaxis: therapeutic anticoagulation with apixaban

## 2023-04-04 PROBLEM — E05.90 HYPERTHYROIDISM: Status: ACTIVE | Noted: 2023-04-04

## 2023-04-04 LAB
ANION GAP SERPL CALC-SCNC: 11 MMOL/L (ref 7–16)
BUN SERPL-MCNC: 13 MG/DL (ref 8–22)
CALCIUM SERPL-MCNC: 9.4 MG/DL (ref 8.5–10.5)
CHLORIDE SERPL-SCNC: 101 MMOL/L (ref 96–112)
CO2 SERPL-SCNC: 24 MMOL/L (ref 20–33)
CORTIS SERPL-MCNC: 1.1 UG/DL (ref 0–23)
CREAT SERPL-MCNC: 1.08 MG/DL (ref 0.5–1.4)
GFR SERPLBLD CREATININE-BSD FMLA CKD-EPI: 74 ML/MIN/1.73 M 2
GLUCOSE SERPL-MCNC: 93 MG/DL (ref 65–99)
MAGNESIUM SERPL-MCNC: 2.3 MG/DL (ref 1.5–2.5)
POTASSIUM SERPL-SCNC: 4.5 MMOL/L (ref 3.6–5.5)
SODIUM SERPL-SCNC: 136 MMOL/L (ref 135–145)

## 2023-04-04 PROCEDURE — A9270 NON-COVERED ITEM OR SERVICE: HCPCS | Performed by: INTERNAL MEDICINE

## 2023-04-04 PROCEDURE — 700102 HCHG RX REV CODE 250 W/ 637 OVERRIDE(OP): Performed by: INTERNAL MEDICINE

## 2023-04-04 PROCEDURE — 770020 HCHG ROOM/CARE - TELE (206)

## 2023-04-04 PROCEDURE — 36415 COLL VENOUS BLD VENIPUNCTURE: CPT

## 2023-04-04 PROCEDURE — 82652 VIT D 1 25-DIHYDROXY: CPT

## 2023-04-04 PROCEDURE — 99232 SBSQ HOSP IP/OBS MODERATE 35: CPT | Performed by: INTERNAL MEDICINE

## 2023-04-04 PROCEDURE — A9270 NON-COVERED ITEM OR SERVICE: HCPCS | Performed by: NURSE PRACTITIONER

## 2023-04-04 PROCEDURE — 80048 BASIC METABOLIC PNL TOTAL CA: CPT

## 2023-04-04 PROCEDURE — 700102 HCHG RX REV CODE 250 W/ 637 OVERRIDE(OP): Performed by: NURSE PRACTITIONER

## 2023-04-04 PROCEDURE — 83735 ASSAY OF MAGNESIUM: CPT

## 2023-04-04 PROCEDURE — 82533 TOTAL CORTISOL: CPT

## 2023-04-04 RX ORDER — HYDROCORTISONE 10 MG/1
10 TABLET ORAL EVERY MORNING
Status: DISCONTINUED | OUTPATIENT
Start: 2023-04-05 | End: 2023-04-05

## 2023-04-04 RX ORDER — HYDROCORTISONE 5 MG/1
5 TABLET ORAL EVERY EVENING
Status: DISCONTINUED | OUTPATIENT
Start: 2023-04-04 | End: 2023-04-04

## 2023-04-04 RX ORDER — HYDROCORTISONE 5 MG/1
5 TABLET ORAL EVERY EVENING
Status: DISCONTINUED | OUTPATIENT
Start: 2023-04-04 | End: 2023-04-05

## 2023-04-04 RX ORDER — ATORVASTATIN CALCIUM 40 MG/1
40 TABLET, FILM COATED ORAL DAILY
Status: DISCONTINUED | OUTPATIENT
Start: 2023-04-04 | End: 2023-04-06 | Stop reason: HOSPADM

## 2023-04-04 RX ORDER — HYDROCORTISONE 10 MG/1
10 TABLET ORAL EVERY MORNING
Status: DISCONTINUED | OUTPATIENT
Start: 2023-04-04 | End: 2023-04-04

## 2023-04-04 RX ORDER — AMIODARONE HYDROCHLORIDE 200 MG/1
200 TABLET ORAL DAILY
Status: DISCONTINUED | OUTPATIENT
Start: 2023-04-04 | End: 2023-04-06 | Stop reason: HOSPADM

## 2023-04-04 RX ORDER — LEVOTHYROXINE SODIUM 0.07 MG/1
150 TABLET ORAL
Status: DISCONTINUED | OUTPATIENT
Start: 2023-04-04 | End: 2023-04-06 | Stop reason: HOSPADM

## 2023-04-04 RX ORDER — METOPROLOL SUCCINATE 25 MG/1
25 TABLET, EXTENDED RELEASE ORAL EVERY EVENING
Status: DISCONTINUED | OUTPATIENT
Start: 2023-04-04 | End: 2023-04-06 | Stop reason: HOSPADM

## 2023-04-04 RX ADMIN — ATORVASTATIN CALCIUM 40 MG: 40 TABLET, FILM COATED ORAL at 16:11

## 2023-04-04 RX ADMIN — BUDESONIDE AND FORMOTEROL FUMARATE DIHYDRATE 2 PUFF: 80; 4.5 AEROSOL RESPIRATORY (INHALATION) at 17:14

## 2023-04-04 RX ADMIN — HYDROCORTISONE 5 MG: 5 TABLET ORAL at 17:13

## 2023-04-04 RX ADMIN — LEVOTHYROXINE SODIUM 150 MCG: 0.07 TABLET ORAL at 05:19

## 2023-04-04 RX ADMIN — APIXABAN 5 MG: 5 TABLET, FILM COATED ORAL at 17:13

## 2023-04-04 RX ADMIN — AMIODARONE HYDROCHLORIDE 200 MG: 200 TABLET ORAL at 16:11

## 2023-04-04 RX ADMIN — APIXABAN 5 MG: 5 TABLET, FILM COATED ORAL at 05:19

## 2023-04-04 RX ADMIN — BUDESONIDE AND FORMOTEROL FUMARATE DIHYDRATE 2 PUFF: 80; 4.5 AEROSOL RESPIRATORY (INHALATION) at 05:58

## 2023-04-04 ASSESSMENT — ENCOUNTER SYMPTOMS
FEVER: 0
NERVOUS/ANXIOUS: 0
FALLS: 1
LOSS OF CONSCIOUSNESS: 1
CHILLS: 0
HEADACHES: 0
SHORTNESS OF BREATH: 0
COUGH: 0
BLURRED VISION: 0
ABDOMINAL PAIN: 0
PALPITATIONS: 0
DIZZINESS: 1
VOMITING: 0
DOUBLE VISION: 0
BACK PAIN: 0
NAUSEA: 0
SORE THROAT: 0
DIARRHEA: 0

## 2023-04-04 ASSESSMENT — FIBROSIS 4 INDEX: FIB4 SCORE: 1.21

## 2023-04-04 ASSESSMENT — PAIN DESCRIPTION - PAIN TYPE: TYPE: ACUTE PAIN

## 2023-04-04 ASSESSMENT — LIFESTYLE VARIABLES: SUBSTANCE_ABUSE: 0

## 2023-04-04 NOTE — PROGRESS NOTES
4 Eyes Skin Assessment Completed by Dennis RN and DELIA Boss.    Head WDL  Ears Redness and Blanching  Nose WDL  Mouth WDL  Neck WDL  Breast/Chest WDL  Shoulder Blades WDL  Spine WDL  (R) Arm/Elbow/Hand WDL  (L) Arm/Elbow/Hand WDL  Abdomen WDL  Groin Redness and Blanching  Scrotum/Coccyx/Buttocks Redness and Blanching  (R) Leg Scab  (L) Leg Scab  (R) Heel/Foot/Toe Redness, Blanching, and Boggy  (L) Heel/Foot/Toe Redness, Blanching, and Boggy          Devices In Places Tele Box and Pulse Ox      Interventions In Place Pillows, Heels Loaded W/Pillows, and Pressure Redistribution Mattress    Possible Skin Injury No    Pictures Uploaded Into Epic N/A  Wound Consult Placed N/A  RN Wound Prevention Protocol Ordered No

## 2023-04-04 NOTE — ASSESSMENT & PLAN NOTE
Echo in January 2023::Normal LV size with reduced LV systolic function and estimated LVEF 20- 30%.  Global hypokinesis with akinesis of the mid-distal septal, anterior and   apical segments.  Possible LV thrombus.  Holding spironolactone, Entresto, metoprolol, amiodarone due to hypotension.  No evidence of acute heart failure: Denies shortness of breath, no lower extremity edema.

## 2023-04-04 NOTE — CARE PLAN
The patient is Stable - Low risk of patient condition declining or worsening    Shift Goals  Clinical Goals: Monitor orthostatics  Patient Goals: Rest  Family Goals: GRISEL    Progress made toward(s) clinical / shift goals:      Problem: Knowledge Deficit - Standard  Goal: Patient and family/care givers will demonstrate understanding of plan of care, disease process/condition, diagnostic tests and medications  Description: Target End Date:  1-3 days or as soon as patient condition allows    Document in Patient Education    1.  Patient and family/caregiver oriented to unit, equipment, visitation policy and means for communicating concern  2.  Complete/review Learning Assessment  3.  Assess knowledge level of disease process/condition, treatment plan, diagnostic tests and medications  4.  Explain disease process/condition, treatment plan, diagnostic tests and medications  Outcome: Progressing

## 2023-04-04 NOTE — HOSPITAL COURSE
Alberto Galicia is a 67 y.o. male with past medical history of cardiomyopathy with EF 20%, paroxysmal A-fib on apixaban, left ventricular thrombus, dyslipidemia, pituitary disease on cortef, hypothyroidism, who presented 4/3/2023 with complaints of orthostatic dizziness and recurrent syncopal episode. Syncope started shortly after starting entresto last week. Patient has secondary adrenal insufficiency and is on cortef.  Patient remained symptomatic with continued syncopal episode in hospital. No heart block or EKG abnormalities. Orthostatic hypotension improved with increase in cortef dosing. Echo was repeated demonstrating normal EF, no valvular disease. Patient improved clinically. Appropriate time allotted for entresto washout. Patient requesting discharge home. Patient was determined satisfactory for discharge with appropriate follow up.

## 2023-04-04 NOTE — PROGRESS NOTES
Assumed  pt care. Pt sleeping In bed  at the moment. On Room air.  Call light within reach. Bed in low and locked position. Will continue to monitor.

## 2023-04-04 NOTE — PROGRESS NOTES
[facility] Medicine Daily Progress Note    Date of Service  4/4/2023    Chief Complaint  Alberto Galicia is a 67 y.o. male admitted 4/3/2023 with syncope    Hospital Course  No notes on file    Interval Problem Update  Patient was seen and examined at bedside.  No acute events overnight. Patient is resting comfortably in bed and in no acute distress.     Hold entresto  Resume cortef    I have discussed this patient's plan of care and discharge plan at IDT rounds today with Case Management, Nursing, Nursing leadership, and other members of the IDT team.    Code Status  Full Code    Disposition  Patient is not medically cleared for discharge.   Anticipate discharge to to home with close outpatient follow-up.  I have placed the appropriate orders for post-discharge needs.    Review of Systems  Review of Systems   Constitutional:  Negative for chills and fever.   HENT:  Negative for congestion and sore throat.    Eyes:  Negative for blurred vision and double vision.   Respiratory:  Negative for cough and shortness of breath.    Cardiovascular:  Negative for chest pain and palpitations.   Gastrointestinal:  Negative for abdominal pain, diarrhea, nausea and vomiting.   Genitourinary:  Negative for dysuria and frequency.   Musculoskeletal:  Positive for falls. Negative for back pain.   Neurological:  Positive for dizziness and loss of consciousness. Negative for headaches.   Psychiatric/Behavioral:  Negative for substance abuse. The patient is not nervous/anxious.       Physical Exam  Temp:  [36 °C (96.8 °F)-36.6 °C (97.9 °F)] 36 °C (96.8 °F)  Pulse:  [50-72] 56  Resp:  [16-18] 18  BP: ()/(41-70) 97/51  SpO2:  [91 %-96 %] 95 %    Physical Exam  Constitutional:       General: He is not in acute distress.     Appearance: He is not toxic-appearing.   HENT:      Head: Normocephalic.      Right Ear: External ear normal.      Left Ear: External ear normal.      Nose: No congestion.      Mouth/Throat:      Mouth: Mucous  membranes are dry.      Pharynx: No oropharyngeal exudate.   Eyes:      General: No scleral icterus.     Pupils: Pupils are equal, round, and reactive to light.   Cardiovascular:      Rate and Rhythm: Normal rate and regular rhythm.      Heart sounds: No murmur heard.  Pulmonary:      Breath sounds: No wheezing.   Abdominal:      Palpations: Abdomen is soft.      Tenderness: There is no abdominal tenderness. There is no guarding or rebound.   Musculoskeletal:         General: No swelling or deformity.   Skin:     Findings: No bruising.   Neurological:      General: No focal deficit present.      Mental Status: He is alert.      Motor: No weakness.   Psychiatric:         Mood and Affect: Mood normal.         Behavior: Behavior normal.       Fluids    Intake/Output Summary (Last 24 hours) at 4/4/2023 1507  Last data filed at 4/4/2023 0600  Gross per 24 hour   Intake 250 ml   Output 950 ml   Net -700 ml       Laboratory  Recent Labs     04/03/23  1356   WBC 8.9   RBC 4.89   HEMOGLOBIN 15.8   HEMATOCRIT 48.2   MCV 98.6*   MCH 32.3   MCHC 32.8*   RDW 44.8   PLATELETCT 312   MPV 9.5     Recent Labs     04/03/23  1356 04/04/23  0042   SODIUM 135 136   POTASSIUM 4.8 4.5   CHLORIDE 98 101   CO2 25 24   GLUCOSE 96 93   BUN 14 13   CREATININE 1.16 1.08   CALCIUM 9.7 9.4                   Imaging  CT-MAXILLOFACIAL W/O PLUS RECONS   Final Result      1.  Negative maxillofacial/paranasal sinuses CT scan without contrast.   2.  Findings of sinusitis as described above.      CT-HEAD W/O   Final Result      1.  No evidence of acute territorial infarct, intracranial hemorrhage or mass lesion.   2.  Mild diffuse cerebral substance loss.   3.  Mild microangiopathic ischemic change versus demyelination or gliosis.         DX-CHEST-PORTABLE (1 VIEW)   Final Result      No acute cardiopulmonary disease evident.           Assessment/Plan  Syncope  Assessment & Plan  Recurrent orthostatic dizziness near syncope and syncopal episodes since  Thursday.. Recently started on entresto and verkuvo, which is most likely the cause.  Mildly hypotensive in ER.  No focal weakness. CT head is negative for acute findings.   - hold BP meds (entresto, vergura).  Hold Flomax as well  Orthostatic positive  AM cortisol 1 - history of secondary adrenal insufficiency  Resume home cortef 10 qam and 5 every evening    Heart failure with reduced ejection fraction (HCC)- (present on admission)  Assessment & Plan  Echo in January 2023::Normal LV size with reduced LV systolic function and estimated LVEF 20- 30%.  Global hypokinesis with akinesis of the mid-distal septal, anterior and   apical segments.  Possible LV thrombus.  Holding spironolactone, Entresto, metoprolol, amiodarone due to hypotension.  No evidence of acute heart failure: Denies shortness of breath, no lower extremity edema.    Left ventricular thrombus- (present on admission)  Assessment & Plan  Missed several doses eliquis prior to admission  Resume eliquis    Panhypopituitarism (HCC)- (present on admission)  Assessment & Plan  Noted.  On thyroid supplementation  Resume home cortef    Secondary adrenal insufficiency (HCC)- (present on admission)  Assessment & Plan  Continue cortef    Mild intermittent asthma without complication- (present on admission)  Assessment & Plan  Continue Symbicort  Albuterol as needed    Hyperlipidemia- (present on admission)  Assessment & Plan  - continue statin    Hyperthyroidism  Assessment & Plan  Mild.  Iatrogenic, on levothyroxine 175 mcg.  Will reduce dose to 150 mcg.           VTE prophylaxis: SCDs/TEDs and therapeutic anticoagulation with eliquis    I have performed a physical exam and reviewed and updated ROS and Plan today (4/4/2023). In review of yesterday's note (4/3/2023), there are no changes except as documented above.

## 2023-04-05 ENCOUNTER — APPOINTMENT (OUTPATIENT)
Dept: CARDIOLOGY | Facility: MEDICAL CENTER | Age: 68
DRG: 312 | End: 2023-04-05
Attending: INTERNAL MEDICINE
Payer: MEDICARE

## 2023-04-05 LAB
1,25(OH)2D3 SERPL-MCNC: 25.2 PG/ML (ref 19.9–79.3)
EKG IMPRESSION: NORMAL
LV EJECT FRACT  99904: 55
LV EJECT FRACT MOD 2C 99903: 57.26
LV EJECT FRACT MOD 4C 99902: 62.92
LV EJECT FRACT MOD BP 99901: 59.88

## 2023-04-05 PROCEDURE — 700102 HCHG RX REV CODE 250 W/ 637 OVERRIDE(OP): Performed by: INTERNAL MEDICINE

## 2023-04-05 PROCEDURE — 93005 ELECTROCARDIOGRAM TRACING: CPT | Performed by: INTERNAL MEDICINE

## 2023-04-05 PROCEDURE — 770020 HCHG ROOM/CARE - TELE (206)

## 2023-04-05 PROCEDURE — 93306 TTE W/DOPPLER COMPLETE: CPT

## 2023-04-05 PROCEDURE — 99232 SBSQ HOSP IP/OBS MODERATE 35: CPT | Performed by: INTERNAL MEDICINE

## 2023-04-05 PROCEDURE — 93306 TTE W/DOPPLER COMPLETE: CPT | Mod: 26 | Performed by: INTERNAL MEDICINE

## 2023-04-05 PROCEDURE — A9270 NON-COVERED ITEM OR SERVICE: HCPCS | Performed by: NURSE PRACTITIONER

## 2023-04-05 PROCEDURE — 700117 HCHG RX CONTRAST REV CODE 255: Performed by: INTERNAL MEDICINE

## 2023-04-05 PROCEDURE — 700102 HCHG RX REV CODE 250 W/ 637 OVERRIDE(OP): Performed by: NURSE PRACTITIONER

## 2023-04-05 PROCEDURE — A9270 NON-COVERED ITEM OR SERVICE: HCPCS | Performed by: INTERNAL MEDICINE

## 2023-04-05 PROCEDURE — 93010 ELECTROCARDIOGRAM REPORT: CPT | Performed by: INTERNAL MEDICINE

## 2023-04-05 RX ORDER — HYDROCORTISONE 10 MG/1
10 TABLET ORAL EVERY EVENING
Status: DISCONTINUED | OUTPATIENT
Start: 2023-04-05 | End: 2023-04-06 | Stop reason: HOSPADM

## 2023-04-05 RX ADMIN — BUDESONIDE AND FORMOTEROL FUMARATE DIHYDRATE 2 PUFF: 80; 4.5 AEROSOL RESPIRATORY (INHALATION) at 18:00

## 2023-04-05 RX ADMIN — HYDROCORTISONE 10 MG: 5 TABLET ORAL at 06:10

## 2023-04-05 RX ADMIN — HUMAN ALBUMIN MICROSPHERES AND PERFLUTREN 3 ML: 10; .22 INJECTION, SOLUTION INTRAVENOUS at 16:48

## 2023-04-05 RX ADMIN — ATORVASTATIN CALCIUM 40 MG: 40 TABLET, FILM COATED ORAL at 06:08

## 2023-04-05 RX ADMIN — LEVOTHYROXINE SODIUM 150 MCG: 0.07 TABLET ORAL at 06:08

## 2023-04-05 RX ADMIN — APIXABAN 5 MG: 5 TABLET, FILM COATED ORAL at 17:04

## 2023-04-05 RX ADMIN — BUDESONIDE AND FORMOTEROL FUMARATE DIHYDRATE 2 PUFF: 80; 4.5 AEROSOL RESPIRATORY (INHALATION) at 06:09

## 2023-04-05 RX ADMIN — HYDROCORTISONE 10 MG: 10 TABLET ORAL at 17:04

## 2023-04-05 RX ADMIN — APIXABAN 5 MG: 5 TABLET, FILM COATED ORAL at 06:09

## 2023-04-05 RX ADMIN — METOPROLOL SUCCINATE 25 MG: 25 TABLET, EXTENDED RELEASE ORAL at 17:05

## 2023-04-05 RX ADMIN — AMIODARONE HYDROCHLORIDE 200 MG: 200 TABLET ORAL at 06:09

## 2023-04-05 ASSESSMENT — ENCOUNTER SYMPTOMS
NAUSEA: 0
FEVER: 0
ABDOMINAL PAIN: 0
NERVOUS/ANXIOUS: 0
COUGH: 0
LOSS OF CONSCIOUSNESS: 1
CHILLS: 0
SHORTNESS OF BREATH: 0
DIZZINESS: 1
SORE THROAT: 0
PALPITATIONS: 0
BLURRED VISION: 0
FALLS: 1
DOUBLE VISION: 0
BACK PAIN: 0
VOMITING: 0
HEADACHES: 0
DIARRHEA: 0

## 2023-04-05 ASSESSMENT — LIFESTYLE VARIABLES: SUBSTANCE_ABUSE: 0

## 2023-04-05 ASSESSMENT — FIBROSIS 4 INDEX: FIB4 SCORE: 1.21

## 2023-04-05 ASSESSMENT — PAIN DESCRIPTION - PAIN TYPE: TYPE: ACUTE PAIN

## 2023-04-05 NOTE — PROGRESS NOTES
Hospital Medicine Daily Progress Note    Date of Service  4/5/2023    Chief Complaint  Alberto Galicia is a 67 y.o. male admitted 4/3/2023 with syncope    Hospital Course  Syncope started shortly after starting entresto. Patient has secondary adrenal insufficiency.     Interval Problem Update  Patient was seen and examined at bedside.  No acute events overnight. Patient is resting comfortably in bed and in no acute distress.     Orthostatic remain positive  Check Echo and EKG  Increase cortef    I have discussed this patient's plan of care and discharge plan at IDT rounds today with Case Management, Nursing, Nursing leadership, and other members of the IDT team.    Code Status  Full Code    Disposition  Patient is not medically cleared for discharge.   Anticipate discharge to to home with close outpatient follow-up.  I have placed the appropriate orders for post-discharge needs.    Review of Systems  Review of Systems   Constitutional:  Negative for chills and fever.   HENT:  Negative for congestion and sore throat.    Eyes:  Negative for blurred vision and double vision.   Respiratory:  Negative for cough and shortness of breath.    Cardiovascular:  Negative for chest pain and palpitations.   Gastrointestinal:  Negative for abdominal pain, diarrhea, nausea and vomiting.   Genitourinary:  Negative for dysuria and frequency.   Musculoskeletal:  Positive for falls. Negative for back pain.   Neurological:  Positive for dizziness and loss of consciousness. Negative for headaches.   Psychiatric/Behavioral:  Negative for substance abuse. The patient is not nervous/anxious.       Physical Exam  Temp:  [36.5 °C (97.7 °F)-36.8 °C (98.2 °F)] 36.5 °C (97.7 °F)  Pulse:  [] 110  Resp:  [17-18] 18  BP: ()/(55-79) 80/57  SpO2:  [92 %-97 %] 94 %    Physical Exam  Vitals and nursing note reviewed.   Constitutional:       General: He is not in acute distress.     Appearance: He is not toxic-appearing.      Comments:  Pleasant, conversational   HENT:      Head: Normocephalic.      Right Ear: External ear normal.      Left Ear: External ear normal.      Nose: No congestion.      Mouth/Throat:      Mouth: Mucous membranes are dry.      Pharynx: No oropharyngeal exudate.   Eyes:      General: No scleral icterus.     Pupils: Pupils are equal, round, and reactive to light.   Cardiovascular:      Rate and Rhythm: Normal rate and regular rhythm.      Heart sounds: No murmur heard.  Pulmonary:      Breath sounds: No wheezing.   Abdominal:      Palpations: Abdomen is soft.      Tenderness: There is no abdominal tenderness. There is no guarding or rebound.   Musculoskeletal:         General: No swelling or deformity.   Skin:     Findings: No bruising.   Neurological:      General: No focal deficit present.      Mental Status: He is alert.      Motor: No weakness.   Psychiatric:         Mood and Affect: Mood normal.         Behavior: Behavior normal.       Fluids    Intake/Output Summary (Last 24 hours) at 4/5/2023 1315  Last data filed at 4/5/2023 0600  Gross per 24 hour   Intake --   Output 450 ml   Net -450 ml       Laboratory  Recent Labs     04/03/23  1356   WBC 8.9   RBC 4.89   HEMOGLOBIN 15.8   HEMATOCRIT 48.2   MCV 98.6*   MCH 32.3   MCHC 32.8*   RDW 44.8   PLATELETCT 312   MPV 9.5     Recent Labs     04/03/23  1356 04/04/23  0042   SODIUM 135 136   POTASSIUM 4.8 4.5   CHLORIDE 98 101   CO2 25 24   GLUCOSE 96 93   BUN 14 13   CREATININE 1.16 1.08   CALCIUM 9.7 9.4                   Imaging  CT-MAXILLOFACIAL W/O PLUS RECONS   Final Result      1.  Negative maxillofacial/paranasal sinuses CT scan without contrast.   2.  Findings of sinusitis as described above.      CT-HEAD W/O   Final Result      1.  No evidence of acute territorial infarct, intracranial hemorrhage or mass lesion.   2.  Mild diffuse cerebral substance loss.   3.  Mild microangiopathic ischemic change versus demyelination or gliosis.         DX-CHEST-PORTABLE (1 VIEW)    Final Result      No acute cardiopulmonary disease evident.      EC-ECHOCARDIOGRAM COMPLETE W/O CONT    (Results Pending)        Assessment/Plan  Syncope  Assessment & Plan  Recurrent orthostatic dizziness near syncope and syncopal episodes since Thursday.. Recently started on entresto and verkuvo, which is most likely the cause.  Mildly hypotensive in ER.  No focal weakness. CT head is negative for acute findings.   - hold BP meds (entresto, vergura).  Hold Flomax as well  Orthostatic positive again  Increase cortef  2D echo, EKG ordered  Recommend bed alarm     Heart failure with reduced ejection fraction (HCC)- (present on admission)  Assessment & Plan  Echo in January 2023::Normal LV size with reduced LV systolic function and estimated LVEF 20- 30%.  Global hypokinesis with akinesis of the mid-distal septal, anterior and   apical segments.  Possible LV thrombus.  Holding spironolactone, Entresto, metoprolol, amiodarone due to hypotension.  No evidence of acute heart failure: Denies shortness of breath, no lower extremity edema.    Left ventricular thrombus- (present on admission)  Assessment & Plan  Missed several doses eliquis prior to admission  Resume eliquis    Panhypopituitarism (HCC)- (present on admission)  Assessment & Plan  Noted.  On thyroid supplementation  Resume home cortef    Secondary adrenal insufficiency (HCC)- (present on admission)  Assessment & Plan  Continue cortef    Mild intermittent asthma without complication- (present on admission)  Assessment & Plan  Continue Symbicort  Albuterol as needed    Hyperlipidemia- (present on admission)  Assessment & Plan  - continue statin    Hyperthyroidism  Assessment & Plan  Mild.  Iatrogenic, on levothyroxine 175 mcg.  Will reduce dose to 150 mcg.           VTE prophylaxis: SCDs/TEDs and therapeutic anticoagulation with eliquis    I have performed a physical exam and reviewed and updated ROS and Plan today (4/5/2023). In review of yesterday's note  (4/4/2023), there are no changes except as documented above.

## 2023-04-05 NOTE — DISCHARGE PLANNING
"HTH/SCP TCN chart review completed. Collaborated with SWATHI Wilder prior to meeting with the pt. The most current review of medical record, knowledge of pt's PLOF and social support, LACE+ score of 78, 6 clicks scores of 24 ADL's and 22 mobility were considered.      Pt seen at bedside. Introduced TCN program. Provided education regarding post acute levels of care. Discussed HTH/SCP plan benefits (Meds to Beds, medical uber and [facility] transitional care). Pt verbalizes understanding.     Patient states he lives alone in a 2nd floor apartment with no elevator.  He was independent with ADL's, IADL's and driving prior.  He does not use any AD's and states he was very active.  He has no concerns with food, housing or transportation.  \"I want to get back to work\", I  had no problems before I started taking the Entresto and Verquvo\".   Patient states he is at his baseline level of function.  Plan for possible discharge home with close outpatient f/u and [facility] referral.      TCN will continue to follow and collaborate with discharge planning team as additional post acute needs arise. Thank you.     Completed today:  Choice obtained: No choice obtained, patient states he is at his baseline level of function.    [facility] referral (Y). Sent on 4/4/23.    "

## 2023-04-05 NOTE — CARE PLAN
The patient is Stable - Low risk of patient condition declining or worsening    Shift Goals  Clinical Goals: Monitor orthostatics  Patient Goals: Rest  Family Goals: GRISEL    Progress made toward(s) clinical / shift goals:    Problem: Knowledge Deficit - Standard  Goal: Patient and family/care givers will demonstrate understanding of plan of care, disease process/condition, diagnostic tests and medications  Outcome: Progressing       Patient is not progressing towards the following goals:

## 2023-04-06 ENCOUNTER — PHARMACY VISIT (OUTPATIENT)
Dept: PHARMACY | Facility: MEDICAL CENTER | Age: 68
End: 2023-04-06
Payer: COMMERCIAL

## 2023-04-06 VITALS
DIASTOLIC BLOOD PRESSURE: 70 MMHG | TEMPERATURE: 97.9 F | HEART RATE: 65 BPM | HEIGHT: 76 IN | SYSTOLIC BLOOD PRESSURE: 99 MMHG | WEIGHT: 197.53 LBS | OXYGEN SATURATION: 97 % | BODY MASS INDEX: 24.05 KG/M2 | RESPIRATION RATE: 17 BRPM

## 2023-04-06 PROCEDURE — A9270 NON-COVERED ITEM OR SERVICE: HCPCS | Performed by: INTERNAL MEDICINE

## 2023-04-06 PROCEDURE — 700102 HCHG RX REV CODE 250 W/ 637 OVERRIDE(OP): Performed by: INTERNAL MEDICINE

## 2023-04-06 PROCEDURE — RXMED WILLOW AMBULATORY MEDICATION CHARGE: Performed by: INTERNAL MEDICINE

## 2023-04-06 PROCEDURE — 99239 HOSP IP/OBS DSCHRG MGMT >30: CPT | Performed by: INTERNAL MEDICINE

## 2023-04-06 PROCEDURE — 700102 HCHG RX REV CODE 250 W/ 637 OVERRIDE(OP): Performed by: NURSE PRACTITIONER

## 2023-04-06 PROCEDURE — A9270 NON-COVERED ITEM OR SERVICE: HCPCS | Performed by: NURSE PRACTITIONER

## 2023-04-06 RX ORDER — HYDROCORTISONE 5 MG/1
15 TABLET ORAL EVERY MORNING
Qty: 90 TABLET | Refills: 1 | Status: SHIPPED | OUTPATIENT
Start: 2023-04-07 | End: 2023-06-22

## 2023-04-06 RX ORDER — HYDROCORTISONE 10 MG/1
10 TABLET ORAL EVERY EVENING
Qty: 30 TABLET | Refills: 1 | Status: SHIPPED | OUTPATIENT
Start: 2023-04-06 | End: 2023-07-10 | Stop reason: SDUPTHER

## 2023-04-06 RX ORDER — FLUTICASONE PROPIONATE AND SALMETEROL 100; 50 UG/1; UG/1
1 POWDER RESPIRATORY (INHALATION) 2 TIMES DAILY
Qty: 60 EACH | Refills: 1 | Status: SHIPPED | OUTPATIENT
Start: 2023-04-06 | End: 2023-10-04 | Stop reason: SDUPTHER

## 2023-04-06 RX ORDER — ALBUTEROL SULFATE 90 UG/1
2 AEROSOL, METERED RESPIRATORY (INHALATION) EVERY 6 HOURS PRN
Qty: 6.7 G | Refills: 0 | Status: SHIPPED | OUTPATIENT
Start: 2023-04-06 | End: 2023-08-24

## 2023-04-06 RX ORDER — AMIODARONE HYDROCHLORIDE 200 MG/1
200 TABLET ORAL DAILY
Qty: 30 TABLET | Refills: 1 | Status: SHIPPED | OUTPATIENT
Start: 2023-04-06 | End: 2023-05-06

## 2023-04-06 RX ADMIN — AMIODARONE HYDROCHLORIDE 200 MG: 200 TABLET ORAL at 04:07

## 2023-04-06 RX ADMIN — HYDROCORTISONE 15 MG: 10 TABLET ORAL at 04:08

## 2023-04-06 RX ADMIN — ATORVASTATIN CALCIUM 40 MG: 40 TABLET, FILM COATED ORAL at 04:08

## 2023-04-06 RX ADMIN — BUDESONIDE AND FORMOTEROL FUMARATE DIHYDRATE 2 PUFF: 80; 4.5 AEROSOL RESPIRATORY (INHALATION) at 04:09

## 2023-04-06 RX ADMIN — LEVOTHYROXINE SODIUM 150 MCG: 0.07 TABLET ORAL at 04:07

## 2023-04-06 RX ADMIN — APIXABAN 5 MG: 5 TABLET, FILM COATED ORAL at 04:08

## 2023-04-06 ASSESSMENT — PAIN DESCRIPTION - PAIN TYPE: TYPE: ACUTE PAIN

## 2023-04-06 NOTE — DISCHARGE SUMMARY
Discharge Summary    CHIEF COMPLAINT ON ADMISSION  Chief Complaint   Patient presents with    Syncope     Pt complains of multiple episodes of syncope x1 week when he stands up. Pt states he ran out of Eliquis and Amiodarone approximately a weeks ago. Pt states he was also recently started on Entresto and Verquvo       Reason for Admission  caitlin yang     Admission Date  4/3/2023    CODE STATUS  Full Code    HPI & HOSPITAL COURSE  Albetro Galicia is a 67 y.o. male with past medical history of cardiomyopathy with EF 20%, paroxysmal A-fib on apixaban, left ventricular thrombus, dyslipidemia, pituitary disease on cortef, hypothyroidism, who presented 4/3/2023 with complaints of orthostatic dizziness and recurrent syncopal episode. Syncope started shortly after starting entresto last week. Patient has secondary adrenal insufficiency and is on cortef.  Patient remained symptomatic with continued syncopal episode in hospital. No heart block or EKG abnormalities. Orthostatic hypotension improved with increase in cortef dosing. Echo was repeated demonstrating normal EF, no valvular disease. Patient improved clinically. Appropriate time allotted for entresto washout. Patient requesting discharge home. Patient was determined satisfactory for discharge with appropriate follow up.      Therefore, he is discharged in fair and stable condition to home with close outpatient follow-up.    The patient met 2-midnight criteria for an inpatient stay at the time of discharge.    Discharge Date  04/06/2023    FOLLOW UP ITEMS POST DISCHARGE  Please follow up with PCP in 3-5 days for post hospitalization follow up and medication reconciliation.     DISCHARGE DIAGNOSES  Principal Problem:    Hypotension POA: Yes  Active Problems:    Syncope POA: Unknown    Left ventricular thrombus POA: Yes    Heart failure with reduced ejection fraction (HCC) POA: Yes    Hyperlipidemia POA: Yes    Mild intermittent asthma without complication POA:  Yes    Secondary adrenal insufficiency (HCC) POA: Yes    Panhypopituitarism (HCC) POA: Yes    Hyperthyroidism POA: Unknown  Resolved Problems:    * No resolved hospital problems. *      FOLLOW UP  Future Appointments   Date Time Provider Department Center   4/12/2023  4:00 PM Manjula Zaidi M.D. [address]   4/26/2023  4:15 PM ARAMIS Matute [facility] None     Manjula Zaidi M.D.  [address] [address]  [phone]    Schedule an appointment as soon as possible for a visit in 1 week(s)      Manjula Zaidi M.D.  [address] [facility] [address] [facility] [address]  [facility] [address]  [phone]            MEDICATIONS ON DISCHARGE     Medication List        CHANGE how you take these medications        Instructions   * hydrocortisone 10 MG Tabs  What changed: You were already taking a medication with the same name, and this prescription was added. Make sure you understand how and when to take each.  Commonly known as: CORTEF   Take 1 Tablet by mouth every evening for 30 days.  Dose: 10 mg     * hydrocortisone 5 MG Tabs  Start taking on: April 7, 2023  What changed:   medication strength  how much to take  how to take this  when to take this  additional instructions  Commonly known as: CORTEF   Take 3 Tablets by mouth every morning for 30 days.  Dose: 15 mg           * This list has 2 medication(s) that are the same as other medications prescribed for you. Read the directions carefully, and ask your doctor or other care provider to review them with you.                CONTINUE taking these medications        Instructions   Advair Diskus 100-50 MCG/ACT Aepb  Generic drug: fluticasone-salmeterol   Inhale 1 Puff 2 times a day.  Dose: 1 Puff     albuterol 108 (90 Base) MCG/ACT Aers inhalation aerosol   Inhale 2 Puffs every 6 hours as needed for Shortness of Breath.  Dose: 2 Puff     amiodarone 200 MG Tabs  Commonly known as: Cordarone   Take 1 Tablet by mouth every day for 30  days.  Dose: 200 mg     apixaban 5mg Tabs  Commonly known as: ELIQUIS   Take 1 Tablet by mouth 2 times a day for 30 days. Indications: DVT/PE  Dose: 5 mg     atorvastatin 40 MG Tabs  Commonly known as: LIPITOR   Take 1 tablet by mouth once daily     levothyroxine 175 MCG Tabs  Commonly known as: SYNTHROID   Take 1 Tablet by mouth every morning on an empty stomach. Please make an appointment for future refills  Dose: 175 mcg     metoprolol SR 25 MG Tb24  Commonly known as: TOPROL XL   Take 1 Tablet by mouth every evening.  Dose: 25 mg            STOP taking these medications      dapagliflozin propanediol 10 MG Tabs  Commonly known as: Farxiga     spironolactone 25 MG Tabs  Commonly known as: ALDACTONE     tamsulosin 0.4 MG capsule  Commonly known as: FLOMAX              Allergies  Allergies   Allergen Reactions    Pcn [Penicillins] Swelling     * age 24 years old*       DIET  Orders Placed This Encounter   Procedures    Diet Order Diet: Cardiac     Standing Status:   Standing     Number of Occurrences:   1     Order Specific Question:   Diet:     Answer:   Cardiac [6]       ACTIVITY  As tolerated.  Weight bearing as tolerated    CONSULTATIONS  N/A    PROCEDURES  N/A    LABORATORY  Lab Results   Component Value Date    SODIUM 136 04/04/2023    POTASSIUM 4.5 04/04/2023    CHLORIDE 101 04/04/2023    CO2 24 04/04/2023    GLUCOSE 93 04/04/2023    BUN 13 04/04/2023    CREATININE 1.08 04/04/2023        Lab Results   Component Value Date    WBC 8.9 04/03/2023    HEMOGLOBIN 15.8 04/03/2023    HEMATOCRIT 48.2 04/03/2023    PLATELETCT 312 04/03/2023        Total time of the discharge process exceeds 39 minutes.

## 2023-04-06 NOTE — PROGRESS NOTES
During report this RN was told patient had a fall in the bathroom during previous night shift.   Upon assessment patient stated that he fell around 0500 and did not report it to the night shift RN. Patient was assessed no injuries found.   Bed alarm was placed as well as fall band.  Patient educated to call when he needs to get up.

## 2023-04-06 NOTE — PROGRESS NOTES
Assumed care of patient, bedside report received from Nadia day shift RN. Updated on plan of care, call light within reach and fall precautions are in place and bed lock and in lowest position. Patient instructed to call for assistance before getting out of bed. All questions answered at this time. No other needs.

## 2023-04-06 NOTE — DISCHARGE PLANNING
HTH/SCP TCN chart review completed. Collaborated with SWATHI Dobbs.  Current discharge considerations are for home with close outpatient f/u and [facility] referral when medically cleared.   Patient denies any new TCN concerns.  TCN will continue to follow and collaborate with discharge planning team as additional post acute needs arise. Thank you.    Completed:  Choice obtained: No choice obtained, patient states he is at his baseline level of function.    [facility] referral (Y). Sent on 4/4/23.

## 2023-04-06 NOTE — CARE PLAN
Problem: Knowledge Deficit - Standard  Goal: Patient and family/care givers will demonstrate understanding of plan of care, disease process/condition, diagnostic tests and medications  Outcome: Progressing  Note: Patient understands plan of care. He understands the need to slowly sit and stand to monitor for syncope.       Problem: Pain - Standard  Goal: Alleviation of pain or a reduction in pain to the patient’s comfort goal  Outcome: Progressing  Note: No pain at this time     Problem: Hemodynamics  Goal: Patient's hemodynamics, fluid balance and neurologic status will be stable or improve  Outcome: Progressing   The patient is Stable - Low risk of patient condition declining or worsening    Shift Goals  Clinical Goals: monitor orthostatics, safety  Patient Goals: sleep  Family Goals: n/a    Progress made toward(s) clinical / shift goals:  monitor for syncope    Patient is not progressing towards the following goals:

## 2023-04-06 NOTE — PROGRESS NOTES
Arrive to dcl, a/o, dc instructions reviewed w/ pt, verbalized understanding. Pending meds to bed.

## 2023-04-07 ENCOUNTER — PATIENT OUTREACH (OUTPATIENT)
Dept: MEDICAL GROUP | Facility: MEDICAL CENTER | Age: 68
End: 2023-04-07
Payer: MEDICARE

## 2023-04-12 ENCOUNTER — OFFICE VISIT (OUTPATIENT)
Dept: MEDICAL GROUP | Facility: MEDICAL CENTER | Age: 68
End: 2023-04-12
Payer: MEDICARE

## 2023-04-12 VITALS
BODY MASS INDEX: 25.76 KG/M2 | DIASTOLIC BLOOD PRESSURE: 70 MMHG | TEMPERATURE: 97.6 F | SYSTOLIC BLOOD PRESSURE: 110 MMHG | OXYGEN SATURATION: 98 % | HEART RATE: 64 BPM | HEIGHT: 76 IN | WEIGHT: 211.54 LBS

## 2023-04-12 DIAGNOSIS — R55 SYNCOPE, UNSPECIFIED SYNCOPE TYPE: ICD-10-CM

## 2023-04-12 DIAGNOSIS — Z09 HOSPITAL DISCHARGE FOLLOW-UP: ICD-10-CM

## 2023-04-12 DIAGNOSIS — I10 HYPERTENSION, UNSPECIFIED TYPE: ICD-10-CM

## 2023-04-12 DIAGNOSIS — L98.9 SKIN LESION: ICD-10-CM

## 2023-04-12 DIAGNOSIS — L30.9 ECZEMA, UNSPECIFIED TYPE: ICD-10-CM

## 2023-04-12 DIAGNOSIS — I50.20 HEART FAILURE WITH REDUCED EJECTION FRACTION (HCC): ICD-10-CM

## 2023-04-12 DIAGNOSIS — I48.91 ATRIAL FIBRILLATION, UNSPECIFIED TYPE (HCC): ICD-10-CM

## 2023-04-12 PROCEDURE — 99214 OFFICE O/P EST MOD 30 MIN: CPT | Performed by: FAMILY MEDICINE

## 2023-04-12 RX ORDER — TRIAMCINOLONE ACETONIDE 1 MG/G
1 CREAM TOPICAL 2 TIMES DAILY
Qty: 45 G | Refills: 3 | Status: SHIPPED | OUTPATIENT
Start: 2023-04-12

## 2023-04-12 ASSESSMENT — FIBROSIS 4 INDEX: FIB4 SCORE: 1.21

## 2023-04-13 NOTE — PROGRESS NOTES
CC: Hospital discharge follow-up    HPI:   Alberto presents today for posthospitalization follow-up    Patient was admitted to [facility] on 4/3/2023, he presented with orthostatic dizziness and recurrent syncopal episode. Syncope started shortly after starting entresto. Patient has secondary adrenal insufficiency and has been on cortef.  Patient remained symptomatic with continued syncopal episode in hospital. No heart block or EKG abnormalities. Orthostatic hypotension improved with increase in cortef dosing. Echo was repeated demonstrating almost normal EF, no valvular disease. Patient improved gradually after Entresto was stopped.Appropriate time allotted for entresto washout.  Patient's condition has improved and he was discharged on 4/6/2023 in stable condition.    Came in today for follow-up.  Denies any similar syncopal episodes.  Still taking the hydrocortisone, is no longer taking the Entresto.  Blood pressure has been adequately controlled on metoprolol.  Most recent echocardiogram showed ejection fraction of 55%, denies any shortness of breath or leg swelling.    Patient has a skin rash on his left cheek that is of concern to him.  Patient with history of eczema has been on Kenalog cream and has been helping, requested refill of the medication.    Patient Active Problem List    Diagnosis Date Noted    Hyperthyroidism 04/04/2023    Syncope 04/03/2023    Hypotension 04/03/2023    Atrial fibrillation with rapid ventricular response (HCC) 02/03/2023    ANGEL (acute kidney injury) (HCC) 02/02/2023    Heart failure with reduced ejection fraction (HCC) 02/01/2023    Demand ischemia ([facility]) 01/31/2023    Acute on chronic respiratory failure with hypoxia and hypercapnia (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 topically 2 times a day. 45 g 3    hydrocortisone (CORTEF) 5 MG Tab Take 3 Tablets by mouth every morning for 30 days. 90 Tablet 1    hydrocortisone (CORTEF) 10 MG Tab Take 1 Tablet by mouth every evening for 30 days. 30 Tablet 1    apixaban (ELIQUIS) 5mg Tab Take 1 Tablet by mouth 2 times a day for 30 days. Indications: DVT/PE 60 Tablet 1    amiodarone (CORDARONE) 200 MG Tab Take 1 Tablet by mouth every day for 30 days. 30 Tablet 1    fluticasone-salmeterol (ADVAIR DISKUS) 100-50 MCG/ACT AEROSOL POWDER, BREATH ACTIVATED Inhale 1 Puff 2 times a day. 60 Each 1    metoprolol SR (TOPROL XL) 25 MG TABLET SR 24 HR Take 1 Tablet by mouth every evening. 30 Tablet 1    levothyroxine (SYNTHROID) 175 MCG Tab Take 1 Tablet by mouth every morning on an empty stomach. Please make an appointment for future refills 90 Tablet 1    atorvastatin (LIPITOR) 40 MG Tab Take 1 tablet by mouth once daily 100 Tablet 2    albuterol 108 (90 Base) MCG/ACT Aero Soln inhalation aerosol Inhale 2 Puffs every 6 hours as needed for Shortness of Breath. (Patient not taking: Reported on 4/12/2023) 6.7 g 0     No current facility-administered medications for this visit.         Allergies as of 04/12/2023 -  "Reviewed 04/12/2023   Allergen Reaction Noted    Pcn [penicillins] Swelling 04/03/2023        ROS: Denies any chest pain, Shortness of breath, Changes bowel or bladder, Lower extremity edema.    Physical Exam:  /70 (BP Location: Left arm, Patient Position: Sitting, BP Cuff Size: Adult)   Pulse 64   Temp 36.4 °C (97.6 °F) (Temporal)   Ht 1.93 m (6' 4\")   Wt 96 kg (211 lb 8.6 oz)   SpO2 98%   BMI 25.75 kg/m²   Gen.: Well-developed, well-nourished, no apparent distress,pleasant and cooperative with the examination  Skin:    Red scaly rash on the left cheek  HEENT:Sinuses nontender with palpation, TMs clear, nares patent with pink mucosa and clear rhinorrhea,no septal deviation ,polyps or lesions. lips without lesions, oropharynx clear.  Neck: Trachea midline,no masses or adenopathy. No JVD.  Cor: Regular rate and rhythm without murmur, gallop or rub.  Lungs: Respirations unlabored.Clear to auscultation with equal breath sounds bilaterally. No wheezes, rhonchi.  Extremities: No cyanosis, clubbing or edema.      Assessment and Plan.   67 y.o. male     1. Hospital discharge follow-up  Hospital discharge summary reviewed.    2. Syncope, unspecified syncope type  Was probably drug-induced postural hypotension, possibly due to Entresto which was discontinued    3. Hypertension, unspecified type  Blood pressure currently controlled.  Continue on metoprolol SR 25 mg daily    4. Heart failure with reduced ejection fraction (HCC)  Almost resolved.  Most recent echocardiogram showed ejection fraction has improved from 20% to  55%    5. Atrial fibrillation, unspecified type (HCC)  No RVR, asymptomatic  Continue Eliquis 5 mg twice a day, amiodarone 200 mg daily.    6. Eczema, unspecified type  Kenalog cream has been helping, needs refill    - triamcinolone acetonide (KENALOG) 0.1 % Cream; Apply 1 Application. topically 2 times a day.  Dispense: 45 g; Refill: 3    7. Skin lesion  Rule out basal cell carcinoma    - " Referral to Dermatology

## 2023-04-25 ENCOUNTER — HOSPITAL ENCOUNTER (OUTPATIENT)
Dept: LAB | Facility: MEDICAL CENTER | Age: 68
End: 2023-04-25
Attending: INTERNAL MEDICINE
Payer: MEDICARE

## 2023-04-25 DIAGNOSIS — I50.20 ACC/AHA STAGE C SYSTOLIC HEART FAILURE (HCC): ICD-10-CM

## 2023-04-25 DIAGNOSIS — I51.89 LEFT VENTRICULAR SYSTOLIC DYSFUNCTION, NYHA CLASS 2: ICD-10-CM

## 2023-04-25 DIAGNOSIS — R06.09 DYSPNEA ON EXERTION: ICD-10-CM

## 2023-04-25 PROCEDURE — 80048 BASIC METABOLIC PNL TOTAL CA: CPT

## 2023-04-25 PROCEDURE — 36415 COLL VENOUS BLD VENIPUNCTURE: CPT

## 2023-04-25 PROCEDURE — 83880 ASSAY OF NATRIURETIC PEPTIDE: CPT

## 2023-04-26 ENCOUNTER — APPOINTMENT (OUTPATIENT)
Dept: CARDIOLOGY | Facility: MEDICAL CENTER | Age: 68
End: 2023-04-26
Payer: MEDICARE

## 2023-04-26 ENCOUNTER — TELEPHONE (OUTPATIENT)
Dept: CARDIOLOGY | Facility: MEDICAL CENTER | Age: 68
End: 2023-04-26

## 2023-04-26 LAB
ANION GAP SERPL CALC-SCNC: 12 MMOL/L (ref 7–16)
BUN SERPL-MCNC: 18 MG/DL (ref 8–22)
CALCIUM SERPL-MCNC: 10 MG/DL (ref 8.5–10.5)
CHLORIDE SERPL-SCNC: 100 MMOL/L (ref 96–112)
CO2 SERPL-SCNC: 24 MMOL/L (ref 20–33)
CREAT SERPL-MCNC: 1.23 MG/DL (ref 0.5–1.4)
GFR SERPLBLD CREATININE-BSD FMLA CKD-EPI: 64 ML/MIN/1.73 M 2
GLUCOSE SERPL-MCNC: 96 MG/DL (ref 65–99)
NT-PROBNP SERPL IA-MCNC: 90 PG/ML (ref 0–125)
POTASSIUM SERPL-SCNC: 5.1 MMOL/L (ref 3.6–5.5)
SODIUM SERPL-SCNC: 136 MMOL/L (ref 135–145)

## 2023-04-26 NOTE — TELEPHONE ENCOUNTER
Tried to call pt to inform him appt was being cancelled. Could not LVM. Sent [employer] message. /cg 04/26/23

## 2023-05-08 ENCOUNTER — TELEPHONE (OUTPATIENT)
Dept: HEALTH INFORMATION MANAGEMENT | Facility: OTHER | Age: 68
End: 2023-05-08
Payer: MEDICARE

## 2023-05-22 PROCEDURE — RXMED WILLOW AMBULATORY MEDICATION CHARGE: Performed by: INTERNAL MEDICINE

## 2023-05-23 ENCOUNTER — PHARMACY VISIT (OUTPATIENT)
Dept: PHARMACY | Facility: MEDICAL CENTER | Age: 68
End: 2023-05-23
Payer: COMMERCIAL

## 2023-06-07 ENCOUNTER — DOCUMENTATION (OUTPATIENT)
Dept: HEALTH INFORMATION MANAGEMENT | Facility: OTHER | Age: 68
End: 2023-06-07
Payer: MEDICARE

## 2023-06-15 ENCOUNTER — OFFICE VISIT (OUTPATIENT)
Dept: DERMATOLOGY | Facility: IMAGING CENTER | Age: 68
End: 2023-06-15
Payer: MEDICARE

## 2023-06-15 DIAGNOSIS — L82.0 INFLAMED SEBORRHEIC KERATOSIS: ICD-10-CM

## 2023-06-15 DIAGNOSIS — L57.0 ACTINIC KERATOSIS: ICD-10-CM

## 2023-06-15 PROCEDURE — 17110 DESTRUCTION B9 LES UP TO 14: CPT | Performed by: NURSE PRACTITIONER

## 2023-06-15 PROCEDURE — 17000 DESTRUCT PREMALG LESION: CPT | Mod: 59 | Performed by: NURSE PRACTITIONER

## 2023-06-15 NOTE — PROGRESS NOTES
DERMATOLOGY NOTE  NEW VISIT       Chief complaint: Establish Care and Skin Lesion  Hx of eczema     HPI/location: Left cheek  Time present: Few years ago  Painful lesion: No  Itching lesion: No  Enlarging lesion: Yes  Anything make it better or worse? It started as a red spot, turned darker      History of skin cancer: No  History of precancers/actinic keratoses: No  History of biopsies:No  History of blistering/severe sunburns:Yes, Details: child  Family history of skin cancer:Yes, Details: Mother melanoma, paternal cousin melanoma  Family history of atypical moles:Yes, Details: Brother      Allergies   Allergen Reactions    Pcn [Penicillins] Swelling     * age 24 years old*        MEDICATIONS:  Medications relevant to specialty reviewed.     REVIEW OF SYSTEMS:   Positive for skin (see HPI)  Negative for fevers and chills       EXAM:  There were no vitals taken for this visit.  Constitutional: Well-developed, well-nourished, and in no distress.     A focused skin exam was performed including the affected areas of the face. Notable findings on exam today listed below and/or in assessment/plan.     Mildly inflamed tan waxy stuck on appearing papule within lentigo to L infraorbital region  Ill-defined erythematous gritty/scaly papule over the L lateral  cheek      IMPRESSION / PLAN:    1. Actinic keratosis  - NMSC education/counseling  CRYOTHERAPY:  Risks (including, but not limited to: skin discoloration, redness, blister, blood blister, recurrence, need for further treatment, infection, scar) and benefits of cryotherapy discussed. Patient verbally agreed to proceed with treatment. 1 cryotherapy freeze thaw cycles of 10 seconds were applied to 1 lesion on L cheek with cryac. Patient tolerated procedure well. Aftercare instructions given--no specific care needed unless irritated during healing process, can apply Vaseline with small band-aid if needed.  Stressed importance of sun protection      2. Inflamed seborrheic  keratosis  - Benign-appearing nature of lesions discussed during exam.   CRYOTHERAPY:  Risks (including, but not limited to: skin discoloration, redness, blister, blood blister, recurrence, need for further treatment, infection, scar) and benefits of cryotherapy discussed. Patient verbally agreed to proceed with treatment. 1 cryotherapy freeze thaw cycles of 10 seconds were applied to 1 lesion on L infraorbital region with cryac. Patient tolerated procedure well. Aftercare instructions given--no specific care needed unless irritated during healing process, can apply Vaseline with small band-aid if needed.  - Advised to continue to monitor for any return to clinic for new or concerning changes.        Discussed risks associated with LN2, Patient verbalized understanding and agrees with plan regarding the above            Please note that this dictation was created using voice recognition software. I have made every reasonable attempt to correct obvious errors, but I expect that there are errors of grammar and possibly content that I did not discover before finalizing the note.      Return to clinic in: Return for PRN. and as needed for any new or changing skin lesions.

## 2023-06-23 DIAGNOSIS — E03.8 SECONDARY HYPOTHYROIDISM: ICD-10-CM

## 2023-06-23 RX ORDER — LEVOTHYROXINE SODIUM 175 UG/1
175 TABLET ORAL
Qty: 90 TABLET | Refills: 1 | Status: SHIPPED | OUTPATIENT
Start: 2023-06-23 | End: 2023-11-08

## 2023-07-10 DIAGNOSIS — E23.0 PANHYPOPITUITARISM (HCC): ICD-10-CM

## 2023-07-12 RX ORDER — HYDROCORTISONE 10 MG/1
10 TABLET ORAL EVERY EVENING
Qty: 30 TABLET | Refills: 0 | Status: SHIPPED | OUTPATIENT
Start: 2023-07-12 | End: 2023-08-11

## 2023-07-17 ENCOUNTER — APPOINTMENT (OUTPATIENT)
Dept: MEDICAL GROUP | Facility: MEDICAL CENTER | Age: 68
End: 2023-07-17
Payer: MEDICARE

## 2023-08-14 ENCOUNTER — OFFICE VISIT (OUTPATIENT)
Dept: CARDIOLOGY | Facility: MEDICAL CENTER | Age: 68
End: 2023-08-14
Attending: NURSE PRACTITIONER
Payer: MEDICARE

## 2023-08-14 VITALS
HEIGHT: 76 IN | HEART RATE: 57 BPM | WEIGHT: 212 LBS | OXYGEN SATURATION: 96 % | DIASTOLIC BLOOD PRESSURE: 64 MMHG | BODY MASS INDEX: 25.82 KG/M2 | SYSTOLIC BLOOD PRESSURE: 118 MMHG

## 2023-08-14 DIAGNOSIS — I50.20 ACC/AHA STAGE C SYSTOLIC HEART FAILURE (HCC): ICD-10-CM

## 2023-08-14 DIAGNOSIS — I42.6 ALCOHOLIC CARDIOMYOPATHY (HCC): Chronic | ICD-10-CM

## 2023-08-14 DIAGNOSIS — I51.3 LEFT VENTRICULAR THROMBUS: ICD-10-CM

## 2023-08-14 DIAGNOSIS — Z79.899 HIGH RISK MEDICATION USE: ICD-10-CM

## 2023-08-14 DIAGNOSIS — I50.20 HEART FAILURE WITH REDUCED EJECTION FRACTION (HCC): ICD-10-CM

## 2023-08-14 DIAGNOSIS — I10 ESSENTIAL HYPERTENSION, BENIGN: ICD-10-CM

## 2023-08-14 DIAGNOSIS — I48.91 ATRIAL FIBRILLATION WITH RAPID VENTRICULAR RESPONSE (HCC): ICD-10-CM

## 2023-08-14 DIAGNOSIS — E78.5 HYPERLIPIDEMIA, UNSPECIFIED HYPERLIPIDEMIA TYPE: ICD-10-CM

## 2023-08-14 LAB — EKG IMPRESSION: NORMAL

## 2023-08-14 PROCEDURE — 3074F SYST BP LT 130 MM HG: CPT | Performed by: NURSE PRACTITIONER

## 2023-08-14 PROCEDURE — 3078F DIAST BP <80 MM HG: CPT | Performed by: NURSE PRACTITIONER

## 2023-08-14 PROCEDURE — 93010 ELECTROCARDIOGRAM REPORT: CPT | Performed by: INTERNAL MEDICINE

## 2023-08-14 PROCEDURE — 93005 ELECTROCARDIOGRAM TRACING: CPT | Performed by: NURSE PRACTITIONER

## 2023-08-14 PROCEDURE — 99214 OFFICE O/P EST MOD 30 MIN: CPT | Performed by: NURSE PRACTITIONER

## 2023-08-14 PROCEDURE — 99212 OFFICE O/P EST SF 10 MIN: CPT | Performed by: NURSE PRACTITIONER

## 2023-08-14 RX ORDER — APIXABAN 5 MG/1
5 TABLET, FILM COATED ORAL 2 TIMES DAILY
COMMUNITY
Start: 2023-07-05 | End: 2023-08-14 | Stop reason: SDUPTHER

## 2023-08-14 RX ORDER — AMIODARONE HYDROCHLORIDE 200 MG/1
200 TABLET ORAL DAILY
COMMUNITY
Start: 2023-07-05 | End: 2023-08-14

## 2023-08-14 RX ORDER — LOSARTAN POTASSIUM 25 MG/1
25 TABLET ORAL DAILY
Qty: 90 TABLET | Refills: 3 | Status: ON HOLD | OUTPATIENT
Start: 2023-08-14 | End: 2023-09-07

## 2023-08-14 RX ORDER — APIXABAN 5 MG/1
5 TABLET, FILM COATED ORAL 2 TIMES DAILY
Qty: 180 TABLET | Refills: 3 | Status: SHIPPED | OUTPATIENT
Start: 2023-08-14

## 2023-08-14 RX ORDER — METOPROLOL SUCCINATE 25 MG/1
25 TABLET, EXTENDED RELEASE ORAL EVERY EVENING
Qty: 90 TABLET | Refills: 3 | Status: SHIPPED | OUTPATIENT
Start: 2023-08-14

## 2023-08-14 ASSESSMENT — FIBROSIS 4 INDEX: FIB4 SCORE: 1.225904119044759997

## 2023-08-14 NOTE — PROGRESS NOTES
Chief Complaint   Patient presents with    Atrial Fibrillation     F/V Dx: Atrial fibrillation with rapid ventricular response (HCC)    Hyperlipidemia    Other     F/V Dx: Heart failure with reduced ejection fraction (HCC)       Subjective     Alberto Galicia is a 68 y.o. male who presents today heart failure follow-up.  Patient was last seen by Dr. Hauser on 3/20/2023.  [facility] hospitalization 1/23 with nonobstructive CAD, EF 20%, A-fib, LV thrombus.  Patient is additional medical problems of adrenal insufficiency and EtOH.    Today, patient reports significant dizziness/syncope with Entresto therapy.  Patient also reports poor tolerance to Farxiga and spironolactone in the past.  Otherwise, Patient feels well, denies chest pain, shortness of breath, palpitations, orthopnea, PND or Edema.  Patient appears euvolemic on exam.    EKG in office today personally viewed myself as bradycardia with no acute ST changes.    We discussed trialing losartan at low-dose, continue metoprolol XL, Eliquis.  We will have patient hold amiodarone therapy.  We will check renal and electrolyte function for his medications.  We will obtain follow-up echocardiogram after 3 months of optimizing GDMT.  Patient to follow-up in 1 to 2 weeks.    Past Medical History:   Diagnosis Date    Asthma     Cardiomyopathy (HCC) EF <20% 1/30/2023    Hyperlipidemia     Left ventricular thrombus 1/30/2023    Pituitary disease (HCC)     Thyroid disease      Past Surgical History:   Procedure Laterality Date    CRANIOTOMY       Family History   Problem Relation Age of Onset    Diabetes Mother     Heart Disease Father     Diabetes Brother     Alzheimer's Disease Maternal Grandmother     Cancer Maternal Grandfather     Heart Disease Paternal Grandmother     Cancer Paternal Grandfather      Social History     Socioeconomic History    Marital status: Single     Spouse name: Not on file    Number of children: Not on file    Years of education: Not on file    Highest  education level: Not on file   Occupational History    Not on file   Tobacco Use    Smoking status: Never    Smokeless tobacco: Never   Vaping Use    Vaping Use: Never used   Substance and Sexual Activity    Alcohol use: Not Currently     Alcohol/week: 12.6 oz     Types: 21 Shots of liquor per week     Comment: daily tumbler of vodka with OJ    Drug use: No    Sexual activity: Not on file   Other Topics Concern    Not on file   Social History Narrative    Not on file     Social Determinants of Health     Financial Resource Strain: Not on file   Food Insecurity: No Food Insecurity (2/13/2020)    Hunger Vital Sign     Worried About Running Out of Food in the Last Year: Never true     Ran Out of Food in the Last Year: Never true   Transportation Needs: No Transportation Needs (2/13/2020)    PRAPARE - Transportation     Lack of Transportation (Medical): No     Lack of Transportation (Non-Medical): No   Physical Activity: Not on file   Stress: Not on file   Social Connections: Not on file   Intimate Partner Violence: Not on file   Housing Stability: Not on file     Allergies   Allergen Reactions    Pcn [Penicillins] Swelling     * age 24 years old*     Outpatient Encounter Medications as of 8/14/2023   Medication Sig Dispense Refill    ELIQUIS 5 MG Tab Take 1 Tablet by mouth 2 times a day. 180 Tablet 3    metoprolol SR (TOPROL XL) 25 MG TABLET SR 24 HR Take 1 Tablet by mouth every evening. 90 Tablet 3    losartan (COZAAR) 25 MG Tab Take 1 Tablet by mouth every day. 90 Tablet 3    levothyroxine (SYNTHROID) 175 MCG Tab Take 1 Tablet by mouth every morning on an empty stomach. Please make an appointment for future refills 90 Tablet 1    fluticasone-salmeterol (ADVAIR DISKUS) 100-50 MCG/ACT AEROSOL POWDER, BREATH ACTIVATED Inhale 1 Puff 2 times a day. 60 Each 1    [DISCONTINUED] albuterol 108 (90 Base) MCG/ACT Aero Soln inhalation aerosol Inhale 2 Puffs every 6 hours as needed for Shortness of Breath. 6.7 g 0    atorvastatin  "(LIPITOR) 40 MG Tab Take 1 tablet by mouth once daily 100 Tablet 2    [DISCONTINUED] ELIQUIS 5 MG Tab Take 5 mg by mouth 2 times a day.      [DISCONTINUED] amiodarone (CORDARONE) 200 MG Tab Take 200 mg by mouth every day.      triamcinolone acetonide (KENALOG) 0.1 % Cream Apply 1 Application. topically 2 times a day. 45 g 3    [DISCONTINUED] metoprolol SR (TOPROL XL) 25 MG TABLET SR 24 HR Take 1 Tablet by mouth every evening. 30 Tablet 1     No facility-administered encounter medications on file as of 8/14/2023.     ROS Complete review of systems negative except as noted in HPI/subjective           Objective     /64 (BP Location: Left arm, Patient Position: Sitting, BP Cuff Size: Adult)   Pulse (!) 57   Ht 1.93 m (6' 4\")   Wt 96.2 kg (212 lb)   SpO2 96%   BMI 25.81 kg/m²     Physical Exam  Vitals reviewed.   Constitutional:       Appearance: He is well-developed.   HENT:      Head: Normocephalic and atraumatic.   Eyes:      Pupils: Pupils are equal, round, and reactive to light.   Neck:      Vascular: No JVD.   Cardiovascular:      Rate and Rhythm: Normal rate and regular rhythm.      Pulses: Normal pulses.      Heart sounds: Normal heart sounds. No murmur heard.     No friction rub. No gallop.   Pulmonary:      Effort: Pulmonary effort is normal. No respiratory distress.      Breath sounds: Normal breath sounds.   Abdominal:      General: Bowel sounds are normal. There is no distension.      Palpations: Abdomen is soft.   Musculoskeletal:      Right lower leg: No edema.      Left lower leg: No edema.   Skin:     General: Skin is warm and dry.      Findings: No erythema.   Neurological:      Mental Status: He is alert and oriented to person, place, and time.   Psychiatric:         Behavior: Behavior normal.                Assessment & Plan     1. High risk medication use  ELIQUIS 5 MG Tab    losartan (COZAAR) 25 MG Tab    Basic Metabolic Panel    EC-ECHOCARDIOGRAM COMPLETE W/O CONT    EKG - Clinic " Performed      2. Essential hypertension, benign  ELIQUIS 5 MG Tab    losartan (COZAAR) 25 MG Tab    Basic Metabolic Panel    EC-ECHOCARDIOGRAM COMPLETE W/O CONT    EKG - Clinic Performed      3. ACC/AHA stage C systolic heart failure (HCC)  ELIQUIS 5 MG Tab    losartan (COZAAR) 25 MG Tab    Basic Metabolic Panel    EC-ECHOCARDIOGRAM COMPLETE W/O CONT    EKG - Clinic Performed      4. Alcoholic cardiomyopathy (HCC)  ELIQUIS 5 MG Tab    losartan (COZAAR) 25 MG Tab    Basic Metabolic Panel    EC-ECHOCARDIOGRAM COMPLETE W/O CONT    EKG - Clinic Performed      5. Atrial fibrillation with rapid ventricular response (HCC)  ELIQUIS 5 MG Tab    losartan (COZAAR) 25 MG Tab    Basic Metabolic Panel    EC-ECHOCARDIOGRAM COMPLETE W/O CONT    EKG - Clinic Performed      6. Heart failure with reduced ejection fraction (HCC)  ELIQUIS 5 MG Tab    metoprolol SR (TOPROL XL) 25 MG TABLET SR 24 HR    losartan (COZAAR) 25 MG Tab    Basic Metabolic Panel    EC-ECHOCARDIOGRAM COMPLETE W/O CONT    EKG - Clinic Performed      7. Hyperlipidemia, unspecified hyperlipidemia type  ELIQUIS 5 MG Tab    losartan (COZAAR) 25 MG Tab    Basic Metabolic Panel    EC-ECHOCARDIOGRAM COMPLETE W/O CONT    EKG - Clinic Performed      8. Left ventricular thrombus  ELIQUIS 5 MG Tab    losartan (COZAAR) 25 MG Tab    Basic Metabolic Panel    EC-ECHOCARDIOGRAM COMPLETE W/O CONT    EKG - Clinic Performed          Medical Decision Making: Today's Assessment/Status/Plan:        HFrEF, Stage C, Class II, LVEF 55% (Recovered from 25%):  -Heart failure due to nonischemic cardiomyopathy  -Discussed Heart failure trajectory and prognosis with patient. Will continue to optimize medical therapy as tolerated. Advanced HF treatment, need for remote monitoring consideration at every visit.  -ACE-I/ARB/ARNI: Try losartan 25  mg daily  -Evidence Based Beta-blocker: continue metoprolol XL 25 mg daily  -Aldosterone Antagonist: Adrenal insufficiency; poor tolerance to  spironolactone in the past  -SGLT2-I: Increase patient's dizziness in the past  -Diuretic: Euvolemic on exam  -Labs: BMP in 2 weeks. Will continue to closely monitor for side effects of patient's high risk medication(s) including renal function, NTproBNP, and electrolytes as needed  -Repeat Echo in 1-2 months, if LVEF not >35%, then will discuss/consider ICD for primary Prevention.   -Reinforced s/sx of worsening heart failure with patient and weight monitoring. Pt verbalizes understanding. Pt to call office if present.  -Heart Failure Education: pt to be contacted by HF nurse for further education.  In the meantime, during visit today we discussed indications and use for each medication, importance of treatment adherence, definition of heart failure and potential etiologies for current diagnosis.  -Pharmacotherapy referral deferred at this time   -Compliance Barriers none identified at this time  -Advanced care planning: Deferred at this time    LV thrombus; Paroxysmal Atrial Fibrillation (PAF)  - Asymptomatic, denies AF breakthrough, rate controlled.   - EKG today showed SB  - On OAC with Eliquis, continue.  - Continue metoprolol    Hypertension; hyperlipidemia  -Blood pressure well controlled in office today.  Often overcontrolled with symptomatic hypotension.  -LDL 88.  At goal.  Continue atorvastatin 4 mg daily    COPD; hypothyroid; adrenal sufficiency  -Per PCP    FU in clinic in 1 to 2 weeks with cardiology. Sooner if needed.    Patient verbalizes understanding and agrees with the plan of care.       ISAIAS Matute.   [facility]  [phone]    PLEASE NOTE: This Note was created using voice recognition Software. I have made every reasonable attempt to correct obvious errors, but I expect that there are errors of grammar and possibly content that I did not discover before finalizing the note

## 2023-08-14 NOTE — PATIENT INSTRUCTIONS
Stop amio    Start metorprolol and losartan. Cotninue Eliquis    Blood in 2 weeks, non-fasting    Need updated ultrasound of heart, schedule at check out today

## 2023-08-23 DIAGNOSIS — E27.49 SECONDARY ADRENAL INSUFFICIENCY (HCC): ICD-10-CM

## 2023-08-23 RX ORDER — HYDROCORTISONE 20 MG/1
TABLET ORAL
Qty: 100 TABLET | Refills: 3 | Status: SHIPPED | OUTPATIENT
Start: 2023-08-23 | End: 2023-08-23

## 2023-08-24 ENCOUNTER — TELEPHONE (OUTPATIENT)
Dept: ENDOCRINOLOGY | Facility: MEDICAL CENTER | Age: 68
End: 2023-08-24
Payer: MEDICARE

## 2023-08-24 NOTE — TELEPHONE ENCOUNTER
Patient was requesting a refill on his hydrocortisone. He states that Dr. Montnao prescribed it initially. Please let patient if it can be refilled. Thank you

## 2023-08-31 ENCOUNTER — OFFICE VISIT (OUTPATIENT)
Dept: MEDICAL GROUP | Facility: MEDICAL CENTER | Age: 68
End: 2023-08-31
Payer: MEDICARE

## 2023-08-31 VITALS
BODY MASS INDEX: 25.11 KG/M2 | HEIGHT: 76 IN | OXYGEN SATURATION: 93 % | TEMPERATURE: 97 F | HEART RATE: 58 BPM | WEIGHT: 206.2 LBS | DIASTOLIC BLOOD PRESSURE: 60 MMHG | SYSTOLIC BLOOD PRESSURE: 110 MMHG

## 2023-08-31 DIAGNOSIS — E03.8 SECONDARY HYPOTHYROIDISM: ICD-10-CM

## 2023-08-31 DIAGNOSIS — E27.49 SECONDARY ADRENAL INSUFFICIENCY (HCC): ICD-10-CM

## 2023-08-31 DIAGNOSIS — E23.0 PANHYPOPITUITARISM (HCC): ICD-10-CM

## 2023-08-31 DIAGNOSIS — I51.3 LEFT VENTRICULAR THROMBUS: ICD-10-CM

## 2023-08-31 DIAGNOSIS — I10 HYPERTENSION, UNSPECIFIED TYPE: ICD-10-CM

## 2023-08-31 DIAGNOSIS — E29.1 SECONDARY MALE HYPOGONADISM: ICD-10-CM

## 2023-08-31 DIAGNOSIS — I50.20 HEART FAILURE WITH REDUCED EJECTION FRACTION (HCC): ICD-10-CM

## 2023-08-31 DIAGNOSIS — J45.40 MODERATE PERSISTENT ASTHMA WITHOUT COMPLICATION: ICD-10-CM

## 2023-08-31 PROBLEM — I42.9 CARDIOMYOPATHY (HCC): Chronic | Status: RESOLVED | Noted: 2023-01-30 | Resolved: 2023-08-31

## 2023-08-31 PROCEDURE — 3074F SYST BP LT 130 MM HG: CPT | Performed by: FAMILY MEDICINE

## 2023-08-31 PROCEDURE — 99214 OFFICE O/P EST MOD 30 MIN: CPT | Performed by: FAMILY MEDICINE

## 2023-08-31 PROCEDURE — 3078F DIAST BP <80 MM HG: CPT | Performed by: FAMILY MEDICINE

## 2023-08-31 ASSESSMENT — FIBROSIS 4 INDEX: FIB4 SCORE: 1.225904119044759997

## 2023-08-31 NOTE — PROGRESS NOTES
CC:     HPI:   Jack presents today to discuss the following:    Moderate persistent asthma without complication  Symptoms has been stable.  Currently denies any cough or shortness of breath.  Has been doing fine on Advair twice a day, and albuterol as needed.  Not on oxygen    Panhypopituitarism (HCC)/ Secondary adrenal insufficiency (HCC)/Secondary hypothyroidism/Secondary male hypogonadism  Patient with history of pituitary lesion has been on hydrocortisone, levothyroxine, and testosterone.Patient stated that he ran out of his hydrocortisone 2 weeks ago, so he went to have an appointment with endocrinologist who did not see for more than a year for a refill of his hydrocortisone, he was advised to make an appointment before seeing him, so he has an appointment with endocrinologist on 11/8/2023.  Patient ran out of his hydrocortisone for more than 2 weeks, has been feeling tired and weak.  Advised to restart hydrocortisone as soon as possible, I will prescribe the medication today but he does not know how much he is on.  Advised to find out the dose and call me for a refill today or tomorrow.    I will send a message to endocrinologist to refill the medication until he sees him or to make an appointment with him as soon as possible.    Hypertension, unspecified type  Blood pressure has been adequate controlled on losartan 25 mg daily, and metoprolol SR 25 mg daily.  No side effects    Heart failure with reduced ejection fraction (HCC)/ Left ventricular thrombus  Patient currently asymptomatic.  Denies any shortness of breath or leg swelling.  Last echocardiogram showed improvement of this ejection fraction  from 20-30% to 55%.  A previous echo showed a thrombus in the left ventricle that is why he has been on Eliquis 5 mg twice a day.  Patient also has been on metoprolol SR 25 mg daily.  He has been following up with cardiology      Patient Active Problem List    Diagnosis Date Noted    Hyperthyroidism 04/04/2023     Syncope 04/03/2023    Hypotension 04/03/2023    Atrial fibrillation with rapid ventricular response ([facility]) 02/03/2023    ANGEL (acute kidney injury) ([facility]) 02/02/2023    Heart failure with reduced ejection fraction ([facility]) 02/01/2023    Demand ischemia ([facility]) 01/31/2023    Acute on chronic respiratory failure with hypoxia and hypercapnia ([facility]) 01/30/2023    Asthma with exacerbation 01/30/2023    Elevated troponin 01/30/2023    Elevated d-dimer 01/30/2023    Elevated lactic acid level 01/30/2023    Infestation by bed bug 01/30/2023    Left ventricular thrombus 01/30/2023    Moderate persistent asthma without complication 01/13/2022    Elevated liver enzymes 01/13/2022    Macrocytosis 07/27/2021    Seasonal allergies 07/27/2021    Meningioma ([facility]) 09/02/2020    Panhypopituitarism ([facility]) 09/02/2020    Secondary male hypogonadism 03/31/2020    Secondary adrenal insufficiency ([facility]) 03/31/2020    Elevated liver function tests 11/15/2019    Mild intermittent asthma without complication 07/10/2019    Pollen allergies 07/25/2017    Secondary hypothyroidism 06/30/2015    Hyperlipidemia 06/30/2015    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 180 Tablet 3    metoprolol SR (TOPROL XL) 25 MG TABLET SR 24 HR Take 1 Tablet by mouth every evening. 90 Tablet 3    losartan (COZAAR) 25 MG Tab Take 1 Tablet by mouth every day. 90 Tablet 3    levothyroxine (SYNTHROID) 175 MCG Tab Take 1 Tablet by mouth every morning on an empty stomach. Please make an appointment for future refills 90 Tablet 1    triamcinolone acetonide (KENALOG) 0.1 % Cream Apply 1 Application. topically 2 times a day. 45 g 3    fluticasone-salmeterol (ADVAIR DISKUS) 100-50 MCG/ACT AEROSOL POWDER, BREATH  "ACTIVATED Inhale 1 Puff 2 times a day. 60 Each 1    atorvastatin (LIPITOR) 40 MG Tab Take 1 tablet by mouth once daily 100 Tablet 2     No current facility-administered medications for this visit.         Allergies as of 08/31/2023 - Reviewed 08/31/2023   Allergen Reaction Noted    Pcn [penicillins] Swelling 04/03/2023        ROS: Denies any chest pain, Shortness of breath, Changes bowel or bladder, Lower extremity edema.    Physical Exam:  /60 (BP Location: Right arm, Patient Position: Sitting, BP Cuff Size: Adult)   Pulse (!) 58   Temp 36.1 °C (97 °F) (Temporal)   Ht 1.93 m (6' 4\")   Wt 93.5 kg (206 lb 3.2 oz)   SpO2 93%   BMI 25.10 kg/m²   Gen.: Well-developed, well-nourished, no apparent distress,pleasant and cooperative with the examination  Skin:  Warm and dry with good turgor. No rashes or suspicious lesions in visible areas  HEENT:Sinuses nontender with palpation, TMs clear, nares patent with pink mucosa and clear rhinorrhea,no septal deviation ,polyps or lesions. lips without lesions, oropharynx clear.  Neck: Trachea midline,no masses or adenopathy. No JVD.  Cor: Regular rate and rhythm without murmur, gallop or rub.  Lungs: Respirations unlabored.Clear to auscultation with equal breath sounds bilaterally. No wheezes, rhonchi.  Extremities: No cyanosis, clubbing or edema.        Assessment and Plan.   68 y.o. male     1. Moderate persistent asthma without complication  Stable.  Continue on Advair twice a day, and albuterol as needed.    2. Panhypopituitarism (HCC)  3. Secondary adrenal insufficiency (HCC)  4. Secondary hypothyroidism  5. Secondary male hypogonadism  Patient with history of pituitary lesion has been on hydrocortisone, levothyroxine, and testosterone.  Patient stated that he ran out of his hydrocortisone 2 weeks ago, went to have an appointment with endocrinologist who did not see for more than a year for a refill of his hydrocortisone, he was advised to make an appointment before " seeing him, so he has an appointment with endocrinologist on 11/8/2023.  Patient ran out of his hydrocortisone for more than 2 weeks, has been feeling tired and weak.  Advised to restart hydrocortisone as soon as possible, I will prescribe the medication for him today but he does not know how much he is on.  Advised to find out the dose and call me for a refill today or tomorrow.    I will send a message to endocrinologist to refill the medication until he sees him or to make an appointment with him as soon as possible.    6. Hypertension, unspecified type  Controlled.  Continue losartan 25 mg daily, and metoprolol SR 25 mg daily    7. Heart failure with reduced ejection fraction (HCC)  8. Left ventricular thrombus  This ejection fraction has improved from 20-30% to 55%.  Continue on metoprolol SR 25 mg daily, Eliquis 5 mg twice a day

## 2023-09-06 ENCOUNTER — APPOINTMENT (OUTPATIENT)
Dept: RADIOLOGY | Facility: MEDICAL CENTER | Age: 68
DRG: 644 | End: 2023-09-06
Attending: EMERGENCY MEDICINE
Payer: MEDICARE

## 2023-09-06 ENCOUNTER — HOSPITAL ENCOUNTER (INPATIENT)
Facility: MEDICAL CENTER | Age: 68
LOS: 1 days | DRG: 644 | End: 2023-09-07
Attending: EMERGENCY MEDICINE | Admitting: HOSPITALIST
Payer: MEDICARE

## 2023-09-06 DIAGNOSIS — E23.0 HYPOPITUITARISM (HCC): ICD-10-CM

## 2023-09-06 DIAGNOSIS — E27.49 SECONDARY ADRENAL INSUFFICIENCY (HCC): ICD-10-CM

## 2023-09-06 DIAGNOSIS — R55 SYNCOPE, UNSPECIFIED SYNCOPE TYPE: ICD-10-CM

## 2023-09-06 DIAGNOSIS — R09.02 HYPOXIA: ICD-10-CM

## 2023-09-06 PROBLEM — E87.1 HYPONATREMIA: Status: ACTIVE | Noted: 2023-09-06

## 2023-09-06 PROBLEM — N39.0 UTI (URINARY TRACT INFECTION): Status: ACTIVE | Noted: 2023-09-06

## 2023-09-06 PROBLEM — J06.9 URI (UPPER RESPIRATORY INFECTION): Status: ACTIVE | Noted: 2023-09-06

## 2023-09-06 PROBLEM — I48.20 CHRONIC ATRIAL FIBRILLATION (HCC): Status: ACTIVE | Noted: 2023-02-03

## 2023-09-06 LAB
ANION GAP SERPL CALC-SCNC: 15 MMOL/L (ref 7–16)
BASOPHILS # BLD AUTO: 0.6 % (ref 0–1.8)
BASOPHILS # BLD: 0.05 K/UL (ref 0–0.12)
BLOOD CULTURE HOLD CXBCH: NORMAL
BUN SERPL-MCNC: 17 MG/DL (ref 8–22)
CA-I SERPL-SCNC: 1.3 MMOL/L (ref 1.1–1.3)
CALCIUM SERPL-MCNC: 10.3 MG/DL (ref 8.5–10.5)
CHLORIDE SERPL-SCNC: 99 MMOL/L (ref 96–112)
CO2 SERPL-SCNC: 18 MMOL/L (ref 20–33)
CORTIS SERPL-MCNC: 4.6 UG/DL (ref 0–23)
CREAT SERPL-MCNC: 1.27 MG/DL (ref 0.5–1.4)
D DIMER PPP IA.FEU-MCNC: <0.27 UG/ML (FEU) (ref 0–0.5)
EOSINOPHIL # BLD AUTO: 0.73 K/UL (ref 0–0.51)
EOSINOPHIL NFR BLD: 8.9 % (ref 0–6.9)
ERYTHROCYTE [DISTWIDTH] IN BLOOD BY AUTOMATED COUNT: 42 FL (ref 35.9–50)
FLUAV RNA SPEC QL NAA+PROBE: NEGATIVE
FLUBV RNA SPEC QL NAA+PROBE: NEGATIVE
GFR SERPLBLD CREATININE-BSD FMLA CKD-EPI: 61 ML/MIN/1.73 M 2
GLUCOSE BLD STRIP.AUTO-MCNC: 99 MG/DL (ref 65–99)
GLUCOSE SERPL-MCNC: 99 MG/DL (ref 65–99)
HCT VFR BLD AUTO: 51.7 % (ref 42–52)
HGB BLD-MCNC: 17.6 G/DL (ref 14–18)
IMM GRANULOCYTES # BLD AUTO: 0.02 K/UL (ref 0–0.11)
IMM GRANULOCYTES NFR BLD AUTO: 0.2 % (ref 0–0.9)
LYMPHOCYTES # BLD AUTO: 2.89 K/UL (ref 1–4.8)
LYMPHOCYTES NFR BLD: 35.4 % (ref 22–41)
MAGNESIUM SERPL-MCNC: 1.9 MG/DL (ref 1.5–2.5)
MCH RBC QN AUTO: 31.2 PG (ref 27–33)
MCHC RBC AUTO-ENTMCNC: 34 G/DL (ref 32.3–36.5)
MCV RBC AUTO: 91.5 FL (ref 81.4–97.8)
MONOCYTES # BLD AUTO: 0.9 K/UL (ref 0–0.85)
MONOCYTES NFR BLD AUTO: 11 % (ref 0–13.4)
NEUTROPHILS # BLD AUTO: 3.58 K/UL (ref 1.82–7.42)
NEUTROPHILS NFR BLD: 43.9 % (ref 44–72)
NRBC # BLD AUTO: 0 K/UL
NRBC BLD-RTO: 0 /100 WBC (ref 0–0.2)
NT-PROBNP SERPL IA-MCNC: 137 PG/ML (ref 0–125)
PLATELET # BLD AUTO: 290 K/UL (ref 164–446)
PMV BLD AUTO: 9.9 FL (ref 9–12.9)
POTASSIUM SERPL-SCNC: 4.4 MMOL/L (ref 3.6–5.5)
PTH-INTACT SERPL-MCNC: 35.7 PG/ML (ref 14–72)
RBC # BLD AUTO: 5.65 M/UL (ref 4.7–6.1)
RSV RNA SPEC QL NAA+PROBE: NEGATIVE
SARS-COV-2 RNA RESP QL NAA+PROBE: NOTDETECTED
SODIUM SERPL-SCNC: 132 MMOL/L (ref 135–145)
SPECIMEN SOURCE: NORMAL
TROPONIN T SERPL-MCNC: 14 NG/L (ref 6–19)
TSH SERPL DL<=0.005 MIU/L-ACNC: 0.21 UIU/ML (ref 0.38–5.33)
WBC # BLD AUTO: 8.2 K/UL (ref 4.8–10.8)

## 2023-09-06 PROCEDURE — 99285 EMERGENCY DEPT VISIT HI MDM: CPT

## 2023-09-06 PROCEDURE — 84439 ASSAY OF FREE THYROXINE: CPT

## 2023-09-06 PROCEDURE — 0241U HCHG SARS-COV-2 COVID-19 NFCT DS RESP RNA 4 TRGT MIC: CPT

## 2023-09-06 PROCEDURE — 84484 ASSAY OF TROPONIN QUANT: CPT

## 2023-09-06 PROCEDURE — 93005 ELECTROCARDIOGRAM TRACING: CPT

## 2023-09-06 PROCEDURE — 80048 BASIC METABOLIC PNL TOTAL CA: CPT

## 2023-09-06 PROCEDURE — 82962 GLUCOSE BLOOD TEST: CPT

## 2023-09-06 PROCEDURE — 71045 X-RAY EXAM CHEST 1 VIEW: CPT

## 2023-09-06 PROCEDURE — 93005 ELECTROCARDIOGRAM TRACING: CPT | Performed by: EMERGENCY MEDICINE

## 2023-09-06 PROCEDURE — 700111 HCHG RX REV CODE 636 W/ 250 OVERRIDE (IP): Mod: JZ | Performed by: HOSPITALIST

## 2023-09-06 PROCEDURE — 83880 ASSAY OF NATRIURETIC PEPTIDE: CPT

## 2023-09-06 PROCEDURE — 82533 TOTAL CORTISOL: CPT

## 2023-09-06 PROCEDURE — 99223 1ST HOSP IP/OBS HIGH 75: CPT | Mod: AI | Performed by: HOSPITALIST

## 2023-09-06 PROCEDURE — 770020 HCHG ROOM/CARE - TELE (206)

## 2023-09-06 PROCEDURE — 83970 ASSAY OF PARATHORMONE: CPT

## 2023-09-06 PROCEDURE — A9270 NON-COVERED ITEM OR SERVICE: HCPCS | Performed by: EMERGENCY MEDICINE

## 2023-09-06 PROCEDURE — 700102 HCHG RX REV CODE 250 W/ 637 OVERRIDE(OP): Performed by: HOSPITALIST

## 2023-09-06 PROCEDURE — 700102 HCHG RX REV CODE 250 W/ 637 OVERRIDE(OP): Performed by: EMERGENCY MEDICINE

## 2023-09-06 PROCEDURE — 700105 HCHG RX REV CODE 258: Performed by: EMERGENCY MEDICINE

## 2023-09-06 PROCEDURE — 83735 ASSAY OF MAGNESIUM: CPT

## 2023-09-06 PROCEDURE — 84443 ASSAY THYROID STIM HORMONE: CPT

## 2023-09-06 PROCEDURE — 85379 FIBRIN DEGRADATION QUANT: CPT

## 2023-09-06 PROCEDURE — C9803 HOPD COVID-19 SPEC COLLECT: HCPCS | Performed by: EMERGENCY MEDICINE

## 2023-09-06 PROCEDURE — 96374 THER/PROPH/DIAG INJ IV PUSH: CPT

## 2023-09-06 PROCEDURE — 82330 ASSAY OF CALCIUM: CPT

## 2023-09-06 PROCEDURE — 36415 COLL VENOUS BLD VENIPUNCTURE: CPT

## 2023-09-06 PROCEDURE — A9270 NON-COVERED ITEM OR SERVICE: HCPCS | Performed by: HOSPITALIST

## 2023-09-06 PROCEDURE — 85025 COMPLETE CBC W/AUTO DIFF WBC: CPT

## 2023-09-06 RX ORDER — ALBUTEROL SULFATE 90 UG/1
2 AEROSOL, METERED RESPIRATORY (INHALATION) EVERY 6 HOURS PRN
Status: DISCONTINUED | OUTPATIENT
Start: 2023-09-06 | End: 2023-09-07 | Stop reason: HOSPADM

## 2023-09-06 RX ORDER — HYDROCORTISONE 10 MG/1
10 TABLET ORAL DAILY
Status: ON HOLD | COMMUNITY
End: 2023-09-07 | Stop reason: SDUPTHER

## 2023-09-06 RX ORDER — ATORVASTATIN CALCIUM 40 MG/1
40 TABLET, FILM COATED ORAL DAILY
Status: DISCONTINUED | OUTPATIENT
Start: 2023-09-07 | End: 2023-09-07 | Stop reason: HOSPADM

## 2023-09-06 RX ORDER — AMOXICILLIN 250 MG
2 CAPSULE ORAL 2 TIMES DAILY
Status: DISCONTINUED | OUTPATIENT
Start: 2023-09-06 | End: 2023-09-07 | Stop reason: HOSPADM

## 2023-09-06 RX ORDER — BISACODYL 10 MG
10 SUPPOSITORY, RECTAL RECTAL
Status: DISCONTINUED | OUTPATIENT
Start: 2023-09-06 | End: 2023-09-07 | Stop reason: HOSPADM

## 2023-09-06 RX ORDER — HYDROCORTISONE 10 MG/1
10 TABLET ORAL ONCE
Status: COMPLETED | OUTPATIENT
Start: 2023-09-06 | End: 2023-09-06

## 2023-09-06 RX ORDER — AMIODARONE HYDROCHLORIDE 200 MG/1
200 TABLET ORAL DAILY
Status: DISCONTINUED | OUTPATIENT
Start: 2023-09-07 | End: 2023-09-07 | Stop reason: HOSPADM

## 2023-09-06 RX ORDER — ACETAMINOPHEN 325 MG/1
650 TABLET ORAL EVERY 6 HOURS PRN
Status: DISCONTINUED | OUTPATIENT
Start: 2023-09-06 | End: 2023-09-07 | Stop reason: HOSPADM

## 2023-09-06 RX ORDER — LEVOTHYROXINE SODIUM 0.07 MG/1
150 TABLET ORAL
Status: DISCONTINUED | OUTPATIENT
Start: 2023-09-07 | End: 2023-09-07 | Stop reason: HOSPADM

## 2023-09-06 RX ORDER — METOPROLOL SUCCINATE 25 MG/1
25 TABLET, EXTENDED RELEASE ORAL EVERY EVENING
Status: DISCONTINUED | OUTPATIENT
Start: 2023-09-07 | End: 2023-09-07 | Stop reason: HOSPADM

## 2023-09-06 RX ORDER — SODIUM CHLORIDE 9 MG/ML
500 INJECTION, SOLUTION INTRAVENOUS ONCE
Status: COMPLETED | OUTPATIENT
Start: 2023-09-06 | End: 2023-09-06

## 2023-09-06 RX ORDER — POLYETHYLENE GLYCOL 3350 17 G/17G
1 POWDER, FOR SOLUTION ORAL
Status: DISCONTINUED | OUTPATIENT
Start: 2023-09-06 | End: 2023-09-07 | Stop reason: HOSPADM

## 2023-09-06 RX ORDER — AMIODARONE HYDROCHLORIDE 200 MG/1
200 TABLET ORAL DAILY
COMMUNITY
End: 2023-12-11

## 2023-09-06 RX ADMIN — APIXABAN 5 MG: 5 TABLET, FILM COATED ORAL at 18:15

## 2023-09-06 RX ADMIN — HYDROCORTISONE 10 MG: 10 TABLET ORAL at 15:07

## 2023-09-06 RX ADMIN — SODIUM CHLORIDE 500 ML: 9 INJECTION, SOLUTION INTRAVENOUS at 15:07

## 2023-09-06 RX ADMIN — HYDROCORTISONE SODIUM SUCCINATE 50 MG: 100 INJECTION, POWDER, FOR SOLUTION INTRAMUSCULAR; INTRAVENOUS at 18:15

## 2023-09-06 RX ADMIN — ALBUTEROL SULFATE 2 PUFF: 90 AEROSOL, METERED RESPIRATORY (INHALATION) at 20:32

## 2023-09-06 ASSESSMENT — LIFESTYLE VARIABLES
TOTAL SCORE: 0
ON A TYPICAL DAY WHEN YOU DRINK ALCOHOL HOW MANY DRINKS DO YOU HAVE: 0
TOTAL SCORE: 0
EVER HAD A DRINK FIRST THING IN THE MORNING TO STEADY YOUR NERVES TO GET RID OF A HANGOVER: NO
HAVE YOU EVER FELT YOU SHOULD CUT DOWN ON YOUR DRINKING: NO
ALCOHOL_USE: NO
EVER FELT BAD OR GUILTY ABOUT YOUR DRINKING: NO
TOTAL SCORE: 0
AVERAGE NUMBER OF DAYS PER WEEK YOU HAVE A DRINK CONTAINING ALCOHOL: 0
DOES PATIENT WANT TO STOP DRINKING: NO
HAVE PEOPLE ANNOYED YOU BY CRITICIZING YOUR DRINKING: NO
CONSUMPTION TOTAL: NEGATIVE
HOW MANY TIMES IN THE PAST YEAR HAVE YOU HAD 5 OR MORE DRINKS IN A DAY: 0

## 2023-09-06 ASSESSMENT — ENCOUNTER SYMPTOMS
NAUSEA: 0
BLURRED VISION: 0
FEVER: 0
WHEEZING: 0
PND: 0
CHILLS: 0
BRUISES/BLEEDS EASILY: 0
DEPRESSION: 0
HEADACHES: 0
DIZZINESS: 1
HEMOPTYSIS: 0
BACK PAIN: 0
COUGH: 0
VOMITING: 0
MYALGIAS: 0
PALPITATIONS: 0
DOUBLE VISION: 0
CLAUDICATION: 0
HEARTBURN: 0

## 2023-09-06 ASSESSMENT — COGNITIVE AND FUNCTIONAL STATUS - GENERAL
STANDING UP FROM CHAIR USING ARMS: A LITTLE
MOBILITY SCORE: 19
CLIMB 3 TO 5 STEPS WITH RAILING: A LITTLE
SUGGESTED CMS G CODE MODIFIER DAILY ACTIVITY: CH
DAILY ACTIVITIY SCORE: 24
WALKING IN HOSPITAL ROOM: A LITTLE
SUGGESTED CMS G CODE MODIFIER MOBILITY: CK
MOVING FROM LYING ON BACK TO SITTING ON SIDE OF FLAT BED: A LITTLE
MOVING TO AND FROM BED TO CHAIR: A LITTLE

## 2023-09-06 ASSESSMENT — PAIN DESCRIPTION - PAIN TYPE: TYPE: ACUTE PAIN

## 2023-09-06 ASSESSMENT — FIBROSIS 4 INDEX
FIB4 SCORE: 1.32
FIB4 SCORE: 1.225904119044759997
FIB4 SCORE: 1.32

## 2023-09-06 ASSESSMENT — COPD QUESTIONNAIRES
DO YOU EVER COUGH UP ANY MUCUS OR PHLEGM?: NO/ONLY WITH OCCASIONAL COLDS OR INFECTIONS
COPD SCREENING SCORE: 2
DURING THE PAST 4 WEEKS HOW MUCH DID YOU FEEL SHORT OF BREATH: NONE/LITTLE OF THE TIME
HAVE YOU SMOKED AT LEAST 100 CIGARETTES IN YOUR ENTIRE LIFE: NO/DON'T KNOW

## 2023-09-06 NOTE — ED PROVIDER NOTES
ER Provider Note    Scribed for Luz Elena Epstein D.o. by Stuart Hobson M.D.. 9/6/2023  1:48 PM    Primary Care Provider: Manjula Zaidi M.D.    CHIEF COMPLAINT  Chief Complaint   Patient presents with    Weakness     Patient brought to ER as a code assist for syncopal episode.      EXTERNAL RECORDS REVIEWED  Outpatient records show a history of panhypopituitarism, cardiomyopathy with an EF less than 20% as of January of this year, among other medical issues.    HPI/ROS  LIMITATION TO HISTORY       OUTSIDE HISTORIAN(S):      Alberto Galicia is a 68 y.o. male who presents to the ED after a syncopal episode, walking to doctor's appointment to try to get his hydrocortisone refilled, and to be evaluated for recent illness.  He says that on Saturday, he came down with a flulike illness with runny nose and sniffles, mild sore throat, and felt very weak, with some on and off subjective fevers over the next couple of days.  Today is Wednesday, so this was 5 days ago.  He reports that due to this feeling of weakness, while walking, he thinks he had a syncopal episode.  This was on hospital property, so he was brought to the emergency department.  He is awake and alert, calm and cooperative.  Borderline soft blood pressure at 94/55.  We discussed his medications.  He is supposed to be on hydrocortisone for history of panhypopituitarism.  He says that he has been off this medication for 2-1/2 weeks.  He says that he called his endocrinologist, who would not refill the medication because the patient has not been seen in over a year.  He saw his primary care provider Friday of last week, the day before he started feeling ill, and this medication was also not refilled.  He says that he was told to bring one of his empty bottles back to the primary care office for refill, which certainly would not have been a good option on a Friday afternoon.  The patient denies any pain from the syncopal episode today.  No evidence  of trauma.  His only complaint at this point is feeling generally weak after this recent illness.  He did not have any testing.  Afebrile.  No recent Tylenol or ibuprofen.    PAST MEDICAL HISTORY  Past Medical History:   Diagnosis Date    Asthma     Cardiomyopathy (HCC) EF <20% 1/30/2023    Hyperlipidemia     Left ventricular thrombus 1/30/2023    Pituitary disease (HCC)     Thyroid disease        SURGICAL HISTORY  Past Surgical History:   Procedure Laterality Date    CRANIOTOMY         FAMILY HISTORY  Family History   Problem Relation Age of Onset    Diabetes Mother     Heart Disease Father     Diabetes Brother     Alzheimer's Disease Maternal Grandmother     Cancer Maternal Grandfather     Heart Disease Paternal Grandmother     Cancer Paternal Grandfather        SOCIAL HISTORY   reports that he has never smoked. He has never used smokeless tobacco. He reports that he does not currently use alcohol after a past usage of about 12.6 oz of alcohol per week. He reports that he does not use drugs.    CURRENT MEDICATIONS  Current Discharge Medication List        CONTINUE these medications which have NOT CHANGED    Details   amiodarone (CORDARONE) 200 MG Tab Take 200 mg by mouth every day.      albuterol 108 (90 Base) MCG/ACT Aero Soln inhalation aerosol INHALE 2 PUFFS BY MOUTH EVERY 6 HOURS AS NEEDED FOR SHORTNESS OF BREATH  Qty: 18 g, Refills: 2    Associated Diagnoses: Asthma with acute exacerbation, unspecified asthma severity, unspecified whether persistent      ELIQUIS 5 MG Tab Take 1 Tablet by mouth 2 times a day.  Qty: 180 Tablet, Refills: 3    Associated Diagnoses: High risk medication use; Essential hypertension, benign; ACC/AHA stage C systolic heart failure (HCC); Alcoholic cardiomyopathy (HCC); Atrial fibrillation with rapid ventricular response (HCC); Heart failure with reduced ejection fraction (HCC); Hyperlipidemia, unspecified hyperlipidemia type; Left ventricular thrombus      metoprolol SR (TOPROL XL)  "25 MG TABLET SR 24 HR Take 1 Tablet by mouth every evening.  Qty: 90 Tablet, Refills: 3    Associated Diagnoses: Heart failure with reduced ejection fraction (HCC)      levothyroxine (SYNTHROID) 175 MCG Tab Take 1 Tablet by mouth every morning on an empty stomach. Please make an appointment for future refills  Qty: 90 Tablet, Refills: 1    Associated Diagnoses: Secondary hypothyroidism      triamcinolone acetonide (KENALOG) 0.1 % Cream Apply 1 Application. topically 2 times a day.  Qty: 45 g, Refills: 3    Associated Diagnoses: Eczema, unspecified type      fluticasone-salmeterol (ADVAIR DISKUS) 100-50 MCG/ACT AEROSOL POWDER, BREATH ACTIVATED Inhale 1 Puff 2 times a day.  Qty: 60 Each, Refills: 1    Associated Diagnoses: Moderate persistent asthma with acute exacerbation; Acute on chronic respiratory failure with hypoxia and hypercapnia (HCC)      atorvastatin (LIPITOR) 40 MG Tab Take 1 tablet by mouth once daily  Qty: 100 Tablet, Refills: 2    Associated Diagnoses: Hyperlipidemia, unspecified hyperlipidemia type             ALLERGIES  Pcn [penicillins]    PHYSICAL EXAM  VITAL SIGNS: /63   Pulse (!) 107   Temp 36.6 °C (97.9 °F) (Temporal)   Resp 18   Ht 1.93 m (6' 4\")   Wt 88 kg (194 lb 0.1 oz)   SpO2 92%   BMI 23.62 kg/m²   Pulse ox interpretation: I interpret this pulse ox as normal.  Constitutional: Alert in no apparent distress.  HENT: No signs of trauma, Bilateral external ears normal, Nose normal.   Eyes: Conjunctiva normal, Non-icteric.   Neck: Normal range of motion, Supple, No stridor.   Lymphatic: No lymphadenopathy noted.   Cardiovascular: Regular rate and rhythm, no murmurs.   Thorax & Lungs: Occasional soft crackles in bilateral lower lung fields, no increased work of breathing, No respiratory distress, No wheezing  Abdomen: Bowel sounds normal, Soft, No tenderness, No masses, No pulsatile masses. No peritoneal signs.  Skin: Warm, Dry, No erythema, No rash.   Extremities: Intact distal " pulses, No edema, No cyanosis.  Musculoskeletal: Good range of motion in all major joints. No or major deformities noted.   Neurologic: Alert , Normal motor function, Normal sensory function, No focal deficits noted.   Psychiatric: Affect normal, Judgment normal, Mood normal.     DIAGNOSTIC STUDIES    Labs:   Labs Reviewed   CBC WITH DIFFERENTIAL - Abnormal; Notable for the following components:       Result Value    Neutrophils-Polys 43.90 (*)     Eosinophils 8.90 (*)     Monos (Absolute) 0.90 (*)     Eos (Absolute) 0.73 (*)     All other components within normal limits    Narrative:     Biotin intake of greater than 5 mg per day may interfere with  troponin levels, causing false low values.   BASIC METABOLIC PANEL - Abnormal; Notable for the following components:    Sodium 132 (*)     Co2 18 (*)     All other components within normal limits    Narrative:     Biotin intake of greater than 5 mg per day may interfere with  troponin levels, causing false low values.   TSH WITH REFLEX TO FT4 - Abnormal; Notable for the following components:    TSH 0.210 (*)     All other components within normal limits   PROBRAIN NATRIURETIC PEPTIDE, NT - Abnormal; Notable for the following components:    NT-proBNP 137 (*)     All other components within normal limits    Narrative:     Biotin intake of greater than 5 mg per day may interfere with  troponin levels, causing false low values.   FREE THYROXINE - Abnormal; Notable for the following components:    Free T-4 1.94 (*)     All other components within normal limits   COMP METABOLIC PANEL - Abnormal; Notable for the following components:    Sodium 133 (*)     Glucose 118 (*)     Alkaline Phosphatase 108 (*)     All other components within normal limits   PTH WITH IONIZED CALCIUM (AR)   TROPONIN    Narrative:     Biotin intake of greater than 5 mg per day may interfere with  troponin levels, causing false low values.   COV-2, FLU A/B, AND RSV BY PCR ([facility] [employer] [facility])    Narrative:      Release to patient->Immediate   ESTIMATED GFR    Narrative:     Biotin intake of greater than 5 mg per day may interfere with  troponin levels, causing false low values.   CORTISOL   D-DIMER   MAGNESIUM    Narrative:     Biotin intake of greater than 5 mg per day may interfere with  troponin levels, causing false low values.   BLOOD CULTURE,HOLD   CBC WITHOUT DIFFERENTIAL   ESTIMATED GFR   POCT GLUCOSE DEVICE RESULTS       EKG:   I have independently interpreted this EKG  Results for orders placed or performed in visit on 23   EKG - Clinic Performed   Result Value Ref Range    Report       [facility] [place] [facility]    Test Date:  2023  Pt Name:    MATT MOODY                   Department: [facility]  MRN:        [id number]                      Room:  Gender:     Male                         Technician: SB  :        1955                   Requested By:DORIS VILLA  Order #:    [id number]                    Reading MD: Shailesh Lao MD    Measurements  Intervals                                Axis  Rate:       51                           P:          -78  IA:         160                          QRS:        -61  QRSD:       121                          T:          75  QT:         476  QTc:        439    Interpretive Statements  Sinus bradycardia  Nonspecific IVCD with LAD    Electronically Signed On 2023 17:32:41 PDT by Shailesh Lao MD         Radiology:   The attending emergency physician has independently interpreted the diagnostic imaging associated with this visit and am waiting the final reading from the radiologist.   Preliminary interpretation is a follows: Unremarkable chest x-ray  Radiologist interpretation:   DX-CHEST-LIMITED (1 VIEW)   Final Result      No acute cardiopulmonary disease.          COURSE & MEDICAL DECISION MAKING     ED Observation Status? Yes; I am placing the patient in to an observation status due to a diagnostic uncertainty as well as therapeutic intensity. Patient  placed in observation status at 2:23 PM, 9/6/2023.     Observation plan is as follows: Laboratory and imaging evaluation, symptomatic treatment, reevaluation.    Upon Reevaluation, the patient's condition has: not improved; and will be escalated to hospitalization.    Patient discharged from ED Observation status at 4:50 PM (Time) 9/6/2023 (Date).     INITIAL ASSESSMENT, COURSE AND PLAN  Care Narrative:       1:48 PM Patient presents to the ED with malaise and fatigue after a flulike illness over the weekend, and 2-1/2 weeks off of his steroid medication for panhypopituitarism.  As in the original HPI, this lack of steroid treatment is no fault of the patient's, as he requested refills from his endocrinologist and primary care provider but was not provided with refills.  It is no wonder that he feels poorly, and is feeling weak in the setting of a recent acute likely viral illness.  He does not know whether or not he take stress dose steroids when he gets sick.  Differential includes but is not limited to viral syndrome, including flu, COVID, RSV, less likely pneumonia or other bacterial infection, and I strongly suspect a component of steroid withdrawal, as he abruptly stopped chronic steroids 2-1/2 weeks ago after running out of his medication.  Will be treated with a small fluid bolus, and evaluated with screening laboratory tests, including TSH, and PTH, and COVID panel, cardiac monitoring, chest x-ray.    4:28 PM patient's labs show a mild metabolic acidosis, and hypothyroidism.  T4 is pending.  In attempting to get orthostatic vital signs, the patient was helped to standing position from seated, and had to be caught, as he would have fallen if left on his own.  He is also hypoxic on room air, with no baseline oxygen requirement. I think this significant debility and hypoxia, in part due to being without his steroids for 2-1/2 weeks, warrants admission.  I have submitted a bed request.    4:50 PM   Jag, hospitalist, discussed the patient's history and medications and recent illness with me, and agrees to admit.    ADDITIONAL PROBLEM LIST  Off of steroids for 2.5 weeks, history of panhypopituitarism.    DISPOSITION AND DISCUSSIONS  I have discussed management of the patient with the following physicians and JOANA's: Dr. Quintero, hospitalist, has discussed the patient's history and recent symptoms and medication noncompliance with me, and agrees to admit.        DISPOSITION:  Patient will be admitted to the hospitalist service in guarded condition.      FINAL DIAGNOSIS  1. Hypoxia    2. Syncope, unspecified syncope type    3. Hypopituitarism (HCC)    4. Secondary adrenal insufficiency (HCC)            Stuart WANG M.D. (Scribe), am scribing for, and in the presence of, Stuart Hobson M.D..    Electronically signed by: Stuart Hobson M.D. (Scribe), 9/6/2023    IStuart M.D. personally performed the services described in this documentation, as scribed by Stuart Hobson M.D. in my presence, and it is both accurate and complete.

## 2023-09-06 NOTE — ED NOTES
Med rec completed per patient and home pharmacy, [facility]  Allergies reviewed  No PO Antibiotics in the last 30 days     Patient states he has been out of his hydrocodone for 2.5 weeks, he states he has to see his doctor before he can get a refill, however he is unable to get an apt before November 8th

## 2023-09-06 NOTE — ED TRIAGE NOTES
"Chief Complaint   Patient presents with    Weakness     Patient brought to ER as a code assist for syncopal episode.      BP 94/55   Pulse 73   Resp 18   Ht 1.93 m (6' 4\")   Wt 95.3 kg (210 lb)   SpO2 97%     "

## 2023-09-07 ENCOUNTER — PHARMACY VISIT (OUTPATIENT)
Dept: PHARMACY | Facility: MEDICAL CENTER | Age: 68
End: 2023-09-07
Payer: COMMERCIAL

## 2023-09-07 VITALS
HEIGHT: 76 IN | BODY MASS INDEX: 23.62 KG/M2 | WEIGHT: 194 LBS | SYSTOLIC BLOOD PRESSURE: 116 MMHG | DIASTOLIC BLOOD PRESSURE: 63 MMHG | HEART RATE: 107 BPM | TEMPERATURE: 97.9 F | OXYGEN SATURATION: 92 % | RESPIRATION RATE: 18 BRPM

## 2023-09-07 LAB
ALBUMIN SERPL BCP-MCNC: 3.8 G/DL (ref 3.2–4.9)
ALBUMIN/GLOB SERPL: 1.1 G/DL
ALP SERPL-CCNC: 108 U/L (ref 30–99)
ALT SERPL-CCNC: 21 U/L (ref 2–50)
ANION GAP SERPL CALC-SCNC: 13 MMOL/L (ref 7–16)
AST SERPL-CCNC: 33 U/L (ref 12–45)
BILIRUB SERPL-MCNC: 0.4 MG/DL (ref 0.1–1.5)
BUN SERPL-MCNC: 20 MG/DL (ref 8–22)
CALCIUM ALBUM COR SERPL-MCNC: 10 MG/DL (ref 8.5–10.5)
CALCIUM SERPL-MCNC: 9.8 MG/DL (ref 8.5–10.5)
CHLORIDE SERPL-SCNC: 99 MMOL/L (ref 96–112)
CO2 SERPL-SCNC: 21 MMOL/L (ref 20–33)
CREAT SERPL-MCNC: 0.98 MG/DL (ref 0.5–1.4)
ERYTHROCYTE [DISTWIDTH] IN BLOOD BY AUTOMATED COUNT: 41.6 FL (ref 35.9–50)
GFR SERPLBLD CREATININE-BSD FMLA CKD-EPI: 83 ML/MIN/1.73 M 2
GLOBULIN SER CALC-MCNC: 3.4 G/DL (ref 1.9–3.5)
GLUCOSE SERPL-MCNC: 118 MG/DL (ref 65–99)
HCT VFR BLD AUTO: 45.8 % (ref 42–52)
HGB BLD-MCNC: 15.9 G/DL (ref 14–18)
MCH RBC QN AUTO: 31.1 PG (ref 27–33)
MCHC RBC AUTO-ENTMCNC: 34.7 G/DL (ref 32.3–36.5)
MCV RBC AUTO: 89.5 FL (ref 81.4–97.8)
PLATELET # BLD AUTO: 247 K/UL (ref 164–446)
PMV BLD AUTO: 10.3 FL (ref 9–12.9)
POTASSIUM SERPL-SCNC: 4.6 MMOL/L (ref 3.6–5.5)
PROT SERPL-MCNC: 7.2 G/DL (ref 6–8.2)
RBC # BLD AUTO: 5.12 M/UL (ref 4.7–6.1)
SODIUM SERPL-SCNC: 133 MMOL/L (ref 135–145)
T4 FREE SERPL-MCNC: 1.94 NG/DL (ref 0.93–1.7)
WBC # BLD AUTO: 5.4 K/UL (ref 4.8–10.8)

## 2023-09-07 PROCEDURE — 99239 HOSP IP/OBS DSCHRG MGMT >30: CPT | Performed by: GENERAL PRACTICE

## 2023-09-07 PROCEDURE — A9270 NON-COVERED ITEM OR SERVICE: HCPCS | Performed by: HOSPITALIST

## 2023-09-07 PROCEDURE — 85027 COMPLETE CBC AUTOMATED: CPT

## 2023-09-07 PROCEDURE — 94640 AIRWAY INHALATION TREATMENT: CPT

## 2023-09-07 PROCEDURE — RXMED WILLOW AMBULATORY MEDICATION CHARGE: Performed by: GENERAL PRACTICE

## 2023-09-07 PROCEDURE — 80053 COMPREHEN METABOLIC PANEL: CPT

## 2023-09-07 PROCEDURE — 700111 HCHG RX REV CODE 636 W/ 250 OVERRIDE (IP): Mod: JZ | Performed by: HOSPITALIST

## 2023-09-07 PROCEDURE — 700101 HCHG RX REV CODE 250: Performed by: GENERAL PRACTICE

## 2023-09-07 PROCEDURE — 700102 HCHG RX REV CODE 250 W/ 637 OVERRIDE(OP): Performed by: HOSPITALIST

## 2023-09-07 RX ORDER — HYDROCORTISONE 10 MG/1
10 TABLET ORAL EVERY MORNING
Qty: 110 TABLET | Refills: 0 | Status: SHIPPED | OUTPATIENT
Start: 2023-09-12 | End: 2023-11-08 | Stop reason: SDUPTHER

## 2023-09-07 RX ORDER — HYDROCORTISONE 10 MG/1
10 TABLET ORAL EVERY EVENING
Qty: 90 TABLET | Refills: 0 | Status: SHIPPED | OUTPATIENT
Start: 2023-09-07 | End: 2023-09-07 | Stop reason: SDUPTHER

## 2023-09-07 RX ORDER — HYDROCORTISONE 5 MG/1
25 TABLET ORAL 2 TIMES DAILY
Qty: 20 TABLET | Refills: 0 | Status: SHIPPED | OUTPATIENT
Start: 2023-09-09 | End: 2023-09-11

## 2023-09-07 RX ORDER — HYDROCORTISONE 5 MG/1
5 TABLET ORAL EVERY MORNING
Qty: 90 TABLET | Refills: 0 | Status: SHIPPED | OUTPATIENT
Start: 2023-09-07 | End: 2023-09-07 | Stop reason: SDUPTHER

## 2023-09-07 RX ORDER — GUAIFENESIN 600 MG/1
1200 TABLET, EXTENDED RELEASE ORAL EVERY 12 HOURS
Status: DISCONTINUED | OUTPATIENT
Start: 2023-09-07 | End: 2023-09-07 | Stop reason: HOSPADM

## 2023-09-07 RX ORDER — GUAIFENESIN 1200 MG/1
1200 TABLET, EXTENDED RELEASE ORAL EVERY 12 HOURS
Qty: 10 TABLET | Refills: 0 | Status: SHIPPED | OUTPATIENT
Start: 2023-09-07 | End: 2023-09-12

## 2023-09-07 RX ORDER — HYDROCORTISONE 5 MG/1
5 TABLET ORAL EVERY EVENING
Qty: 110 TABLET | Refills: 0 | Status: SHIPPED | OUTPATIENT
Start: 2023-09-11 | End: 2023-11-08 | Stop reason: SDUPTHER

## 2023-09-07 RX ORDER — HYDROCORTISONE 10 MG/1
50 TABLET ORAL 2 TIMES DAILY
Qty: 20 TABLET | Refills: 0 | Status: SHIPPED | OUTPATIENT
Start: 2023-09-07 | End: 2023-09-09

## 2023-09-07 RX ORDER — IPRATROPIUM BROMIDE AND ALBUTEROL SULFATE 2.5; .5 MG/3ML; MG/3ML
3 SOLUTION RESPIRATORY (INHALATION) ONCE
Status: COMPLETED | OUTPATIENT
Start: 2023-09-07 | End: 2023-09-07

## 2023-09-07 RX ORDER — HYDROCORTISONE 5 MG/1
5 TABLET ORAL EVERY MORNING
Status: DISCONTINUED | OUTPATIENT
Start: 2023-09-07 | End: 2023-09-07

## 2023-09-07 RX ADMIN — MOMETASONE FUROATE AND FORMOTEROL FUMARATE DIHYDRATE 2 PUFF: 200; 5 AEROSOL RESPIRATORY (INHALATION) at 05:21

## 2023-09-07 RX ADMIN — HYDROCORTISONE SODIUM SUCCINATE 50 MG: 100 INJECTION, POWDER, FOR SOLUTION INTRAMUSCULAR; INTRAVENOUS at 03:55

## 2023-09-07 RX ADMIN — HYDROCORTISONE SODIUM SUCCINATE 50 MG: 100 INJECTION, POWDER, FOR SOLUTION INTRAMUSCULAR; INTRAVENOUS at 10:18

## 2023-09-07 RX ADMIN — APIXABAN 5 MG: 5 TABLET, FILM COATED ORAL at 05:22

## 2023-09-07 RX ADMIN — IPRATROPIUM BROMIDE AND ALBUTEROL SULFATE 3 ML: .5; 2.5 SOLUTION RESPIRATORY (INHALATION) at 12:39

## 2023-09-07 RX ADMIN — LEVOTHYROXINE SODIUM 150 MCG: 0.07 TABLET ORAL at 05:22

## 2023-09-07 RX ADMIN — AMIODARONE HYDROCHLORIDE 200 MG: 200 TABLET ORAL at 05:22

## 2023-09-07 RX ADMIN — ATORVASTATIN CALCIUM 40 MG: 40 TABLET, FILM COATED ORAL at 05:22

## 2023-09-07 ASSESSMENT — CHA2DS2 SCORE
SEX: MALE
VASCULAR DISEASE: NO
AGE 75 OR GREATER: NO
CHA2DS2 VASC SCORE: 3
PRIOR STROKE OR TIA OR THROMBOEMBOLISM: NO
AGE 65 TO 74: YES
CHF OR LEFT VENTRICULAR DYSFUNCTION: YES
DIABETES: NO
HYPERTENSION: YES

## 2023-09-07 NOTE — DISCHARGE SUMMARY
Discharge Summary    CHIEF COMPLAINT ON ADMISSION  Chief Complaint   Patient presents with    Weakness     Patient brought to ER as a code assist for syncopal episode.        Reason for Admission  EMS     Admission Date  9/6/2023    CODE STATUS  Full Code    HPI & HOSPITAL COURSE  This is a 68-year-old male with past medical history of chronic atrial fibrillation on Eliquis, chronic systolic heart failure, dyslipidemia, hypothyroidism, adrenal insufficiency, hypogonadism, and asthma who presented to the ED on 9/6/2023 for generalized weakness and dizziness with ambulation.    Patient follows very closely with his endocrinologist, unfortunately he ran out of his hydrocortisone a little over 2 weeks now, his next follow-up appointment with his endocrinologist is in the middle of November.     On admission, patient noted to be hypoxic requiring 4 L of oxygen, chest x-ray with no evidence of pulmonary infiltrate or effusion.  COVID, influenza, RSV negative.  D-dimer negative.  Home O2 evaluation was performed on day of discharge, patient does not require oxygen at rest or on exertion.    Patient started on IV hydrocortisone.  Patient's home regimen is hydrocortisone 10 mg in the morning and 5 mg in the evening.  Patient reported feeling much better, requesting to discharge home today, he does not want to be stuck with a lengthy co-pay bill for his hospitalization.  I expressed my concern in regards to patient requiring more days of IV steroids, however patient kindly declined.  Patient discharged with hydrocortisone taper as well as a 3-month supply of his home regimen.  He does have an appointment in early November to follow-up with his endocrinologist.    Strongly encouraged and instructed patient if he does not feel well when he returns home, any dizziness, lightheadedness, chest pain, shortness of breath or worsening weakness -to return to the ED immediately via ambulance - not driving himself to the hospital, to  which he agreed.    Therefore, he is discharged in good and stable condition to home with close outpatient follow-up.    The patient recovered much more quickly than anticipated on admission.    Discharge Date  9/7/2023    FOLLOW UP ITEMS POST DISCHARGE  Primary care physician  Endocrinology    DISCHARGE DIAGNOSES  Principal Problem:    Hypoxia (POA: Yes)  Active Problems:    Secondary hypothyroidism (POA: Yes)      Overview: ICD-10 transition    Hyperlipidemia (POA: Yes)    Mild intermittent asthma without complication (POA: Yes)    Secondary male hypogonadism (POA: Yes)    Secondary adrenal insufficiency (HCC) (POA: Yes)    Panhypopituitarism (HCC) (POA: Yes)    Heart failure with reduced ejection fraction (HCC) (POA: Yes)    Chronic atrial fibrillation (HCC) (POA: Yes)    Hypotension (POA: Yes)    URI (upper respiratory infection) (POA: Unknown)    Hyponatremia (POA: Yes)  Resolved Problems:    * No resolved hospital problems. *      FOLLOW UP  Future Appointments   Date Time Provider Department Center   9/25/2023  4:15 PM [facility] EXAM 12 ECHO [facility]   11/8/2023  2:20 PM [facility]   12/11/2023  4:15 PM [facility] None     Manjula Zaidi M.D.  [address] [place] [id number]  [phone]            MEDICATIONS ON DISCHARGE     Medication List        START taking these medications        Instructions   Guaifenesin 1200 MG Tb12   Take 1 Tablet by mouth every 12 hours for 5 days.  Dose: 1,200 mg            CHANGE how you take these medications        Instructions   * hydrocortisone 10 MG Tabs  What changed: You were already taking a medication with the same name, and this prescription was added. Make sure you understand how and when to take each.  Commonly known as: Cortef   Take 5 Tablets by mouth 2 times a day for 4 doses.  Dose: 50 mg     * hydrocortisone 5 MG Tabs  Start taking on: September 9, 2023  What changed: You were already  taking a medication with the same name, and this prescription was added. Make sure you understand how and when to take each.  Commonly known as: Cortef   Take 5 Tablets by mouth 2 times a day for 4 doses.  Dose: 25 mg     * hydrocortisone 5 MG Tabs  Start taking on: September 11, 2023  What changed: You were already taking a medication with the same name, and this prescription was added. Make sure you understand how and when to take each.  Commonly known as: Cortef   Take 5 Tablets by mouth 2 times a day for 4 doses, then take 1 Tablet by mouth every evening for 90 days.  Dose: 5 mg     * hydrocortisone 10 MG Tabs  Start taking on: September 12, 2023  What changed:   when to take this  These instructions start on September 12, 2023. If you are unsure what to do until then, ask your doctor or other care provider.  Commonly known as: Cortef   Take 5 Tablets by mouth 2 times a day for 4 doses, then take 1 Tablet by mouth every morning for 90 days.  Dose: 10 mg           * This list has 4 medication(s) that are the same as other medications prescribed for you. Read the directions carefully, and ask your doctor or other care provider to review them with you.                CONTINUE taking these medications        Instructions   Advair Diskus 100-50 MCG/ACT Aepb  Generic drug: fluticasone-salmeterol   Inhale 1 Puff 2 times a day.  Dose: 1 Puff     albuterol 108 (90 Base) MCG/ACT Aers inhalation aerosol   INHALE 2 PUFFS BY MOUTH EVERY 6 HOURS AS NEEDED FOR SHORTNESS OF BREATH  Dose: 2 Puff     amiodarone 200 MG Tabs  Commonly known as: Cordarone   Take 200 mg by mouth every day.  Dose: 200 mg     atorvastatin 40 MG Tabs  Commonly known as: Lipitor   Take 1 tablet by mouth once daily     Eliquis 5mg Tabs  Generic drug: apixaban   Take 1 Tablet by mouth 2 times a day.  Dose: 5 mg     levothyroxine 175 MCG Tabs  Commonly known as: Synthroid   Take 1 Tablet by mouth every morning on an empty stomach. Please make an appointment  for future refills  Dose: 175 mcg     metoprolol SR 25 MG Tb24  Commonly known as: Toprol XL   Take 1 Tablet by mouth every evening.  Dose: 25 mg     triamcinolone acetonide 0.1 % Crea  Commonly known as: Kenalog   Apply 1 Application. topically 2 times a day.  Dose: 1 Application             STOP taking these medications      losartan 25 MG Tabs  Commonly known as: Cozaar              Allergies  Allergies   Allergen Reactions    Pcn [Penicillins] Swelling     * age 24 years old*       DIET  Orders Placed This Encounter   Procedures    Diet Order Diet: Cardiac     Standing Status:   Standing     Number of Occurrences:   1     Order Specific Question:   Diet:     Answer:   Cardiac [6]       ACTIVITY  As tolerated.  Weight bearing as tolerated    CONSULTATIONS  None    PROCEDURES  None    LABORATORY  Lab Results   Component Value Date    SODIUM 133 (L) 09/07/2023    POTASSIUM 4.6 09/07/2023    CHLORIDE 99 09/07/2023    CO2 21 09/07/2023    GLUCOSE 118 (H) 09/07/2023    BUN 20 09/07/2023    CREATININE 0.98 09/07/2023        Lab Results   Component Value Date    WBC 5.4 09/07/2023    HEMOGLOBIN 15.9 09/07/2023    HEMATOCRIT 45.8 09/07/2023    PLATELETCT 247 09/07/2023      DX-CHEST-LIMITED (1 VIEW)   Final Result      No acute cardiopulmonary disease.         Total time of the discharge process exceeds 45 minutes.

## 2023-09-07 NOTE — ASSESSMENT & PLAN NOTE
On admission, patient noted to be hypoxic requiring 4 L of oxygen, chest x-ray with no evidence of pulmonary infiltrate or effusion.  COVID, influenza, RSV negative.  D-dimer negative.    Currently on 1L  Continue incentive spirometry  Continue to wean as tolerated

## 2023-09-07 NOTE — PROGRESS NOTES
4 Eyes Skin Assessment Completed by DELIA Boss and DELIA Reece.    Head WDL  Ears Redness and Blanching  Nose WDL  Mouth WDL  Neck WDL  Breast/Chest Redness  Shoulder Blades Redness  Spine Redness  (R) Arm/Elbow/Hand WDL  (L) Arm/Elbow/Hand WDL  Abdomen WDL  Groin WDL  Scrotum/Coccyx/Buttocks WDL  (R) Leg bruising, scabbing  (L) Leg Scab and Bruising  (R) Heel/Foot/Toe Redness  (L) Heel/Foot/Toe Redness          Devices In Places Tele Box, Blood Pressure Cuff, and Pulse Ox      Interventions In Place Pillows and Pressure Redistribution Mattress    Possible Skin Injury No    Pictures Uploaded Into Epic N/A  Wound Consult Placed N/A  RN Wound Prevention Protocol Ordered No

## 2023-09-07 NOTE — ED NOTES
Pt transported to floor by floor RN on cardiac monitor. Report to Floor RN. Pt taken up to floor with personal belongings, pt left ED GCS 15, pt up to floor on 2L NC with full oxygen tank.

## 2023-09-07 NOTE — ASSESSMENT & PLAN NOTE
On acute adrenal insufficiency due to running out of medications  Will start with stress dose corticosteroids  Continue close monitoring  Check cortisol level  We will wean him down to his normal dose of hydrocortisone 10 mg in a.m. and 5 mg in the evening

## 2023-09-07 NOTE — DIETARY
"Nutrition services: Day 1 of admit.  Alberto Galicia is a 68 y.o. male with admitting DX of hypoxia.  Consult received for MST of 3 for unsure weight loss x 1 month and poor appetite.    Visited pt at bedside. Pt reported he hasn't eaten since 9/2 until breakfast this am d/t severe flu w/nausea but now he is feeling much better and ate all of his breakfast.  Chart hx shows he weighed 212 lbs on 8/14/23. This is a 18 lb (8.5%) loss in less than a month, which is severe.    Assessment:  Height: 193 cm (6' 4\")  Weight: 88 kg (194 lb 0.1 oz) per bed scale.  Body mass index is 23.62 kg/m²., BMI classification: normal weight  Diet/Intake: cardiac / 100% x 1 per pt.    Evaluation:   Current dx list includes: URI, hyonatremia, hypotension, heart failure with reduced EF, Panhypopituitarism  Labs: Na 133, glu 118, alk phos 108  MAR: statin, Synthroid, metoprolol, bowel regimen  Skin: no wounds or edema  GI: LBM pta  Nutrition-focused physical exam: Mild wasting of temporalis and interosseous muscles    Malnutrition Risk: Moderate malnutrition RT nausea and poor appetite AEB mild muscle wasting and severe weight loss.    Recommendations/Plan:  Continue cardiac diet.   Encourage intake of >75%.  Document intake of all meals as % taken in ADL's to provide interdisciplinary communication across all shifts.   Monitor weight.  Nutrition rep will continue to see patient for ongoing meal and snack preferences.     RD monitoring per department policy.    "

## 2023-09-07 NOTE — ASSESSMENT & PLAN NOTE
Not in acute exacerbation  Continue monitoring  Watch for signs and symptoms of fluid overload  Held losartan given hypotension

## 2023-09-07 NOTE — DISCHARGE PLANNING
HTH/SCP TCN chart review completed. Collaborated with BRADEN Wilder prior to meeting with the pt. The most current review of medical record, knowledge of pt's PLOF and social support, LACE+ score of 75, 6 clicks scores of 19 mobility were considered.      Pt seen at bedside. Introduced TCN program. Provided education regarding post acute levels of care. Discussed HTH/SCP plan benefits (Meds to Beds, medical uber and [facility] transitional care). Pt verbalizes understanding.     Patient reports he lives alone and is independent at baseline, very active, does not use DME, and is on RA.  Reports he would decline HH or DME (O2) choice if recommended by the medical team as he does not believe it is necessary and he will be fine.  Reports main issue was access to prescription as he ran out and couldn't get into [facility] in time.  Patient declined assistance with setting up follow up with PCP.  No choice obtained at this time.  Noted PT/OT orders in place, so will monitor their recommendations for discharge planning purposes.      No choice proactively obtained given patient's refusal.. TCN will continue to follow and collaborate with discharge planning team as additional post acute needs arise. Thank you.     Completed today:  Awaiting PT/OT with recommendations \  Choice obtained: none  SCP with [facility] PCP.

## 2023-09-07 NOTE — H&P
Hospital Medicine History & Physical Note    Date of Service  9/6/2023    Primary Care Physician  Manjula Zaidi M.D.    Consultants  none    Code Status  Full Code    Chief Complaint  Chief Complaint   Patient presents with    Weakness     Patient brought to ER as a code assist for syncopal episode.        History of Presenting Illness  Alberto Galicia is a 68 y.o. male who presented 9/6/2023 with past medical history of asthma, heart failure, hyperlipidemia, hypothyroidism, adrenal insufficiency, hypogonadism, is coming today complaining of generalized weakness, dizziness with ambulation, patient is on hydrocortisone twice a day but he is stated that he ran out of his medication a little more than 2 weeks ago, patient tried to get into his endocrinologist for refill but unfortunately next appointment will be until November, patient was able to see his primary care physician but was not able to prescribe his steroids because he did not know his dose, patient over the weekend he developed flulike symptoms and he has started to feel very weak and tired, patient developed dizziness with ambulation, his blood pressure is low, normally patient takes hypertension medications, patient came to emergency room for evaluation where he was found to be hypoxic as well, initially requiring 4 L of oxygen with oxygen saturation 87%, patient had initial work-up in the emergency room with chest x-ray that did not show acute findings, CBC CMP only positive for mild hyponatremia, patient blood pressure in the high 80s, patient is alert oriented follows commands and he is able to give information, patient probably on acute adrenal crisis, I have started him on IV hydrocortisone, will check cortisol level, I have ordered D-dimer, patient is on Eliquis for atrial fibrillation which makes PE less likely, hypoxia might be related to adrenal insufficiency and generalized weakness, I have ordered incentive spirometry, have  discussed with ER physician, have discussed with patient, nurse staff, all questions been answered.        Review of Systems  Review of Systems   Constitutional:  Positive for malaise/fatigue. Negative for chills and fever.   Eyes:  Negative for blurred vision and double vision.   Respiratory:  Negative for cough, hemoptysis and wheezing.    Cardiovascular:  Negative for chest pain, palpitations, claudication, leg swelling and PND.   Gastrointestinal:  Negative for heartburn, nausea and vomiting.   Genitourinary:  Negative for hematuria and urgency.   Musculoskeletal:  Negative for back pain and myalgias.   Skin:  Negative for rash.   Neurological:  Positive for dizziness. Negative for headaches.   Endo/Heme/Allergies:  Does not bruise/bleed easily.   Psychiatric/Behavioral:  Negative for depression and suicidal ideas.        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    Surgical History   has a past surgical history that includes craniotomy.     Family History  family history includes Alzheimer's Disease in his maternal grandmother; Cancer in his maternal grandfather and paternal grandfather; Diabetes in his brother and mother; Heart Disease in his father and paternal grandmother.       Social History   reports that he has never smoked. He has never used smokeless tobacco. He reports that he does not currently use alcohol after a past usage of about 12.6 oz of alcohol per week. He reports that he does not use drugs.    Allergies  Allergies   Allergen Reactions    Pcn [Penicillins] Swelling     * age 24 years old*       Medications  Prior to Admission Medications   Prescriptions Last Dose Informant Patient Reported? Taking?   ELIQUIS 5 MG Tab 9/6/2023 at AM Patient No No   Sig: Take 1 Tablet by mouth 2 times a day.   albuterol 108 (90 Base) MCG/ACT Aero Soln  inhalation aerosol 9/6/2023 at AM Patient No No   Sig: INHALE 2 PUFFS BY MOUTH EVERY 6 HOURS AS NEEDED FOR SHORTNESS OF BREATH   amiodarone (CORDARONE) 200 MG Tab 9/6/2023 at AM Patient Yes Yes   Sig: Take 200 mg by mouth every day.   atorvastatin (LIPITOR) 40 MG Tab 9/6/2023 at AM Patient No No   Sig: Take 1 tablet by mouth once daily   fluticasone-salmeterol (ADVAIR DISKUS) 100-50 MCG/ACT AEROSOL POWDER, BREATH ACTIVATED 9/6/2023 at AM Patient No No   Sig: Inhale 1 Puff 2 times a day.   hydrocortisone (CORTEF) 10 MG Tab >2.5 weeks at Out of med Patient Yes Yes   Sig: Take 10 mg by mouth every day.   levothyroxine (SYNTHROID) 175 MCG Tab 9/6/2023 at AM Patient No No   Sig: Take 1 Tablet by mouth every morning on an empty stomach. Please make an appointment for future refills   losartan (COZAAR) 25 MG Tab 9/6/2023 at AM Patient No No   Sig: Take 1 Tablet by mouth every day.   metoprolol SR (TOPROL XL) 25 MG TABLET SR 24 HR 9/5/2023 at PM Patient No No   Sig: Take 1 Tablet by mouth every evening.   triamcinolone acetonide (KENALOG) 0.1 % Cream 9/5/2023 at PM Patient No No   Sig: Apply 1 Application. topically 2 times a day.      Facility-Administered Medications: None       Physical Exam  Temp:  [36.8 °C (98.2 °F)] 36.8 °C (98.2 °F)  Pulse:  [58-74] 58  Resp:  [17-38] 18  BP: ()/(55-65) 105/59  SpO2:  [87 %-99 %] 92 %  Blood Pressure : 105/59   Temperature: 36.8 °C (98.2 °F)   Pulse: (!) 58   Respiration: 18   Pulse Oximetry: 92 %       Physical Exam  Vitals and nursing note reviewed.   Constitutional:       General: He is not in acute distress.     Appearance: Normal appearance. He is not ill-appearing.   HENT:      Head: Normocephalic.      Mouth/Throat:      Mouth: Mucous membranes are dry.      Pharynx: Oropharynx is clear. No oropharyngeal exudate or posterior oropharyngeal erythema.   Eyes:      General: No scleral icterus.        Right eye: No discharge.         Left eye: No discharge.      Extraocular  Movements: Extraocular movements intact.      Conjunctiva/sclera: Conjunctivae normal.      Pupils: Pupils are equal, round, and reactive to light.   Cardiovascular:      Rate and Rhythm: Normal rate and regular rhythm.      Pulses: Normal pulses.      Heart sounds: Normal heart sounds.   Pulmonary:      Effort: Pulmonary effort is normal. No respiratory distress.      Breath sounds: Normal breath sounds. No wheezing.   Abdominal:      General: Bowel sounds are normal. There is no distension.      Palpations: Abdomen is soft.      Tenderness: There is no abdominal tenderness. There is no guarding.   Musculoskeletal:         General: Normal range of motion.      Cervical back: Normal range of motion and neck supple. No rigidity or tenderness.      Right lower leg: No edema.      Left lower leg: No edema.   Skin:     General: Skin is warm and dry.      Capillary Refill: Capillary refill takes less than 2 seconds.      Coloration: Skin is not jaundiced.   Neurological:      General: No focal deficit present.      Mental Status: He is alert and oriented to person, place, and time.      Cranial Nerves: No cranial nerve deficit.   Psychiatric:         Mood and Affect: Mood normal.         Behavior: Behavior normal.         Laboratory:  Recent Labs     09/06/23  1340   WBC 8.2   RBC 5.65   HEMOGLOBIN 17.6   HEMATOCRIT 51.7   MCV 91.5   MCH 31.2   MCHC 34.0   RDW 42.0   PLATELETCT 290   MPV 9.9     Recent Labs     09/06/23  1340   SODIUM 132*   POTASSIUM 4.4   CHLORIDE 99   CO2 18*   GLUCOSE 99   BUN 17   CREATININE 1.27   CALCIUM 10.3     Recent Labs     09/06/23  1340   GLUCOSE 99         Recent Labs     09/06/23  1340   NTPROBNP 137*         Recent Labs     09/06/23  1340   TROPONINT 14       Imaging:  DX-CHEST-LIMITED (1 VIEW)   Final Result      No acute cardiopulmonary disease.          X-Ray:  My impression is: Chest x-ray no infiltrates no pleural effusion no pneumothorax    Assessment/Plan:  Justification for  Admission Status  I anticipate this patient will require at least two midnights for appropriate medical management, necessitating inpatient admission because patient will require close monitoring in telemetry, will require IV hydrocortisone, checking cortisol levels, will need to wean off oxygen, follow-up D-dimer        * Hypoxia- (present on admission)  Assessment & Plan  Unclear etiology  Chest x-ray did not show any acute findings  Might be related to adrenal insufficiency  I have ordered D-dimer although patient is on Eliquis PE is very unlikely.  Started on incentive spirometry  Patient is not wheezing  To be related to recent URI    Hyponatremia- (present on admission)  Assessment & Plan  Multifactorial  Monitoring BMP    URI (upper respiratory infection)  Assessment & Plan  Viral panel is negative  Continue supportive treatment    Hypotension- (present on admission)  Assessment & Plan  Due to adrenal insufficiency  Started him on stress dose hydrocortisone  Close monitoring    Chronic atrial fibrillation (HCC)- (present on admission)  Assessment & Plan  Continue amiodarone, metoprolol, Eliquis    Heart failure with reduced ejection fraction (HCC)- (present on admission)  Assessment & Plan  Not in acute exacerbation  Continue monitoring  Watch for signs and symptoms of fluid overload    Panhypopituitarism (HCC)- (present on admission)  Assessment & Plan  Continue supplements    Secondary adrenal insufficiency (HCC)- (present on admission)  Assessment & Plan  On acute adrenal insufficiency due to running out of medications  Will start with stress dose corticosteroids  Continue close monitoring  Check cortisol level  We will wean him down to his normal dose of hydrocortisone 10 mg in a.m. and 5 mg in the evening    Secondary male hypogonadism- (present on admission)  Assessment & Plan  Needs follow-up with endocrinology as outpatient  Continue supplements    Mild intermittent asthma without complication-  (present on admission)  Assessment & Plan  Not in acute exacerbation  Continue RT per protocol    Hyperlipidemia- (present on admission)  Assessment & Plan  Continue statin    Secondary hypothyroidism- (present on admission)  Assessment & Plan  Decrease Synthroid to 150 mcg due to low TSH and elevated T4, will require repeat TSH in 4 weeks        VTE prophylaxis: therapeutic anticoagulation with Eliquis        I have reviewed CBC  Have reviewed CMP  Have reviewed troponin level  Reviewed BMP  I have reviewed and interpreted chest x-ray  I have discussed with ER physician  I have placed order to admit patient to telemetry  I have ordered IV hydrocortisone and I am monitoring for side effects complication.  I have ordered labs for in a.m. CBC CMP  I have ordered D-dimer

## 2023-09-07 NOTE — PROGRESS NOTES
Assumed care of patient, bedside report received from Yasmin RICHEY RN. Updated on plan of care, call light within reach and fall precautions are in place and bed lock and in lowest position. Patient instructed to call for assistance before getting out of bed. All questions answered at this time. No other needs.

## 2023-09-07 NOTE — HOSPITAL COURSE
This is a 68-year-old male with past medical history of chronic atrial fibrillation on Eliquis, chronic systolic heart failure, dyslipidemia, hypothyroidism, adrenal insufficiency, hypogonadism, and asthma who presented to the ED on 9/6/2023 for generalized weakness and dizziness with ambulation.    Patient follows very closely with his endocrinologist, unfortunately he ran out of his hydrocortisone a little over 2 weeks now, his next follow-up appointment with his endocrinologist is in the middle of November.     On admission, patient noted to be hypoxic requiring 4 L of oxygen, chest x-ray with no evidence of pulmonary infiltrate or effusion.  COVID, influenza, RSV negative.  D-dimer negative.  Home O2 evaluation was performed on day of discharge, patient does not require oxygen at rest or on exertion.    Patient started on IV hydrocortisone.  Patient's home regimen is hydrocortisone 10 mg in the morning and 5 mg in the evening.  Patient reported feeling much better, requesting to discharge home today, he does not want to be stuck with a lengthy co-pay bill for his hospitalization.  I expressed my concern in regards to patient requiring more days of IV steroids, however patient kindly declined.  Patient discharged with hydrocortisone taper as well as a 3-month supply of his home regimen.  He does have an appointment in early November to follow-up with his endocrinologist.    Strongly encouraged and instructed patient if he does not feel well when he returns home, any dizziness, lightheadedness, chest pain, shortness of breath or worsening weakness -to return to the ED immediately via ambulance - not driving himself to the hospital, to which he agreed.

## 2023-09-07 NOTE — DISCHARGE PLANNING
Care Transition Team Assessment    LMSW met with pt at bedside to complete assessment. Pt A&Ox4 and able to verify the information on the face sheet. Pt lives by himself in a 2nd story apt with multiple steps. Prior to this hospitalization pt was independent at home with ADLs and most IADLs. Pt does not use any DME at baseline. Pt reported that he is good support for himself and has friends in the area with a brother out of state. Pt works as a  and a  for income. Pt denies any SA or MH concerns because he reportedly stopped drinking in February. Pt does not have an advance directive. LMSW to follow along with pt for CM needs.    Information Source  Orientation Level: Oriented to person, Oriented to situation, Oriented to place, Disoriented to time  Information Given By: Patient  Who is responsible for making decisions for patient? : Patient    Readmission Evaluation  Is this a readmission?: No    Elopement Risk  Legal Hold: No  Ambulatory or Self Mobile in Wheelchair: Yes  Disoriented: No  Psychiatric Symptoms: None  History of Wandering: No  Elopement this Admit: No  Vocalizing Wanting to Leave: No  Displays Behaviors, Body Language Wanting to Leave: No-Not at Risk for Elopement  Elopement Risk: Not at Risk for Elopement    Interdisciplinary Discharge Planning  Patient or legal guardian wants to designate a caregiver: No    Discharge Preparedness  What is your plan after discharge?: Home with help  What are your discharge supports?: Sibling  Prior Functional Level: Independent with Activities of Daily Living  Difficulity with ADLs: None  Difficulity with IADLs: None    Functional Assesment  Prior Functional Level: Independent with Activities of Daily Living    Finances  Financial Barriers to Discharge: No  Prescription Coverage: Yes    Vision / Hearing Impairment  Vision Impairment : Yes  Right Eye Vision: Impaired, Wears Glasses  Left Eye Vision: Impaired, Wears Glasses  Hearing  Impairment : Yes  Hearing Impairment: Hearing Device Not Available  Does Pt Need Special Equipment for the Hearing Impaired?: No    Advance Directive  Advance Directive?: None    Domestic Abuse  Have you ever been the victim of abuse or violence?: No  Physical Abuse or Sexual Abuse: No  Verbal Abuse or Emotional Abuse: No  Possible Abuse/Neglect Reported to:: Not Applicable    Psychological Assessment  History of Substance Abuse: Alcohol  Date Last Used - Alcohol: February  History of Psychiatric Problems: No  Non-compliant with Treatment: No  Newly Diagnosed Illness: No    Discharge Risks or Barriers  Discharge risks or barriers?: No    Anticipated Discharge Information  Discharge Disposition: Discharged to home/self care (01)

## 2023-09-07 NOTE — CARE PLAN
Problem: Knowledge Deficit - Standard  Goal: Patient and family/care givers will demonstrate understanding of plan of care, disease process/condition, diagnostic tests and medications  Outcome: Progressing  Note: Patient has been updated on plan of care by ER provider. Patient is axo x4. Patient wishes to speak with day team to better understand full plan of care and when potential discharge is to occur.      Problem: Fall Risk  Goal: Patient will remain free from falls  Outcome: Progressing   The patient is Stable - Low risk of patient condition declining or worsening    Shift Goals  Clinical Goals: monitor labs  Patient Goals: sleep  Family Goals: n/a    Progress made toward(s) clinical / shift goals:      Patient is not progressing towards the following goals:

## 2023-09-08 ENCOUNTER — PATIENT OUTREACH (OUTPATIENT)
Dept: MEDICAL GROUP | Facility: MEDICAL CENTER | Age: 68
End: 2023-09-08
Payer: MEDICARE

## 2023-09-20 DIAGNOSIS — E78.5 HYPERLIPIDEMIA, UNSPECIFIED HYPERLIPIDEMIA TYPE: ICD-10-CM

## 2023-09-21 RX ORDER — ATORVASTATIN CALCIUM 40 MG/1
TABLET, FILM COATED ORAL
Qty: 100 TABLET | Refills: 0 | Status: SHIPPED | OUTPATIENT
Start: 2023-09-21 | End: 2023-09-22 | Stop reason: SDUPTHER

## 2023-09-22 DIAGNOSIS — E78.5 HYPERLIPIDEMIA, UNSPECIFIED HYPERLIPIDEMIA TYPE: ICD-10-CM

## 2023-09-22 RX ORDER — ATORVASTATIN CALCIUM 40 MG/1
40 TABLET, FILM COATED ORAL DAILY
Qty: 100 TABLET | Refills: 0 | Status: SHIPPED | OUTPATIENT
Start: 2023-09-22

## 2023-09-25 ENCOUNTER — HOSPITAL ENCOUNTER (OUTPATIENT)
Dept: CARDIOLOGY | Facility: MEDICAL CENTER | Age: 68
End: 2023-09-25
Attending: INTERNAL MEDICINE
Payer: MEDICARE

## 2023-09-25 DIAGNOSIS — I51.3 LEFT VENTRICULAR THROMBUS: ICD-10-CM

## 2023-09-25 DIAGNOSIS — I50.20 HEART FAILURE WITH REDUCED EJECTION FRACTION (HCC): ICD-10-CM

## 2023-09-25 DIAGNOSIS — I50.20 ACC/AHA STAGE C SYSTOLIC HEART FAILURE (HCC): ICD-10-CM

## 2023-09-25 DIAGNOSIS — I10 ESSENTIAL HYPERTENSION, BENIGN: ICD-10-CM

## 2023-09-25 DIAGNOSIS — I48.91 ATRIAL FIBRILLATION WITH RAPID VENTRICULAR RESPONSE (HCC): ICD-10-CM

## 2023-09-25 DIAGNOSIS — Z79.899 HIGH RISK MEDICATION USE: ICD-10-CM

## 2023-09-25 DIAGNOSIS — E78.5 HYPERLIPIDEMIA, UNSPECIFIED HYPERLIPIDEMIA TYPE: ICD-10-CM

## 2023-09-25 DIAGNOSIS — I42.6 ALCOHOLIC CARDIOMYOPATHY (HCC): Chronic | ICD-10-CM

## 2023-09-25 PROCEDURE — 93306 TTE W/DOPPLER COMPLETE: CPT

## 2023-09-27 LAB
LV EJECT FRACT  99904: 69
LV EJECT FRACT MOD 2C 99903: 68.7
LV EJECT FRACT MOD 4C 99902: 68.77
LV EJECT FRACT MOD BP 99901: 69.05

## 2023-09-27 PROCEDURE — 93306 TTE W/DOPPLER COMPLETE: CPT | Mod: 26 | Performed by: INTERNAL MEDICINE

## 2023-10-04 ENCOUNTER — OFFICE VISIT (OUTPATIENT)
Dept: MEDICAL GROUP | Facility: MEDICAL CENTER | Age: 68
End: 2023-10-04
Payer: MEDICARE

## 2023-10-04 VITALS
HEIGHT: 76 IN | DIASTOLIC BLOOD PRESSURE: 54 MMHG | BODY MASS INDEX: 25.09 KG/M2 | HEART RATE: 63 BPM | TEMPERATURE: 97.3 F | OXYGEN SATURATION: 96 % | WEIGHT: 206 LBS | RESPIRATION RATE: 16 BRPM | SYSTOLIC BLOOD PRESSURE: 108 MMHG

## 2023-10-04 DIAGNOSIS — Z09 HOSPITAL DISCHARGE FOLLOW-UP: ICD-10-CM

## 2023-10-04 DIAGNOSIS — E27.49 SECONDARY ADRENAL INSUFFICIENCY (HCC): ICD-10-CM

## 2023-10-04 DIAGNOSIS — J45.41 MODERATE PERSISTENT ASTHMA WITH ACUTE EXACERBATION: ICD-10-CM

## 2023-10-04 DIAGNOSIS — J45.41 MODERATE PERSISTENT ASTHMA WITH EXACERBATION: ICD-10-CM

## 2023-10-04 DIAGNOSIS — J96.21 ACUTE ON CHRONIC RESPIRATORY FAILURE WITH HYPOXIA AND HYPERCAPNIA (HCC): ICD-10-CM

## 2023-10-04 DIAGNOSIS — J45.901 ASTHMA WITH ACUTE EXACERBATION, UNSPECIFIED ASTHMA SEVERITY, UNSPECIFIED WHETHER PERSISTENT: ICD-10-CM

## 2023-10-04 DIAGNOSIS — J96.22 ACUTE ON CHRONIC RESPIRATORY FAILURE WITH HYPOXIA AND HYPERCAPNIA (HCC): ICD-10-CM

## 2023-10-04 LAB
FLUAV RNA SPEC QL NAA+PROBE: NEGATIVE
FLUBV RNA SPEC QL NAA+PROBE: NEGATIVE
RSV RNA SPEC QL NAA+PROBE: NEGATIVE
SARS-COV-2 RNA RESP QL NAA+PROBE: NEGATIVE

## 2023-10-04 PROCEDURE — 3078F DIAST BP <80 MM HG: CPT | Performed by: FAMILY MEDICINE

## 2023-10-04 PROCEDURE — 0241U POCT CEPHEID COV-2, FLU A/B, RSV - PCR: CPT | Performed by: FAMILY MEDICINE

## 2023-10-04 PROCEDURE — 94640 AIRWAY INHALATION TREATMENT: CPT | Performed by: FAMILY MEDICINE

## 2023-10-04 PROCEDURE — 3074F SYST BP LT 130 MM HG: CPT | Performed by: FAMILY MEDICINE

## 2023-10-04 PROCEDURE — 99214 OFFICE O/P EST MOD 30 MIN: CPT | Mod: 25 | Performed by: FAMILY MEDICINE

## 2023-10-04 RX ORDER — FLUTICASONE PROPIONATE AND SALMETEROL 100; 50 UG/1; UG/1
1 POWDER RESPIRATORY (INHALATION) 2 TIMES DAILY
Qty: 60 EACH | Refills: 1 | Status: SHIPPED | OUTPATIENT
Start: 2023-10-04

## 2023-10-04 RX ORDER — IPRATROPIUM BROMIDE AND ALBUTEROL SULFATE 2.5; .5 MG/3ML; MG/3ML
3 SOLUTION RESPIRATORY (INHALATION) ONCE
Status: COMPLETED | OUTPATIENT
Start: 2023-10-04 | End: 2023-10-04

## 2023-10-04 RX ORDER — ALBUTEROL SULFATE 90 UG/1
2 AEROSOL, METERED RESPIRATORY (INHALATION) EVERY 6 HOURS PRN
Qty: 18 G | Refills: 2 | Status: SHIPPED | OUTPATIENT
Start: 2023-10-04 | End: 2023-10-23

## 2023-10-04 RX ADMIN — IPRATROPIUM BROMIDE AND ALBUTEROL SULFATE 3 ML: 2.5; .5 SOLUTION RESPIRATORY (INHALATION) at 16:21

## 2023-10-04 ASSESSMENT — FIBROSIS 4 INDEX: FIB4 SCORE: 2.01

## 2023-10-04 NOTE — PROGRESS NOTES
CC: Hospital discharge follow-up(secondary adrenal sufficiency), new asthma exacerbation    HPI:   Jack presents today for posthospitalization follow-up as well as for shortness of breath that started 5 days ago due to his asthma exacerbation.     Patient was admitted to [facility] on 9/6/2023, presented with generalized weakness and dizziness with ambulation.Patient follows very closely with his endocrinologist, unfortunately he ran out of his hydrocortisone a little over 2 weeks before his last admission, and his next follow-up appointment with his endocrinologist is in the middle of November.   So, on admission, patient noted to be hypoxic requiring 4 L of oxygen, chest x-ray with no evidence of pulmonary infiltrate or effusion.  COVID, influenza, RSV negative.  D-dimer negative.  Home O2 evaluation was performed on day of discharge, patient does not require oxygen at rest or on exertion.Patient started on IV hydrocortisone.  Patient's home regimen is hydrocortisone 10 mg in the morning and 5 mg in the evening.  Patient reported feeling much better, requesting to be discharged home .  He was discharged in stable condition on 9/7. Patient discharged with hydrocortisone taper as well as a 3-month supply of his home regimen.  He does have an appointment in early November to follow-up with his endocrinologist.     Get today for follow-up.  Has been taking hydrocortisone 10 in the morning and 5 in the evening.  Denies any dizziness, tiredness, weakness.  But for the past 2 weeks he has been having cough, wheeze and shortness of breath.  Patient has history of asthma, has been on albuterol both inhaler and nebulizer, has been taking it as needed.  He also has been on Advair twice a day.  Patient's oxygen saturation has been normal in room air.  But he is shortness of breath getting a little bit worse.  Denies any fever, chills, or runny nose.    Patient Active Problem List    Diagnosis Date Noted    Hypoxia  09/06/2023    URI (upper respiratory infection) 09/06/2023    Hyponatremia 09/06/2023    Hyperthyroidism 04/04/2023    Syncope 04/03/2023    Hypotension 04/03/2023    Chronic atrial fibrillation (HCC) 02/03/2023    ANGEL (acute kidney injury) ([facility]) 02/02/2023    Heart failure with reduced ejection fraction ([facility]) 02/01/2023    Demand ischemia 01/31/2023    Acute on chronic respiratory failure with hypoxia and hypercapnia ([facility]) 01/30/2023    Asthma with exacerbation 01/30/2023    Elevated troponin 01/30/2023    Elevated d-dimer 01/30/2023    Elevated lactic acid level 01/30/2023    Infestation by bed bug 01/30/2023    Left ventricular thrombus 01/30/2023    Moderate persistent asthma without complication 01/13/2022    Elevated liver enzymes 01/13/2022    Macrocytosis 07/27/2021    Seasonal allergies 07/27/2021    Meningioma ([facility]) 09/02/2020    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 100 Tablet 0    hydrocortisone (CORTEF) 5 MG Tab Take 5 Tablets by mouth 2 times a day for 4 doses, then take 1 Tablet by mouth every evening for 90 days. 110 Tablet 0    hydrocortisone (CORTEF) 10 MG Tab Take 5 Tablets by mouth 2 times a day for 4 doses, then take 1 Tablet by mouth every morning for 90 days. 110 Tablet 0    amiodarone (CORDARONE) 200 MG Tab Take 200 mg by mouth every day.      albuterol 108 (90 Base) MCG/ACT Aero Soln inhalation aerosol INHALE 2 PUFFS BY MOUTH EVERY 6 HOURS AS NEEDED FOR SHORTNESS OF BREATH 18 g 2    ELIQUIS 5 MG Tab Take 1 Tablet by  "mouth 2 times a day. 180 Tablet 3    metoprolol SR (TOPROL XL) 25 MG TABLET SR 24 HR Take 1 Tablet by mouth every evening. 90 Tablet 3    levothyroxine (SYNTHROID) 175 MCG Tab Take 1 Tablet by mouth every morning on an empty stomach. Please make an appointment for future refills 90 Tablet 1    triamcinolone acetonide (KENALOG) 0.1 % Cream Apply 1 Application. topically 2 times a day. 45 g 3    fluticasone-salmeterol (ADVAIR DISKUS) 100-50 MCG/ACT AEROSOL POWDER, BREATH ACTIVATED Inhale 1 Puff 2 times a day. 60 Each 1     No current facility-administered medications for this visit.         Allergies as of 10/04/2023 - Reviewed 10/04/2023   Allergen Reaction Noted    Pcn [penicillins] Swelling 04/03/2023        ROS: Denies any chest pain, Shortness of breath, Changes bowel or bladder, Lower extremity edema.    Physical Exam:  /54 (BP Location: Right arm, Patient Position: Sitting, BP Cuff Size: Adult)   Pulse 65   Temp 36.3 °C (97.3 °F) (Temporal)   Resp 16   Ht 1.93 m (6' 4\")   Wt 93.4 kg (206 lb)   SpO2 95%   BMI 25.08 kg/m²   Gen.: Well-developed, well-nourished, no apparent distress,pleasant and cooperative with the examination  Skin:  Warm and dry with good turgor. No rashes or suspicious lesions in visible areas  HEENT:Sinuses nontender with palpation, TMs clear, nares patent with pink mucosa and clear rhinorrhea,no septal deviation ,polyps or lesions. lips without lesions, oropharynx clear.  Neck: Trachea midline,no masses or adenopathy. No JVD.  Cor: Regular rate and rhythm without murmur, gallop or rub.  Lungs: Respirations unlabored.decreased breath sounds bilaterally.  Generalized wheeze/crackles  Extremities: No cyanosis, clubbing or edema.          Assessment and Plan.   68 y.o. male     1. Hospital discharge follow-up  Hospital discharge summary reviewed.    2. Secondary adrenal insufficiency (HCC)  Resolved.  Status post IV hydrocortisone.  Currently doing fine on his regular " regimen(hydrocortisone 10 mg in the morning, and 5 mg in the evening)  Continue follow-up with endocrinology    3. Moderate persistent asthma with exacerbation  Patient given a DuoNeb inhaler  Advised to take albuterol nebulizer every 6 hours as needed, and Advair twice a day  We will send patient for chest x-ray to rule out pneumonia  Tested to R/O RSV, covid, and flu infection, however patient was found to be negative for COVID, RSV, and flu.    - DX-CHEST-2 VIEWS; Future  - albuterol 108 (90 Base) MCG/ACT Aero Soln inhalation aerosol; Inhale 2 Puffs every 6 hours as needed for Shortness of Breath.  Dispense: 18 g; Refill: 2  - fluticasone-salmeterol (ADVAIR DISKUS) 100-50 MCG/ACT AEROSOL POWDER, BREATH ACTIVATED; Inhale 1 Puff 2 times a day.  Dispense: 60 Each; Refill: 1  - ipratropium-albuterol (DUONEB) nebulizer solution  - POCT Cepheid CoV-2, Flu A/B, RSV - PCR

## 2023-10-05 ENCOUNTER — APPOINTMENT (OUTPATIENT)
Dept: RADIOLOGY | Facility: MEDICAL CENTER | Age: 68
End: 2023-10-05
Attending: STUDENT IN AN ORGANIZED HEALTH CARE EDUCATION/TRAINING PROGRAM
Payer: MEDICARE

## 2023-10-05 ENCOUNTER — HOSPITAL ENCOUNTER (EMERGENCY)
Facility: MEDICAL CENTER | Age: 68
End: 2023-10-06
Attending: STUDENT IN AN ORGANIZED HEALTH CARE EDUCATION/TRAINING PROGRAM
Payer: MEDICARE

## 2023-10-05 DIAGNOSIS — J44.1 ACUTE EXACERBATION OF CHRONIC OBSTRUCTIVE PULMONARY DISEASE (COPD) (HCC): ICD-10-CM

## 2023-10-05 LAB
ALBUMIN SERPL BCP-MCNC: 4.2 G/DL (ref 3.2–4.9)
ALBUMIN/GLOB SERPL: 1.4 G/DL
ALP SERPL-CCNC: 87 U/L (ref 30–99)
ALT SERPL-CCNC: 15 U/L (ref 2–50)
ANION GAP SERPL CALC-SCNC: 9 MMOL/L (ref 7–16)
AST SERPL-CCNC: 24 U/L (ref 12–45)
BASOPHILS # BLD AUTO: 0.6 % (ref 0–1.8)
BASOPHILS # BLD: 0.04 K/UL (ref 0–0.12)
BILIRUB SERPL-MCNC: 0.4 MG/DL (ref 0.1–1.5)
BUN SERPL-MCNC: 9 MG/DL (ref 8–22)
CALCIUM ALBUM COR SERPL-MCNC: 9.5 MG/DL (ref 8.5–10.5)
CALCIUM SERPL-MCNC: 9.7 MG/DL (ref 8.5–10.5)
CHLORIDE SERPL-SCNC: 102 MMOL/L (ref 96–112)
CO2 SERPL-SCNC: 27 MMOL/L (ref 20–33)
CREAT SERPL-MCNC: 0.84 MG/DL (ref 0.5–1.4)
D DIMER PPP IA.FEU-MCNC: <0.27 UG/ML (FEU) (ref 0–0.5)
EKG IMPRESSION: NORMAL
EOSINOPHIL # BLD AUTO: 0.61 K/UL (ref 0–0.51)
EOSINOPHIL NFR BLD: 8.8 % (ref 0–6.9)
ERYTHROCYTE [DISTWIDTH] IN BLOOD BY AUTOMATED COUNT: 47.1 FL (ref 35.9–50)
FLUAV RNA SPEC QL NAA+PROBE: NEGATIVE
FLUBV RNA SPEC QL NAA+PROBE: NEGATIVE
GFR SERPLBLD CREATININE-BSD FMLA CKD-EPI: 94 ML/MIN/1.73 M 2
GLOBULIN SER CALC-MCNC: 3 G/DL (ref 1.9–3.5)
GLUCOSE SERPL-MCNC: 96 MG/DL (ref 65–99)
HCT VFR BLD AUTO: 46.5 % (ref 42–52)
HGB BLD-MCNC: 15.4 G/DL (ref 14–18)
IMM GRANULOCYTES # BLD AUTO: 0.02 K/UL (ref 0–0.11)
IMM GRANULOCYTES NFR BLD AUTO: 0.3 % (ref 0–0.9)
LYMPHOCYTES # BLD AUTO: 1.49 K/UL (ref 1–4.8)
LYMPHOCYTES NFR BLD: 21.4 % (ref 22–41)
MCH RBC QN AUTO: 31.6 PG (ref 27–33)
MCHC RBC AUTO-ENTMCNC: 33.1 G/DL (ref 32.3–36.5)
MCV RBC AUTO: 95.3 FL (ref 81.4–97.8)
MONOCYTES # BLD AUTO: 0.55 K/UL (ref 0–0.85)
MONOCYTES NFR BLD AUTO: 7.9 % (ref 0–13.4)
NEUTROPHILS # BLD AUTO: 4.24 K/UL (ref 1.82–7.42)
NEUTROPHILS NFR BLD: 61 % (ref 44–72)
NRBC # BLD AUTO: 0 K/UL
NRBC BLD-RTO: 0 /100 WBC (ref 0–0.2)
NT-PROBNP SERPL IA-MCNC: 757 PG/ML (ref 0–125)
PLATELET # BLD AUTO: 296 K/UL (ref 164–446)
PMV BLD AUTO: 9.5 FL (ref 9–12.9)
POTASSIUM SERPL-SCNC: 4.8 MMOL/L (ref 3.6–5.5)
PROT SERPL-MCNC: 7.2 G/DL (ref 6–8.2)
RBC # BLD AUTO: 4.88 M/UL (ref 4.7–6.1)
RSV RNA SPEC QL NAA+PROBE: NEGATIVE
SARS-COV-2 RNA RESP QL NAA+PROBE: NOTDETECTED
SODIUM SERPL-SCNC: 138 MMOL/L (ref 135–145)
SPECIMEN SOURCE: NORMAL
TROPONIN T SERPL-MCNC: <6 NG/L (ref 6–19)
WBC # BLD AUTO: 7 K/UL (ref 4.8–10.8)

## 2023-10-05 PROCEDURE — 0241U HCHG SARS-COV-2 COVID-19 NFCT DS RESP RNA 4 TRGT MIC: CPT

## 2023-10-05 PROCEDURE — 36415 COLL VENOUS BLD VENIPUNCTURE: CPT

## 2023-10-05 PROCEDURE — 84484 ASSAY OF TROPONIN QUANT: CPT

## 2023-10-05 PROCEDURE — 80053 COMPREHEN METABOLIC PANEL: CPT

## 2023-10-05 PROCEDURE — 96365 THER/PROPH/DIAG IV INF INIT: CPT

## 2023-10-05 PROCEDURE — 700111 HCHG RX REV CODE 636 W/ 250 OVERRIDE (IP): Mod: JZ | Performed by: STUDENT IN AN ORGANIZED HEALTH CARE EDUCATION/TRAINING PROGRAM

## 2023-10-05 PROCEDURE — 94644 CONT INHLJ TX 1ST HOUR: CPT

## 2023-10-05 PROCEDURE — 93005 ELECTROCARDIOGRAM TRACING: CPT | Performed by: STUDENT IN AN ORGANIZED HEALTH CARE EDUCATION/TRAINING PROGRAM

## 2023-10-05 PROCEDURE — 96366 THER/PROPH/DIAG IV INF ADDON: CPT

## 2023-10-05 PROCEDURE — 96375 TX/PRO/DX INJ NEW DRUG ADDON: CPT

## 2023-10-05 PROCEDURE — 93005 ELECTROCARDIOGRAM TRACING: CPT

## 2023-10-05 PROCEDURE — 83880 ASSAY OF NATRIURETIC PEPTIDE: CPT

## 2023-10-05 PROCEDURE — 71045 X-RAY EXAM CHEST 1 VIEW: CPT

## 2023-10-05 PROCEDURE — 85025 COMPLETE CBC W/AUTO DIFF WBC: CPT

## 2023-10-05 PROCEDURE — 700101 HCHG RX REV CODE 250: Performed by: STUDENT IN AN ORGANIZED HEALTH CARE EDUCATION/TRAINING PROGRAM

## 2023-10-05 PROCEDURE — 94645 CONT INHLJ TX EACH ADDL HOUR: CPT

## 2023-10-05 PROCEDURE — C9803 HOPD COVID-19 SPEC COLLECT: HCPCS | Performed by: STUDENT IN AN ORGANIZED HEALTH CARE EDUCATION/TRAINING PROGRAM

## 2023-10-05 PROCEDURE — 85379 FIBRIN DEGRADATION QUANT: CPT

## 2023-10-05 PROCEDURE — 99285 EMERGENCY DEPT VISIT HI MDM: CPT

## 2023-10-05 RX ORDER — METHYLPREDNISOLONE SODIUM SUCCINATE 125 MG/2ML
125 INJECTION, POWDER, LYOPHILIZED, FOR SOLUTION INTRAMUSCULAR; INTRAVENOUS ONCE
Status: COMPLETED | OUTPATIENT
Start: 2023-10-05 | End: 2023-10-05

## 2023-10-05 RX ORDER — MAGNESIUM SULFATE HEPTAHYDRATE 40 MG/ML
2 INJECTION, SOLUTION INTRAVENOUS ONCE
Status: COMPLETED | OUTPATIENT
Start: 2023-10-05 | End: 2023-10-06

## 2023-10-05 RX ADMIN — IPRATROPIUM BROMIDE 0.5 MG: 0.5 SOLUTION RESPIRATORY (INHALATION) at 22:36

## 2023-10-05 RX ADMIN — METHYLPREDNISOLONE SODIUM SUCCINATE 125 MG: 125 INJECTION, POWDER, FOR SOLUTION INTRAMUSCULAR; INTRAVENOUS at 21:12

## 2023-10-05 RX ADMIN — Medication 10 MG/HR: at 21:08

## 2023-10-05 RX ADMIN — MAGNESIUM SULFATE HEPTAHYDRATE 2 G: 2 INJECTION, SOLUTION INTRAVENOUS at 22:42

## 2023-10-05 RX ADMIN — Medication 10 MG/HR: at 22:37

## 2023-10-05 ASSESSMENT — PAIN DESCRIPTION - PAIN TYPE: TYPE: ACUTE PAIN

## 2023-10-05 ASSESSMENT — FIBROSIS 4 INDEX: FIB4 SCORE: 2.01

## 2023-10-06 VITALS
WEIGHT: 207.45 LBS | BODY MASS INDEX: 25.26 KG/M2 | SYSTOLIC BLOOD PRESSURE: 129 MMHG | HEART RATE: 60 BPM | DIASTOLIC BLOOD PRESSURE: 64 MMHG | RESPIRATION RATE: 20 BRPM | HEIGHT: 76 IN | OXYGEN SATURATION: 93 % | TEMPERATURE: 98.4 F

## 2023-10-06 NOTE — ED NOTES
Performed ambulatory pulse oximeter monitoring  Saturation is 93-95% on room air  Notified to the provider

## 2023-10-06 NOTE — ED TRIAGE NOTES
".  Chief Complaint   Patient presents with    Shortness of Breath     Increased SOB over the last few days cough, sob. Denies fevers. Hx CHF denies increased swelling. Pain with deep breath. Home Albuterol not helpful.        69 yo male ambulatory to triage for above complaint. Increased WOB noted. SOB protocol ordered. Patient reports he has severe asthma. Admitted last month for hypoxia after a syncopal.      Pt is alert and oriented, speaking in full sentences, follows commands and responds appropriately to questions.      Patient placed back for EKG and educated on triage process. Asked to inform RN of any changes or concerns.     /84   Pulse 63   Temp 36.9 °C (98.4 °F) (Temporal)   Resp 20   Ht 1.93 m (6' 4\")   Wt 94.1 kg (207 lb 7.3 oz)   SpO2 96%   BMI 25.25 kg/m²     "

## 2023-10-06 NOTE — ED NOTES
Pt able to ambulate to GRN 39 from the lobby with steady gait. Pt provided with gown, chart up for ERP.

## 2023-10-06 NOTE — ED PROVIDER NOTES
ED Provider Note    CHIEF COMPLAINT  Chief Complaint   Patient presents with    Shortness of Breath     Increased SOB over the last few days cough, sob. Denies fevers. Hx CHF denies increased swelling. Pain with deep breath. Home Albuterol not helpful.        EXTERNAL RECORDS REVIEWED  Inpatient Notes recent admission    HPI/ROS  LIMITATION TO HISTORY     OUTSIDE HISTORIAN(S):      Alberto Galicia is a 68 y.o. male who presents with shortness of breath.  Patient says that since being discharged from hospital he has had progressive shortness of breath over the past 3 weeks.  Patient says he has been taking his prescribed medications, and blood thinner hydrocortisone.  Patient says that his nebulizer stopped working a few days ago.  Patient says that shortness of breath became worse tonight.  Patient denies recent fever, chills, productive cough, nausea, vomiting, abdominal pain, back pain.  Patient reports mild intermittent pleuritic chest pain associated with coughing.    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    SURGICAL HISTORY   has a past surgical history that includes craniotomy.    FAMILY HISTORY  Family History   Problem Relation Age of Onset    Diabetes Mother     Heart Disease Father     Diabetes Brother     Alzheimer's Disease Maternal Grandmother     Cancer Maternal Grandfather     Heart Disease Paternal Grandmother     Cancer Paternal Grandfather        SOCIAL HISTORY  Social History     Tobacco Use    Smoking status: Never    Smokeless tobacco: Never   Vaping Use    Vaping Use: Never used   Substance and Sexual Activity    Alcohol use: Not Currently     Alcohol/week: 12.6 oz     Types: 21 Shots of liquor per week     Comment: Not since Feb    Drug use: No    Sexual activity: Not on file       CURRENT MEDICATIONS  Home Medications        "Reviewed by Marni Orozco R.N. (Registered Nurse) on 10/05/23 at 1852  Med List Status: Partial     Medication Last Dose Status   albuterol 108 (90 Base) MCG/ACT Aero Soln inhalation aerosol  Active   amiodarone (CORDARONE) 200 MG Tab  Active   atorvastatin (LIPITOR) 40 MG Tab  Active   ELIQUIS 5 MG Tab  Active   fluticasone-salmeterol (ADVAIR DISKUS) 100-50 MCG/ACT AEROSOL POWDER, BREATH ACTIVATED  Active   hydrocortisone (CORTEF) 10 MG Tab  Active   hydrocortisone (CORTEF) 5 MG Tab  Active   levothyroxine (SYNTHROID) 175 MCG Tab  Active   metoprolol SR (TOPROL XL) 25 MG TABLET SR 24 HR  Active   triamcinolone acetonide (KENALOG) 0.1 % Cream  Active                    ALLERGIES  Allergies   Allergen Reactions    Pcn [Penicillins] Swelling     * age 24 years old*       PHYSICAL EXAM  VITAL SIGNS: /64   Pulse 60   Temp 36.9 °C (98.4 °F) (Temporal)   Resp 20   Ht 1.93 m (6' 4\")   Wt 94.1 kg (207 lb 7.3 oz)   SpO2 93%   BMI 25.25 kg/m²    Mild distress, tachypneic, bilateral wheezing, normal heart sounds, normal peripheral pulses, abdomen soft nontender, gross motor function sensation intact    DIAGNOSTIC STUDIES / PROCEDURES  EKG  I have independently interpreted this EKG  Results for orders placed or performed during the hospital encounter of 10/05/23   EKG   Result Value Ref Range    Report       [facility] Emergency Dept.    Test Date:  2023-10-05  Pt Name:    MATT MOODY                   Department: ER  MRN:        [id number]                      Room:  Gender:     Male                         Technician: [id number]  :        1955                   Requested By:ER TRIAGE PROTOCOL  Order #:    [id number]                    [place] MD: Arash Paredes    Measurements  Intervals                                Axis  Rate:       57                           P:          -10  ID:         [id number]                          QRS:        -58  QRSD:       119                          T:        "   55  QT:         465  QTc:        453    Interpretive Statements  Sinus bradycardia  Incomplete left bundle branch block  Compared to ECG 09/06/2023 13:19:48  Left bundle-branch block now present  Sinus rhythm no longer present  Left anterior fascicular block no longer present  Right bundle-branch block no longer present  Electronically Signed On 10- 20:55:32 PDT by Arash Paredes       No signs of acute ischemia, sleep ligation of QRS normal QTc    LABS  Labs Reviewed   CBC WITH DIFFERENTIAL - Abnormal; Notable for the following components:       Result Value    Lymphocytes 21.40 (*)     Eosinophils 8.80 (*)     Eos (Absolute) 0.61 (*)     All other components within normal limits   PROBRAIN NATRIURETIC PEPTIDE, NT - Abnormal; Notable for the following components:    NT-proBNP 757 (*)     All other components within normal limits    Narrative:     Biotin intake of greater than 5 mg per day may interfere with  troponin levels, causing false low values.   TROPONIN    Narrative:     Biotin intake of greater than 5 mg per day may interfere with  troponin levels, causing false low values.   COV-2, FLU A/B, AND RSV BY PCR (ipDatatel)    Narrative:     Release to patient->Immediate   D-DIMER    Narrative:     Biotin intake of greater than 5 mg per day may interfere with  troponin levels, causing false low values.   COMP METABOLIC PANEL    Narrative:     Biotin intake of greater than 5 mg per day may interfere with  troponin levels, causing false low values.   ESTIMATED GFR    Narrative:     Biotin intake of greater than 5 mg per day may interfere with  troponin levels, causing false low values.         RADIOLOGY  I have independently interpreted the diagnostic imaging associated with this visit and am waiting the final reading from the radiologist.   My preliminary interpretation is as follows: No pneumonia  Radiologist interpretation:   DX-CHEST-PORTABLE (1 VIEW)   Final Result         1.  No acute  cardiopulmonary disease.            COURSE & MEDICAL DECISION MAKING    ED Observation Status? Yes; I am placing the patient in to an observation status due to a diagnostic uncertainty as well as therapeutic intensity. Patient placed in observation status at 2200  10/5/2023.     Observation plan is as follows: Monitor for improvement in dyspnea after treatments    Upon Reevaluation, the patient's condition has: Improved; and will be discharged.    Patient discharged from ED Observation status at 1244 am (Time) 10/6/23 (Date).     INITIAL ASSESSMENT, COURSE AND PLAN  Care Narrative: Differential includes pneumonia, bronchoconstrictive disease, CHF exacerbation, PE, ACS.  Patient with bilateral wheezing, tachypnea and malfunctioning nebulizer.  Will initiate steroids and continuous bronchodilators.  Patient has been compliant with anticoagulation will obtain D-dimer to risk stratify.  Patient's ECG nonischemic, pain is pleuritic intermittent and mild obtain troponin overall lower suspicion for ACS.    Chest x-ray without signs of pneumonia or pulmonary edema.  D-dimer normal range.  Troponin normal range.  BMP with moderate elevation compared to previous patient without signs of fluid overload or pulmonary edema.  On reassessment patient with an improved respiratory distress and wheezing says he could use additional nebulizer.  We will dose patient with magnesium as well as additional continuous and reassess.    On reassessment patient with resolution of symptoms breathing comfortably no hypoxemia, able to ambulate without hypoxemia or dyspnea.  Patient says that he feels much improved.  Patient has adequate albuterol at home.  Patient plans to reach out to his primary care provider.  Discussed with patient strict return precautions, close outpatient follow-up and supportive care at home which he is comfortable with.        ADDITIONAL PROBLEM LIST    DISPOSITION AND DISCUSSIONS    Discussion of management with other  QHP or appropriate source(s): RT regarding continuous nebulizer        FINAL DIAGNOSIS  1. Acute exacerbation of chronic obstructive pulmonary disease (COPD) (HCC)           Electronically signed by: Arash Paredes D.O., 10/5/2023 9:04 PM

## 2023-10-06 NOTE — DISCHARGE INSTRUCTIONS
As we discussed you should follow-up with your primary care provider.  If you develop uncontrolled shortness of breath, chest pain, passing out, vomiting you should return to the emergency department.

## 2023-10-06 NOTE — ED NOTES
Pt discharged to home. Discharge paperwork provided. Education provided by ERP. Reinforced discharge instructions.  Pt was given follow up instructions  Pt verbalized understanding of all instructions for discharge.   Patient went out of the ER ambulatory with steady gait., alert and oriented x 4, with all belongings.

## 2023-10-22 DIAGNOSIS — J45.901 ASTHMA WITH ACUTE EXACERBATION, UNSPECIFIED ASTHMA SEVERITY, UNSPECIFIED WHETHER PERSISTENT: ICD-10-CM

## 2023-10-23 RX ORDER — ALBUTEROL SULFATE 90 UG/1
2 AEROSOL, METERED RESPIRATORY (INHALATION) EVERY 6 HOURS PRN
Qty: 18 G | Refills: 0 | Status: SHIPPED | OUTPATIENT
Start: 2023-10-23 | End: 2024-02-07

## 2023-11-08 ENCOUNTER — OFFICE VISIT (OUTPATIENT)
Dept: ENDOCRINOLOGY | Facility: MEDICAL CENTER | Age: 68
End: 2023-11-08
Attending: INTERNAL MEDICINE
Payer: MEDICARE

## 2023-11-08 VITALS
HEIGHT: 76 IN | BODY MASS INDEX: 26.2 KG/M2 | OXYGEN SATURATION: 94 % | HEART RATE: 71 BPM | WEIGHT: 215.2 LBS | DIASTOLIC BLOOD PRESSURE: 60 MMHG | SYSTOLIC BLOOD PRESSURE: 130 MMHG | RESPIRATION RATE: 20 BRPM

## 2023-11-08 DIAGNOSIS — E29.1 SECONDARY MALE HYPOGONADISM: ICD-10-CM

## 2023-11-08 DIAGNOSIS — E27.49 SECONDARY ADRENAL INSUFFICIENCY (HCC): ICD-10-CM

## 2023-11-08 DIAGNOSIS — E55.9 VITAMIN D DEFICIENCY: ICD-10-CM

## 2023-11-08 DIAGNOSIS — N52.9 ERECTILE DYSFUNCTION, UNSPECIFIED ERECTILE DYSFUNCTION TYPE: ICD-10-CM

## 2023-11-08 DIAGNOSIS — E03.8 SECONDARY HYPOTHYROIDISM: ICD-10-CM

## 2023-11-08 DIAGNOSIS — E23.0 PANHYPOPITUITARISM (HCC): ICD-10-CM

## 2023-11-08 PROCEDURE — 99215 OFFICE O/P EST HI 40 MIN: CPT | Performed by: INTERNAL MEDICINE

## 2023-11-08 PROCEDURE — 99211 OFF/OP EST MAY X REQ PHY/QHP: CPT | Performed by: INTERNAL MEDICINE

## 2023-11-08 PROCEDURE — 3075F SYST BP GE 130 - 139MM HG: CPT | Performed by: INTERNAL MEDICINE

## 2023-11-08 PROCEDURE — 3078F DIAST BP <80 MM HG: CPT | Performed by: INTERNAL MEDICINE

## 2023-11-08 RX ORDER — TESTOSTERONE 16.2 MG/G
40.5 GEL TRANSDERMAL DAILY
Qty: 75 G | Refills: 5 | Status: SHIPPED | OUTPATIENT
Start: 2023-11-08 | End: 2023-12-08

## 2023-11-08 RX ORDER — HYDROCORTISONE 5 MG/1
TABLET ORAL
Qty: 200 TABLET | Refills: 3 | Status: SHIPPED | OUTPATIENT
Start: 2023-11-08

## 2023-11-08 RX ORDER — SILDENAFIL 50 MG/1
50 TABLET, FILM COATED ORAL
Qty: 20 TABLET | Refills: 3 | Status: SHIPPED | OUTPATIENT
Start: 2023-11-08

## 2023-11-08 RX ORDER — LEVOTHYROXINE SODIUM 0.15 MG/1
150 TABLET ORAL
Qty: 90 TABLET | Refills: 2 | Status: SHIPPED | OUTPATIENT
Start: 2023-11-08

## 2023-11-08 RX ORDER — HYDROCORTISONE 10 MG/1
TABLET ORAL
Qty: 200 TABLET | Refills: 3 | Status: SHIPPED | OUTPATIENT
Start: 2023-11-08 | End: 2023-11-08

## 2023-11-08 ASSESSMENT — FIBROSIS 4 INDEX: FIB4 SCORE: 1.44

## 2023-11-22 ENCOUNTER — HOSPITAL ENCOUNTER (OUTPATIENT)
Dept: LAB | Facility: MEDICAL CENTER | Age: 68
End: 2023-11-22
Attending: NURSE PRACTITIONER
Payer: MEDICARE

## 2023-11-22 ENCOUNTER — TELEPHONE (OUTPATIENT)
Dept: CARDIOLOGY | Facility: MEDICAL CENTER | Age: 68
End: 2023-11-22
Payer: MEDICARE

## 2023-11-22 DIAGNOSIS — E78.5 HYPERLIPIDEMIA, UNSPECIFIED HYPERLIPIDEMIA TYPE: ICD-10-CM

## 2023-11-22 DIAGNOSIS — I42.6 ALCOHOLIC CARDIOMYOPATHY (HCC): Chronic | ICD-10-CM

## 2023-11-22 DIAGNOSIS — I50.20 ACC/AHA STAGE C SYSTOLIC HEART FAILURE (HCC): ICD-10-CM

## 2023-11-22 DIAGNOSIS — Z79.899 HIGH RISK MEDICATION USE: ICD-10-CM

## 2023-11-22 DIAGNOSIS — I51.3 LEFT VENTRICULAR THROMBUS: ICD-10-CM

## 2023-11-22 DIAGNOSIS — I10 ESSENTIAL HYPERTENSION, BENIGN: ICD-10-CM

## 2023-11-22 DIAGNOSIS — I48.91 ATRIAL FIBRILLATION WITH RAPID VENTRICULAR RESPONSE (HCC): ICD-10-CM

## 2023-11-22 DIAGNOSIS — I50.20 HEART FAILURE WITH REDUCED EJECTION FRACTION (HCC): ICD-10-CM

## 2023-11-22 LAB
ANION GAP SERPL CALC-SCNC: 8 MMOL/L (ref 7–16)
BUN SERPL-MCNC: 12 MG/DL (ref 8–22)
CALCIUM SERPL-MCNC: 9 MG/DL (ref 8.5–10.5)
CHLORIDE SERPL-SCNC: 105 MMOL/L (ref 96–112)
CO2 SERPL-SCNC: 28 MMOL/L (ref 20–33)
CREAT SERPL-MCNC: 0.89 MG/DL (ref 0.5–1.4)
GFR SERPLBLD CREATININE-BSD FMLA CKD-EPI: 92 ML/MIN/1.73 M 2
GLUCOSE SERPL-MCNC: 78 MG/DL (ref 65–99)
POTASSIUM SERPL-SCNC: 3.8 MMOL/L (ref 3.6–5.5)
SODIUM SERPL-SCNC: 141 MMOL/L (ref 135–145)

## 2023-11-22 PROCEDURE — 80048 BASIC METABOLIC PNL TOTAL CA: CPT

## 2023-11-22 PROCEDURE — 36415 COLL VENOUS BLD VENIPUNCTURE: CPT

## 2023-11-23 NOTE — RESULT ENCOUNTER NOTE
Your diagnostic results have been reviewed. No acute changes noted. No changes to plan of care at this time. Please follow up as scheduled. Thank you.

## 2023-11-23 NOTE — TELEPHONE ENCOUNTER
Recommendations visible through mychart. No further action at this time.     ----- Message from ARAMIS Matute sent at 11/22/2023  4:25 PM PST -----  Your diagnostic results have been reviewed. No acute changes noted. No changes to plan of care at this time. Please follow up as scheduled. Thank you.

## 2023-12-11 ENCOUNTER — OFFICE VISIT (OUTPATIENT)
Dept: CARDIOLOGY | Facility: MEDICAL CENTER | Age: 68
End: 2023-12-11
Attending: NURSE PRACTITIONER
Payer: MEDICARE

## 2023-12-11 VITALS
WEIGHT: 212 LBS | DIASTOLIC BLOOD PRESSURE: 68 MMHG | RESPIRATION RATE: 18 BRPM | SYSTOLIC BLOOD PRESSURE: 112 MMHG | HEART RATE: 67 BPM | HEIGHT: 76 IN | BODY MASS INDEX: 25.82 KG/M2 | OXYGEN SATURATION: 97 %

## 2023-12-11 DIAGNOSIS — I10 ESSENTIAL HYPERTENSION, BENIGN: ICD-10-CM

## 2023-12-11 DIAGNOSIS — E03.8 SECONDARY HYPOTHYROIDISM: ICD-10-CM

## 2023-12-11 DIAGNOSIS — I48.91 ATRIAL FIBRILLATION WITH RAPID VENTRICULAR RESPONSE (HCC): ICD-10-CM

## 2023-12-11 DIAGNOSIS — I51.89 LEFT VENTRICULAR SYSTOLIC DYSFUNCTION, NYHA CLASS 2: ICD-10-CM

## 2023-12-11 DIAGNOSIS — I48.0 HYPERCOAGULABLE STATE DUE TO PAROXYSMAL ATRIAL FIBRILLATION (HCC): ICD-10-CM

## 2023-12-11 DIAGNOSIS — I42.8 NON-ISCHEMIC CARDIOMYOPATHY (HCC): ICD-10-CM

## 2023-12-11 DIAGNOSIS — I50.20 ACC/AHA STAGE C SYSTOLIC HEART FAILURE (HCC): ICD-10-CM

## 2023-12-11 DIAGNOSIS — D68.69 HYPERCOAGULABLE STATE DUE TO PAROXYSMAL ATRIAL FIBRILLATION (HCC): ICD-10-CM

## 2023-12-11 DIAGNOSIS — I50.20 HEART FAILURE WITH REDUCED EJECTION FRACTION (HCC): ICD-10-CM

## 2023-12-11 DIAGNOSIS — I42.6 ALCOHOLIC CARDIOMYOPATHY (HCC): ICD-10-CM

## 2023-12-11 LAB — EKG IMPRESSION: NORMAL

## 2023-12-11 PROCEDURE — 99214 OFFICE O/P EST MOD 30 MIN: CPT | Performed by: NURSE PRACTITIONER

## 2023-12-11 PROCEDURE — 3074F SYST BP LT 130 MM HG: CPT | Performed by: NURSE PRACTITIONER

## 2023-12-11 PROCEDURE — 3078F DIAST BP <80 MM HG: CPT | Performed by: NURSE PRACTITIONER

## 2023-12-11 PROCEDURE — 93010 ELECTROCARDIOGRAM REPORT: CPT | Performed by: INTERNAL MEDICINE

## 2023-12-11 PROCEDURE — 93005 ELECTROCARDIOGRAM TRACING: CPT | Performed by: NURSE PRACTITIONER

## 2023-12-11 PROCEDURE — 99212 OFFICE O/P EST SF 10 MIN: CPT | Performed by: NURSE PRACTITIONER

## 2023-12-11 RX ORDER — LOSARTAN POTASSIUM 25 MG/1
25 TABLET ORAL EVERY EVENING
Qty: 90 TABLET | Refills: 3 | Status: SHIPPED | OUTPATIENT
Start: 2023-12-11

## 2023-12-11 ASSESSMENT — FIBROSIS 4 INDEX: FIB4 SCORE: 1.44

## 2023-12-11 NOTE — PROGRESS NOTES
Chief Complaint   Patient presents with    Congestive Heart Failure     F/V Dx: ACC/AHA stage C systolic heart failure (HCC)         Subjective     Alberto Galicia is a 68 y.o. male who presents today heart failure follow-up.  Patient was last seen by Dr. Hauser on 3/20/2023, and myself on 8/14/2023.  [facility] hospitalization 1/23 with nonobstructive CAD, EF 20%, A-fib, LV thrombus.  Patient is additional medical problems of adrenal insufficiency and EtOH.    Today, patient feels well, denies chest pain, shortness of breath, palpitations, dizziness/lightheadedness, orthopnea, PND or Edema.  Patient reports he reduced his alcohol consumption to 2 drinks during special occasions.     EKG in office today personally interpreted by me as sinus bradycardia with LAFB    Patient appears euvolemic on exam.  We discussed his echocardiogram results per below.  Appears amiodarone was resumed during last hospitalization.  We discussed high risk nature of amiodarone therapy with previous discontinuation with hypothyroid and adrenal insufficiency, patient agreeable to discontinue.  We will add ARB to optimize GDMT.  We will also refer patient to EP A-fib clinic to discuss additional options other than amiodarone therapy to control his A-fib    Past Medical History:   Diagnosis Date    Asthma     Cardiomyopathy (HCC) EF <20% 1/30/2023    Hyperlipidemia     Left ventricular thrombus 1/30/2023    Pituitary disease (HCC)     Thyroid disease      Past Surgical History:   Procedure Laterality Date    CRANIOTOMY       Family History   Problem Relation Age of Onset    Diabetes Mother     Heart Disease Father     Diabetes Brother     Alzheimer's Disease Maternal Grandmother     Cancer Maternal Grandfather     Heart Disease Paternal Grandmother     Cancer Paternal Grandfather      Social History     Socioeconomic History    Marital status: Single     Spouse name: Not on file    Number of children: Not on file    Years of education: Not on file     Highest education level: Not on file   Occupational History    Not on file   Tobacco Use    Smoking status: Never    Smokeless tobacco: Never   Vaping Use    Vaping Use: Never used   Substance and Sexual Activity    Alcohol use: Not Currently     Alcohol/week: 12.6 oz     Types: 21 Shots of liquor per week     Comment: Not since Feb    Drug use: No    Sexual activity: Not on file   Other Topics Concern    Not on file   Social History Narrative    Not on file     Social Determinants of Health     Financial Resource Strain: Not on file   Food Insecurity: No Food Insecurity (2/13/2020)    Hunger Vital Sign     Worried About Running Out of Food in the Last Year: Never true     Ran Out of Food in the Last Year: Never true   Transportation Needs: No Transportation Needs (2/13/2020)    PRAPARE - Transportation     Lack of Transportation (Medical): No     Lack of Transportation (Non-Medical): No   Physical Activity: Not on file   Stress: Not on file   Social Connections: Not on file   Intimate Partner Violence: Not on file   Housing Stability: Not on file     Allergies   Allergen Reactions    Penicillins Swelling     * age 24 years old*     Outpatient Encounter Medications as of 12/11/2023   Medication Sig Dispense Refill    losartan (COZAAR) 25 MG Tab Take 1 Tablet by mouth every evening. 90 Tablet 3    hydrocortisone (CORTEF) 5 MG Tab Take 2 pills in the morning and 1 pill in the afternoon 200 Tablet 3    levothyroxine (SYNTHROID) 150 MCG Tab Take 1 Tablet by mouth every morning on an empty stomach. 90 Tablet 2    sildenafil citrate (VIAGRA) 50 MG tablet Take 1 Tablet by mouth 1 time a day as needed for Erectile Dysfunction. 20 Tablet 3    albuterol 108 (90 Base) MCG/ACT Aero Soln inhalation aerosol INHALE 2 PUFFS BY MOUTH EVERY 6 HOURS AS NEEDED FOR SHORTNESS OF BREATH 18 g 0    fluticasone-salmeterol (ADVAIR DISKUS) 100-50 MCG/ACT AEROSOL POWDER, BREATH ACTIVATED Inhale 1 Puff 2 times a day. 60 Each 1    atorvastatin  "(LIPITOR) 40 MG Tab Take 1 Tablet by mouth every day. 100 Tablet 0    ELIQUIS 5 MG Tab Take 1 Tablet by mouth 2 times a day. 180 Tablet 3    metoprolol SR (TOPROL XL) 25 MG TABLET SR 24 HR Take 1 Tablet by mouth every evening. 90 Tablet 3    triamcinolone acetonide (KENALOG) 0.1 % Cream Apply 1 Application. topically 2 times a day. 45 g 3    [DISCONTINUED] amiodarone (CORDARONE) 200 MG Tab Take 200 mg by mouth every day.       No facility-administered encounter medications on file as of 12/11/2023.     ROS Complete review of systems negative except as noted in HPI/subjective           Objective     /68 (BP Location: Left arm, Patient Position: Sitting, BP Cuff Size: Adult)   Pulse 67   Resp 18   Ht 1.93 m (6' 4\")   Wt 96.2 kg (212 lb)   SpO2 97%   BMI 25.81 kg/m²     Physical Exam  Vitals reviewed.   Constitutional:       Appearance: He is well-developed.   HENT:      Head: Normocephalic and atraumatic.   Eyes:      Pupils: Pupils are equal, round, and reactive to light.   Neck:      Vascular: No JVD.   Cardiovascular:      Rate and Rhythm: Normal rate and regular rhythm.      Pulses: Normal pulses.      Heart sounds: Normal heart sounds. No murmur heard.     No friction rub. No gallop.   Pulmonary:      Effort: Pulmonary effort is normal. No respiratory distress.      Breath sounds: Normal breath sounds.   Abdominal:      General: Bowel sounds are normal. There is no distension.      Palpations: Abdomen is soft.   Musculoskeletal:      Right lower leg: No edema.      Left lower leg: No edema.   Skin:     General: Skin is warm and dry.      Findings: No erythema.   Neurological:      Mental Status: He is alert and oriented to person, place, and time.   Psychiatric:         Behavior: Behavior normal.                Assessment & Plan     1. Atrial fibrillation with rapid ventricular response (HCC)  REFERRAL TO CARDIOLOGY    losartan (COZAAR) 25 MG Tab      2. Secondary hypothyroidism  REFERRAL TO " CARDIOLOGY    losartan (COZAAR) 25 MG Tab      3. ACC/AHA stage C systolic heart failure (HCC)  EKG    REFERRAL TO CARDIOLOGY    losartan (COZAAR) 25 MG Tab      4. Essential hypertension, benign        5. Alcoholic cardiomyopathy (HCC)        6. Heart failure with reduced ejection fraction (HCC)        7. Left ventricular systolic dysfunction, NYHA class 2        8. Non-ischemic cardiomyopathy (HCC)        9. Hypercoagulable state due to paroxysmal atrial fibrillation (HCC)            Medical Decision Making: Today's Assessment/Status/Plan:        HFimpEF, Stage C, Class II, LVEF 70% (Recovered from 25%):  -Heart failure due to nonischemic cardiomyopathy  -Discussed Heart failure trajectory and prognosis with patient. Will continue to optimize medical therapy as tolerated. Advanced HF treatment, need for remote monitoring consideration at every visit.  -ACE-I/ARB/ARNI: Initiate losartan 25  mg daily  -Evidence Based Beta-blocker: continue metoprolol XL 25 mg daily  -Aldosterone Antagonist: Adrenal insufficiency; poor tolerance to spironolactone in the past  -SGLT2-I: Increase patient's dizziness in the past  -Diuretic: Euvolemic on exam  -Labs:  Will continue to closely monitor for side effects of patient's high risk medication(s) including renal function, NTproBNP, and electrolytes as needed  -EF >35%, no indication for primary prevention ICD fat this time  -Reinforced s/sx of worsening heart failure with patient and weight monitoring. Pt verbalizes understanding. Pt to call office if present.  -Heart Failure Education: pt to be contacted by HF nurse for further education.  In the meantime, during visit today we discussed indications and use for each medication, importance of treatment adherence, definition of heart failure and potential etiologies for current diagnosis.  -Pharmacotherapy referral deferred at this time   -Compliance Barriers none identified at this time  -Advanced care planning: Deferred at this  time    LV thrombus; Paroxysmal Atrial Fibrillation (PAF); Hypothyroid  - Asymptomatic, denies AF breakthrough, rate controlled.   -Amio resumed during last hospitalizatin, discontinue  - EKG today showed SB  - On OAC with Eliquis, continue.  - Continue metoprolol  -Refer to EP    Hypertension; hyperlipidemia  -Blood pressure well controlled in office today.  Often overcontrolled with symptomatic hypotension.  -LDL 88.  At goal.  Continue atorvastatin 40 mg daily    COPD; hypothyroid; adrenal sufficiency  -Per endocrine. On chronic steriods    FU in clinic in 6 months with cardiology. Sooner if needed.    Patient verbalizes understanding and agrees with the plan of care.       ISAIAS Matute.   [facility]  [phone]    PLEASE NOTE: This Note was created using voice recognition Software. I have made every reasonable attempt to correct obvious errors, but I expect that there are errors of grammar and possibly content that I did not discover before finalizing the note

## 2023-12-12 PROBLEM — D68.69 HYPERCOAGULABLE STATE DUE TO PAROXYSMAL ATRIAL FIBRILLATION (HCC): Status: ACTIVE | Noted: 2023-12-12

## 2023-12-12 PROBLEM — I48.0 HYPERCOAGULABLE STATE DUE TO PAROXYSMAL ATRIAL FIBRILLATION (HCC): Status: ACTIVE | Noted: 2023-12-12

## 2023-12-18 ENCOUNTER — TELEPHONE (OUTPATIENT)
Dept: ENDOCRINOLOGY | Facility: MEDICAL CENTER | Age: 68
End: 2023-12-18
Payer: MEDICARE

## 2024-02-06 DIAGNOSIS — J45.901 ASTHMA WITH ACUTE EXACERBATION, UNSPECIFIED ASTHMA SEVERITY, UNSPECIFIED WHETHER PERSISTENT: ICD-10-CM

## 2024-02-07 RX ORDER — ALBUTEROL SULFATE 90 UG/1
2 AEROSOL, METERED RESPIRATORY (INHALATION) EVERY 6 HOURS PRN
Qty: 18 G | Refills: 0 | Status: SHIPPED | OUTPATIENT
Start: 2024-02-07 | End: 2024-03-26

## 2024-02-07 NOTE — TELEPHONE ENCOUNTER
Received request via: Pharmacy    Was the patient seen in the last year in this department? Yes    Does the patient have an active prescription (recently filled or refills available) for medication(s) requested? No    Pharmacy Name: [id number] - [place], [place] - [address]     Does the patient have detention Plus and need 100 day supply (blood pressure, diabetes and cholesterol meds only)? Medication is not for cholesterol, blood pressure or diabetes  
Yes

## 2024-02-26 ENCOUNTER — HOSPITAL ENCOUNTER (OUTPATIENT)
Dept: LAB | Facility: MEDICAL CENTER | Age: 69
End: 2024-02-26
Attending: INTERNAL MEDICINE
Payer: MEDICARE

## 2024-02-26 DIAGNOSIS — E55.9 VITAMIN D DEFICIENCY: ICD-10-CM

## 2024-02-26 DIAGNOSIS — E03.8 SECONDARY HYPOTHYROIDISM: ICD-10-CM

## 2024-02-26 DIAGNOSIS — E29.1 SECONDARY MALE HYPOGONADISM: ICD-10-CM

## 2024-02-26 DIAGNOSIS — N52.9 ERECTILE DYSFUNCTION, UNSPECIFIED ERECTILE DYSFUNCTION TYPE: ICD-10-CM

## 2024-02-26 DIAGNOSIS — E23.0 PANHYPOPITUITARISM (HCC): ICD-10-CM

## 2024-02-26 DIAGNOSIS — E27.49 SECONDARY ADRENAL INSUFFICIENCY (HCC): ICD-10-CM

## 2024-02-26 LAB
25(OH)D3 SERPL-MCNC: 28 NG/ML (ref 30–100)
ALBUMIN SERPL BCP-MCNC: 3.9 G/DL (ref 3.2–4.9)
ALBUMIN/GLOB SERPL: 1.3 G/DL
ALP SERPL-CCNC: 88 U/L (ref 30–99)
ALT SERPL-CCNC: 16 U/L (ref 2–50)
ANION GAP SERPL CALC-SCNC: 9 MMOL/L (ref 7–16)
AST SERPL-CCNC: 26 U/L (ref 12–45)
BILIRUB SERPL-MCNC: 0.4 MG/DL (ref 0.1–1.5)
BUN SERPL-MCNC: 9 MG/DL (ref 8–22)
CALCIUM ALBUM COR SERPL-MCNC: 9 MG/DL (ref 8.5–10.5)
CALCIUM SERPL-MCNC: 8.9 MG/DL (ref 8.5–10.5)
CHLORIDE SERPL-SCNC: 105 MMOL/L (ref 96–112)
CO2 SERPL-SCNC: 26 MMOL/L (ref 20–33)
CREAT SERPL-MCNC: 0.82 MG/DL (ref 0.5–1.4)
GFR SERPLBLD CREATININE-BSD FMLA CKD-EPI: 95 ML/MIN/1.73 M 2
GLOBULIN SER CALC-MCNC: 2.9 G/DL (ref 1.9–3.5)
GLUCOSE SERPL-MCNC: 94 MG/DL (ref 65–99)
POTASSIUM SERPL-SCNC: 4.5 MMOL/L (ref 3.6–5.5)
PROT SERPL-MCNC: 6.8 G/DL (ref 6–8.2)
PSA SERPL-MCNC: 0.17 NG/ML (ref 0–4)
SODIUM SERPL-SCNC: 140 MMOL/L (ref 135–145)
T4 FREE SERPL-MCNC: 0.99 NG/DL (ref 0.93–1.7)

## 2024-02-26 PROCEDURE — 84270 ASSAY OF SEX HORMONE GLOBUL: CPT

## 2024-02-26 PROCEDURE — 84403 ASSAY OF TOTAL TESTOSTERONE: CPT

## 2024-02-26 PROCEDURE — 84153 ASSAY OF PSA TOTAL: CPT

## 2024-02-26 PROCEDURE — 84439 ASSAY OF FREE THYROXINE: CPT

## 2024-02-26 PROCEDURE — 80053 COMPREHEN METABOLIC PANEL: CPT

## 2024-02-26 PROCEDURE — 84402 ASSAY OF FREE TESTOSTERONE: CPT

## 2024-02-26 PROCEDURE — 36415 COLL VENOUS BLD VENIPUNCTURE: CPT

## 2024-02-26 PROCEDURE — 82306 VITAMIN D 25 HYDROXY: CPT

## 2024-02-27 LAB
SHBG SERPL-SCNC: 55 NMOL/L (ref 19–76)
TESTOST FREE MFR SERPL: 1.6 % (ref 1.6–2.9)
TESTOST FREE SERPL-MCNC: 152 PG/ML (ref 47–244)
TESTOST SERPL-MCNC: 974 NG/DL (ref 300–720)

## 2024-02-28 ENCOUNTER — OFFICE VISIT (OUTPATIENT)
Dept: ENDOCRINOLOGY | Facility: MEDICAL CENTER | Age: 69
End: 2024-02-28
Payer: MEDICARE

## 2024-02-28 VITALS
BODY MASS INDEX: 27.76 KG/M2 | HEIGHT: 76 IN | SYSTOLIC BLOOD PRESSURE: 132 MMHG | OXYGEN SATURATION: 90 % | HEART RATE: 74 BPM | WEIGHT: 228 LBS | DIASTOLIC BLOOD PRESSURE: 80 MMHG

## 2024-02-28 DIAGNOSIS — E27.49 SECONDARY ADRENAL INSUFFICIENCY (HCC): ICD-10-CM

## 2024-02-28 DIAGNOSIS — E23.0 PANHYPOPITUITARISM (HCC): ICD-10-CM

## 2024-02-28 DIAGNOSIS — E29.1 SECONDARY MALE HYPOGONADISM: ICD-10-CM

## 2024-02-28 DIAGNOSIS — E55.9 VITAMIN D DEFICIENCY: ICD-10-CM

## 2024-02-28 DIAGNOSIS — E03.8 SECONDARY HYPOTHYROIDISM: ICD-10-CM

## 2024-02-28 PROCEDURE — 3075F SYST BP GE 130 - 139MM HG: CPT

## 2024-02-28 PROCEDURE — 99214 OFFICE O/P EST MOD 30 MIN: CPT

## 2024-02-28 PROCEDURE — 99211 OFF/OP EST MAY X REQ PHY/QHP: CPT

## 2024-02-28 PROCEDURE — 3079F DIAST BP 80-89 MM HG: CPT

## 2024-02-28 ASSESSMENT — FIBROSIS 4 INDEX: FIB4 SCORE: 1.52

## 2024-02-28 NOTE — PROGRESS NOTES
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 male here for follow up for the above medical issue.  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    Panhypopituitarism  Pituitary MRI on March 23, 2020 showed no evidence of tumor recurrence  He did have an incidental 5 mm meningioma at the area of his previous pituitary surgery in the [place].  I have asked him to follow up with his pcp for his meningioma     He was admitted for acute diastolic CHF with EF of 25% at [facility].  He was diagnosed with alcoholic CMO.    He was treated with dobutamine.  He then developed PAF and was placed on Eliquis and Amidarone   Patient c/o weight gain around belly which he believes is from his CHF fluid collection. He is following up with his cardiologist in March 2024       2. secondary hypothyroidism  He is taking levothyroxine 150 MCG daily   He reports good compliance  He denies constipation and cold intolerance  He denies palpitations and tremors  His TSH is unreliable due to pituitary surgery  Currently taking multivitamin    Latest Reference Range & Units 02/26/24 08:35   Free T-4 0.93 - 1.70 ng/dL 0.99     3. secondary adrenal insufficiency  He is now taking hydrocortisone 10 mg in morning AM and 5MG in the afternoon   He reports feeling great  His blood pressure today normal and electrolytes are normal  His weight is stable and he doesn't look cushingoid    Latest Reference Range & Units 02/26/24 08:35   Sodium 135 - 145 mmol/L 140   Potassium 3.6 - 5.5 mmol/L 4.5       4. secondary hypogonadism  Currently taking topical Androgel 1.62% 2 pumps daily  Currently taking sildenafil 50 mg once a day as needed for ED - he states it is not effective.    Latest Reference Range & Units 02/26/24 08:35   Prostatic Specific Antigen Tot 0.00 - 4.00 ng/mL 0.17      Latest  Reference Range & Units 02/26/24 08:35   Free Testosterone 47 - 244 pg/mL 152   Sex Hormone Bind Globulin 19 - 76 nmol/L 55   Testosterone % Free 1.6 - 2.9 % 1.6   Testosterone,Total 300 - 720 ng/dL 974 (H)     5. Vitamin D deficiency  Currently not on supplement   Latest Reference Range & Units 02/26/24 08:35   25-Hydroxy   Vitamin D 25 30 - 100 ng/mL 28 (L)         Patient's medications, allergies, and social histories were reviewed and updated as appropriate.      ROS:       CONS:     No fever, no chills   EYES:     No diplopia, no blurry vision   CV:           No chest pain, no palpitations   PULM:     No SOB, no cough, no hemoptysis.   GI:            No nausea, no vomiting, no diarrhea, no constipation   ENDO:     No polyuria, no polydipsia, no heat intolerance, no cold intolerance       Past Medical History:  Problem List:  2023-12: Hypercoagulable state due to paroxysmal atrial fibrillation   ([facility])  2023-11: Erectile dysfunction  2023-09: Hypoxia  2023-09: URI (upper respiratory infection)  2023-09: Hyponatremia  2023-04: Hyperthyroidism  2023-04: Syncope  2023-04: Hypotension  2023-02: Chronic atrial fibrillation ([facility])  2023-02: ANGEL (acute kidney injury) ([facility])  2023-02: Heart failure with reduced ejection fraction ([facility])  2023-01: Demand ischemia  2023-01: Acute on chronic respiratory failure with hypoxia and   hypercapnia ([facility])  2023-01: Asthma with exacerbation  2023-01: Sepsis ([facility])  2023-01: Elevated troponin  2023-01: Elevated d-dimer  2023-01: Lactic acidosis  2023-01: Elevated lactic acid level  2023-01: Infestation by bed bug  2023-01: NSTEMI (non-ST elevated myocardial infarction) ([facility])  2023-01: Left ventricular thrombus  2023-01: Cardiomyopathy ([facility]) EF <20%  2023-01: Shock ([facility])  2023-01: Acute respiratory failure with hypoxia due to asthma   exacerbation ([facility])  2022-01: Moderate persistent asthma without complication  2022-01: Elevated liver enzymes  2021-07: Macrocytosis  2021-07: Seasonal  "allergies  2020-09: Meningioma ([facility])  2020-09: Panhypopituitarism ([facility])  2020-03: Secondary male hypogonadism  2020-03: Secondary adrenal insufficiency ([facility])  2019-11: Elevated liver function tests  2019-07: Mild intermittent asthma without complication  2019-07: Eczema  2017-07: Pollen allergies  2015-06: Secondary hypothyroidism  2015-06: Hyperlipidemia  2015-06: Reactive airway disease  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[facility])  Pituitary adenoma ([facility])      Past Surgical History:  Past Surgical History:   Procedure Laterality Date    CRANIOTOMY          Allergies:  Pcn [penicillins]     Social History:  Social History     Tobacco Use    Smoking status: Never    Smokeless tobacco: Never   Vaping Use    Vaping Use: Never used   Substance Use Topics    Alcohol use: Not Currently     Alcohol/week: 12.6 oz     Types: 21 Shots of liquor per week     Comment: Not since Feb    Drug use: No        Family History:   family history includes Alzheimer's Disease in his maternal grandmother; Cancer in his maternal grandfather and paternal grandfather; Diabetes in his brother and mother; Heart Disease in his father and paternal grandmother.        PHYSICAL EXAM:   Vital signs: /80 (BP Location: Right arm, Patient Position: Sitting, BP Cuff Size: Large adult)   Pulse 74   Ht 1.93 m (6' 4\")   Wt 103 kg (228 lb)   SpO2 90%   BMI 27.75 kg/m²   GENERAL: Well-developed, well-nourished in no apparent distress.   EYE:  No ocular asymmetry, PERRLA  HENT: Pink, moist mucous membranes.     CHEST: no gynecomastia  CARDIOVASCULAR:  No murmurs  LUNGS: Clear breath sounds  ABDOMEN: Soft, nontender   GENIT: normal  EXTREMITIES: No clubbing, cyanosis, or edema.   NEUROLOGICAL: No gross focal motor abnormalities. No visible tremor, no proximal muscle weakness   LYMPH: No cervical adenopathy palpated.   SKIN: No rashes, lesions.       Labs:  Lab Results   Component Value Date/Time    WBC 7.0 10/05/2023 09:02 PM    RBC 4.88 " 10/05/2023 09:02 PM    HEMOGLOBIN 15.4 10/05/2023 09:02 PM    MCV 95.3 10/05/2023 09:02 PM    MCH 31.6 10/05/2023 09:02 PM    MCHC 33.1 10/05/2023 09:02 PM    RDW 47.1 10/05/2023 09:02 PM    MPV 9.5 10/05/2023 09:02 PM       Lab Results   Component Value Date/Time    SODIUM 140 02/26/2024 08:35 AM    POTASSIUM 4.5 02/26/2024 08:35 AM    CHLORIDE 105 02/26/2024 08:35 AM    CO2 26 02/26/2024 08:35 AM    ANION 9.0 02/26/2024 08:35 AM    GLUCOSE 94 02/26/2024 08:35 AM    BUN 9 02/26/2024 08:35 AM    CREATININE 0.82 02/26/2024 08:35 AM    CALCIUM 8.9 02/26/2024 08:35 AM    ASTSGOT 26 02/26/2024 08:35 AM    ALTSGPT 16 02/26/2024 08:35 AM    TBILIRUBIN 0.4 02/26/2024 08:35 AM    ALBUMIN 3.9 02/26/2024 08:35 AM    TOTPROTEIN 6.8 02/26/2024 08:35 AM    GLOBULIN 2.9 02/26/2024 08:35 AM    AGRATIO 1.3 02/26/2024 08:35 AM       No results found for: LH    Lab Results   Component Value Date/Time    FSH 4.0 03/19/2020 0914       Lab Results   Component Value Date/Time    TESTOSTERONE 68 (L) 03/19/2020 0914           Imaging: Please refer to pituitary MRI from March 2020    ASSESSMENT/PLAN:    1. Secondary adrenal insufficiency (HCC)  Stable   Medication:  hydrocortisone 10 mg in the morning and 5 mg in the afternoon - continue  Reviewed stress dosing of steroids for illness  Plan for follow-up in 6 months with labs  - Comp Metabolic Panel; Future    2. Secondary hypothyroidism  Stable  His TSH levels are unreliable because of radiotherapy for his pituitary and should not be utilized for monitoring response to therapy  Medication:  Levothyroxine 150 mcg daily - continue  Repeat levels in 6 months.   - FREE THYROXINE; Future    3. Secondary male hypogonadism  Unstable  Testosterone elevated at 974  Medication:  Androgel 1.62% 2 pumps daily - change to Androgel 1.62% 1 pump daily  Sildenafil 50mg once daily as needed - change to Sildenafil 100mg once daily as needed.   We will check his hematocrit, and testosterone levels in 3  months Recommend that he get an annual PSA and digital rectal exam with his primary care physician  - Testosterone, Free & Total, Adult Male (w/SHBG); Future  - CBC WITH DIFFERENTIAL; Future    4. Vitamin D deficiency  Unstable  Recommend otc Vitamin D 3 2000 IU daily   - VITAMIN D,25 HYDROXY (DEFICIENCY); Future    5. Panhypopituitarism (HCC)  Plan as above.     Return in about 6 months (around 8/28/2024). Patient will follow up with Dr. Montano in 6 months.     Thank you kindly for allowing me to participate in the endocrine care plan for this patient.    Junior Quezada, APRN   02/28/24      CC:   Manjula Zaidi M.D.

## 2024-02-29 ENCOUNTER — TELEPHONE (OUTPATIENT)
Dept: ENDOCRINOLOGY | Facility: MEDICAL CENTER | Age: 69
End: 2024-02-29
Payer: MEDICARE

## 2024-02-29 DIAGNOSIS — E23.0 HYPOGONADOTROPIC HYPOGONADISM (HCC): ICD-10-CM

## 2024-02-29 RX ORDER — TESTOSTERONE 16.2 MG/G
20.25 GEL TRANSDERMAL DAILY
Qty: 75 G | Refills: 1 | Status: SHIPPED | OUTPATIENT
Start: 2024-02-29 | End: 2024-03-30

## 2024-03-11 ENCOUNTER — OFFICE VISIT (OUTPATIENT)
Dept: CARDIOLOGY | Facility: MEDICAL CENTER | Age: 69
End: 2024-03-11
Attending: NURSE PRACTITIONER
Payer: MEDICARE

## 2024-03-11 VITALS
DIASTOLIC BLOOD PRESSURE: 82 MMHG | SYSTOLIC BLOOD PRESSURE: 124 MMHG | HEIGHT: 76 IN | BODY MASS INDEX: 27.4 KG/M2 | OXYGEN SATURATION: 94 % | RESPIRATION RATE: 14 BRPM | HEART RATE: 76 BPM | WEIGHT: 225 LBS

## 2024-03-11 DIAGNOSIS — I48.20 CHRONIC ATRIAL FIBRILLATION (HCC): ICD-10-CM

## 2024-03-11 PROCEDURE — 3074F SYST BP LT 130 MM HG: CPT | Performed by: INTERNAL MEDICINE

## 2024-03-11 PROCEDURE — 3079F DIAST BP 80-89 MM HG: CPT | Performed by: INTERNAL MEDICINE

## 2024-03-11 PROCEDURE — 99213 OFFICE O/P EST LOW 20 MIN: CPT | Performed by: INTERNAL MEDICINE

## 2024-03-11 PROCEDURE — 99204 OFFICE O/P NEW MOD 45 MIN: CPT | Performed by: INTERNAL MEDICINE

## 2024-03-11 ASSESSMENT — FIBROSIS 4 INDEX: FIB4 SCORE: 1.52

## 2024-03-11 NOTE — PROGRESS NOTES
Arrhythmia Clinic Note (New patient)     DOS: 3/11/2024    Referring physician: MARIO Bedoya    Chief complaint/Reason for consult: Afib    HPI: 68 old man diagnosed last year with acute systolic heart failure due to nonischemic alcohol induced cardiomyopathy.  He was found to have LV thrombus at this time as well.  He was started on guideline directed medical therapy.  His ejection fraction normalized in the following months.  He was initially placed on amiodarone as he had bouts of paroxysmal atrial fibrillation while initially admitted with acute systolic heart failure.  He has since been taken off amiodarone and referred to electrophysiology.    ROS (+ highlighted in bold):  Constitutional: Fevers/chills/fatigue/weightloss  HEENT: Blurry vision/eye pain/sore throat/hearing loss  Respiratory: Shortness of breath/cough  Cardiovascular: Chest pain/palpitations/edema/orthopnea/syncope  GI: Nausea/vomitting/diarrhea  MSK: Arthralgias/myagias/muscle weakness  Skin: Rash/sores  Neurological: Numbness/tremors/vertigo  Endocrine: Excessive thirst/polyuria/cold intolerance/heat intolerance  Psych: Depression/anxiety    Past Medical History:   Diagnosis Date    Asthma     Cardiomyopathy (HCC) EF <20% 1/30/2023    Hyperlipidemia     Left ventricular thrombus 1/30/2023    Pituitary disease (HCC)     Thyroid disease        Past Surgical History:   Procedure Laterality Date    CRANIOTOMY         Social History     Socioeconomic History    Marital status: Single     Spouse name: Not on file    Number of children: Not on file    Years of education: Not on file    Highest education level: Not on file   Occupational History    Not on file   Tobacco Use    Smoking status: Never    Smokeless tobacco: Never   Vaping Use    Vaping Use: Never used   Substance and Sexual Activity    Alcohol use: Not Currently     Alcohol/week: 12.6 oz     Types: 21 Shots of liquor per week     Comment: Not since Feb    Drug use: No    Sexual activity: Not  on file   Other Topics Concern    Not on file   Social History Narrative    Not on file     Social Determinants of Health     Financial Resource Strain: Not on file   Food Insecurity: No Food Insecurity (2/13/2020)    Hunger Vital Sign     Worried About Running Out of Food in the Last Year: Never true     Ran Out of Food in the Last Year: Never true   Transportation Needs: No Transportation Needs (2/13/2020)    PRAPARE - Transportation     Lack of Transportation (Medical): No     Lack of Transportation (Non-Medical): No   Physical Activity: Not on file   Stress: Not on file   Social Connections: Not on file   Intimate Partner Violence: Not on file   Housing Stability: Not on file       Family History   Problem Relation Age of Onset    Diabetes Mother     Heart Disease Father     Diabetes Brother     Alzheimer's Disease Maternal Grandmother     Cancer Maternal Grandfather     Heart Disease Paternal Grandmother     Cancer Paternal Grandfather        Allergies   Allergen Reactions    Penicillins Swelling     * age 24 years old*       Current Outpatient Medications   Medication Sig Dispense Refill    albuterol 108 (90 Base) MCG/ACT Aero Soln inhalation aerosol INHALE 2 PUFFS BY MOUTH EVERY 6 HOURS AS NEEDED FOR SHORTNESS OF BREATH 18 g 0    losartan (COZAAR) 25 MG Tab Take 1 Tablet by mouth every evening. 90 Tablet 3    hydrocortisone (CORTEF) 5 MG Tab Take 2 pills in the morning and 1 pill in the afternoon 200 Tablet 3    levothyroxine (SYNTHROID) 150 MCG Tab Take 1 Tablet by mouth every morning on an empty stomach. 90 Tablet 2    sildenafil citrate (VIAGRA) 50 MG tablet Take 1 Tablet by mouth 1 time a day as needed for Erectile Dysfunction. 20 Tablet 3    fluticasone-salmeterol (ADVAIR DISKUS) 100-50 MCG/ACT AEROSOL POWDER, BREATH ACTIVATED Inhale 1 Puff 2 times a day. 60 Each 1    atorvastatin (LIPITOR) 40 MG Tab Take 1 Tablet by mouth every day. 100 Tablet 0    ELIQUIS 5 MG Tab Take 1 Tablet by mouth 2 times a day.  "180 Tablet 3    metoprolol SR (TOPROL XL) 25 MG TABLET SR 24 HR Take 1 Tablet by mouth every evening. 90 Tablet 3    Testosterone (ANDROGEL) 20.25 MG/ACT (1.62%) Gel Place 20.25 mg on the skin every day for 30 days. Apply one pump daily. (Patient not taking: Reported on 3/11/2024) 75 g 1    triamcinolone acetonide (KENALOG) 0.1 % Cream Apply 1 Application. topically 2 times a day. (Patient not taking: Reported on 3/11/2024) 45 g 3     No current facility-administered medications for this visit.       Physical Exam:  Vitals:    03/11/24 1444   BP: 124/82   BP Location: Left arm   Patient Position: Sitting   BP Cuff Size: Adult   Pulse: 76   Resp: 14   SpO2: 94%   Weight: 102 kg (225 lb)   Height: 1.93 m (6' 4\")     General appearance: NAD, conversant   Eyes: anicteric sclerae, moist conjunctivae; no lid-lag; PERRLA  HENT: Atraumatic; oropharynx clear with moist mucous membranes and no mucosal ulcerations; normal hard and soft palate  Neck: Trachea midline; FROM, supple, no thyromegaly or lymphadenopathy  Lungs: CTA, with normal respiratory effort and no intercostal retractions  CV: RRR, no MRGs, no JVD   Abdomen: Soft, non-tender; no masses or HSM  Extremities: No peripheral edema or extremity lymphadenopathy  Skin: Normal temperature, turgor and texture; no rash, ulcers or subcutaneous nodules  Psych: Appropriate affect, alert and oriented to person, place and time    Data:  Lipids:   Lab Results   Component Value Date/Time    CHOLSTRLTOT 151 01/31/2023 12:04 AM    TRIGLYCERIDE 69 01/31/2023 12:04 AM    HDL 49 01/31/2023 12:04 AM    LDL 88 01/31/2023 12:04 AM        BMP:  Lab Results   Component Value Date/Time    SODIUM 140 02/26/2024 0835    POTASSIUM 4.5 02/26/2024 0835    CHLORIDE 105 02/26/2024 0835    CO2 26 02/26/2024 0835    GLUCOSE 94 02/26/2024 0835    BUN 9 02/26/2024 0835    CREATININE 0.82 02/26/2024 0835    CALCIUM 8.9 02/26/2024 0835    ANION 9.0 02/26/2024 0835        TSH:   Lab Results   Component " "Value Date/Time    TSHULTRASEN 0.210 (L) 09/06/2023 1340        THYROXINE (T4):   No results found for: \"FREEDIR\"     CBC:   Lab Results   Component Value Date/Time    WBC 7.0 10/05/2023 09:02 PM    RBC 4.88 10/05/2023 09:02 PM    HEMOGLOBIN 15.4 10/05/2023 09:02 PM    HEMATOCRIT 46.5 10/05/2023 09:02 PM    MCV 95.3 10/05/2023 09:02 PM    MCH 31.6 10/05/2023 09:02 PM    MCHC 33.1 10/05/2023 09:02 PM    RDW 47.1 10/05/2023 09:02 PM    PLATELETCT 296 10/05/2023 09:02 PM    MPV 9.5 10/05/2023 09:02 PM    NEUTSPOLYS 61.00 10/05/2023 09:02 PM    LYMPHOCYTES 21.40 (L) 10/05/2023 09:02 PM    MONOCYTES 7.90 10/05/2023 09:02 PM    EOSINOPHILS 8.80 (H) 10/05/2023 09:02 PM    BASOPHILS 0.60 10/05/2023 09:02 PM    IMMGRAN 0.30 10/05/2023 09:02 PM    NRBC 0.00 10/05/2023 09:02 PM    NEUTS 4.24 10/05/2023 09:02 PM    LYMPHS 1.49 10/05/2023 09:02 PM    MONOS 0.55 10/05/2023 09:02 PM    EOS 0.61 (H) 10/05/2023 09:02 PM    BASO 0.04 10/05/2023 09:02 PM    IMMGRANAB 0.02 10/05/2023 09:02 PM    NRBCAB 0.00 10/05/2023 09:02 PM        CBC w/o DIFF  Lab Results   Component Value Date/Time    WBC 7.0 10/05/2023 09:02 PM    RBC 4.88 10/05/2023 09:02 PM    HEMOGLOBIN 15.4 10/05/2023 09:02 PM    MCV 95.3 10/05/2023 09:02 PM    MCH 31.6 10/05/2023 09:02 PM    MCHC 33.1 10/05/2023 09:02 PM    RDW 47.1 10/05/2023 09:02 PM    MPV 9.5 10/05/2023 09:02 PM       Prior echo/stress results reviewed: Normal ejection fraction    EKG interpreted by me: Normal sinus rhythm    Impression/Plan:  1. Chronic atrial fibrillation (HCC)  EKG        1.  Paroxysmal atrial fibrillation  2.  Heart failure with recovered ejection fraction  3.  Nonischemic cardiomyopathy  4.  LV thrombus    -He is off amiodarone without evidence of recurrence of atrial fibrillation  -Atrial fibrillation in setting of acute systolic heart failure and alcoholic cardiomyopathy may not represent clinical atrial fibrillation to the degree necessitating rhythm control.  He does not seem to " have symptoms from atrial fibrillation at this time either.  -He is noting fluid retention.  He has a normal systolic function however may have some degree of diastolic dysfunction.  I will refer him back to heart failure clinic to evaluate potential need for diuretics.  -Continue Eliquis for LV thrombus history and paroxysmal atrial fibrillation by history  -Refer back to EP if significant or symptomatic atrial fibrillation burden off antiarrhythmics        Erwin Frost MD  Cardiac Electrophysiology

## 2024-03-15 ENCOUNTER — APPOINTMENT (OUTPATIENT)
Dept: CARDIOLOGY | Facility: MEDICAL CENTER | Age: 69
End: 2024-03-15
Attending: NURSE PRACTITIONER
Payer: MEDICARE

## 2024-03-26 DIAGNOSIS — J45.901 ASTHMA WITH ACUTE EXACERBATION, UNSPECIFIED ASTHMA SEVERITY, UNSPECIFIED WHETHER PERSISTENT: ICD-10-CM

## 2024-03-26 RX ORDER — ALBUTEROL SULFATE 90 UG/1
2 AEROSOL, METERED RESPIRATORY (INHALATION) EVERY 6 HOURS PRN
Qty: 18 G | Refills: 0 | Status: SHIPPED | OUTPATIENT
Start: 2024-03-26

## 2024-04-04 ENCOUNTER — TELEPHONE (OUTPATIENT)
Dept: HEALTH INFORMATION MANAGEMENT | Facility: OTHER | Age: 69
End: 2024-04-04

## 2024-04-04 NOTE — TELEPHONE ENCOUNTER
Per [facility] Liaison, medication requires a PA.   Insurance: [employer] D  Member ID:  [id number]  BIN: [id number]  PCN: [id number]  Group: [facility]     Thank you,  Audra Gill [facility]  Endocrinology Pharmacy Coordinator        
Prior Authorization for Testosterone 20.25mg/ACT (1.62%) Gel  (Quantity: 75G, Days: 30) has been submitted via Cover My Meds: Key ([id number])  Insurance: [employer] Plus/[employer] D  Will follow up in 24-48 business hours.     Thank you,  Audra Gill [facility]  Endocrinology Pharmacy Coordinator    
Prior Authorization for Testosterone 20.25mg/ACT (1.62%) Gel  has been approved for a quantity of 75G , day supply 30  Prior Authorization reference number:  [id number]  Insurance: [employer] Plus/[employer]Rx D  Effective dates: 2/29/2024-2/28/2025  Copay: $22.37  Is patient eligible to fill with [facility]? Yes  Patient requested for the prescription to be sent and filled at [id number]. Services declined.    Thank you,  Audra Gill, [facility]  Endocrinology Pharmacy Coordinator    
edis: PT evaluated by intake physician. HPI/PE/ROS as noted above. Will follow up plan per intake physician. discussed results with pt. Pt states she will not wait for CT scan.   I had a lengthy discussion with patient, and the patient wishes to leave at this time.The patient understands that he/she is leaving against medical advice despite the risk of missing a potential serious diagnosis which may lead to injury, disability and/or death. I discussed with the patient which tests would need to be performed and what type of monitoring would be necessary for the patient as well. I was unable to convince the patient to stay for further work-up.The patient is alert and oriented and demonstrates competence in making medical decisions.

## 2024-04-16 DIAGNOSIS — J45.901 ASTHMA WITH ACUTE EXACERBATION, UNSPECIFIED ASTHMA SEVERITY, UNSPECIFIED WHETHER PERSISTENT: ICD-10-CM

## 2024-04-16 RX ORDER — ALBUTEROL SULFATE 90 UG/1
2 AEROSOL, METERED RESPIRATORY (INHALATION) EVERY 6 HOURS PRN
Qty: 18 G | Refills: 0 | Status: SHIPPED | OUTPATIENT
Start: 2024-04-16

## 2024-04-16 NOTE — TELEPHONE ENCOUNTER
Received request via: Pharmacy    Was the patient seen in the last year in this department? Yes    Does the patient have an active prescription (recently filled or refills available) for medication(s) requested? No    Pharmacy Name: [id number] - [place], [place] - [address]     Does the patient have [facility] Plus and need 100 day supply (blood pressure, diabetes and cholesterol meds only)? Medication is not for cholesterol, blood pressure or diabetes

## 2024-04-17 DIAGNOSIS — E78.5 HYPERLIPIDEMIA, UNSPECIFIED HYPERLIPIDEMIA TYPE: ICD-10-CM

## 2024-04-19 RX ORDER — ATORVASTATIN CALCIUM 40 MG/1
40 TABLET, FILM COATED ORAL DAILY
Qty: 100 TABLET | Refills: 0 | Status: SHIPPED | OUTPATIENT
Start: 2024-04-19

## 2024-05-02 ENCOUNTER — APPOINTMENT (OUTPATIENT)
Dept: CARDIOLOGY | Facility: MEDICAL CENTER | Age: 69
End: 2024-05-02
Attending: NURSE PRACTITIONER
Payer: MEDICARE

## 2024-05-02 VITALS
SYSTOLIC BLOOD PRESSURE: 112 MMHG | WEIGHT: 232.2 LBS | BODY MASS INDEX: 28.27 KG/M2 | RESPIRATION RATE: 17 BRPM | DIASTOLIC BLOOD PRESSURE: 70 MMHG | HEART RATE: 67 BPM | HEIGHT: 76 IN | OXYGEN SATURATION: 94 %

## 2024-05-02 DIAGNOSIS — Z79.899 HIGH RISK MEDICATION USE: ICD-10-CM

## 2024-05-02 DIAGNOSIS — I50.20 ACC/AHA STAGE C SYSTOLIC HEART FAILURE (HCC): ICD-10-CM

## 2024-05-02 DIAGNOSIS — I42.8 NON-ISCHEMIC CARDIOMYOPATHY (HCC): ICD-10-CM

## 2024-05-02 DIAGNOSIS — D68.69 HYPERCOAGULABLE STATE DUE TO PAROXYSMAL ATRIAL FIBRILLATION (HCC): ICD-10-CM

## 2024-05-02 DIAGNOSIS — I50.9 ACUTE DECOMPENSATED HEART FAILURE (HCC): ICD-10-CM

## 2024-05-02 DIAGNOSIS — I42.6 ALCOHOLIC CARDIOMYOPATHY (HCC): ICD-10-CM

## 2024-05-02 DIAGNOSIS — I48.0 HYPERCOAGULABLE STATE DUE TO PAROXYSMAL ATRIAL FIBRILLATION (HCC): ICD-10-CM

## 2024-05-02 DIAGNOSIS — I51.89 LEFT VENTRICULAR SYSTOLIC DYSFUNCTION, NYHA CLASS 2: ICD-10-CM

## 2024-05-02 DIAGNOSIS — I50.20 HEART FAILURE WITH REDUCED EJECTION FRACTION (HCC): ICD-10-CM

## 2024-05-02 PROCEDURE — 3078F DIAST BP <80 MM HG: CPT | Performed by: NURSE PRACTITIONER

## 2024-05-02 PROCEDURE — 99214 OFFICE O/P EST MOD 30 MIN: CPT | Performed by: NURSE PRACTITIONER

## 2024-05-02 PROCEDURE — 3074F SYST BP LT 130 MM HG: CPT | Performed by: NURSE PRACTITIONER

## 2024-05-02 RX ORDER — FUROSEMIDE 20 MG/1
20 TABLET ORAL DAILY
Qty: 90 TABLET | Refills: 3 | Status: SHIPPED | OUTPATIENT
Start: 2024-05-02

## 2024-05-02 RX ORDER — POTASSIUM CHLORIDE 20 MEQ/1
10 TABLET, EXTENDED RELEASE ORAL DAILY
Qty: 45 TABLET | Refills: 3 | Status: SHIPPED | OUTPATIENT
Start: 2024-05-02

## 2024-05-02 ASSESSMENT — FIBROSIS 4 INDEX: FIB4 SCORE: 1.52

## 2024-05-02 NOTE — PROGRESS NOTES
Chief Complaint   Patient presents with    Atrial Fibrillation     F/V DX:Atrial fibrillation with rapid ventricular response (HCC)           Subjective     Alberto Galicia is a 68 y.o. male who presents today heart failure follow-up.  Patient was last seen by Dr. Hauser on 3/20/2023, and myself on 12/11/2023.  [facility] hospitalization 1/23 with nonobstructive CAD, EF 20%, A-fib, LV thrombus.  Patient is additional medical problems of adrenal insufficiency and EtOH.  Patient also seen by EP on 3/11/2024 where A-fib has not recurred since stopping amiodarone therapy.    Today, patient reports he has been experiencing steady weight gain and decreased of appetite since he was last seen.  Patient reports overall gaining approximately 30 pounds.  Patient does report he drinks orange juice and multiple Powerade's in 1 day.  Patient has also been using over-the-counter medication for his congestion.  Patient notes orthopnea and abdominal distention.  Otherwise, patient denies chest pain, shortness of breath at rest, palpitations, dizziness/lightheadedness, edema.    Patient continues EtOH sensation.  Patient does appear slightly fluid overloaded on exam with abdominal distention mild JVD.    We will have patient resume diuretic therapy with potassium supplementation.  We will reevaluate renal electrolyte function in 1 week for high risk medication use.  We will also check a BNP.  Close follow-up in 1 month.  We discussed earlier follow-up if no improvement for evaluation in rapid access cardiology clinic.  Patient verbalizes understanding and agreeable to plan of care.    Past Medical History:   Diagnosis Date    Asthma     Cardiomyopathy (HCC) EF <20% 1/30/2023    Hyperlipidemia     Left ventricular thrombus 1/30/2023    Pituitary disease (HCC)     Thyroid disease      Past Surgical History:   Procedure Laterality Date    CRANIOTOMY       Family History   Problem Relation Age of Onset    Diabetes Mother     Heart Disease Father      Diabetes Brother     Alzheimer's Disease Maternal Grandmother     Cancer Maternal Grandfather     Heart Disease Paternal Grandmother     Cancer Paternal Grandfather      Social History     Socioeconomic History    Marital status: Single     Spouse name: Not on file    Number of children: Not on file    Years of education: Not on file    Highest education level: Not on file   Occupational History    Not on file   Tobacco Use    Smoking status: Never    Smokeless tobacco: Never   Vaping Use    Vaping Use: Never used   Substance and Sexual Activity    Alcohol use: Not Currently     Alcohol/week: 12.6 oz     Types: 21 Shots of liquor per week     Comment: Not since Feb    Drug use: No    Sexual activity: Not on file   Other Topics Concern    Not on file   Social History Narrative    Not on file     Social Determinants of Health     Financial Resource Strain: Not on file   Food Insecurity: No Food Insecurity (2/13/2020)    Hunger Vital Sign     Worried About Running Out of Food in the Last Year: Never true     Ran Out of Food in the Last Year: Never true   Transportation Needs: No Transportation Needs (2/13/2020)    PRAPARE - Transportation     Lack of Transportation (Medical): No     Lack of Transportation (Non-Medical): No   Physical Activity: Not on file   Stress: Not on file   Social Connections: Not on file   Intimate Partner Violence: Not on file   Housing Stability: Not on file     Allergies   Allergen Reactions    Penicillins Swelling     * age 24 years old*     Outpatient Encounter Medications as of 5/2/2024   Medication Sig Dispense Refill    furosemide (LASIX) 20 MG Tab Take 1 Tablet by mouth every day. 90 Tablet 3    potassium chloride SA (KDUR) 20 MEQ Tab CR Take 0.5 Tablets by mouth every day. 45 Tablet 3    atorvastatin (LIPITOR) 40 MG Tab Take 1 tablet by mouth once daily 100 Tablet 0    albuterol 108 (90 Base) MCG/ACT Aero Soln inhalation aerosol INHALE 2 PUFFS BY MOUTH EVERY 6 HOURS AS NEEDED FOR  "SHORTNESS OF BREATH 18 g 0    losartan (COZAAR) 25 MG Tab Take 1 Tablet by mouth every evening. 90 Tablet 3    hydrocortisone (CORTEF) 5 MG Tab Take 2 pills in the morning and 1 pill in the afternoon 200 Tablet 3    levothyroxine (SYNTHROID) 150 MCG Tab Take 1 Tablet by mouth every morning on an empty stomach. 90 Tablet 2    sildenafil citrate (VIAGRA) 50 MG tablet Take 1 Tablet by mouth 1 time a day as needed for Erectile Dysfunction. 20 Tablet 3    fluticasone-salmeterol (ADVAIR DISKUS) 100-50 MCG/ACT AEROSOL POWDER, BREATH ACTIVATED Inhale 1 Puff 2 times a day. 60 Each 1    ELIQUIS 5 MG Tab Take 1 Tablet by mouth 2 times a day. 180 Tablet 3    metoprolol SR (TOPROL XL) 25 MG TABLET SR 24 HR Take 1 Tablet by mouth every evening. 90 Tablet 3    triamcinolone acetonide (KENALOG) 0.1 % Cream Apply 1 Application. topically 2 times a day. 45 g 3     No facility-administered encounter medications on file as of 5/2/2024.     ROS Complete review of systems negative except as noted in HPI/subjective           Objective     /70 (BP Location: Left arm, Patient Position: Sitting, BP Cuff Size: Adult)   Pulse 67   Resp 17   Ht 1.93 m (6' 4\")   Wt 105 kg (232 lb 3.2 oz)   SpO2 94%   BMI 28.26 kg/m²     Physical Exam  Vitals reviewed.   Constitutional:       Appearance: He is well-developed.   HENT:      Head: Normocephalic and atraumatic.   Eyes:      Pupils: Pupils are equal, round, and reactive to light.   Neck:      Vascular: JVD (8) present.   Cardiovascular:      Rate and Rhythm: Normal rate and regular rhythm.      Pulses: Normal pulses.      Heart sounds: Normal heart sounds. No murmur heard.     No friction rub. No gallop.   Pulmonary:      Effort: Pulmonary effort is normal. No respiratory distress.      Breath sounds: Wheezing present.   Abdominal:      General: Bowel sounds are normal. There is distension.   Musculoskeletal:      Right lower leg: No edema.      Left lower leg: No edema.   Skin:     " General: Skin is warm and dry.      Findings: No erythema.   Neurological:      Mental Status: He is alert and oriented to person, place, and time.   Psychiatric:         Behavior: Behavior normal.                Assessment & Plan     1. Hypercoagulable state due to paroxysmal atrial fibrillation (HCC)  furosemide (LASIX) 20 MG Tab    Basic Metabolic Panel    proBrain Natriuretic Peptide, NT    potassium chloride SA (KDUR) 20 MEQ Tab CR      2. Alcoholic cardiomyopathy (HCC)  furosemide (LASIX) 20 MG Tab    Basic Metabolic Panel    proBrain Natriuretic Peptide, NT    potassium chloride SA (KDUR) 20 MEQ Tab CR      3. Heart failure with reduced ejection fraction (HCC)  furosemide (LASIX) 20 MG Tab    Basic Metabolic Panel    proBrain Natriuretic Peptide, NT    potassium chloride SA (KDUR) 20 MEQ Tab CR      4. Left ventricular systolic dysfunction, NYHA class 2  furosemide (LASIX) 20 MG Tab    Basic Metabolic Panel    proBrain Natriuretic Peptide, NT    potassium chloride SA (KDUR) 20 MEQ Tab CR      5. ACC/AHA stage C systolic heart failure (HCC)        6. Non-ischemic cardiomyopathy (HCC)        7. High risk medication use        8. Acute decompensated heart failure (HCC)              Medical Decision Making: Today's Assessment/Status/Plan:        HFimpEF, Stage C, Class II, LVEF 70% (Recovered from 25%):  -Heart failure due to nonischemic cardiomyopathy  -Discussed Heart failure trajectory and prognosis with patient. Will continue to optimize medical therapy as tolerated. Advanced HF treatment, need for remote monitoring consideration at every visit.  -ACE-I/ARB/ARNI: Continue losartan 25  mg daily  -Evidence Based Beta-blocker: continue metoprolol XL 25 mg daily  -Aldosterone Antagonist: Adrenal insufficiency; poor tolerance to spironolactone in the past  -SGLT2-I: Increase patient's dizziness in the past  -Diuretic: Start furosemide 20 mg daily with 10 mEq potassium supplementation  -Labs: Check BNP and BMP in 1  week. Will continue to closely monitor for side effects of patient's high risk medication(s) including renal function, NTproBNP, and electrolytes as needed  -EF >35%, no indication for primary prevention ICD fat this time  -Reinforced s/sx of worsening heart failure with patient and weight monitoring. Pt verbalizes understanding. Pt to call office if present.  -Heart Failure Education: pt to be contacted by HF nurse for further education.  In the meantime, during visit today we discussed indications and use for each medication, importance of treatment adherence, definition of heart failure and potential etiologies for current diagnosis.  -Pharmacotherapy referral deferred at this time   -Compliance Barriers none identified at this time  -Advanced care planning: Deferred at this time    LV thrombus; Paroxysmal Atrial Fibrillation (PAF); Hypothyroid  - Asymptomatic, denies AF breakthrough, rate controlled.   -No recurrence off Amio  - EKG today showed SB  - On OAC with Eliquis, continue.  - Continue metoprolol    Hypertension; hyperlipidemia  -Blood pressure well controlled in office today.  .  -LDL 88.  At goal.  Continue atorvastatin 40 mg daily    COPD; hypothyroid; adrenal sufficiency  -Per endocrine. On chronic steriods    FU in clinic in 1 months with cardiology. Sooner if needed.    Patient verbalizes understanding and agrees with the plan of care.       MEGHA MatuteR.N.   [facility]  [phone]    PLEASE NOTE: This Note was created using voice recognition Software. I have made every reasonable attempt to correct obvious errors, but I expect that there are errors of grammar and possibly content that I did not discover before finalizing the note

## 2024-05-17 DIAGNOSIS — E29.1 SECONDARY MALE HYPOGONADISM: ICD-10-CM

## 2024-05-19 DIAGNOSIS — J45.901 ASTHMA WITH ACUTE EXACERBATION, UNSPECIFIED ASTHMA SEVERITY, UNSPECIFIED WHETHER PERSISTENT: ICD-10-CM

## 2024-05-20 DIAGNOSIS — L30.9 ECZEMA, UNSPECIFIED TYPE: ICD-10-CM

## 2024-05-20 RX ORDER — TRIAMCINOLONE ACETONIDE 1 MG/G
CREAM TOPICAL
Qty: 45 G | Refills: 0 | Status: SHIPPED | OUTPATIENT
Start: 2024-05-20

## 2024-05-20 RX ORDER — ALBUTEROL SULFATE 90 UG/1
2 AEROSOL, METERED RESPIRATORY (INHALATION) EVERY 6 HOURS PRN
Qty: 18 G | Refills: 0 | Status: SHIPPED | OUTPATIENT
Start: 2024-05-20

## 2024-05-23 DIAGNOSIS — E23.0 HYPOGONADOTROPIC HYPOGONADISM (HCC): ICD-10-CM

## 2024-05-23 RX ORDER — TESTOSTERONE 16.2 MG/G
20.25 GEL TRANSDERMAL DAILY
Qty: 75 G | Refills: 3 | Status: SHIPPED | OUTPATIENT
Start: 2024-05-23 | End: 2024-06-20

## 2024-05-30 ENCOUNTER — TELEPHONE (OUTPATIENT)
Dept: ENDOCRINOLOGY | Facility: MEDICAL CENTER | Age: 69
End: 2024-05-30
Payer: MEDICARE

## 2024-05-31 RX ORDER — TESTOSTERONE 20.25 MG/1.25G
GEL TOPICAL
Qty: 75 G | Refills: 5 | Status: SHIPPED | OUTPATIENT
Start: 2024-05-31 | End: 2024-06-30

## 2024-06-09 DIAGNOSIS — J45.901 ASTHMA WITH ACUTE EXACERBATION, UNSPECIFIED ASTHMA SEVERITY, UNSPECIFIED WHETHER PERSISTENT: ICD-10-CM

## 2024-06-10 RX ORDER — ALBUTEROL SULFATE 90 UG/1
2 AEROSOL, METERED RESPIRATORY (INHALATION) EVERY 6 HOURS PRN
Qty: 18 G | Refills: 0 | Status: SHIPPED | OUTPATIENT
Start: 2024-06-10

## 2024-06-13 ENCOUNTER — PHARMACY VISIT (OUTPATIENT)
Dept: PHARMACY | Facility: MEDICAL CENTER | Age: 69
End: 2024-06-13
Payer: COMMERCIAL

## 2024-06-13 ENCOUNTER — OFFICE VISIT (OUTPATIENT)
Dept: MEDICAL GROUP | Facility: MEDICAL CENTER | Age: 69
End: 2024-06-13
Payer: MEDICARE

## 2024-06-13 VITALS
TEMPERATURE: 98 F | SYSTOLIC BLOOD PRESSURE: 114 MMHG | HEART RATE: 99 BPM | RESPIRATION RATE: 14 BRPM | OXYGEN SATURATION: 98 % | BODY MASS INDEX: 28.37 KG/M2 | WEIGHT: 233 LBS | HEIGHT: 76 IN | DIASTOLIC BLOOD PRESSURE: 60 MMHG

## 2024-06-13 DIAGNOSIS — I10 HYPERTENSION, UNSPECIFIED TYPE: ICD-10-CM

## 2024-06-13 DIAGNOSIS — E29.1 SECONDARY MALE HYPOGONADISM: ICD-10-CM

## 2024-06-13 DIAGNOSIS — E03.8 SECONDARY HYPOTHYROIDISM: ICD-10-CM

## 2024-06-13 DIAGNOSIS — E27.49 SECONDARY ADRENAL INSUFFICIENCY (HCC): ICD-10-CM

## 2024-06-13 DIAGNOSIS — J45.40 MODERATE PERSISTENT ASTHMA WITHOUT COMPLICATION: ICD-10-CM

## 2024-06-13 PROCEDURE — 3074F SYST BP LT 130 MM HG: CPT | Performed by: FAMILY MEDICINE

## 2024-06-13 PROCEDURE — 3078F DIAST BP <80 MM HG: CPT | Performed by: FAMILY MEDICINE

## 2024-06-13 PROCEDURE — RXMED WILLOW AMBULATORY MEDICATION CHARGE: Performed by: FAMILY MEDICINE

## 2024-06-13 PROCEDURE — 99214 OFFICE O/P EST MOD 30 MIN: CPT | Performed by: FAMILY MEDICINE

## 2024-06-13 RX ORDER — FLUTICASONE FUROATE AND VILANTEROL 100; 25 UG/1; UG/1
1 POWDER RESPIRATORY (INHALATION) DAILY
Qty: 60 EACH | Refills: 2 | Status: SHIPPED | OUTPATIENT
Start: 2024-06-13

## 2024-06-13 ASSESSMENT — FIBROSIS 4 INDEX: FIB4 SCORE: 1.54

## 2024-06-13 ASSESSMENT — PATIENT HEALTH QUESTIONNAIRE - PHQ9: CLINICAL INTERPRETATION OF PHQ2 SCORE: 0

## 2024-06-13 NOTE — PROGRESS NOTES
CC: Asthma, hypertension, panhypopituitarism    HPI:   Jack presents today to discuss the following:    Moderate persistent asthma without complication  Has been having more frequent exacerbations, however currently stable.  Patient has not been on steroid based inhaler, she used to take Advair but he stopped it.  Has been taking albuterol nebulizer/inhaler as needed.    Hypertension, unspecified type  Blood pressure has been adequately controlled on losartan 25 mg daily, metoprolol SR 25 mg daily.  No side effects    Panhypopituitarism:   1/ Secondary hypothyroidism  He has been tolerating Levothyroxine, no palpitation, no cold or heat intolerance, has been on levothyroxine 150 mcg daily    2/ Secondary adrenal insufficiency (HCC): Has been on hydrocortisone 10 mg in the morning, and 5 mg in the evening.  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/02/2024    Hypercoagulable state due to paroxysmal atrial fibrillation (HCC) 12/12/2023    Erectile dysfunction 11/08/2023    Hypoxia 09/06/2023    URI (upper respiratory infection) 09/06/2023    Hyponatremia 09/06/2023    Hyperthyroidism 04/04/2023    Syncope 04/03/2023    Hypotension 04/03/2023    ANGEL (acute kidney injury) ([facility]) 02/02/2023    ACC/AHA stage C systolic heart failure (HCC)     Demand ischemia (HCC) 01/31/2023    Acute on chronic respiratory failure with hypoxia and hypercapnia (HCC) 01/30/2023    Asthma with exacerbation 01/30/2023    Elevated troponin 01/30/2023    Elevated d-dimer 01/30/2023    Elevated lactic acid level 01/30/2023    Infestation by bed bug 01/30/2023    Left ventricular thrombus 01/30/2023    Moderate persistent asthma without complication 01/13/2022    Elevated liver enzymes 01/13/2022    Macrocytosis 07/27/2021    Seasonal allergies  07/27/2021    Meningioma (HCC) 09/02/2020    Panhypopituitarism (HCC) 09/02/2020    Secondary male hypogonadism 03/31/2020    Secondary adrenal insufficiency ([facility]) 03/31/2020    Elevated liver function tests 11/15/2019    Mild intermittent asthma without complication 07/10/2019    Pollen allergies 07/25/2017    Secondary hypothyroidism 06/30/2015    Hyperlipidemia 06/30/2015    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[facility]) 08/13/2009       Current Outpatient Medications   Medication Sig Dispense Refill    fluticasone furoate-vilanterol (BREO ELLIPTA) 100-25 MCG/ACT AEROSOL POWDER, BREATH ACTIVATED Inhale 1 Puff every day. 60 Each 2    albuterol 108 (90 Base) MCG/ACT Aero Soln inhalation aerosol INHALE 2 PUFFS BY MOUTH EVERY 6 HOURS AS NEEDED FOR SHORTNESS OF BREATH 18 g 0    Testosterone 1.62 % Gel APPLY 2 PUMPS OF GEL TOPICALLY TO SKIN ONCE DAILY 75 g 5    triamcinolone acetonide (KENALOG) 0.1 % Cream APPLY CREAM TOPICALLY TWICE DAILY 45 g 0    furosemide (LASIX) 20 MG Tab Take 1 Tablet by mouth every day. 90 Tablet 3    potassium chloride SA (KDUR) 20 MEQ Tab CR Take 0.5 Tablets by mouth every day. 45 Tablet 3    atorvastatin (LIPITOR) 40 MG Tab Take 1 tablet by mouth once daily 100 Tablet 0    losartan (COZAAR) 25 MG Tab Take 1 Tablet by mouth every evening. 90 Tablet 3    hydrocortisone (CORTEF) 5 MG Tab Take 2 pills in the morning and 1 pill in the afternoon 200 Tablet 3    levothyroxine (SYNTHROID) 150 MCG Tab Take 1 Tablet by mouth every morning on an empty stomach. 90 Tablet 2    sildenafil citrate (VIAGRA) 50 MG tablet Take 1 Tablet by mouth 1 time a day as needed for Erectile Dysfunction. 20 Tablet 3    ELIQUIS 5 MG Tab Take 1 Tablet by mouth 2 times a day. 180 Tablet 3    metoprolol SR (TOPROL XL) 25 MG TABLET SR 24 HR Take 1 Tablet by mouth every evening. 90 Tablet 3    Testosterone (ANDROGEL) 20.25 MG/ACT (1.62%) Gel Place 20.25 mg on the skin every day for 28 days. Apply one pump daily (Patient not taking:  "Reported on 6/13/2024) 75 g 3    fluticasone-salmeterol (ADVAIR DISKUS) 100-50 MCG/ACT AEROSOL POWDER, BREATH ACTIVATED Inhale 1 Puff 2 times a day. (Patient not taking: Reported on 6/13/2024) 60 Each 1     No current facility-administered medications for this visit.         Allergies as of 06/13/2024 - Reviewed 06/13/2024   Allergen Reaction Noted    Penicillins Swelling 06/29/2015        ROS: Denies any chest pain, Shortness of breath, Changes bowel or bladder, Lower extremity edema.    Physical Exam:  /60   Pulse 99   Temp 36.7 °C (98 °F)   Resp 14   Ht 1.93 m (6' 4\")   Wt 106 kg (233 lb)   SpO2 98%   BMI 28.36 kg/m²   Gen.: Well-developed, well-nourished, no apparent distress,pleasant and cooperative with the examination  Skin:  Warm and dry with good turgor. No rashes or suspicious lesions in visible areas  HEENT:Sinuses nontender with palpation, TMs clear, nares patent with pink mucosa and clear rhinorrhea,no septal deviation ,polyps or lesions. lips without lesions, oropharynx clear.  Neck: Trachea midline,no masses or adenopathy. No JVD.  Cor: Regular rate and rhythm without murmur, gallop or rub.  Lungs: Respirations unlabored.Clear to auscultation with equal breath sounds bilaterally.  Scattered wheezes,    Extremities: No cyanosis, clubbing or edema.          Assessment and Plan.   69 y.o. male     1. Moderate persistent asthma without complication  Has been having more frequent exacerbations, however currently stable.  Advised to start steroid based inhaler: Breo Ellipta daily  Continue on albuterol as needed for acute symptoms    - fluticasone furoate-vilanterol (BREO ELLIPTA) 100-25 MCG/ACT AEROSOL POWDER, BREATH ACTIVATED; Inhale 1 Puff every day.  Dispense: 60 Each; Refill: 2    2. Hypertension, unspecified type  Controlled.  Continue on losartan 25 mg daily, metoprolol SR 25 mg daily    3. Secondary hypothyroidism  As a part of panhypopituitarism.    He has been tolerating " Levothyroxine, no palpitation, no cold or heat intolerance  Continue on levothyroxine 150 mcg daily    4. Secondary adrenal insufficiency (HCC)  As a part of panhypopituitarism.    Stable.  Continue on hydrocortisone 10 mg in the morning, and 5 mg in the evening.  Continue follow-up with endocrinologist    5. Secondary male hypogonadism  As a part of poanhypopituitarism.  Continue on testosterone gel  Continue follow-up with endocrinologist

## 2024-06-17 ENCOUNTER — APPOINTMENT (OUTPATIENT)
Dept: CARDIOLOGY | Facility: MEDICAL CENTER | Age: 69
End: 2024-06-17
Attending: NURSE PRACTITIONER
Payer: MEDICARE

## 2024-06-20 ENCOUNTER — PATIENT MESSAGE (OUTPATIENT)
Dept: HEALTH INFORMATION MANAGEMENT | Facility: OTHER | Age: 69
End: 2024-06-20

## 2024-06-20 ENCOUNTER — PATIENT OUTREACH (OUTPATIENT)
Dept: HEALTH INFORMATION MANAGEMENT | Facility: OTHER | Age: 69
End: 2024-06-20
Payer: MEDICARE

## 2024-06-20 DIAGNOSIS — I50.20 HEART FAILURE WITH REDUCED EJECTION FRACTION (HCC): ICD-10-CM

## 2024-06-20 NOTE — PROGRESS NOTES
Community Health Worker Follow-Up    Reason for outreach: CHW called the pt to introduce the PCM program.    CHW Interventions: CHW and the pt discussed the PCM program and the benefits of the program for the pt. Pt will be set for enrollment and a MyChart message will be sent.    Specific Resources Provided:  Housing/Shelter: n/a  Transportation: n/a  Food: n/a  Financial: n/a  Social Supports: n/a  Other: MyChart message sent with program details and time of enrollment.    Plan: Pt accepted the program and is set for enrollment on 7/10 @3pm with PCM nurse Mark

## 2024-07-10 ENCOUNTER — PATIENT OUTREACH (OUTPATIENT)
Dept: HEALTH INFORMATION MANAGEMENT | Facility: OTHER | Age: 69
End: 2024-07-10
Payer: MEDICARE

## 2024-07-10 DIAGNOSIS — I50.20 HEART FAILURE WITH REDUCED EJECTION FRACTION (HCC): ICD-10-CM

## 2024-07-11 ENCOUNTER — PATIENT OUTREACH (OUTPATIENT)
Dept: HEALTH INFORMATION MANAGEMENT | Facility: OTHER | Age: 69
End: 2024-07-11
Payer: MEDICARE

## 2024-07-15 ENCOUNTER — PATIENT OUTREACH (OUTPATIENT)
Dept: HEALTH INFORMATION MANAGEMENT | Facility: OTHER | Age: 69
End: 2024-07-15
Payer: MEDICARE

## 2024-07-17 ENCOUNTER — HOSPITAL ENCOUNTER (OUTPATIENT)
Dept: LAB | Facility: MEDICAL CENTER | Age: 69
End: 2024-07-17
Attending: NURSE PRACTITIONER
Payer: MEDICARE

## 2024-07-17 DIAGNOSIS — I48.0 HYPERCOAGULABLE STATE DUE TO PAROXYSMAL ATRIAL FIBRILLATION (HCC): ICD-10-CM

## 2024-07-17 DIAGNOSIS — D68.69 HYPERCOAGULABLE STATE DUE TO PAROXYSMAL ATRIAL FIBRILLATION (HCC): ICD-10-CM

## 2024-07-17 DIAGNOSIS — I51.89 LEFT VENTRICULAR SYSTOLIC DYSFUNCTION, NYHA CLASS 2: ICD-10-CM

## 2024-07-17 DIAGNOSIS — I42.6 ALCOHOLIC CARDIOMYOPATHY (HCC): ICD-10-CM

## 2024-07-17 DIAGNOSIS — I50.20 HEART FAILURE WITH REDUCED EJECTION FRACTION (HCC): ICD-10-CM

## 2024-07-17 LAB
ANION GAP SERPL CALC-SCNC: 14 MMOL/L (ref 7–16)
BUN SERPL-MCNC: 13 MG/DL (ref 8–22)
CALCIUM SERPL-MCNC: 9.3 MG/DL (ref 8.5–10.5)
CHLORIDE SERPL-SCNC: 99 MMOL/L (ref 96–112)
CO2 SERPL-SCNC: 25 MMOL/L (ref 20–33)
CREAT SERPL-MCNC: 1.09 MG/DL (ref 0.5–1.4)
GFR SERPLBLD CREATININE-BSD FMLA CKD-EPI: 73 ML/MIN/1.73 M 2
GLUCOSE SERPL-MCNC: 102 MG/DL (ref 65–99)
NT-PROBNP SERPL IA-MCNC: <36 PG/ML (ref 0–125)
POTASSIUM SERPL-SCNC: 4.4 MMOL/L (ref 3.6–5.5)
SODIUM SERPL-SCNC: 138 MMOL/L (ref 135–145)

## 2024-07-17 PROCEDURE — 80048 BASIC METABOLIC PNL TOTAL CA: CPT

## 2024-07-17 PROCEDURE — 36415 COLL VENOUS BLD VENIPUNCTURE: CPT

## 2024-07-17 PROCEDURE — 83880 ASSAY OF NATRIURETIC PEPTIDE: CPT

## 2024-07-22 ENCOUNTER — APPOINTMENT (OUTPATIENT)
Dept: CARDIOLOGY | Facility: MEDICAL CENTER | Age: 69
End: 2024-07-22
Attending: NURSE PRACTITIONER
Payer: MEDICARE

## 2024-07-23 ENCOUNTER — OFFICE VISIT (OUTPATIENT)
Dept: CARDIOLOGY | Facility: MEDICAL CENTER | Age: 69
End: 2024-07-23
Attending: NURSE PRACTITIONER
Payer: MEDICARE

## 2024-07-23 VITALS
BODY MASS INDEX: 30.35 KG/M2 | DIASTOLIC BLOOD PRESSURE: 74 MMHG | HEART RATE: 75 BPM | HEIGHT: 76 IN | OXYGEN SATURATION: 93 % | RESPIRATION RATE: 16 BRPM | SYSTOLIC BLOOD PRESSURE: 124 MMHG | WEIGHT: 249.2 LBS

## 2024-07-23 DIAGNOSIS — Z79.899 HIGH RISK MEDICATION USE: ICD-10-CM

## 2024-07-23 DIAGNOSIS — E78.5 DYSLIPIDEMIA: ICD-10-CM

## 2024-07-23 DIAGNOSIS — E78.5 HYPERLIPIDEMIA, UNSPECIFIED HYPERLIPIDEMIA TYPE: ICD-10-CM

## 2024-07-23 DIAGNOSIS — D68.69 HYPERCOAGULABLE STATE DUE TO PAROXYSMAL ATRIAL FIBRILLATION (HCC): ICD-10-CM

## 2024-07-23 DIAGNOSIS — I50.20 ACC/AHA STAGE C SYSTOLIC HEART FAILURE (HCC): ICD-10-CM

## 2024-07-23 DIAGNOSIS — I51.3 LEFT VENTRICULAR THROMBUS: ICD-10-CM

## 2024-07-23 DIAGNOSIS — I48.91 ATRIAL FIBRILLATION WITH RAPID VENTRICULAR RESPONSE (HCC): ICD-10-CM

## 2024-07-23 DIAGNOSIS — I50.20 HEART FAILURE WITH REDUCED EJECTION FRACTION (HCC): ICD-10-CM

## 2024-07-23 DIAGNOSIS — I48.0 HYPERCOAGULABLE STATE DUE TO PAROXYSMAL ATRIAL FIBRILLATION (HCC): ICD-10-CM

## 2024-07-23 DIAGNOSIS — E03.8 SECONDARY HYPOTHYROIDISM: ICD-10-CM

## 2024-07-23 PROCEDURE — 99213 OFFICE O/P EST LOW 20 MIN: CPT | Performed by: NURSE PRACTITIONER

## 2024-07-23 PROCEDURE — 3074F SYST BP LT 130 MM HG: CPT | Performed by: NURSE PRACTITIONER

## 2024-07-23 PROCEDURE — 3078F DIAST BP <80 MM HG: CPT | Performed by: NURSE PRACTITIONER

## 2024-07-23 PROCEDURE — 99214 OFFICE O/P EST MOD 30 MIN: CPT | Performed by: NURSE PRACTITIONER

## 2024-07-23 RX ORDER — METOPROLOL SUCCINATE 25 MG/1
25 TABLET, EXTENDED RELEASE ORAL EVERY EVENING
Qty: 90 TABLET | Refills: 3 | Status: SHIPPED | OUTPATIENT
Start: 2024-07-23

## 2024-07-23 RX ORDER — LOSARTAN POTASSIUM 25 MG/1
25 TABLET ORAL EVERY EVENING
Qty: 90 TABLET | Refills: 3 | Status: SHIPPED | OUTPATIENT
Start: 2024-07-23

## 2024-07-23 ASSESSMENT — FIBROSIS 4 INDEX: FIB4 SCORE: 1.54

## 2024-07-28 DIAGNOSIS — E78.5 HYPERLIPIDEMIA, UNSPECIFIED HYPERLIPIDEMIA TYPE: ICD-10-CM

## 2024-07-29 RX ORDER — ATORVASTATIN CALCIUM 40 MG/1
40 TABLET, FILM COATED ORAL DAILY
Qty: 100 TABLET | Refills: 0 | Status: SHIPPED | OUTPATIENT
Start: 2024-07-29

## 2024-08-06 ENCOUNTER — HOSPITAL ENCOUNTER (OUTPATIENT)
Dept: LAB | Facility: MEDICAL CENTER | Age: 69
End: 2024-08-06
Attending: NURSE PRACTITIONER
Payer: MEDICARE

## 2024-08-06 ENCOUNTER — HOSPITAL ENCOUNTER (OUTPATIENT)
Dept: LAB | Facility: MEDICAL CENTER | Age: 69
End: 2024-08-06
Payer: MEDICARE

## 2024-08-06 ENCOUNTER — PHARMACY VISIT (OUTPATIENT)
Dept: PHARMACY | Facility: MEDICAL CENTER | Age: 69
End: 2024-08-06
Payer: COMMERCIAL

## 2024-08-06 DIAGNOSIS — I48.0 HYPERCOAGULABLE STATE DUE TO PAROXYSMAL ATRIAL FIBRILLATION (HCC): ICD-10-CM

## 2024-08-06 DIAGNOSIS — E03.8 SECONDARY HYPOTHYROIDISM: ICD-10-CM

## 2024-08-06 DIAGNOSIS — E29.1 SECONDARY MALE HYPOGONADISM: ICD-10-CM

## 2024-08-06 DIAGNOSIS — E55.9 VITAMIN D DEFICIENCY: ICD-10-CM

## 2024-08-06 DIAGNOSIS — E78.5 HYPERLIPIDEMIA, UNSPECIFIED HYPERLIPIDEMIA TYPE: ICD-10-CM

## 2024-08-06 DIAGNOSIS — I50.20 ACC/AHA STAGE C SYSTOLIC HEART FAILURE (HCC): ICD-10-CM

## 2024-08-06 DIAGNOSIS — E27.49 SECONDARY ADRENAL INSUFFICIENCY (HCC): ICD-10-CM

## 2024-08-06 DIAGNOSIS — D68.69 HYPERCOAGULABLE STATE DUE TO PAROXYSMAL ATRIAL FIBRILLATION (HCC): ICD-10-CM

## 2024-08-06 LAB
25(OH)D3 SERPL-MCNC: 54 NG/ML (ref 30–100)
ALBUMIN SERPL BCP-MCNC: 3.9 G/DL (ref 3.2–4.9)
ALBUMIN/GLOB SERPL: 1.1 G/DL
ALP SERPL-CCNC: 87 U/L (ref 30–99)
ALT SERPL-CCNC: 16 U/L (ref 2–50)
ANION GAP SERPL CALC-SCNC: 19 MMOL/L (ref 7–16)
AST SERPL-CCNC: 25 U/L (ref 12–45)
BASOPHILS # BLD AUTO: 0.6 % (ref 0–1.8)
BASOPHILS # BLD: 0.06 K/UL (ref 0–0.12)
BILIRUB SERPL-MCNC: 0.6 MG/DL (ref 0.1–1.5)
BUN SERPL-MCNC: 13 MG/DL (ref 8–22)
CALCIUM ALBUM COR SERPL-MCNC: 9.8 MG/DL (ref 8.5–10.5)
CALCIUM SERPL-MCNC: 9.7 MG/DL (ref 8.5–10.5)
CHLORIDE SERPL-SCNC: 99 MMOL/L (ref 96–112)
CHOLEST SERPL-MCNC: 175 MG/DL (ref 100–199)
CO2 SERPL-SCNC: 20 MMOL/L (ref 20–33)
CREAT SERPL-MCNC: 1.09 MG/DL (ref 0.5–1.4)
EOSINOPHIL # BLD AUTO: 0.54 K/UL (ref 0–0.51)
EOSINOPHIL NFR BLD: 5.4 % (ref 0–6.9)
ERYTHROCYTE [DISTWIDTH] IN BLOOD BY AUTOMATED COUNT: 52.3 FL (ref 35.9–50)
FASTING STATUS PATIENT QL REPORTED: NORMAL
GFR SERPLBLD CREATININE-BSD FMLA CKD-EPI: 73 ML/MIN/1.73 M 2
GLOBULIN SER CALC-MCNC: 3.7 G/DL (ref 1.9–3.5)
GLUCOSE SERPL-MCNC: 109 MG/DL (ref 65–99)
HCT VFR BLD AUTO: 53.7 % (ref 42–52)
HDLC SERPL-MCNC: 48 MG/DL
HGB BLD-MCNC: 17.8 G/DL (ref 14–18)
IMM GRANULOCYTES # BLD AUTO: 0.04 K/UL (ref 0–0.11)
IMM GRANULOCYTES NFR BLD AUTO: 0.4 % (ref 0–0.9)
LDLC SERPL CALC-MCNC: 83 MG/DL
LYMPHOCYTES # BLD AUTO: 3.21 K/UL (ref 1–4.8)
LYMPHOCYTES NFR BLD: 32.3 % (ref 22–41)
MCH RBC QN AUTO: 32.8 PG (ref 27–33)
MCHC RBC AUTO-ENTMCNC: 33.1 G/DL (ref 32.3–36.5)
MCV RBC AUTO: 98.9 FL (ref 81.4–97.8)
MONOCYTES # BLD AUTO: 0.73 K/UL (ref 0–0.85)
MONOCYTES NFR BLD AUTO: 7.4 % (ref 0–13.4)
NEUTROPHILS # BLD AUTO: 5.35 K/UL (ref 1.82–7.42)
NEUTROPHILS NFR BLD: 53.9 % (ref 44–72)
NRBC # BLD AUTO: 0 K/UL
NRBC BLD-RTO: 0 /100 WBC (ref 0–0.2)
PLATELET # BLD AUTO: 297 K/UL (ref 164–446)
PMV BLD AUTO: 9.9 FL (ref 9–12.9)
POTASSIUM SERPL-SCNC: 4.1 MMOL/L (ref 3.6–5.5)
PROT SERPL-MCNC: 7.6 G/DL (ref 6–8.2)
RBC # BLD AUTO: 5.43 M/UL (ref 4.7–6.1)
SODIUM SERPL-SCNC: 138 MMOL/L (ref 135–145)
T4 FREE SERPL-MCNC: 1.01 NG/DL (ref 0.93–1.7)
TRIGL SERPL-MCNC: 220 MG/DL (ref 0–149)
WBC # BLD AUTO: 9.9 K/UL (ref 4.8–10.8)

## 2024-08-06 PROCEDURE — 85025 COMPLETE CBC W/AUTO DIFF WBC: CPT

## 2024-08-06 PROCEDURE — 84439 ASSAY OF FREE THYROXINE: CPT

## 2024-08-06 PROCEDURE — 84270 ASSAY OF SEX HORMONE GLOBUL: CPT

## 2024-08-06 PROCEDURE — 82306 VITAMIN D 25 HYDROXY: CPT

## 2024-08-06 PROCEDURE — 84402 ASSAY OF FREE TESTOSTERONE: CPT

## 2024-08-06 PROCEDURE — 36415 COLL VENOUS BLD VENIPUNCTURE: CPT

## 2024-08-06 PROCEDURE — 80053 COMPREHEN METABOLIC PANEL: CPT

## 2024-08-06 PROCEDURE — RXMED WILLOW AMBULATORY MEDICATION CHARGE: Performed by: FAMILY MEDICINE

## 2024-08-06 PROCEDURE — 80061 LIPID PANEL: CPT

## 2024-08-06 PROCEDURE — 84403 ASSAY OF TOTAL TESTOSTERONE: CPT

## 2024-08-08 LAB
SHBG SERPL-SCNC: 52 NMOL/L (ref 19–76)
TESTOST FREE MFR SERPL: 1.5 % (ref 1.6–2.9)
TESTOST FREE SERPL-MCNC: 78 PG/ML (ref 47–244)
TESTOST SERPL-MCNC: 534 NG/DL (ref 300–720)

## 2024-08-11 DIAGNOSIS — E03.8 SECONDARY HYPOTHYROIDISM: ICD-10-CM

## 2024-08-11 RX ORDER — LEVOTHYROXINE SODIUM 150 UG/1
150 TABLET ORAL
Qty: 90 TABLET | Refills: 0 | Status: SHIPPED | OUTPATIENT
Start: 2024-08-11 | End: 2024-08-13

## 2024-08-13 DIAGNOSIS — E03.8 SECONDARY HYPOTHYROIDISM: ICD-10-CM

## 2024-08-13 RX ORDER — LEVOTHYROXINE SODIUM 150 UG/1
150 TABLET ORAL
Qty: 90 TABLET | Refills: 0 | Status: SHIPPED | OUTPATIENT
Start: 2024-08-13

## 2024-08-17 DIAGNOSIS — I51.3 LEFT VENTRICULAR THROMBUS: ICD-10-CM

## 2024-08-17 DIAGNOSIS — I10 ESSENTIAL HYPERTENSION, BENIGN: ICD-10-CM

## 2024-08-17 DIAGNOSIS — I48.91 ATRIAL FIBRILLATION WITH RAPID VENTRICULAR RESPONSE (HCC): ICD-10-CM

## 2024-08-17 DIAGNOSIS — E78.5 HYPERLIPIDEMIA, UNSPECIFIED HYPERLIPIDEMIA TYPE: ICD-10-CM

## 2024-08-17 DIAGNOSIS — I50.20 HEART FAILURE WITH REDUCED EJECTION FRACTION (HCC): ICD-10-CM

## 2024-08-17 DIAGNOSIS — I50.20 ACC/AHA STAGE C SYSTOLIC HEART FAILURE (HCC): ICD-10-CM

## 2024-08-17 DIAGNOSIS — I42.6 ALCOHOLIC CARDIOMYOPATHY (HCC): Chronic | ICD-10-CM

## 2024-08-17 DIAGNOSIS — Z79.899 HIGH RISK MEDICATION USE: ICD-10-CM

## 2024-08-19 NOTE — TELEPHONE ENCOUNTER
Is the patient due for a refill? Yes    Was the patient seen the past year? Yes    Date of last office visit: 7/23/2024    Does the patient have an upcoming appointment?  No    Provider to refill:ZHOU    Does the patient have [facility] and need 100-day supply? (This applies to ALL medications) YES

## 2024-08-20 RX ORDER — APIXABAN 5 MG/1
5 TABLET, FILM COATED ORAL 2 TIMES DAILY
Qty: 200 TABLET | Refills: 3 | Status: SHIPPED | OUTPATIENT
Start: 2024-08-20 | End: 2024-08-21 | Stop reason: SDUPTHER

## 2024-08-21 DIAGNOSIS — I51.3 LEFT VENTRICULAR THROMBUS: ICD-10-CM

## 2024-08-21 DIAGNOSIS — I10 ESSENTIAL HYPERTENSION, BENIGN: ICD-10-CM

## 2024-08-21 DIAGNOSIS — I50.20 HEART FAILURE WITH REDUCED EJECTION FRACTION (HCC): ICD-10-CM

## 2024-08-21 DIAGNOSIS — I50.20 ACC/AHA STAGE C SYSTOLIC HEART FAILURE (HCC): ICD-10-CM

## 2024-08-21 DIAGNOSIS — E27.49 SECONDARY ADRENAL INSUFFICIENCY (HCC): ICD-10-CM

## 2024-08-21 DIAGNOSIS — Z79.899 HIGH RISK MEDICATION USE: ICD-10-CM

## 2024-08-21 DIAGNOSIS — I48.91 ATRIAL FIBRILLATION WITH RAPID VENTRICULAR RESPONSE (HCC): ICD-10-CM

## 2024-08-21 DIAGNOSIS — I42.6 ALCOHOLIC CARDIOMYOPATHY (HCC): Chronic | ICD-10-CM

## 2024-08-21 DIAGNOSIS — E78.5 HYPERLIPIDEMIA, UNSPECIFIED HYPERLIPIDEMIA TYPE: ICD-10-CM

## 2024-08-21 RX ORDER — APIXABAN 5 MG/1
5 TABLET, FILM COATED ORAL 2 TIMES DAILY
Qty: 200 TABLET | Refills: 3 | Status: SHIPPED | OUTPATIENT
Start: 2024-08-21

## 2024-08-22 RX ORDER — HYDROCORTISONE 5 MG/1
TABLET ORAL
Qty: 200 TABLET | Refills: 3 | Status: SHIPPED | OUTPATIENT
Start: 2024-08-22

## 2024-08-23 ENCOUNTER — PATIENT MESSAGE (OUTPATIENT)
Dept: CARDIOLOGY | Facility: MEDICAL CENTER | Age: 69
End: 2024-08-23
Payer: MEDICARE

## 2024-08-23 ENCOUNTER — TELEPHONE (OUTPATIENT)
Dept: CARDIOLOGY | Facility: MEDICAL CENTER | Age: 69
End: 2024-08-23
Payer: MEDICARE

## 2024-08-23 NOTE — TELEPHONE ENCOUNTER
Received Refill Erx request via [facility]  for Eliquis 5mg tabs. (Quantity:200, Day Supply:100)     Insurance: [employer] / [employer]      Ran Test claim via [employer] & medication  co-pays are as followed:    $47 for a 30 day supply  $94 for a 60 day supply  $123.62 for a 90 day supply    Sent [employer] message to patient to advise of co-pays and that he is going into the GAP Phase (donut hole) of his pharmacy insurance coverage for his 90 day fill. I also offered our Specialty Services with [facility], and we can get him a 100 day supply right now for $94 through his insurance if it is delivered to him. Waiting to hear back from patient on how he would like to proceed with his Eliquis prescription.

## 2024-09-04 ENCOUNTER — TELEPHONE (OUTPATIENT)
Dept: HEALTH INFORMATION MANAGEMENT | Facility: OTHER | Age: 69
End: 2024-09-04

## 2024-09-05 ENCOUNTER — OFFICE VISIT (OUTPATIENT)
Dept: ENDOCRINOLOGY | Facility: MEDICAL CENTER | Age: 69
End: 2024-09-05
Attending: INTERNAL MEDICINE
Payer: MEDICARE

## 2024-09-05 VITALS
SYSTOLIC BLOOD PRESSURE: 118 MMHG | BODY MASS INDEX: 29.59 KG/M2 | HEIGHT: 76 IN | WEIGHT: 243 LBS | OXYGEN SATURATION: 92 % | DIASTOLIC BLOOD PRESSURE: 82 MMHG | HEART RATE: 90 BPM

## 2024-09-05 DIAGNOSIS — N52.9 ERECTILE DYSFUNCTION, UNSPECIFIED ERECTILE DYSFUNCTION TYPE: ICD-10-CM

## 2024-09-05 DIAGNOSIS — R30.0 DYSURIA: ICD-10-CM

## 2024-09-05 DIAGNOSIS — E23.0 PANHYPOPITUITARISM (HCC): ICD-10-CM

## 2024-09-05 DIAGNOSIS — E23.0 HYPOGONADOTROPIC HYPOGONADISM (HCC): ICD-10-CM

## 2024-09-05 DIAGNOSIS — E03.8 SECONDARY HYPOTHYROIDISM: ICD-10-CM

## 2024-09-05 DIAGNOSIS — E27.49 SECONDARY ADRENAL INSUFFICIENCY (HCC): ICD-10-CM

## 2024-09-05 DIAGNOSIS — E55.9 VITAMIN D DEFICIENCY: ICD-10-CM

## 2024-09-05 PROCEDURE — 3074F SYST BP LT 130 MM HG: CPT | Performed by: INTERNAL MEDICINE

## 2024-09-05 PROCEDURE — 99211 OFF/OP EST MAY X REQ PHY/QHP: CPT | Performed by: INTERNAL MEDICINE

## 2024-09-05 PROCEDURE — 99214 OFFICE O/P EST MOD 30 MIN: CPT | Performed by: INTERNAL MEDICINE

## 2024-09-05 PROCEDURE — 3079F DIAST BP 80-89 MM HG: CPT | Performed by: INTERNAL MEDICINE

## 2024-09-05 RX ORDER — TESTOSTERONE 1.62 MG/G
40.5 GEL TRANSDERMAL DAILY
Qty: 75 G | Refills: 5 | Status: SHIPPED | OUTPATIENT
Start: 2024-09-05 | End: 2024-10-05

## 2024-09-05 RX ORDER — LEVOTHYROXINE SODIUM 150 UG/1
150 TABLET ORAL
Qty: 90 TABLET | Refills: 3 | Status: SHIPPED | OUTPATIENT
Start: 2024-09-05

## 2024-09-05 ASSESSMENT — FIBROSIS 4 INDEX: FIB4 SCORE: 1.47

## 2024-09-05 NOTE — PROGRESS NOTES
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 male here for follow up for the above medical issue.  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    Pituitary MRI on March 23, 2020 showed no evidence of tumor recurrence  He did have an incidental 5 mm meningioma at the area of his previous pituitary surgery in the sella.  I have asked him to follow up with his pcp for his meningioma      Since I last saw him he missed an appt on 2/2023. He was admitted for acute diastolic CHF with EF of 25% at [facility].  He was diagnosed with alcoholic CMO.    He was treated with dobutamine.  He then developed PAF and was placed on Eliquis and Amidarone   He was sober for 6 months      For his secondary hypothyroidism,  He is taking Levothyroxine 150 MCG daily   He reports good compliance  He denies constipation and cold intolerance  He denies palpitations and tremors  His TSH is unreliable due to pituitary surgery  His free T4 is 1.01  on 8/6/2024 ( while on 150mcg)   His free T4 is high at 1.94 on 9/2023 (likely effect of amiodarone while on 175mcg )  His free T4  was 1.15 on 3/24/2022.  (While on 175)        For his secondary adrenal insufficiency  He is now taking hydrocortisone 10 mg in morning AM and 5MG in the afternoon   He reports feeling great  His blood pressure today normal and electrolytes are normal  His weight is stable and he doesn't look cushingoid         For his secondary hypogonadism  He was on  Testim 50 mg 1 packet every other day   He then switched to Androgel 1.62% 1 pump daily 75grams monthly  He denies chest pain and dyspnea  He denies lower urinary tract obstructive symptoms.  He denies weak stream, urinary hesistancy, and intermittency   His total testosterone was 534 on 8/2024   HCT was 53% on 8/2024   PSA was 0.17 on  2/2024            Patient's medications, allergies, and social histories were reviewed and updated as appropriate.      ROS:       CONS:     No fever, no chills   EYES:     No diplopia, no blurry vision   CV:           No chest pain, no palpitations   PULM:     No SOB, no cough, no hemoptysis.   GI:            No nausea, no vomiting, no diarrhea, no constipation   ENDO:     No polyuria, no polydipsia, no heat intolerance, no cold intolerance       Past Medical History:  Problem List:  2024-05: Alcoholic cardiomyopathy ([facility])  2023-12: Hypercoagulable state due to paroxysmal atrial fibrillation   ([facility])  2023-11: Erectile dysfunction  2023-09: Hypoxia  2023-09: URI (upper respiratory infection)  2023-09: Hyponatremia  2023-04: Hyperthyroidism  2023-04: Syncope  2023-04: Hypotension  2023-02: ANGEL (acute kidney injury) ([facility])  2023-01: Demand ischemia ([facility])  2023-01: Acute on chronic respiratory failure with hypoxia and   hypercapnia ([facility])  2023-01: Asthma with exacerbation  2023-01: Sepsis ([facility])  2023-01: Elevated troponin  2023-01: Elevated d-dimer  2023-01: Lactic acidosis  2023-01: Elevated lactic acid level  2023-01: Infestation by bed bug  2023-01: NSTEMI (non-ST elevated myocardial infarction) ([facility])  2023-01: Left ventricular thrombus  2023-01: Cardiomyopathy ([facility]) EF <20%  2023-01: Shock ([facility])  2023-01: Acute respiratory failure with hypoxia due to asthma   exacerbation ([facility])  2022-01: Moderate persistent asthma without complication  2022-01: Elevated liver enzymes  2021-07: Macrocytosis  2021-07: Seasonal allergies  2020-09: Meningioma ([facility])  2020-09: Panhypopituitarism ([facility])  2020-03: Secondary male hypogonadism  2020-03: Secondary adrenal insufficiency ([facility])  2019-11: Elevated liver function tests  2019-07: Mild intermittent asthma without complication  2019-07: Eczema  2017-07: Pollen allergies  2015-06: Secondary hypothyroidism  2015-06: Hyperlipidemia  2015-06: Reactive airway disease  2009-08:  "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HCC)  Pituitary adenoma (HCC)  ACC/AHA stage C systolic heart failure (HCC)      Past Surgical History:  Past Surgical History:   Procedure Laterality Date    CRANIOTOMY          Allergies:  Pcn [penicillins]     Social History:  Social History     Tobacco Use    Smoking status: Never    Smokeless tobacco: Never   Vaping Use    Vaping status: Never Used   Substance Use Topics    Alcohol use: Yes     Alcohol/week: 16.8 oz     Types: 28 Glasses of wine per week     Comment: Occasional    Drug use: No        Family History:   family history includes Alzheimer's Disease in his maternal grandmother; Cancer in his maternal grandfather and paternal grandfather; Diabetes in his brother and mother; Heart Disease in his father and paternal grandmother.        PHYSICAL EXAM:   Vital signs: /82 (BP Location: Right arm, Patient Position: Sitting, BP Cuff Size: Large adult)   Pulse 90   Ht 1.93 m (6' 4\")   Wt 110 kg (243 lb)   SpO2 92%   BMI 29.58 kg/m²   GENERAL: Well-developed, well-nourished in no apparent distress.   EYE:  No ocular asymmetry, PERRLA  HENT: Pink, moist mucous membranes.     CHEST: no gynecomastia  CARDIOVASCULAR:  No murmurs  LUNGS: Clear breath sounds  ABDOMEN: Soft, nontender   GENIT: normal  EXTREMITIES: No clubbing, cyanosis, or edema.   NEUROLOGICAL: No gross focal motor abnormalities. No visible tremor, no proximal muscle weakness   LYMPH: No cervical adenopathy palpated.   SKIN: No rashes, lesions.       Labs:  Lab Results   Component Value Date/Time    WBC 9.9 08/06/2024 08:20 AM    RBC 5.43 08/06/2024 08:20 AM    HEMOGLOBIN 17.8 08/06/2024 08:20 AM    MCV 98.9 (H) 08/06/2024 08:20 AM    MCH 32.8 08/06/2024 08:20 AM    MCHC 33.1 08/06/2024 08:20 AM    RDW 52.3 (H) 08/06/2024 08:20 AM    MPV 9.9 08/06/2024 08:20 AM       Lab Results   Component Value Date/Time    SODIUM 138 08/06/2024 08:20 AM    POTASSIUM 4.1 08/06/2024 08:20 AM    CHLORIDE 99 08/06/2024 08:20 " AM    CO2 20 08/06/2024 08:20 AM    ANION 19.0 (H) 08/06/2024 08:20 AM    GLUCOSE 109 (H) 08/06/2024 08:20 AM    BUN 13 08/06/2024 08:20 AM    CREATININE 1.09 08/06/2024 08:20 AM    CALCIUM 9.7 08/06/2024 08:20 AM    ASTSGOT 25 08/06/2024 08:20 AM    ALTSGPT 16 08/06/2024 08:20 AM    TBILIRUBIN 0.6 08/06/2024 08:20 AM    ALBUMIN 3.9 08/06/2024 08:20 AM    TOTPROTEIN 7.6 08/06/2024 08:20 AM    GLOBULIN 3.7 (H) 08/06/2024 08:20 AM    AGRATIO 1.1 08/06/2024 08:20 AM       No results found for: LH    Lab Results   Component Value Date/Time    FSH 4.0 03/19/2020 0914       Lab Results   Component Value Date/Time    TESTOSTERONE 68 (L) 03/19/2020 0914           Imaging: Please refer to pituitary MRI from March 2020    ASSESSMENT/PLAN:      1. Secondary adrenal insufficiency (HCC)  Stable control  Continue hydrocortisone 10 mg in the morning and 5 mg in the afternoon  Reviewed stress dosing of steroids for illness  Follow-up in 6 months with labs    2. Secondary hypothyroidism  Unstable  His TSH levels are unreliable because of radiotherapy for his pituitary and should not be utilized for monitoring response to therapy  His free T4 is normal   Continue levothyroxine 150 MCG daily  We will repeat his free T4 levels in in 6 months    3. Secondary male hypogonadism  Controlled   Continue Androgel 1.62% 2 pumps daily  Continue sildenafil for ED  Reviewed the risks of androgen replacement therapy  We will check his hematocrit, and testosterone levels in 6 months Recommend that he get an annual PSA and digital rectal exam with his primary care physician    4. Panhypopituitarism (HCC)  See above      Return in about 7 months (around 4/5/2025).        Thank you kindly for allowing me to participate in the endocrine care plan for this patient.    Catracho Montano MD, FACE, [facility]      CC:   Manjula Zaidi M.D.

## 2024-09-11 ENCOUNTER — OFFICE VISIT (OUTPATIENT)
Dept: MEDICAL GROUP | Facility: MEDICAL CENTER | Age: 69
End: 2024-09-11
Payer: MEDICARE

## 2024-09-11 VITALS
OXYGEN SATURATION: 93 % | HEIGHT: 76 IN | BODY MASS INDEX: 30.32 KG/M2 | HEART RATE: 99 BPM | RESPIRATION RATE: 18 BRPM | WEIGHT: 249 LBS | TEMPERATURE: 98.6 F | DIASTOLIC BLOOD PRESSURE: 62 MMHG | SYSTOLIC BLOOD PRESSURE: 122 MMHG

## 2024-09-11 DIAGNOSIS — E78.5 HYPERLIPIDEMIA, UNSPECIFIED HYPERLIPIDEMIA TYPE: ICD-10-CM

## 2024-09-11 DIAGNOSIS — E66.9 OBESITY (BMI 30.0-34.9): ICD-10-CM

## 2024-09-11 DIAGNOSIS — E29.1 SECONDARY MALE HYPOGONADISM: ICD-10-CM

## 2024-09-11 DIAGNOSIS — I10 HYPERTENSION, UNSPECIFIED TYPE: ICD-10-CM

## 2024-09-11 DIAGNOSIS — E23.0 PANHYPOPITUITARISM (HCC): ICD-10-CM

## 2024-09-11 DIAGNOSIS — F10.288 ALCOHOL DEPENDENCE WITH OTHER ALCOHOL-INDUCED DISORDER (HCC): ICD-10-CM

## 2024-09-11 DIAGNOSIS — Z00.00 MEDICARE ANNUAL WELLNESS VISIT, SUBSEQUENT: ICD-10-CM

## 2024-09-11 DIAGNOSIS — E27.49 SECONDARY ADRENAL INSUFFICIENCY (HCC): ICD-10-CM

## 2024-09-11 DIAGNOSIS — E03.8 SECONDARY HYPOTHYROIDISM: ICD-10-CM

## 2024-09-11 DIAGNOSIS — D32.9 MENINGIOMA (HCC): ICD-10-CM

## 2024-09-11 DIAGNOSIS — J45.40 MODERATE PERSISTENT ASTHMA WITHOUT COMPLICATION: ICD-10-CM

## 2024-09-11 PROCEDURE — 3074F SYST BP LT 130 MM HG: CPT | Performed by: FAMILY MEDICINE

## 2024-09-11 PROCEDURE — G0439 PPPS, SUBSEQ VISIT: HCPCS | Performed by: FAMILY MEDICINE

## 2024-09-11 PROCEDURE — 3078F DIAST BP <80 MM HG: CPT | Performed by: FAMILY MEDICINE

## 2024-09-11 ASSESSMENT — ACTIVITIES OF DAILY LIVING (ADL): BATHING_REQUIRES_ASSISTANCE: 0

## 2024-09-11 ASSESSMENT — PATIENT HEALTH QUESTIONNAIRE - PHQ9: CLINICAL INTERPRETATION OF PHQ2 SCORE: 0

## 2024-09-11 ASSESSMENT — FIBROSIS 4 INDEX: FIB4 SCORE: 1.47

## 2024-09-11 ASSESSMENT — ENCOUNTER SYMPTOMS: GENERAL WELL-BEING: FAIR

## 2024-09-11 NOTE — PROGRESS NOTES
Chief Complaint   Patient presents with    Medicare Annual Wellness       HPI:  Alberto Galicia is a 69 y.o. here for Medicare Annual Wellness Visit     Patient Active Problem List    Diagnosis Date Noted    Alcoholic cardiomyopathy (HCC) 05/02/2024    Hypercoagulable state due to paroxysmal atrial fibrillation (HCC) 12/12/2023    Erectile dysfunction 11/08/2023    Hypoxia 09/06/2023    URI (upper respiratory infection) 09/06/2023    Hyponatremia 09/06/2023    Hyperthyroidism 04/04/2023    Syncope 04/03/2023    Hypotension 04/03/2023    ANGEL (acute kidney injury) (HCC) 02/02/2023    ACC/AHA stage C systolic heart failure (HCC)     Demand ischemia (HCC) 01/31/2023    Acute on chronic respiratory failure with hypoxia and hypercapnia (HCC) 01/30/2023    Asthma with exacerbation 01/30/2023    Elevated troponin 01/30/2023    Elevated d-dimer 01/30/2023    Elevated lactic acid level 01/30/2023    Infestation by bed bug 01/30/2023    Left ventricular thrombus 01/30/2023    Moderate persistent asthma without complication 01/13/2022    Elevated liver enzymes 01/13/2022    Macrocytosis 07/27/2021    Seasonal allergies 07/27/2021    Meningioma (HCC) 09/02/2020    Panhypopituitarism (HCC) 09/02/2020    Secondary male hypogonadism 03/31/2020    Secondary adrenal insufficiency (HCC) 03/31/2020    Elevated liver function tests 11/15/2019    Mild intermittent asthma without complication 07/10/2019    Pollen allergies 07/25/2017    Secondary hypothyroidism 06/30/2015    Hyperlipidemia 06/30/2015    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HCC) 08/13/2009       Current Outpatient Medications   Medication Sig Dispense Refill    levothyroxine (SYNTHROID) 150 MCG Tab Take 1 Tablet by mouth every morning on an empty stomach. 90 Tablet 3    Testosterone (ANDROGEL) 20.25 MG/ACT (1.62%) Gel Place 40.5 mg on the skin every day for 30 days. 75 g 5    hydrocortisone (CORTEF) 5 MG Tab Take 2 pills in the morning and 1 pill in the afternoon 200 Tablet 3     ELIQUIS 5 MG Tab Take 1 Tablet by mouth 2 times a day. 200 Tablet 3    atorvastatin (LIPITOR) 40 MG Tab Take 1 tablet by mouth once daily 100 Tablet 0    losartan (COZAAR) 25 MG Tab Take 1 Tablet by mouth every evening. 90 Tablet 3    metoprolol SR (TOPROL XL) 25 MG TABLET SR 24 HR Take 1 Tablet by mouth every evening. 90 Tablet 3    fluticasone furoate-vilanterol (BREO ELLIPTA) 100-25 MCG/ACT AEROSOL POWDER, BREATH ACTIVATED Inhale 1 Puff every day. 60 Each 2    albuterol 108 (90 Base) MCG/ACT Aero Soln inhalation aerosol INHALE 2 PUFFS BY MOUTH EVERY 6 HOURS AS NEEDED FOR SHORTNESS OF BREATH 18 g 0    triamcinolone acetonide (KENALOG) 0.1 % Cream APPLY CREAM TOPICALLY TWICE DAILY 45 g 0    furosemide (LASIX) 20 MG Tab Take 1 Tablet by mouth every day. 90 Tablet 3    potassium chloride SA (KDUR) 20 MEQ Tab CR Take 0.5 Tablets by mouth every day. 45 Tablet 3    sildenafil citrate (VIAGRA) 50 MG tablet Take 1 Tablet by mouth 1 time a day as needed for Erectile Dysfunction. 20 Tablet 3    fluticasone-salmeterol (ADVAIR DISKUS) 100-50 MCG/ACT AEROSOL POWDER, BREATH ACTIVATED Inhale 1 Puff 2 times a day. 60 Each 1     No current facility-administered medications for this visit.          Current supplements as per medication list.     Allergies: Penicillins and Viagra [sildenafil]    Current social contact/activities: Subs at schools in [facility]    He  reports that he has never smoked. He has never used smokeless tobacco. He reports current alcohol use of about 16.8 oz of alcohol per week. He reports that he does not use drugs.  Counseling given: Not Answered      ROS:    Gait: Uses no assistive device  Ostomy: No  Other tubes: No  Amputations: No  Chronic oxygen use: No  Last eye exam: 1 month ago  Wears hearing aids: No   : Denies any urinary leakage during the last 6 months    Screening:    Depression Screening  Little interest or pleasure in doing things?  0 - not at all  Feeling down, depressed , or hopeless?  0 - not at all  Patient Health Questionnaire Score: 0     If depressive symptoms identified deferred to follow up visit unless specifically addressed in assessment and plan.    Interpretation of PHQ-9 Total Score   Score Severity   1-4 No Depression   5-9 Mild Depression   10-14 Moderate Depression   15-19 Moderately Severe Depression   20-27 Severe Depression    Screening for Cognitive Impairment  Do you or any of your friends or family members have any concern about your memory? No  Three Minute Recall (Leader, Season, Table) 1/3    Carlos clock face with all 12 numbers and set the hands to show 10 minutes after 11.  Yes    Cognitive concerns identified deferred for follow up unless specifically addressed in assessment and plan.    Fall Risk Assessment  Has the patient had two or more falls in the last year or any fall with injury in the last year?  No    Safety Assessment  Do you always wear your seatbelt?  Yes  Any changes to home needed to function safely? No  Difficulty hearing.  Yes  Patient counseled about all safety risks that were identified.    Functional Assessment ADLs  Are there any barriers preventing you from cooking for yourself or meeting nutritional needs?  No.    Are there any barriers preventing you from driving safely or obtaining transportation?  No.    Are there any barriers preventing you from using a telephone or calling for help?  No    Are there any barriers preventing you from shopping?  No.    Are there any barriers preventing you from taking care of your own finances?  No    Are there any barriers preventing you from managing your medications?  No    Are there any barriers preventing you from showering, bathing or dressing yourself? No    Are there any barriers preventing you from doing housework or laundry? No  Are there any barriers preventing you from using the toilet?No  Are you currently engaging in any exercise or physical activity?  No.      Self-Assessment of Health  What is your  perception of your health? Fair    Do you sleep more than six hours a night? No    In the past 7 days, how much did pain keep you from doing your normal work? None    Do you spend quality time with family or friends (virtually or in person)? No    Do you usually eat a heart healthy diet that constists of a variety of fruits, vegetables, whole grains and fiber? Yes    Do you eat foods high in fat and/or Fast Food more than three times per week? No    How concerned are you that your medical conditions are not being well managed? Not at all    Are you worried that in the next 2 months, you may not have stable housing that you own, rent, or stay in as part of a household? No      Advance Care Planning  Do you have an Advance Directive, Living Will, Durable Power of , or POLST? No                 Health Maintenance Summary            Overdue - Annual Wellness Visit (Yearly) Overdue since 7/27/2022 07/27/2021  Level of Service: NM ANNUAL WELLNESS VISIT-INCLUDES PPPS SUBSEQUE*    07/26/2021  Initial Annual Wellness Visit - Includes PPPS ()    07/26/2021  Visit Dx: Medicare annual wellness visit, initial              Overdue - Influenza Vaccine (1) Overdue since 9/1/2024      10/25/2023  Imm Admin: Influenza Vaccine Adult HD    11/25/2022  Imm Admin: Influenza Vaccine Adult HD    10/21/2020  Imm Admin: Influenza Vaccine Quad Recombinant    11/13/2019  Imm Admin: Influenza Vaccine Adult HD    10/22/2018  Imm Admin: Influenza Vac Subunit Quad Inj (Pf)    Only the first 5 history entries have been loaded, but more history exists.              Overdue - COVID-19 Vaccine (7 - 2023-24 season) Overdue since 9/1/2024      10/25/2023  Imm Admin: Covid-19 Mrna (Spikevax) Moderna 12+ Years    12/23/2022  Imm Admin: PFIZER BIVALENT SARS-COV-2 VACCINE (12+)    07/29/2022  Imm Admin: PFIZER ABRAMS CAP SARS-COV-2 VACCINATION (12+)    12/17/2021  Imm Admin: PFIZER PURPLE CAP SARS-COV-2 VACCINATION (12+)    05/14/2021  Imm  Admin: PFIZER PURPLE CAP SARS-COV-2 VACCINATION (12+)    Only the first 5 history entries have been loaded, but more history exists.              IMM DTaP/Tdap/Td Vaccine (3 - Td or Tdap) Next due on 7/26/2031 07/26/2021  Imm Admin: Tdap Vaccine    07/12/2011  Imm Admin: Tdap Vaccine              Pneumococcal Vaccine: 65+ Years (Series Information) Completed      11/19/2020  Imm Admin: Pneumococcal polysaccharide vaccine (PPSV-23)    11/13/2019  Imm Admin: Pneumococcal Conjugate Vaccine (Prevnar/PCV-13)              Zoster (Shingles) Vaccines (Series Information) Completed      05/25/2022  Imm Admin: Zoster Vaccine Recombinant (RZV) (SHINGRIX)    07/26/2021  Imm Admin: Zoster Vaccine Recombinant (RZV) (SHINGRIX)              Hepatitis C Screening  Tentatively Complete      01/30/2023  Hepatitis C Antibody component of HEPATITIS PANEL ACUTE(4 COMPONENTS)              Hepatitis A Vaccine (Hep A) (Series Information) Aged Out      No completion history exists for this topic.              Hepatitis B Vaccine (Hep B) (Series Information) Aged Out      No completion history exists for this topic.              HPV Vaccines (Series Information) Aged Out      No completion history exists for this topic.              Polio Vaccine (Inactivated Polio) (Series Information) Aged Out      No completion history exists for this topic.              Meningococcal Immunization (Series Information) Aged Out      No completion history exists for this topic.              Discontinued - Colorectal Cancer Screening  Discontinued      06/29/2022  OCCULT BLOOD FECES IMMUNOASSAY    10/07/2019  OCCULT BLOOD FECES IMMUNOASSAY                    Patient Care Team:  Manjula Zaidi M.D. as PCP - General (Geriatrics)  Manjula Zaidi M.D. as PCP - [facility]  Chong Aguirre M.D. (Geriatrics)  Catracho Montano M.D. (Endocrinology)        Social History     Tobacco Use    Smoking status: Never    Smokeless tobacco: Never    Vaping Use    Vaping status: Never Used   Substance Use Topics    Alcohol use: Yes     Alcohol/week: 16.8 oz     Types: 28 Glasses of wine per week     Comment: Occasional    Drug use: No     Family History   Problem Relation Age of Onset    Diabetes Mother     Heart Disease Father     Diabetes Brother     Alzheimer's Disease Maternal Grandmother     Cancer Maternal Grandfather     Heart Disease Paternal Grandmother     Cancer Paternal Grandfather      He  has a past medical history of Asthma, Cardiomyopathy (HCC) EF <20% (1/30/2023), Hyperlipidemia, Left ventricular thrombus (1/30/2023), Pituitary disease (HCC), and Thyroid disease.   Past Surgical History:   Procedure Laterality Date    CRANIOTOMY         Exam:   There were no vitals taken for this visit. There is no height or weight on file to calculate BMI.    Hearing poor.    Dentition fair  Alert, oriented in no acute distress.  Eye contact is good, speech goal directed, affect calm    Assessment and Plan. The following treatment and monitoring plan is recommended:    69 y.o. male     1. Medicare annual wellness visit, subsequent  Preventive measures and chronic medical history reviewed.    - Subsequent Annual Wellness Visit - Includes PPPS ()    2. Hypertension, unspecified type  Blood pressure has been adequately controlled.   Continue on losartan 25 mg daily, metoprolol SR 25 mg daily.  No side effects    - Subsequent Annual Wellness Visit - Includes PPPS ()    3. Hyperlipidemia, unspecified hyperlipidemia type  He has been tolerating the statin. Denies muscle pain LFTs has been normal  Continue atorvastatin 40 mg daily.    - Subsequent Annual Wellness Visit - Includes PPPS ()    4. Moderate persistent asthma without complication  Has been having more frequent exacerbations, however currently stable.  Patient has not been on steroid based inhaler, she used to take Advair but he stopped it.  Has been taking albuterol nebulizer/inhaler as  needed.    - Subsequent Annual Wellness Visit - Includes PPPS ()    5. Panhypopituitarism (HCC)  6. Secondary hypothyroidism  7. Secondary adrenal insufficiency (HCC)  8. Secondary male hypogonadism  Panhypopituitarism:   - Secondary hypothyroidism  He has been tolerating Levothyroxine, no palpitation, no cold or heat intolerance, has been on levothyroxine 150 mcg daily     - Secondary adrenal insufficiency (HCC): Has been on hydrocortisone 10 mg in the morning, and 5 mg in the evening.  Has been following up with endocrinologist     - Secondary male hypogonadism: Has been on testosterone gel, has been following up with endocrinologist    - Subsequent Annual Wellness Visit - Includes PPPS ()    9. Meningioma (HCC)  Stable.  Asymptomatic.  MRI in 2022 showed: Small meningioma along the dorsal aspect of the right dorsum sellae, stable in appearance.     - Subsequent Annual Wellness Visit - Includes PPPS ()    10. Obesity (BMI 30.0-34.9)  BMI 30.  Patient is counseled on lifestyle medication.  - Subsequent Annual Wellness Visit - Includes PPPS ()    11. Alcohol dependence with other alcohol-induced disorder (HCC)  Patient drinks vodka about 3-1/2 gallons every 3 days.  Patient is counseled on alcohol consumption.  Discussed side effects and complications of alcoholism.    - Subsequent Annual Wellness Visit - Includes PPPS ()    Services suggested: No services needed at this time  Health Care Screening: Age-appropriate preventive services recommended by USPTF and ACIP covered by Medicare were discussed today. Services ordered if indicated and agreed upon by the patient.  Referrals offered: Community-based lifestyle interventions to reduce health risks and promote self-management and wellness, fall prevention, nutrition, physical activity, tobacco-use cessation, weight loss, and mental health services as per orders if indicated.    Discussion today about general wellness and lifestyle habits:     Prevent falls and reduce trip hazards; Cautioned about securing or removing rugs.  Have a working fire alarm and carbon monoxide detector;   Engage in regular physical activity and social activities     Follow-up: No follow-ups on file.

## 2024-10-25 ENCOUNTER — PHARMACY VISIT (OUTPATIENT)
Dept: PHARMACY | Facility: MEDICAL CENTER | Age: 69
End: 2024-10-25
Payer: COMMERCIAL

## 2024-10-25 PROCEDURE — RXMED WILLOW AMBULATORY MEDICATION CHARGE: Performed by: FAMILY MEDICINE

## 2024-11-08 DIAGNOSIS — E78.5 HYPERLIPIDEMIA, UNSPECIFIED HYPERLIPIDEMIA TYPE: ICD-10-CM

## 2024-11-08 RX ORDER — ATORVASTATIN CALCIUM 40 MG/1
40 TABLET, FILM COATED ORAL DAILY
Qty: 100 TABLET | Refills: 0 | Status: SHIPPED | OUTPATIENT
Start: 2024-11-08

## 2024-11-08 NOTE — TELEPHONE ENCOUNTER
Received fax from pharmacy requesting refill on pts medication(s). Pt was last seen in office on 7/23/2020  and has a follow up scheduled for 8/21/2020. Will send request to  Maura Ding  for patient.      Requested Prescriptions     Pending Prescriptions Disp Refills    JARDIANCE 25 MG tablet [Pharmacy Med Name: Marleta Meter 25 MG TAB 25 TAB] 30 tablet 5     Sig: TAKE ONE TABLET BY MOUTH DAILY Received request via: Pharmacy    Was the patient seen in the last year in this department? Yes    Does the patient have an active prescription (recently filled or refills available) for medication(s) requested? No    Pharmacy Name:   [facility] DONA PORTILLO - [id number]  [facility] [place] [address]  Phone: [phone] Fax: [phone]      Does the patient have prison Plus and need 100-day supply? (This applies to ALL medications) Yes, quantity updated to 100 days

## 2024-12-29 ENCOUNTER — APPOINTMENT (OUTPATIENT)
Dept: RADIOLOGY | Facility: MEDICAL CENTER | Age: 69
End: 2024-12-29
Attending: EMERGENCY MEDICINE
Payer: MEDICARE

## 2024-12-29 ENCOUNTER — HOSPITAL ENCOUNTER (OUTPATIENT)
Facility: MEDICAL CENTER | Age: 69
End: 2024-12-30
Attending: EMERGENCY MEDICINE | Admitting: HOSPITALIST
Payer: MEDICARE

## 2024-12-29 ENCOUNTER — APPOINTMENT (OUTPATIENT)
Dept: CARDIOLOGY | Facility: MEDICAL CENTER | Age: 69
End: 2024-12-29
Attending: HOSPITALIST
Payer: MEDICARE

## 2024-12-29 DIAGNOSIS — I50.20 HEART FAILURE WITH REDUCED EJECTION FRACTION (HCC): ICD-10-CM

## 2024-12-29 DIAGNOSIS — J45.40 MODERATE PERSISTENT ASTHMA WITHOUT COMPLICATION: ICD-10-CM

## 2024-12-29 DIAGNOSIS — I42.6 ALCOHOLIC CARDIOMYOPATHY (HCC): ICD-10-CM

## 2024-12-29 DIAGNOSIS — I48.0 HYPERCOAGULABLE STATE DUE TO PAROXYSMAL ATRIAL FIBRILLATION (HCC): ICD-10-CM

## 2024-12-29 DIAGNOSIS — J98.01 ACUTE BRONCHOSPASM: ICD-10-CM

## 2024-12-29 DIAGNOSIS — I51.89 LEFT VENTRICULAR SYSTOLIC DYSFUNCTION, NYHA CLASS 2: ICD-10-CM

## 2024-12-29 DIAGNOSIS — R06.01 ORTHOPNEA: ICD-10-CM

## 2024-12-29 DIAGNOSIS — J45.901 ASTHMA WITH ACUTE EXACERBATION, UNSPECIFIED ASTHMA SEVERITY, UNSPECIFIED WHETHER PERSISTENT: ICD-10-CM

## 2024-12-29 DIAGNOSIS — I48.91 ATRIAL FIBRILLATION WITH RAPID VENTRICULAR RESPONSE (HCC): ICD-10-CM

## 2024-12-29 DIAGNOSIS — D68.69 HYPERCOAGULABLE STATE DUE TO PAROXYSMAL ATRIAL FIBRILLATION (HCC): ICD-10-CM

## 2024-12-29 PROBLEM — R06.00 DYSPNEA: Status: ACTIVE | Noted: 2024-12-29

## 2024-12-29 PROBLEM — I50.22 CHRONIC SYSTOLIC CHF (CONGESTIVE HEART FAILURE) (HCC): Status: ACTIVE | Noted: 2024-12-29

## 2024-12-29 LAB
ALBUMIN SERPL BCP-MCNC: 3.9 G/DL (ref 3.2–4.9)
ALBUMIN/GLOB SERPL: 1.1 G/DL
ALP SERPL-CCNC: 81 U/L (ref 30–99)
ALT SERPL-CCNC: 45 U/L (ref 2–50)
ANION GAP SERPL CALC-SCNC: 15 MMOL/L (ref 7–16)
AST SERPL-CCNC: 79 U/L (ref 12–45)
BASOPHILS # BLD AUTO: 0.5 % (ref 0–1.8)
BASOPHILS # BLD: 0.04 K/UL (ref 0–0.12)
BILIRUB SERPL-MCNC: 0.5 MG/DL (ref 0.1–1.5)
BUN SERPL-MCNC: 15 MG/DL (ref 8–22)
CALCIUM ALBUM COR SERPL-MCNC: 10 MG/DL (ref 8.5–10.5)
CALCIUM SERPL-MCNC: 9.9 MG/DL (ref 8.5–10.5)
CHLORIDE SERPL-SCNC: 99 MMOL/L (ref 96–112)
CO2 SERPL-SCNC: 20 MMOL/L (ref 20–33)
CREAT SERPL-MCNC: 1.15 MG/DL (ref 0.5–1.4)
D DIMER PPP IA.FEU-MCNC: <0.27 UG/ML (FEU) (ref 0–0.5)
EOSINOPHIL # BLD AUTO: 0.38 K/UL (ref 0–0.51)
EOSINOPHIL NFR BLD: 4.4 % (ref 0–6.9)
ERYTHROCYTE [DISTWIDTH] IN BLOOD BY AUTOMATED COUNT: 46.7 FL (ref 35.9–50)
FLUAV RNA SPEC QL NAA+PROBE: NEGATIVE
FLUBV RNA SPEC QL NAA+PROBE: NEGATIVE
GFR SERPLBLD CREATININE-BSD FMLA CKD-EPI: 68 ML/MIN/1.73 M 2
GLOBULIN SER CALC-MCNC: 3.5 G/DL (ref 1.9–3.5)
GLUCOSE SERPL-MCNC: 131 MG/DL (ref 65–99)
HCT VFR BLD AUTO: 50.6 % (ref 42–52)
HGB BLD-MCNC: 17.2 G/DL (ref 14–18)
IMM GRANULOCYTES # BLD AUTO: 0.04 K/UL (ref 0–0.11)
IMM GRANULOCYTES NFR BLD AUTO: 0.5 % (ref 0–0.9)
LV EJECT FRACT  99904: 60
LYMPHOCYTES # BLD AUTO: 2 K/UL (ref 1–4.8)
LYMPHOCYTES NFR BLD: 23 % (ref 22–41)
MCH RBC QN AUTO: 32.3 PG (ref 27–33)
MCHC RBC AUTO-ENTMCNC: 34 G/DL (ref 32.3–36.5)
MCV RBC AUTO: 95.1 FL (ref 81.4–97.8)
MONOCYTES # BLD AUTO: 0.71 K/UL (ref 0–0.85)
MONOCYTES NFR BLD AUTO: 8.2 % (ref 0–13.4)
NEUTROPHILS # BLD AUTO: 5.51 K/UL (ref 1.82–7.42)
NEUTROPHILS NFR BLD: 63.4 % (ref 44–72)
NRBC # BLD AUTO: 0 K/UL
NRBC BLD-RTO: 0 /100 WBC (ref 0–0.2)
NT-PROBNP SERPL IA-MCNC: 130 PG/ML (ref 0–125)
PLATELET # BLD AUTO: 260 K/UL (ref 164–446)
PMV BLD AUTO: 9.2 FL (ref 9–12.9)
POTASSIUM SERPL-SCNC: 4.2 MMOL/L (ref 3.6–5.5)
PROT SERPL-MCNC: 7.4 G/DL (ref 6–8.2)
RBC # BLD AUTO: 5.32 M/UL (ref 4.7–6.1)
RSV RNA SPEC QL NAA+PROBE: NEGATIVE
SARS-COV-2 RNA RESP QL NAA+PROBE: NOTDETECTED
SODIUM SERPL-SCNC: 134 MMOL/L (ref 135–145)
TROPONIN T SERPL-MCNC: 14 NG/L (ref 6–19)
TROPONIN T SERPL-MCNC: 16 NG/L (ref 6–19)
TSH SERPL-ACNC: 0.78 UIU/ML (ref 0.35–5.5)
WBC # BLD AUTO: 8.7 K/UL (ref 4.8–10.8)

## 2024-12-29 PROCEDURE — 93005 ELECTROCARDIOGRAM TRACING: CPT | Mod: TC | Performed by: EMERGENCY MEDICINE

## 2024-12-29 PROCEDURE — 36415 COLL VENOUS BLD VENIPUNCTURE: CPT

## 2024-12-29 PROCEDURE — G0378 HOSPITAL OBSERVATION PER HR: HCPCS

## 2024-12-29 PROCEDURE — 71045 X-RAY EXAM CHEST 1 VIEW: CPT

## 2024-12-29 PROCEDURE — 93306 TTE W/DOPPLER COMPLETE: CPT

## 2024-12-29 PROCEDURE — 85027 COMPLETE CBC AUTOMATED: CPT | Mod: XU

## 2024-12-29 PROCEDURE — 700102 HCHG RX REV CODE 250 W/ 637 OVERRIDE(OP): Performed by: HOSPITALIST

## 2024-12-29 PROCEDURE — 84484 ASSAY OF TROPONIN QUANT: CPT

## 2024-12-29 PROCEDURE — 80048 BASIC METABOLIC PNL TOTAL CA: CPT | Mod: XU

## 2024-12-29 PROCEDURE — A9270 NON-COVERED ITEM OR SERVICE: HCPCS | Performed by: HOSPITALIST

## 2024-12-29 PROCEDURE — 700111 HCHG RX REV CODE 636 W/ 250 OVERRIDE (IP): Mod: JZ | Performed by: HOSPITALIST

## 2024-12-29 PROCEDURE — 83880 ASSAY OF NATRIURETIC PEPTIDE: CPT

## 2024-12-29 PROCEDURE — 99223 1ST HOSP IP/OBS HIGH 75: CPT | Performed by: HOSPITALIST

## 2024-12-29 PROCEDURE — 700117 HCHG RX CONTRAST REV CODE 255: Performed by: HOSPITALIST

## 2024-12-29 PROCEDURE — 96374 THER/PROPH/DIAG INJ IV PUSH: CPT | Mod: XU

## 2024-12-29 PROCEDURE — 85025 COMPLETE CBC W/AUTO DIFF WBC: CPT

## 2024-12-29 PROCEDURE — 85379 FIBRIN DEGRADATION QUANT: CPT

## 2024-12-29 PROCEDURE — 93306 TTE W/DOPPLER COMPLETE: CPT | Mod: 26 | Performed by: INTERNAL MEDICINE

## 2024-12-29 PROCEDURE — 700101 HCHG RX REV CODE 250: Performed by: EMERGENCY MEDICINE

## 2024-12-29 PROCEDURE — 99285 EMERGENCY DEPT VISIT HI MDM: CPT

## 2024-12-29 PROCEDURE — 84443 ASSAY THYROID STIM HORMONE: CPT

## 2024-12-29 PROCEDURE — 80053 COMPREHEN METABOLIC PANEL: CPT

## 2024-12-29 PROCEDURE — 93005 ELECTROCARDIOGRAM TRACING: CPT | Mod: TC

## 2024-12-29 PROCEDURE — 0241U HCHG SARS-COV-2 COVID-19 NFCT DS RESP RNA 4 TRGT ED POC: CPT

## 2024-12-29 PROCEDURE — 94640 AIRWAY INHALATION TREATMENT: CPT

## 2024-12-29 RX ORDER — ACETAMINOPHEN 500 MG
1000 TABLET ORAL EVERY 6 HOURS PRN
COMMUNITY

## 2024-12-29 RX ORDER — LEVALBUTEROL INHALATION SOLUTION 0.63 MG/3ML
0.63 SOLUTION RESPIRATORY (INHALATION) ONCE
Status: COMPLETED | OUTPATIENT
Start: 2024-12-29 | End: 2024-12-29

## 2024-12-29 RX ORDER — METOPROLOL SUCCINATE 25 MG/1
25 TABLET, EXTENDED RELEASE ORAL EVERY EVENING
Status: DISCONTINUED | OUTPATIENT
Start: 2024-12-29 | End: 2024-12-30 | Stop reason: HOSPADM

## 2024-12-29 RX ORDER — METHYLPREDNISOLONE 4 MG/1
4 TABLET ORAL
Status: DISCONTINUED | OUTPATIENT
Start: 2024-12-31 | End: 2024-12-30 | Stop reason: HOSPADM

## 2024-12-29 RX ORDER — ATORVASTATIN CALCIUM 40 MG/1
40 TABLET, FILM COATED ORAL DAILY
Status: DISCONTINUED | OUTPATIENT
Start: 2024-12-30 | End: 2024-12-30 | Stop reason: HOSPADM

## 2024-12-29 RX ORDER — ONDANSETRON 4 MG/1
4 TABLET, ORALLY DISINTEGRATING ORAL EVERY 4 HOURS PRN
Status: DISCONTINUED | OUTPATIENT
Start: 2024-12-29 | End: 2024-12-29

## 2024-12-29 RX ORDER — METHYLPREDNISOLONE 4 MG/1
8 TABLET ORAL
Status: DISCONTINUED | OUTPATIENT
Start: 2024-12-30 | End: 2024-12-30 | Stop reason: HOSPADM

## 2024-12-29 RX ORDER — ONDANSETRON 2 MG/ML
4 INJECTION INTRAMUSCULAR; INTRAVENOUS EVERY 4 HOURS PRN
Status: DISCONTINUED | OUTPATIENT
Start: 2024-12-29 | End: 2024-12-29

## 2024-12-29 RX ORDER — METHYLPREDNISOLONE 4 MG/1
4 TABLET ORAL 2 TIMES DAILY WITH MEALS
Status: DISCONTINUED | OUTPATIENT
Start: 2025-01-02 | End: 2024-12-30 | Stop reason: HOSPADM

## 2024-12-29 RX ORDER — LEVOTHYROXINE SODIUM 150 UG/1
150 TABLET ORAL
Status: DISCONTINUED | OUTPATIENT
Start: 2024-12-30 | End: 2024-12-30 | Stop reason: HOSPADM

## 2024-12-29 RX ORDER — GAUZE BANDAGE 2" X 2"
100 BANDAGE TOPICAL DAILY
Status: DISCONTINUED | OUTPATIENT
Start: 2024-12-29 | End: 2024-12-30 | Stop reason: HOSPADM

## 2024-12-29 RX ORDER — LOSARTAN POTASSIUM 50 MG/1
25 TABLET ORAL EVERY EVENING
Status: DISCONTINUED | OUTPATIENT
Start: 2024-12-29 | End: 2024-12-30 | Stop reason: HOSPADM

## 2024-12-29 RX ORDER — FUROSEMIDE 10 MG/ML
20 INJECTION INTRAMUSCULAR; INTRAVENOUS ONCE
Status: COMPLETED | OUTPATIENT
Start: 2024-12-29 | End: 2024-12-29

## 2024-12-29 RX ORDER — POTASSIUM CHLORIDE 1500 MG/1
40 TABLET, EXTENDED RELEASE ORAL ONCE
Status: COMPLETED | OUTPATIENT
Start: 2024-12-29 | End: 2024-12-29

## 2024-12-29 RX ORDER — METHYLPREDNISOLONE 4 MG/1
4 TABLET ORAL
Status: DISCONTINUED | OUTPATIENT
Start: 2025-01-01 | End: 2024-12-30 | Stop reason: HOSPADM

## 2024-12-29 RX ORDER — METHYLPREDNISOLONE 4 MG/1
4 TABLET ORAL
Status: DISCONTINUED | OUTPATIENT
Start: 2024-12-30 | End: 2024-12-30 | Stop reason: HOSPADM

## 2024-12-29 RX ORDER — LEVALBUTEROL INHALATION SOLUTION 0.63 MG/3ML
0.63 SOLUTION RESPIRATORY (INHALATION)
Status: DISCONTINUED | OUTPATIENT
Start: 2024-12-29 | End: 2024-12-30 | Stop reason: HOSPADM

## 2024-12-29 RX ORDER — METOPROLOL TARTRATE 1 MG/ML
5 INJECTION, SOLUTION INTRAVENOUS ONCE
Status: DISCONTINUED | OUTPATIENT
Start: 2024-12-29 | End: 2024-12-29

## 2024-12-29 RX ADMIN — LEVALBUTEROL 0.63 MG: 0.63 SOLUTION RESPIRATORY (INHALATION) at 11:08

## 2024-12-29 RX ADMIN — Medication 100 MG: at 17:34

## 2024-12-29 RX ADMIN — APIXABAN 5 MG: 5 TABLET, FILM COATED ORAL at 17:34

## 2024-12-29 RX ADMIN — FUROSEMIDE 20 MG: 10 INJECTION, SOLUTION INTRAVENOUS at 17:34

## 2024-12-29 RX ADMIN — MOMETASONE FUROATE AND FORMOTEROL FUMARATE DIHYDRATE 2 PUFF: 200; 5 AEROSOL RESPIRATORY (INHALATION) at 17:36

## 2024-12-29 RX ADMIN — METHYLPREDNISOLONE 24 MG: 16 TABLET ORAL at 17:35

## 2024-12-29 RX ADMIN — LOSARTAN POTASSIUM 25 MG: 50 TABLET, FILM COATED ORAL at 17:34

## 2024-12-29 RX ADMIN — METOPROLOL SUCCINATE 25 MG: 25 TABLET, EXTENDED RELEASE ORAL at 17:34

## 2024-12-29 RX ADMIN — HUMAN ALBUMIN MICROSPHERES AND PERFLUTREN 3 ML: 10; .22 INJECTION, SOLUTION INTRAVENOUS at 16:00

## 2024-12-29 RX ADMIN — POTASSIUM CHLORIDE 40 MEQ: 1500 TABLET, EXTENDED RELEASE ORAL at 17:33

## 2024-12-29 SDOH — ECONOMIC STABILITY: TRANSPORTATION INSECURITY
IN THE PAST 12 MONTHS, HAS LACK OF RELIABLE TRANSPORTATION KEPT YOU FROM MEDICAL APPOINTMENTS, MEETINGS, WORK OR FROM GETTING THINGS NEEDED FOR DAILY LIVING?: NO

## 2024-12-29 ASSESSMENT — COGNITIVE AND FUNCTIONAL STATUS - GENERAL
MOBILITY SCORE: 24
SUGGESTED CMS G CODE MODIFIER MOBILITY: CH
SUGGESTED CMS G CODE MODIFIER DAILY ACTIVITY: CH
DAILY ACTIVITIY SCORE: 24

## 2024-12-29 ASSESSMENT — CHA2DS2 SCORE
PRIOR STROKE OR TIA OR THROMBOEMBOLISM: YES
VASCULAR DISEASE: NO
AGE 75 OR GREATER: NO
CHF OR LEFT VENTRICULAR DYSFUNCTION: YES
CHA2DS2 VASC SCORE: 5
AGE 65 TO 74: YES
DIABETES: NO
SEX: MALE
HYPERTENSION: YES

## 2024-12-29 ASSESSMENT — LIFESTYLE VARIABLES
TOTAL SCORE: 0
TOTAL SCORE: 0
CONSUMPTION TOTAL: POSITIVE
ON A TYPICAL DAY WHEN YOU DRINK ALCOHOL HOW MANY DRINKS DO YOU HAVE: 4
ALCOHOL_USE: YES
HOW MANY TIMES IN THE PAST YEAR HAVE YOU HAD 5 OR MORE DRINKS IN A DAY: 10
AVERAGE NUMBER OF DAYS PER WEEK YOU HAVE A DRINK CONTAINING ALCOHOL: 25
EVER FELT BAD OR GUILTY ABOUT YOUR DRINKING: NO
HAVE PEOPLE ANNOYED YOU BY CRITICIZING YOUR DRINKING: NO
DOES PATIENT WANT TO STOP DRINKING: NO
EVER HAD A DRINK FIRST THING IN THE MORNING TO STEADY YOUR NERVES TO GET RID OF A HANGOVER: NO
TOTAL SCORE: 0
HAVE YOU EVER FELT YOU SHOULD CUT DOWN ON YOUR DRINKING: NO

## 2024-12-29 ASSESSMENT — FIBROSIS 4 INDEX
FIB4 SCORE: 3.17
FIB4 SCORE: 3.17
FIB4 SCORE: 1.47

## 2024-12-29 ASSESSMENT — ENCOUNTER SYMPTOMS
SHORTNESS OF BREATH: 1
CONSTITUTIONAL NEGATIVE: 1
GASTROINTESTINAL NEGATIVE: 1
MUSCULOSKELETAL NEGATIVE: 1
WHEEZING: 1
EYES NEGATIVE: 1
COUGH: 1
NEUROLOGICAL NEGATIVE: 1
CARDIOVASCULAR NEGATIVE: 1
PSYCHIATRIC NEGATIVE: 1

## 2024-12-29 ASSESSMENT — SOCIAL DETERMINANTS OF HEALTH (SDOH)
WITHIN THE PAST 12 MONTHS, THE FOOD YOU BOUGHT JUST DIDN'T LAST AND YOU DIDN'T HAVE MONEY TO GET MORE: NEVER TRUE
WITHIN THE PAST 12 MONTHS, YOU WORRIED THAT YOUR FOOD WOULD RUN OUT BEFORE YOU GOT THE MONEY TO BUY MORE: NEVER TRUE
WITHIN THE LAST YEAR, HAVE TO BEEN RAPED OR FORCED TO HAVE ANY KIND OF SEXUAL ACTIVITY BY YOUR PARTNER OR EX-PARTNER?: NO
WITHIN THE LAST YEAR, HAVE YOU BEEN HUMILIATED OR EMOTIONALLY ABUSED IN OTHER WAYS BY YOUR PARTNER OR EX-PARTNER?: NO
WITHIN THE LAST YEAR, HAVE YOU BEEN AFRAID OF YOUR PARTNER OR EX-PARTNER?: NO
IN THE PAST 12 MONTHS, HAS THE ELECTRIC, GAS, OIL, OR WATER COMPANY THREATENED TO SHUT OFF SERVICE IN YOUR HOME?: NO
WITHIN THE LAST YEAR, HAVE YOU BEEN KICKED, HIT, SLAPPED, OR OTHERWISE PHYSICALLY HURT BY YOUR PARTNER OR EX-PARTNER?: NO

## 2024-12-29 ASSESSMENT — PATIENT HEALTH QUESTIONNAIRE - PHQ9
SUM OF ALL RESPONSES TO PHQ9 QUESTIONS 1 AND 2: 0
2. FEELING DOWN, DEPRESSED, IRRITABLE, OR HOPELESS: NOT AT ALL
1. LITTLE INTEREST OR PLEASURE IN DOING THINGS: NOT AT ALL

## 2024-12-29 ASSESSMENT — PAIN DESCRIPTION - PAIN TYPE
TYPE: ACUTE PAIN
TYPE: ACUTE PAIN

## 2024-12-29 NOTE — CARE PLAN
The patient is Watcher - Medium risk of patient condition declining or worsening         Progress made toward(s) clinical / shift goals:    Problem: Knowledge Deficit - Standard  Goal: Patient and family/care givers will demonstrate understanding of plan of care, disease process/condition, diagnostic tests and medications  Description: Target End Date:  1-3 days or as soon as patient condition allows    Document in Patient Education    1.  Patient and family/caregiver oriented to unit, equipment, visitation policy and means for communicating concern  2.  Complete/review Learning Assessment  3.  Assess knowledge level of disease process/condition, treatment plan, diagnostic tests and medications  4.  Explain disease process/condition, treatment plan, diagnostic tests and medications  Outcome: Progressing     Problem: Communication  Goal: The ability to communicate needs accurately and effectively will improve  Outcome: Progressing     Problem: Safety  Goal: Will remain free from injury  Outcome: Progressing  Goal: Will remain free from falls  Outcome: Progressing     Problem: Pain Management  Goal: Pain level will decrease to patient's comfort goal  Outcome: Progressing     Problem: Infection  Goal: Will remain free from infection  Outcome: Progressing     Problem: Venous Thromboembolism (VTW)/Deep Vein Thrombosis (DVT) Prevention:  Goal: Patient will participate in Venous Thrombosis (VTE)/Deep Vein Thrombosis (DVT)Prevention Measures  Outcome: Progressing     Problem: Psychosocial Needs:  Goal: Level of anxiety will decrease  Outcome: Progressing     Problem: Fluid Volume:  Goal: Will maintain balanced intake and output  Outcome: Progressing     Problem: Respiratory:  Goal: Respiratory status will improve  Outcome: Progressing     Problem: Bowel/Gastric:  Goal: Normal bowel function is maintained or improved  Outcome: Progressing  Goal: Will not experience complications related to bowel motility  Outcome:  Progressing     Problem: Urinary Elimination:  Goal: Ability to reestablish a normal urinary elimination pattern will improve  Outcome: Progressing     Problem: Skin Integrity  Goal: Risk for impaired skin integrity will decrease  Outcome: Progressing     Problem: Mobility  Goal: Risk for activity intolerance will decrease  Outcome: Progressing     Problem: Medication  Goal: Compliance with prescribed medication will improve  Outcome: Progressing     Problem: Knowledge Deficit  Goal: Knowledge of disease process/condition, treatment plan, diagnostic tests, and medications will improve  Outcome: Progressing  Goal: Knowledge of the prescribed therapeutic regimen will improve  Outcome: Progressing     Problem: Discharge Barriers/Planning  Goal: Patient's continuum of care needs will be met  Outcome: Progressing       Patient is not progressing towards the following goals:

## 2024-12-29 NOTE — ED NOTES
Medication Reconciliation    Medication reconciliation is complete per patient reporting. Patient reports taking Breo Ellipta every other day to ration doses for financial reasons.  - Allergies reviewed.  - No outpatient antibiotics in the last 30 days.  - Patient is taking apixaban. Last dose taken on 12/29/24 AM.  - Patient's home pharmacy is [facility] on [facility] [phone].    Mahsa Watts, Pharmacy Intern

## 2024-12-29 NOTE — PROGRESS NOTES
4 Eyes Skin Assessment Completed by DELIA Drake and DELIA Crawford.    Head WDL  Ears WDL  Nose WDL  Mouth WDL  Neck WDL  Breast/Chest WDL  Shoulder Blades WDL  Spine WDL  (R) Arm/Elbow/Hand WDL  (L) Arm/Elbow/Hand WDL  Abdomen WDL  Groin WDL  Scrotum/Coccyx/Buttocks WDL  (R) Leg WDL  (L) Leg WDL  (R) Heel/Foot/Toe WDL  (L) Heel/Foot/Toe WDL      Devices In Places Tele Box, Blood Pressure Cuff, and Pulse Ox      Interventions In Place Pillows    Possible Skin Injury No    Pictures Uploaded Into Epic N/A  Wound Consult Placed N/A  RN Wound Prevention Protocol Ordered No

## 2024-12-29 NOTE — ED TRIAGE NOTES
To triage by wheelchair with   Chief Complaint   Patient presents with    Shortness of Breath    Arrhythmia     A Fib      Reports flu-like symptoms x 1 week. Onset of SOB, orthopnea, and irregular pulse since last night. Has associated weight gain and abdominal swelling over the last several months. Hx of CHF, cardiomyopathy, and atrial fibrillation. Reports no CP, lightheadedness, fever. Reports adherence to medication regime. EKG shows A fib RVR rate 159.     Protocol ordered. Charge RN notified.

## 2024-12-29 NOTE — ED PROVIDER NOTES
Emergency Physician Note    Chief Concern:  Chief Complaint   Patient presents with    Shortness of Breath    Arrhythmia     A Fib          External Records Reviewed:  1.  Outpatient clinic notes reviewed: Patient was seen in cardiology clinic 7/23/2024.  APRN note reviewed from that visit. He was seen for evaluation of heart failure follow-up.  Most recently seen by Dr. Hauser.  Noted have a history of nonobstructive coronary artery disease, ejection fraction 20%, atrial fibrillation, left ventricular thrombus.  Additionally noted have a history of adrenal insufficiency and alcoholic cardiomyopathy.  Noted that he had stopped amiodarone therapy in March, and A-fib had not recurred.  He remains on Eliquis and metoprolol.    HPI/ROS     HPI:  Alberto Galicia is a 69 y.o. male who presents to the emergency department today for evaluation of shortness of breath, and feeling generally unwell.  He is a past medical history significant for heart failure, states that he has had some steady weight gain over the past year to year and a half, as well as progressively worsening shortness of breath over the past few months.  Over the last week he developed flulike symptoms, states he was having chills, uncertain if he was having fevers.  5 days ago he actually drove to the emergency department because he was feeling unwell, but states that he did not feel strong enough to walk from his car in the parking lot to the emergency department, so he returned home.  Today he is feeling a little more improved, but states that his symptoms have not resolved.  He noticed his heart rate is elevated, and continues to have significant shortness of breath.  He has not missed any doses of his home medication, most recent dose of metoprolol was this morning, about 3 and half hours prior to arrival.  He states that his flulike symptoms are improving, though he continues to have weight gain.  Additionally, he has a past medical history  significant for asthma.    PAST MEDICAL HISTORY  Past Medical History:   Diagnosis Date    Asthma     Cardiomyopathy (HCC) EF <20% 1/30/2023    Hyperlipidemia     Left ventricular thrombus 1/30/2023    Pituitary disease (HCC)     Thyroid disease        SURGICAL HISTORY  Past Surgical History:   Procedure Laterality Date    CRANIOTOMY         FAMILY HISTORY  Family History   Problem Relation Age of Onset    Diabetes Mother     Heart Disease Father     Diabetes Brother     Alzheimer's Disease Maternal Grandmother     Cancer Maternal Grandfather     Heart Disease Paternal Grandmother     Cancer Paternal Grandfather        SOCIAL HISTORY   reports that he has never smoked. He has never used smokeless tobacco. He reports current alcohol use of about 16.8 oz of alcohol per week. He reports that he does not use drugs.    CURRENT MEDICATIONS  Previous Medications    ALBUTEROL 108 (90 BASE) MCG/ACT AERO SOLN INHALATION AEROSOL    INHALE 2 PUFFS BY MOUTH EVERY 6 HOURS AS NEEDED FOR SHORTNESS OF BREATH    ATORVASTATIN (LIPITOR) 40 MG TAB    Take 1 Tablet by mouth every day.    ELIQUIS 5 MG TAB    Take 1 Tablet by mouth 2 times a day.    FLUTICASONE FUROATE-VILANTEROL (BREO ELLIPTA) 100-25 MCG/ACT AEROSOL POWDER, BREATH ACTIVATED    Inhale 1 Puff every day.    FLUTICASONE-SALMETEROL (ADVAIR DISKUS) 100-50 MCG/ACT AEROSOL POWDER, BREATH ACTIVATED    Inhale 1 Puff 2 times a day.    FUROSEMIDE (LASIX) 20 MG TAB    Take 1 Tablet by mouth every day.    HYDROCORTISONE (CORTEF) 5 MG TAB    Take 2 pills in the morning and 1 pill in the afternoon    LEVOTHYROXINE (SYNTHROID) 150 MCG TAB    Take 1 Tablet by mouth every morning on an empty stomach.    LOSARTAN (COZAAR) 25 MG TAB    Take 1 Tablet by mouth every evening.    METOPROLOL SR (TOPROL XL) 25 MG TABLET SR 24 HR    Take 1 Tablet by mouth every evening.    POTASSIUM CHLORIDE SA (KDUR) 20 MEQ TAB CR    Take 0.5 Tablets by mouth every day.    SILDENAFIL CITRATE (VIAGRA) 50 MG TABLET    " Take 1 Tablet by mouth 1 time a day as needed for Erectile Dysfunction.    TRIAMCINOLONE ACETONIDE (KENALOG) 0.1 % CREAM    APPLY CREAM TOPICALLY TWICE DAILY       ALLERGIES  Penicillins and Viagra [sildenafil]    PHYSICAL EXAM  Vital Signs: /84   Pulse 82 Comment: Irregular  Temp 35.8 °C (96.5 °F) (Temporal)   Resp 20   Ht 1.93 m (6' 4\")   Wt 116 kg (255 lb 15.3 oz)   SpO2 94%   BMI 31.16 kg/m²   Constitutional: Alert, no acute distress  HENT: Normocephalic, atraumatic.  Cardiovascular: Tachycardic rate on arrival with irregular rhythm, atrial fibrillation with rapid ventricular response, normotensive  Pulmonary: Mild tachypnea, mild wheezing throughout all lung fields consistent with history of asthma, no basilar crackles  Abdomen: Soft, non tender, no peritoneal signs.  Skin: Warm, dry, no rashes or lesions  Musculoskeletal: Normal range of motion in all extremities, no swelling or deformity noted, no significant lower extremity edema neurologic: Alert, oriented, normal motor function, no speech deficits    Diagnostic Studies & Procedures    Labs:  All labs reviewed by me as noted below.    EK Lead EKG interpreted by me as noted below  Results for orders placed or performed during the hospital encounter of 24   EKG   Result Value Ref Range    Report       [facility] Emergency Dept.    Test Date:  2024  Pt Name:    MATT MOODY                   Department: ER  MRN:        [id number]                      Room:         Gender:     M                            Technician: [id number]  :        [id number]                   Requested By:ER TRIAGE PROTOCOL  Order #:    [id number]                    Reading MD: OZ PALACIOS MD    Measurements  Intervals                                Axis  Rate:       159                          P:          0  OK:         0                            QRS:        -73  QRSD:       102                          T:          78  QT:         " 308  QTc:        502    Interpretive Statements  Rate 159, atrial fibrillation with rapid ventricular response, irregularly  irregular rhythm, normal voltage  Electronically Signed On 12- 15:26:17 PST by OZ PALACIOS MD       *Note: Due to a large number of results and/or encounters for the requested time period, some results have not been displayed. A complete set of results can be found in Results Review.         Radiology:  The attending Emergency Physician has independently interpreted the following imaging:  Chest x-ray interpreted, no acute process identified, no focal infiltrates, no evidence of pulmonary edema    DX-CHEST-PORTABLE (1 VIEW)   Final Result      No acute process.       Course and Medical Decision Making    Initial Assessment and Plan:  Mr. Galicia presents to the emergency department today for evaluation progressively worsening shortness of breath.  He states his symptoms were most severe a few days ago when he drove himself to the emergency department but did not feel well enough to walk from the parking garage to check himself in.  He returned home, and states he felt a little better today so he came back to the emergency department.  He reports progressive weight gain, and worsening shortness of breath, though symptoms seem to come more severe over the past few days.  He has not had any associated fevers, but has had chills.  On arrival to the emergency department he is in atrial fibrillation with rapid ventricular response.  He did take his dose of metoprolol this morning and has not missed any of those doses.  He does not have associated chest pain.  He is currently anticoagulated.    During my initial history, his rate remained in the 150s-160s, though would break to a few beats of normal sinus rhythm, before reverting back to A-fib with RVR.    On laboratory evaluation he has a normal CBC, no evidence of symptomatic anemia.  proBNP is just above normal reference range at 130.   CMP shows no significant electrolyte abnormalities.  D-dimer is negative.  High-sensitivity troponin is negative.  COVID, flu, and RSV testing resulted negative.  Chest x-ray is negative for acute process.    On my reassessment, he is in sinus rhythm in the 70s.  Though he continues to transition in and out of atrial fibrillation, he is now spending considerably more time in normal sinus rhythm.  He did not receive any interventions in the emergency department.  Given worsening dyspnea on exertion, with tachypnea on arrival, concerned that heart rate may not yet be fully controlled.  Additionally, pulmonary auscultation is notable for some mild wheezing, he was given a dose of Xopenex in the emergency department and remained in sinus rhythm.    Plan at this time is for admission to CDU for echocardiogram, and ongoing cardiac monitoring.  He is feeling improved, though states he is not entirely asymptomatic, he does feel as though his breathing is improving.  Additionally, he may require continued treatment for asthma.    Additional Problems and Disposition    I have discussed management of the patient with the following physicians:   Dr. Galicia, Hospitalist    Disposition:  Admit in stable condition    FINAL IMPRESSION   1. Atrial fibrillation with rapid ventricular response (HCC)    2. Orthopnea    3. Asthma with acute exacerbation, unspecified asthma severity, unspecified whether persistent    4. Moderate persistent asthma without complication    5. Heart failure with reduced ejection fraction (HCC)    6. Hypercoagulable state due to paroxysmal atrial fibrillation (HCC)    7. Alcoholic cardiomyopathy (HCC)    8. Left ventricular systolic dysfunction, NYHA class 2    9. Acute bronchospasm

## 2024-12-30 VITALS
WEIGHT: 251.32 LBS | DIASTOLIC BLOOD PRESSURE: 58 MMHG | TEMPERATURE: 97.9 F | HEART RATE: 84 BPM | SYSTOLIC BLOOD PRESSURE: 113 MMHG | RESPIRATION RATE: 18 BRPM | OXYGEN SATURATION: 91 % | HEIGHT: 76 IN | BODY MASS INDEX: 30.6 KG/M2

## 2024-12-30 PROBLEM — J98.01 ACUTE BRONCHOSPASM: Status: RESOLVED | Noted: 2024-12-29 | Resolved: 2024-12-30

## 2024-12-30 LAB
ANION GAP SERPL CALC-SCNC: 11 MMOL/L (ref 7–16)
BUN SERPL-MCNC: 15 MG/DL (ref 8–22)
CALCIUM SERPL-MCNC: 9.5 MG/DL (ref 8.5–10.5)
CHLORIDE SERPL-SCNC: 101 MMOL/L (ref 96–112)
CO2 SERPL-SCNC: 22 MMOL/L (ref 20–33)
CREAT SERPL-MCNC: 1.03 MG/DL (ref 0.5–1.4)
EKG IMPRESSION: NORMAL
ERYTHROCYTE [DISTWIDTH] IN BLOOD BY AUTOMATED COUNT: 48.8 FL (ref 35.9–50)
GFR SERPLBLD CREATININE-BSD FMLA CKD-EPI: 78 ML/MIN/1.73 M 2
GLUCOSE SERPL-MCNC: 163 MG/DL (ref 65–99)
HCT VFR BLD AUTO: 50.8 % (ref 42–52)
HGB BLD-MCNC: 17.2 G/DL (ref 14–18)
MCH RBC QN AUTO: 32.9 PG (ref 27–33)
MCHC RBC AUTO-ENTMCNC: 33.9 G/DL (ref 32.3–36.5)
MCV RBC AUTO: 97.1 FL (ref 81.4–97.8)
PLATELET # BLD AUTO: 234 K/UL (ref 164–446)
PMV BLD AUTO: 9.5 FL (ref 9–12.9)
POTASSIUM SERPL-SCNC: 5.3 MMOL/L (ref 3.6–5.5)
RBC # BLD AUTO: 5.23 M/UL (ref 4.7–6.1)
SODIUM SERPL-SCNC: 134 MMOL/L (ref 135–145)
WBC # BLD AUTO: 8.9 K/UL (ref 4.8–10.8)

## 2024-12-30 PROCEDURE — 700102 HCHG RX REV CODE 250 W/ 637 OVERRIDE(OP): Performed by: HOSPITALIST

## 2024-12-30 PROCEDURE — 99239 HOSP IP/OBS DSCHRG MGMT >30: CPT | Performed by: HOSPITALIST

## 2024-12-30 PROCEDURE — RXMED WILLOW AMBULATORY MEDICATION CHARGE: Performed by: HOSPITALIST

## 2024-12-30 PROCEDURE — G0378 HOSPITAL OBSERVATION PER HR: HCPCS

## 2024-12-30 PROCEDURE — A9270 NON-COVERED ITEM OR SERVICE: HCPCS | Performed by: HOSPITALIST

## 2024-12-30 RX ORDER — METOPROLOL SUCCINATE 50 MG/1
50 TABLET, EXTENDED RELEASE ORAL EVERY EVENING
Qty: 30 TABLET | Refills: 3 | Status: SHIPPED | OUTPATIENT
Start: 2024-12-30

## 2024-12-30 RX ORDER — LEVALBUTEROL TARTRATE 45 UG/1
1-2 AEROSOL, METERED ORAL EVERY 4 HOURS PRN
Qty: 15 G | Refills: 3 | Status: SHIPPED | OUTPATIENT
Start: 2024-12-30 | End: 2025-01-17 | Stop reason: SDUPTHER

## 2024-12-30 RX ORDER — FUROSEMIDE 40 MG/1
40 TABLET ORAL DAILY
Qty: 30 TABLET | Refills: 3 | Status: SHIPPED | OUTPATIENT
Start: 2024-12-30

## 2024-12-30 RX ADMIN — ATORVASTATIN CALCIUM 40 MG: 40 TABLET, FILM COATED ORAL at 05:37

## 2024-12-30 RX ADMIN — APIXABAN 5 MG: 5 TABLET, FILM COATED ORAL at 05:37

## 2024-12-30 RX ADMIN — LEVOTHYROXINE SODIUM 150 MCG: 0.15 TABLET ORAL at 05:37

## 2024-12-30 RX ADMIN — METHYLPREDNISOLONE 4 MG: 4 TABLET ORAL at 08:37

## 2024-12-30 RX ADMIN — Medication 100 MG: at 05:36

## 2024-12-30 RX ADMIN — MOMETASONE FUROATE AND FORMOTEROL FUMARATE DIHYDRATE 2 PUFF: 200; 5 AEROSOL RESPIRATORY (INHALATION) at 05:37

## 2024-12-30 NOTE — DISCHARGE PLANNING
HTH/ [facility] TCN chart review completed. Noted patient LACE 67 and high 6 click scores noted at 24 ADL and 24 mobility. Noted patient scheduled for New to You appointment with primary care 1/7. As patient has a dc order, no TCN needs anticipated in patient discharge planning at this time. Should post acute discharge needs arise warranting TCN involvement, please contact TCN team via Voalte. Thank you.

## 2024-12-30 NOTE — H&P
Hospital Medicine History & Physical Note    Date of Service  12/29/2024    Primary Care Physician  Manjula Zaidi M.D.    Consultants      Code Status  Full Code    Chief Complaint  Chief Complaint   Patient presents with    Shortness of Breath    Arrhythmia     A Fib        History of Presenting Illness  Alberto Galicia is a 69 y.o. male who presented 12/29/2024 with past medical of alcohol abuse, A-fib, CHF with a EF of 20% to the emergency department at [facility] stating for the last several days he has been short of breath.  The shortness of breath gets worse with exertion.  He denies any chest pain.  He has a cough that is occasionally productive of whitish sputum.  He states that he is just getting over a viral syndrome.  In the emergency department patient was tested for COVID and flu and it was negative.  In the emergency department he was wheezing he came in     he was tachycardic with  and in A-fib       heart rate improved after a Xopenex nebulization.  Patient referred to me for further care and management.    I discussed the plan of care with patient.    Review of Systems  Review of Systems   Constitutional: Negative.    HENT: Negative.     Eyes: Negative.    Respiratory:  Positive for cough, shortness of breath and wheezing.    Cardiovascular: Negative.    Gastrointestinal: Negative.    Genitourinary: Negative.    Musculoskeletal: Negative.    Skin: Negative.    Neurological: Negative.    Psychiatric/Behavioral: Negative.         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    Surgical History   has a past surgical history that includes craniotomy.     Family History  family history includes Alzheimer's Disease in his maternal grandmother; Cancer in his maternal grandfather and paternal grandfather; Diabetes in his brother and  mother; Heart Disease in his father and paternal grandmother.   Family history reviewed with patient. There is no family history that is pertinent to the chief complaint.     Social History   reports that he has never smoked. He has never used smokeless tobacco. He reports current alcohol use of about 16.8 oz of alcohol per week. He reports that he does not use drugs.    Allergies  Allergies   Allergen Reactions    Penicillins Swelling     Swelling at injection site; reaction occurred at 25 yo    Viagra [Sildenafil]      If taken 100mg       Medications  Prior to Admission Medications   Prescriptions Last Dose Informant Patient Reported? Taking?   ELIQUIS 5 MG Tab 12/29/2024 Morning Patient No Yes   Sig: Take 1 Tablet by mouth 2 times a day.   MELATONIN PO Unknown Patient Yes No   Sig: Take 2 Tablets by mouth 1 time a day as needed (sleep).   Omega-3 Fatty Acids (FISH OIL PO) 12/29/2024 Morning Patient Yes Yes   Sig: Take 1-2 Capsules by mouth 2 times a day. Take 2 capsules in the morning and 1 capsule in the evening   Pseudoephedrine HCl (PSEUDOEPHEDRINE PO) Unknown Patient Yes No   Sig: Take 2 Tablets by mouth 1 time a day as needed (congestion).   acetaminophen (TYLENOL) 500 MG Tab Unknown Patient Yes No   Sig: Take 1,000 mg by mouth every 6 hours as needed for Mild Pain, Moderate Pain or Fever.   albuterol 108 (90 Base) MCG/ACT Aero Soln inhalation aerosol 12/27/2024 Patient No No   Sig: INHALE 2 PUFFS BY MOUTH EVERY 6 HOURS AS NEEDED FOR SHORTNESS OF BREATH   atorvastatin (LIPITOR) 40 MG Tab 12/29/2024 Morning Patient No Yes   Sig: Take 1 Tablet by mouth every day.   fluticasone furoate-vilanterol (BREO ELLIPTA) 100-25 MCG/ACT AEROSOL POWDER, BREATH ACTIVATED Unknown Patient No No   Sig: Inhale 1 Puff every day.   Patient taking differently: Inhale 1 Puff every 48 hours.   furosemide (LASIX) 20 MG Tab 12/28/2024 Evening Patient No Yes   Sig: Take 1 Tablet by mouth every day.   hydrocortisone (CORTEF) 5 MG Tab  12/29/2024 Morning Patient No Yes   Sig: Take 2 pills in the morning and 1 pill in the afternoon   levothyroxine (SYNTHROID) 150 MCG Tab 12/29/2024 Morning Patient No Yes   Sig: Take 1 Tablet by mouth every morning on an empty stomach.   losartan (COZAAR) 25 MG Tab 12/28/2024 Evening Patient No Yes   Sig: Take 1 Tablet by mouth every evening.   metoprolol SR (TOPROL XL) 25 MG TABLET SR 24 HR 12/28/2024 Evening Patient No Yes   Sig: Take 1 Tablet by mouth every evening.   multivitamin Tab 12/29/2024 Morning Patient Yes Yes   Sig: Take 1 Tablet by mouth every morning.   potassium chloride SA (KDUR) 20 MEQ Tab CR 12/28/2024 Evening Patient No Yes   Sig: Take 0.5 Tablets by mouth every day.   sildenafil citrate (VIAGRA) 50 MG tablet Unknown Patient No No   Sig: Take 1 Tablet by mouth 1 time a day as needed for Erectile Dysfunction.   triamcinolone acetonide (KENALOG) 0.1 % Cream Unknown Patient No No   Sig: APPLY CREAM TOPICALLY TWICE DAILY      Facility-Administered Medications: None       Physical Exam  Temp:  [35.8 °C (96.5 °F)-36.7 °C (98 °F)] 36.7 °C (98 °F)  Pulse:  [] 74  Resp:  [15-29] 16  BP: (107-147)/(56-90) 138/78  SpO2:  [90 %-96 %] 96 %  Blood Pressure : 138/78   Temperature: 36.7 °C (98 °F)   Pulse: 74   Respiration: 16   Pulse Oximetry: 96 %       Physical Exam  Constitutional:       Appearance: He is obese. He is not ill-appearing, toxic-appearing or diaphoretic.   Eyes:      General: No scleral icterus.  Cardiovascular:      Rate and Rhythm: Rhythm irregular.      Heart sounds: No murmur heard.     No gallop.   Pulmonary:      Breath sounds: Wheezing and rhonchi present.   Abdominal:      General: There is distension.      Palpations: There is no mass.      Tenderness: There is no abdominal tenderness. There is no right CVA tenderness or rebound.   Musculoskeletal:      Cervical back: Normal range of motion.      Right lower leg: No edema.      Left lower leg: Edema present.   Skin:     Capillary  Refill: Capillary refill takes 2 to 3 seconds.   Neurological:      General: No focal deficit present.      Mental Status: He is oriented to person, place, and time.      Cranial Nerves: No cranial nerve deficit.      Motor: No weakness.   Psychiatric:         Mood and Affect: Mood normal.         Behavior: Behavior normal.         Laboratory:  Recent Labs     12/29/24  0848   WBC 8.7   RBC 5.32   HEMOGLOBIN 17.2   HEMATOCRIT 50.6   MCV 95.1   MCH 32.3   MCHC 34.0   RDW 46.7   PLATELETCT 260   MPV 9.2     Recent Labs     12/29/24  0848   SODIUM 134*   POTASSIUM 4.2   CHLORIDE 99   CO2 20   GLUCOSE 131*   BUN 15   CREATININE 1.15   CALCIUM 9.9     Recent Labs     12/29/24  0848   ALTSGPT 45   ASTSGOT 79*   ALKPHOSPHAT 81   TBILIRUBIN 0.5   GLUCOSE 131*         Recent Labs     12/29/24  0848   NTPROBNP 130*         Recent Labs     12/29/24  0848 12/29/24  1109   TROPONINT 14 16       Imaging:  EC-ECHOCARDIOGRAM COMPLETE W/ CONT         DX-CHEST-PORTABLE (1 VIEW)   Final Result      No acute process.          X-Ray:  I have personally reviewed the images and compared with prior images.    Assessment/Plan:  Justification for Admission Status  I anticipate this patient is appropriate for observation status at this time       * Dyspnea- (present on admission)  Assessment & Plan  Is related to acute bronchospasm    Received Lasix 20 mg IV push x 1 for pulmonary toilet    I have ordered Xopenex for his wheezing    Does not require oxygen at this time    Monitor for clinical improvement    Acute bronchospasm- (present on admission)  Assessment & Plan  May be precipitated by his viral syndrome    Medrol Dosepak  Xopenex    Paroxysmal A-fib (HCC)  Assessment & Plan  Patient's beta-blocker from 25 to 50 mg p.o. daily.      Patient was monitored on telemetry    Continue Eliquis for stroke risk reduction.    TSH    Echo    Chronic systolic CHF (congestive heart failure) (HCC)- (present on admission)  Assessment & Plan  I will  obtain updated   Will strive to maintain euvolemia with diuresis with Lasix as needed    Alcohol dependence with other alcohol-induced disorder (HCC)- (present on admission)  Assessment & Plan  Thiamine daily for 3 days    Monitor For alcohol withdrawal        VTE prophylaxis: SCDs/TEDs
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
## 2024-12-30 NOTE — CARE PLAN
The patient is Stable - Low risk of patient condition declining or worsening    Shift Goals  Clinical Goals: tele monitoring for Afib, monitor respiratory status  Patient Goals: rest  Family Goals: N/A    Progress made toward(s) clinical / shift goals:    Problem: Knowledge Deficit - Standard  Goal: Patient and family/care givers will demonstrate understanding of plan of care, disease process/condition, diagnostic tests and medications  Description: Target End Date:  1-3 days or as soon as patient condition allows    1.  Patient and family/caregiver oriented to unit, equipment, visitation policy and means for communicating concern  2.  Complete/review Learning Assessment  3.  Assess knowledge level of disease process/condition, treatment plan, diagnostic tests and medications  4.  Explain disease process/condition, treatment plan, diagnostic tests and medications  Outcome: Progressing     Problem: Safety  Goal: Will remain free from injury  Outcome: Progressing  Goal: Will remain free from falls  Outcome: Progressing     Problem: Pain Management  Goal: Pain level will decrease to patient's comfort goal  Outcome: Progressing     Problem: Infection  Goal: Will remain free from infection  Outcome: Progressing     Problem: Fluid Volume:  Goal: Will maintain balanced intake and output  Outcome: Progressing     Problem: Respiratory:  Goal: Respiratory status will improve  Outcome: Progressing     Problem: Medication  Goal: Compliance with prescribed medication will improve  Outcome: Progressing     Problem: Knowledge Deficit  Goal: Knowledge of disease process/condition, treatment plan, diagnostic tests, and medications will improve  Outcome: Progressing  Goal: Knowledge of the prescribed therapeutic regimen will improve  Outcome: Progressing

## 2024-12-30 NOTE — PROGRESS NOTES
Discharge orders received.  Patient arrived to the discharge lounge.  PIV removed.  Patient denies he had home medications.  Instructions given, medications reviewed and general discharge education provided to patient.  Heart failure booklet given to patient and reviewed. Meds to beds medications reviewed with patient.  Received verbal understanding.  Follow up appointments discussed.  Patient verbalized understanding of dc instructions and prescriptions.  Patient signed discharge instructions.  Patient had all belongings with him.   Wished patient a speedy recovery.

## 2024-12-30 NOTE — PROGRESS NOTES
2 RN Skin Assessment Completed.     Head: WDL  Ears: WDL  Nose: WDL  Mouth: WDL  Neck: WDL  Breasts/Chest: WDL  Shoulder Blades: WDL  Spine: WDL  (R) Arm/Elbow/hand: WDL  (L) Arm/Elbow/hand: WDL  Abdomen:WDL  Groin: WDL  Sacrum/Coccyx/Buttocks: blanching  (R) Leg: WDL  (L) Leg: WDL  (R) Heel/Foot/Toe: blanching  (L) Heel/Foot/Toe: blanching              Devices in place: BP Cuff and Pulse Ox     Interventions in place: Gray ear foams and Pillows     Possible skin injury found: No     Pictures uploaded into Epic: N/A  Wound Consult Placed: N/A

## 2024-12-30 NOTE — ASSESSMENT & PLAN NOTE
Is related to acute bronchospasm    Received Lasix 20 mg IV push x 1 for pulmonary toilet    I have ordered Xopenex for his wheezing    Does not require oxygen at this time    Monitor for clinical improvement

## 2024-12-30 NOTE — DISCHARGE INSTRUCTIONS
HF Patient Discharge Instructions  Monitor your weight daily, and maintain a weight chart, to track your weight changes.   Activity as tolerated, unless your Doctor has ordered otherwise. Other activity order:None.  Follow a low fat, low cholesterol, low salt diet unless instructed otherwise by your Doctor. Read the labels on the back of food products and track your intake of fat, cholesterol and salt.   Fluid Restriction No. If a Fluid Restriction has been ordered by your Doctor, measure fluids with a measuring cup to ensure that you are not exceeding the restriction.   No smoking.  Oxygen No. If your Doctor has ordered that you wear Oxygen at home, it is important to wear it as ordered.  Did you receive an explanation from staff on the importance of taking each of your medications and why it is necessary to keep taking them unless your doctor says to stop? Yes  Were all of your questions answered about how to manage your heart failure and what to do if you have increased signs and symptoms after you go home? Yes  Do you feel like your heart failure care team involved you in the care treatment plan and allowed you to make decisions regarding your care while in the hospital and addressed any discharge needs you might have? Yes    See the educational handout provided at discharge for more information on monitoring your daily weight, activity and diet. This also explains more about Heart Failure, symptoms of a flare-up and some of the tests that you have undergone.     Warning Signs of a Flare-Up include:  Swelling in the ankles or lower legs.  Shortness of breath, while at rest, or while doing normal activities.   Shortness of breath at night when in bed, or coughing in bed.   Requiring more pillows to sleep at night, or needing to sit up at night to sleep.  Feeling weak, dizzy or fatigued.     When to call your Doctor:  Call your Primary Care Physician or Cardiologist if:   You experience any pain radiating to  your jaw or neck.  You have any difficulty breathing.  You experience weight gain of 3 lbs in a day or 5 lbs in a week.   You feel any palpitations or irregular heartbeats.  You become dizzy or lose consciousness.   If you have had an angiogram or had a pacemaker or AICD placed, and experience:  Bleeding, drainage or swelling at the surgical / puncture site.  Fever greater than 100.0 F  Shock from internal defibrillator.  Cool and / or numb extremities.     Please access the [facility]HA My HF Guide/Heart Failure Interactive Workbook:   [email or web address]

## 2024-12-30 NOTE — ASSESSMENT & PLAN NOTE
Patient's beta-blocker from 25 to 50 mg p.o. daily.      Patient was monitored on telemetry    Continue Eliquis for stroke risk reduction.    TSH    Echo

## 2024-12-30 NOTE — DISCHARGE SUMMARY
Discharge Summary    CHIEF COMPLAINT ON ADMISSION  Chief Complaint   Patient presents with    Shortness of Breath    Arrhythmia     A Fib        Reason for Admission  SOB     Admission Date  12/29/2024    CODE STATUS  Full Code    HPI & HOSPITAL COURSE  Alberto Galicia is a 69 y.o. male who presented 12/29/2024 with past medical of alcohol abuse, A-fib, CHF with a EF of 20% to the emergency department at [facility] stating for the last several days he has been short of breath.  The shortness of breath gets worse with exertion.  He denies any chest pain.  He has a cough that is occasionally productive of whitish sputum.  He states that he is just getting over a viral syndrome.  In the emergency department patient was tested for COVID and flu and it was negative.  In the emergency department he was wheezing he came in      he was tachycardic with  and in A-fib         heart rate improved after a Xopenex nebulization.  Patient referred to me for further care and management.   ==========================  During his Hospital stay , we changed his Lasix to IV.  We changed his albuterol to Xopenex.  We increased his metoprolol from 25 to 50 mg daily.  we saw no further episodes of tachycardia.  We diuresed the patient , his wheezing improved and patient was stable for discharge home with the below medications  Therefore, he is discharged in good and stable condition to home with close outpatient follow-up.    The patient recovered much more quickly than anticipated on admission.    Discharge Date  12/30    FOLLOW UP ITEMS POST DISCHARGE      DISCHARGE DIAGNOSES  Principal Problem:    Dyspnea (POA: Yes)  Active Problems:    Alcohol dependence with other alcohol-induced disorder (HCC) (POA: Yes)    Chronic systolic CHF (congestive heart failure) (HCC) (POA: Yes)    Paroxysmal A-fib (HCC) (POA: Unknown)  Resolved Problems:    Acute bronchospasm (POA: Yes)      FOLLOW UP  Future Appointments   Date Time Provider  Department Center   1/7/2025  8:20 AM James Prajapati M.D. [facility] [place]   4/7/2025  4:20 PM [facility] [place]     No follow-up provider specified.    MEDICATIONS ON DISCHARGE     Medication List        START taking these medications        Instructions   levalbuterol 45 MCG/ACT inhaler  Commonly known as: Xopenex HFA   Inhale 1-2 Puffs every four hours as needed for Shortness of Breath.  Dose: 1-2 Puff            CHANGE how you take these medications        Instructions   Breo Ellipta 100-25 MCG/ACT Aepb  Generic drug: fluticasone furoate-vilanterol   Inhale 1 Puff every day.  Dose: 1 Puff     furosemide 40 MG Tabs  What changed:   medication strength  how much to take  Commonly known as: Lasix   Take 1 Tablet by mouth every day.  Dose: 40 mg     metoprolol SR 50 MG Tb24  What changed:   medication strength  how much to take  Commonly known as: Toprol XL   Take 1 Tablet by mouth every evening.  Dose: 50 mg            CONTINUE taking these medications        Instructions   acetaminophen 500 MG Tabs  Commonly known as: Tylenol   Take 1,000 mg by mouth every 6 hours as needed for Mild Pain, Moderate Pain or Fever.  Dose: 1,000 mg     atorvastatin 40 MG Tabs  Commonly known as: Lipitor   Take 1 Tablet by mouth every day.  Dose: 40 mg     Eliquis 5 MG Tabs  Generic drug: apixaban   Take 1 Tablet by mouth 2 times a day.  Dose: 5 mg     FISH OIL PO   Take 1-2 Capsules by mouth 2 times a day. Take 2 capsules in the morning and 1 capsule in the evening  Dose: 1-2 Capsule     hydrocortisone 5 MG Tabs  Commonly known as: Cortef   Take 2 pills in the morning and 1 pill in the afternoon     levothyroxine 150 MCG Tabs  Commonly known as: Synthroid   Take 1 Tablet by mouth every morning on an empty stomach.  Dose: 150 mcg     losartan 25 MG Tabs  Commonly known as: Cozaar   Take 1 Tablet by mouth every evening.  Dose: 25 mg     MELATONIN PO   Take 2 Tablets by mouth 1 time a day as needed (sleep).  Dose: 2  Tablet     multivitamin Tabs   Take 1 Tablet by mouth every morning.  Dose: 1 Tablet     potassium chloride SA 20 MEQ Tbcr  Commonly known as: Kdur   Take 0.5 Tablets by mouth every day.  Dose: 10 mEq     PSEUDOEPHEDRINE PO   Take 2 Tablets by mouth 1 time a day as needed (congestion).  Dose: 2 Tablet     sildenafil citrate 50 MG tablet  Commonly known as: Viagra   Take 1 Tablet by mouth 1 time a day as needed for Erectile Dysfunction.  Dose: 50 mg     triamcinolone acetonide 0.1 % Crea  Commonly known as: Kenalog   APPLY CREAM TOPICALLY TWICE DAILY            STOP taking these medications      albuterol 108 (90 Base) MCG/ACT Aers inhalation aerosol              Allergies  Allergies   Allergen Reactions    Penicillins Swelling     Swelling at injection site; reaction occurred at 23 yo    Viagra [Sildenafil]      If taken 100mg       DIET  Orders Placed This Encounter   Procedures    Diet Order Diet: Cardiac     Standing Status:   Standing     Number of Occurrences:   1     Order Specific Question:   Diet:     Answer:   Cardiac [6]       ACTIVITY  As tolerated.  Weight bearing as tolerated    CONSULTATIONS      PROCEDURES  nsthoracic  Echo Report        Echocardiography Laboratory     CONCLUSIONS  Normal left ventricular size, thickness, and systolic function.  The left ventricular ejection fraction is visually estimated to be 55-  60%.  Normal diastolic function for age.  Upper normal right ventricular size and systolic function.  Right ventricular systolic pressure is estimated to be 26 mmHg.  No significant valvular abnormalities.   The inferior vena cava is not well visualized, but appears grossly   normal.     MATT MOODY  Exam Date:         2024                      14:38  Exam Location:     Inpatient  Priority:          Routine     Ordering Physician:        PRISCA MOODY  Referring Physician:  Sonographer:               Quentin Kidd RDCS     Age:    69     Gender:    M  MRN:    [id number]  :     1955  BSA:    2.46   Ht (in):    76     Wt (lb):    256  Exam Type:     Complete     Indications:     Shortness of breath  ICD Codes:       R06.02     CPT Codes:       73895     BP:   147    /   90     HR:   69  Technical Quality:       Good     MEASUREMENTS  (Male / Female) Normal Values  2D ECHO  LV Diastolic Diameter PLAX        5.4 cm                4.2 - 5.9 / 3.9 - 5.3   cm  LV Systolic Diameter PLAX         3.5 cm                2.1 - 4.0 cm  IVS Diastolic Thickness           1.1 cm                  LVPW Diastolic Thickness          1.1 cm                  LVOT Diameter                     2.6 cm                  Estimated LV Ejection Fraction    60 %                       M-MODE  Aortic Root Diameter MM           3.5 cm                     DOPPLER  AV Peak Velocity                  1.2 m/s                 AV Peak Gradient                  5.4 mmHg                AV Mean Gradient                  3.3 mmHg                LVOT Peak Velocity                0.9 m/s                 AV Area Cont Eq vti               4.2 cm2                 Mitral E Point Velocity           0.69 m/s                Mitral E to A Ratio               1.1                     Mitral A Duration                 96.9 ms                 MV Pressure Half Time             66.3 ms                 MV Area PHT                       3.3 cm2                 MV Deceleration Time              229 ms                  TR Peak Velocity                  244 cm/s                TR Peak Gradient                  23.9 mmHg               PV Peak Velocity                  0.92 m/s                PV Peak Gradient                  3.4 mmHg                   * Indicates values subject to auto-interpretation  LV EF:  60    %     FINDINGS  Left Ventricle  Normal left ventricular size, thickness, and systolic function. The   left ventricular ejection fraction is visually estimated to be 55-60%.   Intermittent abnormal septal motion due to ectopy. Normal  diastolic   function for age.     Right Ventricle  Upper normal right ventricular size and systolic function.     Right Atrium  The right atrium is not well visualized. The inferior vena cava is not   well visualized, but appears grossly normal.     Left Atrium  Normal left atrial size.     Mitral Valve  Structurally normal mitral valve without significant stenosis or   regurgitation.     Aortic Valve  Structurally normal aortic valve without significant stenosis.     Tricuspid Valve  Structurally normal tricuspid valve. No tricuspid stenosis. Trace   tricuspid regurgitation. Right ventricular systolic pressure is   estimated to be 26 mmHg.     Pulmonic Valve  Structurally normal pulmonic valve without significant stenosis or   regurgitation.     Pericardium  Normal pericardium without effusion.     Aorta  Normal aortic root for body surface area.                                Todd Crespo MD  (Electronically Signed)  Final Date:     29 December 2024                   19:16    LABORATORY  Lab Results   Component Value Date    SODIUM 134 (L) 12/29/2024    POTASSIUM 5.3 12/29/2024    CHLORIDE 101 12/29/2024    CO2 22 12/29/2024    GLUCOSE 163 (H) 12/29/2024    BUN 15 12/29/2024    CREATININE 1.03 12/29/2024        Lab Results   Component Value Date    WBC 8.9 12/29/2024    HEMOGLOBIN 17.2 12/29/2024    HEMATOCRIT 50.8 12/29/2024    PLATELETCT 234 12/29/2024        Total time of the discharge process exceeds 35 minutes.

## 2024-12-30 NOTE — PROGRESS NOTES
Report received. Assumed care. Pt AAOx4. VSS. Denies pain a this time, No other complaints reported; Pt was updated on POC, tele monitoring for Afib, monitor respiratory status. White board updated, All question answered. Pt has call light within reach, bed is in the lowest position. Pt has no other needs at this time.

## 2025-01-07 ENCOUNTER — OFFICE VISIT (OUTPATIENT)
Dept: MEDICAL GROUP | Age: 70
End: 2025-01-07
Payer: MEDICARE

## 2025-01-07 VITALS
DIASTOLIC BLOOD PRESSURE: 50 MMHG | BODY MASS INDEX: 30.81 KG/M2 | HEART RATE: 70 BPM | HEIGHT: 76 IN | TEMPERATURE: 96.8 F | WEIGHT: 253 LBS | OXYGEN SATURATION: 95 % | SYSTOLIC BLOOD PRESSURE: 96 MMHG

## 2025-01-07 DIAGNOSIS — D32.9 MENINGIOMA (HCC): ICD-10-CM

## 2025-01-07 DIAGNOSIS — I48.0 HYPERCOAGULABLE STATE DUE TO PAROXYSMAL ATRIAL FIBRILLATION (HCC): ICD-10-CM

## 2025-01-07 DIAGNOSIS — I50.20 ACC/AHA STAGE C SYSTOLIC HEART FAILURE (HCC): ICD-10-CM

## 2025-01-07 DIAGNOSIS — D68.69 HYPERCOAGULABLE STATE DUE TO PAROXYSMAL ATRIAL FIBRILLATION (HCC): ICD-10-CM

## 2025-01-07 DIAGNOSIS — L30.9 ECZEMA, UNSPECIFIED TYPE: ICD-10-CM

## 2025-01-07 DIAGNOSIS — F10.288 ALCOHOL DEPENDENCE WITH OTHER ALCOHOL-INDUCED DISORDER (HCC): ICD-10-CM

## 2025-01-07 DIAGNOSIS — E03.8 SECONDARY HYPOTHYROIDISM: ICD-10-CM

## 2025-01-07 DIAGNOSIS — I50.22 CHRONIC SYSTOLIC CHF (CONGESTIVE HEART FAILURE) (HCC): ICD-10-CM

## 2025-01-07 DIAGNOSIS — I42.6 ALCOHOLIC CARDIOMYOPATHY (HCC): ICD-10-CM

## 2025-01-07 PROCEDURE — 3074F SYST BP LT 130 MM HG: CPT | Performed by: STUDENT IN AN ORGANIZED HEALTH CARE EDUCATION/TRAINING PROGRAM

## 2025-01-07 PROCEDURE — 3078F DIAST BP <80 MM HG: CPT | Performed by: STUDENT IN AN ORGANIZED HEALTH CARE EDUCATION/TRAINING PROGRAM

## 2025-01-07 PROCEDURE — 99214 OFFICE O/P EST MOD 30 MIN: CPT | Performed by: STUDENT IN AN ORGANIZED HEALTH CARE EDUCATION/TRAINING PROGRAM

## 2025-01-07 RX ORDER — TRIAMCINOLONE ACETONIDE 1 MG/G
1 CREAM TOPICAL 2 TIMES DAILY
Qty: 45 G | Refills: 0 | Status: SHIPPED | OUTPATIENT
Start: 2025-01-07 | End: 2025-01-21

## 2025-01-07 ASSESSMENT — ENCOUNTER SYMPTOMS
SHORTNESS OF BREATH: 0
BLURRED VISION: 0
ORTHOPNEA: 0
COUGH: 0
CHILLS: 0
WEAKNESS: 0
ABDOMINAL PAIN: 0
BACK PAIN: 0
DOUBLE VISION: 0
PALPITATIONS: 0
BLOOD IN STOOL: 0
HEADACHES: 0
WHEEZING: 0
DIZZINESS: 0
BRUISES/BLEEDS EASILY: 0
DEPRESSION: 0
FEVER: 0

## 2025-01-07 ASSESSMENT — FIBROSIS 4 INDEX: FIB4 SCORE: 3.52

## 2025-01-07 ASSESSMENT — PATIENT HEALTH QUESTIONNAIRE - PHQ9: CLINICAL INTERPRETATION OF PHQ2 SCORE: 0

## 2025-01-07 NOTE — PROGRESS NOTES
Subjective:     CC: Establish care.    HPI:   History of Present Illness  The patient is a 69-year-old male presenting to [facility].    He was previously under the care of Dr. Zaidi, who has since relocated. Approximately one week ago, he experienced a severe influenza infection during the Christmas holiday, which necessitated bed rest. Despite his condition, he attempted to seek emergency care on [address] but was unable to find parking due to his weakness. He managed his symptoms with Tylenol and increased fluid intake. His symptoms included chest discomfort, sinus congestion, nausea, and anorexia. Although his symptoms gradually improved, he felt something was amiss and sought emergency care. A few days later, he was diagnosed with atrial fibrillation and admitted overnight for further evaluation. By the next day, his condition had improved significantly, and his heart rhythm had returned to normal.    He has a history of congestive heart failure, for which he has been hospitalized twice, each time for a duration of one week. He is currently on Eliquis and under the care of Dr. Ang, a cardiologist, whom he last saw in July 2024. He was advised to follow up in six months. During his recent hospital stay, his metoprolol dosage was increased from 25 mg to 50 mg to manage his fluid retention. He reports no leg swelling but notes an increase in body water retention and weight gain, despite a decrease in appetite. His usual weight is around 220 to 222 pounds. He maintains hydration with orange juice and water and has eliminated potato chips and corn chips from his diet. He also reports the development of a hernia in the abdominal area, which protrudes when he coughs unless he applies pressure to the area. His blood pressure upon discharge from the hospital was 125/80 or 86. He does not have a home blood pressure monitor. He has noticed a decrease in energy levels over the past few weeks, which he  "attributes to the influenza infection.    Supplemental Information  He has a history of a pituitary tumor, diagnosed at age 24, which was surgically removed. He underwent radiation therapy, which affected his pituitary gland. He is on a regimen of three hydrocortisone pills daily. He also takes atorvastatin for cholesterol management, medication for hypothyroidism, and testosterone supplements.    FAMILY HISTORY  His mother had low blood pressure and congestive heart failure.    MEDICATIONS  Current: Eliquis, metoprolol, hydrocortisone, atorvastatin, testosterone       ROS:  Review of Systems   Constitutional:  Negative for chills, fever and malaise/fatigue.   HENT:  Negative for nosebleeds and tinnitus.    Eyes:  Negative for blurred vision and double vision.   Respiratory:  Negative for cough, shortness of breath and wheezing.    Cardiovascular:  Negative for chest pain, palpitations, orthopnea and leg swelling.   Gastrointestinal:  Negative for abdominal pain, blood in stool and melena.   Genitourinary:  Negative for dysuria and urgency.   Musculoskeletal:  Negative for back pain and joint pain.   Skin:  Negative for rash.   Neurological:  Negative for dizziness, weakness and headaches.   Endo/Heme/Allergies:  Does not bruise/bleed easily.   Psychiatric/Behavioral:  Negative for depression and suicidal ideas.        Objective:     Exam:  BP 96/50 (BP Location: Left arm, Patient Position: Sitting, BP Cuff Size: Large adult)   Pulse 70   Temp 36 °C (96.8 °F) (Temporal)   Ht 1.93 m (6' 4\")   Wt 115 kg (253 lb)   SpO2 95%   BMI 30.80 kg/m²  Body mass index is 30.8 kg/m².    Physical Exam  Vitals reviewed.   Constitutional:       General: He is not in acute distress.  HENT:      Head: Normocephalic and atraumatic.      Nose: No congestion.      Mouth/Throat:      Mouth: Mucous membranes are moist.      Pharynx: No oropharyngeal exudate or posterior oropharyngeal erythema.   Eyes:      General: No scleral " icterus.     Extraocular Movements: Extraocular movements intact.      Pupils: Pupils are equal, round, and reactive to light.   Cardiovascular:      Rate and Rhythm: Normal rate and regular rhythm.      Pulses: Normal pulses.      Heart sounds: Normal heart sounds. No murmur heard.  Pulmonary:      Effort: Pulmonary effort is normal. No respiratory distress.      Breath sounds: Normal breath sounds. No wheezing.   Abdominal:      General: Bowel sounds are normal. There is no distension.      Palpations: Abdomen is soft.      Tenderness: There is no abdominal tenderness. There is no guarding or rebound.   Musculoskeletal:      Cervical back: Normal range of motion and neck supple.      Right lower leg: No edema.      Left lower leg: No edema.   Skin:     General: Skin is warm.      Capillary Refill: Capillary refill takes less than 2 seconds.      Coloration: Skin is not jaundiced.      Findings: No bruising or erythema.   Neurological:      General: No focal deficit present.      Mental Status: He is alert.      Cranial Nerves: No cranial nerve deficit.      Sensory: No sensory deficit.   Psychiatric:         Mood and Affect: Mood normal.         Behavior: Behavior normal.       Labs: Reviewed    Assessment & Plan:     69 y.o. male with the following -     1. ACC/AHA stage C systolic heart failure (HCC)  2. Alcohol dependence with other alcohol-induced disorder (HCC)  3. Chronic systolic CHF (congestive heart failure) (HCC)  4. Alcoholic cardiomyopathy (HCC)  He has a history of congestive heart failure and was recently hospitalized. He is currently on Eliquis and metoprolol. He is advised to revert to his previous metoprolol dosage of 25 mg due to low blood pressure readings. He should monitor his blood pressure daily and limit fluid intake to 1.5 liters per day.   He is also advised to schedule an appointment with his cardiologist for further evaluation.  Patient was seen last by cardiology service in July 2024  and was told to make an appointment follow-up within 6 months.    5. Hypercoagulable state due to paroxysmal atrial fibrillation (HCC)  He experienced atrial fibrillation during his recent hospitalization. His condition improved, and his heart rhythm returned to normal. He is currently on Eliquis. He is advised to follow up with his cardiologist and revert to his previous metoprolol dosage of 25 mg due to low blood pressure readings. Daily blood pressure monitoring is recommended.  Physical examination patient seems to be on sinus rhythm.  Heart rate normal rate and regular rhythm.    6. Secondary hypothyroidism  -Chronic, stable  -Currently taking levothyroxine 150 mg daily  -No signs or symptoms of hypothyroidism or hyperthyroidism  -Continue same therapy    7. Meningioma (HCC)  -Managed others    8. Eczema, unspecified type  -Chronic, stable  -Recommended med refills  - triamcinolone acetonide (KENALOG) 0.1 % Cream; Apply 1 g topically 2 times a day for 14 days.  Dispense: 45 g; Refill: 0       HCC Gap Form    Diagnosis to address: F10.288 - Alcohol dependence with other alcohol-induced disorder (HCC)  Assessment and plan: Chronic, improving. Continue with current defined treatment plan: Continue abstinence.  Follow-up at least annually.  Diagnosis: I50.20 - ACC/AHA stage C systolic heart failure (HCC)  Assessment and plan: Chronic, stable, as based on today's assessment and impact on other conditions evaluated today. Continue with current treatment plan: Schedule cardiology follow-up appointment, continue same therapy.  Follow-up with specialist as directed, but at least annually.  Diagnosis: I50.22 - Chronic systolic CHF (congestive heart failure) (HCC)  Assessment and plan: Chronic, stable, as based on today's assessment and impact on other conditions evaluated today. Continue with current treatment plan: Continue same therapy, follow-up with cardiologist.  Follow-up with specialist as directed, but at least  annually.  Diagnosis: I42.6 - Alcoholic cardiomyopathy (HCC)  Assessment and plan: Chronic, stable, as based on today's assessment and impact on other conditions evaluated today. Continue with current treatment plan: Did not follow cardiologist.  Follow-up with specialist as directed, but at least annually.  Diagnosis: D32.9 - Meningioma (HCC)  Assessment and plan: Chronic, stable, as based on today's assessment and impact on other conditions evaluated today. Continue with current treatment plan: Continue to follow with specialist.  Follow-up with specialist as directed, but at least annually.  Diagnosis: D68.69, I48.0 - Hypercoagulable state due to paroxysmal atrial fibrillation (HCC)  Assessment and plan: Chronic, stable, as based on today's assessment and impact on other conditions evaluated today. Continue with current treatment plan: Continue to follow with cardiologist.  Continue same therapy.  Follow-up with specialist as directed, but at least annually.  Last edited 01/07/25 08:42 PST by James Prajapati M.D.           Return in about 3 months (around 4/7/2025) for Meds.    Please note that this dictation was created using voice recognition software. I have made every reasonable attempt to correct obvious errors, but I expect that there are errors of grammar and possibly content that I did not discover before finalizing the note.

## 2025-01-07 NOTE — PATIENT INSTRUCTIONS
-Continue home meds  -Monitor BP daily  -Follow up with Endo and Cardiologist  -Monitor weight  -Follow up with me in 3 months

## 2025-01-13 DIAGNOSIS — E23.0 HYPOGONADOTROPIC HYPOGONADISM (HCC): ICD-10-CM

## 2025-01-17 DIAGNOSIS — J98.01 ACUTE BRONCHOSPASM: ICD-10-CM

## 2025-01-18 NOTE — TELEPHONE ENCOUNTER
Received request via: Patient    Was the patient seen in the last year in this department? Yes    Does the patient have an active prescription (recently filled or refills available) for medication(s) requested? No    Pharmacy Name: [facility] [address] [place]    Does the patient have group home Plus and need 100-day supply? (This applies to ALL medications) Yes, quantity updated to 100 days

## 2025-01-19 ENCOUNTER — PHARMACY VISIT (OUTPATIENT)
Dept: PHARMACY | Facility: MEDICAL CENTER | Age: 70
End: 2025-01-19
Payer: COMMERCIAL

## 2025-01-19 PROCEDURE — RXOTC WILLOW AMBULATORY OTC CHARGE

## 2025-01-19 RX ORDER — TESTOSTERONE 20.25 MG/1.25G
GEL TOPICAL
Qty: 75 G | Refills: 5 | Status: SHIPPED | OUTPATIENT
Start: 2025-01-19 | End: 2025-02-18

## 2025-01-19 RX ORDER — LEVALBUTEROL TARTRATE 45 UG/1
1-2 AEROSOL, METERED ORAL EVERY 4 HOURS PRN
Qty: 15 G | Refills: 3 | Status: SHIPPED | OUTPATIENT
Start: 2025-01-19

## 2025-02-09 DIAGNOSIS — J45.40 MODERATE PERSISTENT ASTHMA WITHOUT COMPLICATION: ICD-10-CM

## 2025-02-10 PROCEDURE — RXMED WILLOW AMBULATORY MEDICATION CHARGE: Performed by: STUDENT IN AN ORGANIZED HEALTH CARE EDUCATION/TRAINING PROGRAM

## 2025-02-10 RX ORDER — FLUTICASONE FUROATE AND VILANTEROL 100; 25 UG/1; UG/1
1 POWDER RESPIRATORY (INHALATION) DAILY
Qty: 60 EACH | Refills: 2 | Status: SHIPPED | OUTPATIENT
Start: 2025-02-10

## 2025-02-11 ENCOUNTER — PHARMACY VISIT (OUTPATIENT)
Dept: PHARMACY | Facility: MEDICAL CENTER | Age: 70
End: 2025-02-11
Payer: COMMERCIAL

## 2025-02-23 DIAGNOSIS — E78.5 HYPERLIPIDEMIA, UNSPECIFIED HYPERLIPIDEMIA TYPE: ICD-10-CM

## 2025-02-24 NOTE — TELEPHONE ENCOUNTER
Received request via: Pharmacy    Was the patient seen in the last year in this department? Yes    Does the patient have an active prescription (recently filled or refills available) for medication(s) requested? No    Pharmacy Name:   [facility] [address] - [address]  [facility] [place] [address]  Phone: [phone] Fax: [phone]      Does the patient have [facility] Plus and need 100-day supply? (This applies to ALL medications) Yes, quantity updated to 100 days

## 2025-02-25 RX ORDER — ATORVASTATIN CALCIUM 40 MG/1
40 TABLET, FILM COATED ORAL DAILY
Qty: 100 TABLET | Refills: 0 | Status: SHIPPED | OUTPATIENT
Start: 2025-02-25

## 2025-03-01 ENCOUNTER — HOSPITAL ENCOUNTER (OUTPATIENT)
Dept: LAB | Facility: MEDICAL CENTER | Age: 70
End: 2025-03-01
Attending: INTERNAL MEDICINE
Payer: MEDICARE

## 2025-03-01 DIAGNOSIS — E55.9 VITAMIN D DEFICIENCY: ICD-10-CM

## 2025-03-01 DIAGNOSIS — E03.8 SECONDARY HYPOTHYROIDISM: ICD-10-CM

## 2025-03-01 DIAGNOSIS — E23.0 HYPOGONADOTROPIC HYPOGONADISM (HCC): ICD-10-CM

## 2025-03-01 DIAGNOSIS — N52.9 ERECTILE DYSFUNCTION, UNSPECIFIED ERECTILE DYSFUNCTION TYPE: ICD-10-CM

## 2025-03-01 DIAGNOSIS — E23.0 PANHYPOPITUITARISM (HCC): ICD-10-CM

## 2025-03-01 DIAGNOSIS — E27.49 SECONDARY ADRENAL INSUFFICIENCY (HCC): ICD-10-CM

## 2025-03-01 DIAGNOSIS — R30.0 DYSURIA: ICD-10-CM

## 2025-03-01 LAB
25(OH)D3 SERPL-MCNC: 52 NG/ML (ref 30–100)
ALBUMIN SERPL BCP-MCNC: 3.9 G/DL (ref 3.2–4.9)
ALBUMIN/GLOB SERPL: 1.1 G/DL
ALP SERPL-CCNC: 72 U/L (ref 30–99)
ALT SERPL-CCNC: 40 U/L (ref 2–50)
ANION GAP SERPL CALC-SCNC: 14 MMOL/L (ref 7–16)
AST SERPL-CCNC: 53 U/L (ref 12–45)
BILIRUB SERPL-MCNC: 0.3 MG/DL (ref 0.1–1.5)
BUN SERPL-MCNC: 17 MG/DL (ref 8–22)
CALCIUM ALBUM COR SERPL-MCNC: 9.1 MG/DL (ref 8.5–10.5)
CALCIUM SERPL-MCNC: 9 MG/DL (ref 8.5–10.5)
CHLORIDE SERPL-SCNC: 100 MMOL/L (ref 96–112)
CO2 SERPL-SCNC: 22 MMOL/L (ref 20–33)
CREAT SERPL-MCNC: 1.04 MG/DL (ref 0.5–1.4)
GFR SERPLBLD CREATININE-BSD FMLA CKD-EPI: 77 ML/MIN/1.73 M 2
GLOBULIN SER CALC-MCNC: 3.5 G/DL (ref 1.9–3.5)
GLUCOSE SERPL-MCNC: 128 MG/DL (ref 65–99)
HCT VFR BLD AUTO: 49.6 % (ref 42–52)
HGB BLD-MCNC: 16.4 G/DL (ref 14–18)
POTASSIUM SERPL-SCNC: 4.2 MMOL/L (ref 3.6–5.5)
PROT SERPL-MCNC: 7.4 G/DL (ref 6–8.2)
PSA SERPL DL<=0.01 NG/ML-MCNC: 0.09 NG/ML (ref 0–4)
SODIUM SERPL-SCNC: 136 MMOL/L (ref 135–145)
T4 FREE SERPL-MCNC: 1.21 NG/DL (ref 0.93–1.7)

## 2025-03-01 PROCEDURE — 84439 ASSAY OF FREE THYROXINE: CPT

## 2025-03-01 PROCEDURE — 85014 HEMATOCRIT: CPT

## 2025-03-01 PROCEDURE — 36415 COLL VENOUS BLD VENIPUNCTURE: CPT

## 2025-03-01 PROCEDURE — 84270 ASSAY OF SEX HORMONE GLOBUL: CPT

## 2025-03-01 PROCEDURE — 82306 VITAMIN D 25 HYDROXY: CPT

## 2025-03-01 PROCEDURE — 84153 ASSAY OF PSA TOTAL: CPT

## 2025-03-01 PROCEDURE — 80053 COMPREHEN METABOLIC PANEL: CPT

## 2025-03-01 PROCEDURE — 85018 HEMOGLOBIN: CPT

## 2025-03-01 PROCEDURE — 84403 ASSAY OF TOTAL TESTOSTERONE: CPT

## 2025-03-01 PROCEDURE — 84402 ASSAY OF FREE TESTOSTERONE: CPT

## 2025-03-03 LAB
SHBG SERPL-SCNC: 49 NMOL/L (ref 19–76)
TESTOST FREE MFR SERPL: 1.5 % (ref 1.6–2.9)
TESTOST FREE SERPL-MCNC: 83 PG/ML (ref 47–244)
TESTOST SERPL-MCNC: 545 NG/DL (ref 300–720)

## 2025-03-05 ENCOUNTER — TELEPHONE (OUTPATIENT)
Dept: ENDOCRINOLOGY | Facility: MEDICAL CENTER | Age: 70
End: 2025-03-05
Payer: MEDICARE

## 2025-03-05 NOTE — TELEPHONE ENCOUNTER
Received Renewal PA request for Testosterone 20.25mg/Act (1.62%) Gel. (Quantity:75g, Day Supply:30)  Insurance: [employer] D  Member ID: [id number]  BIN: [id number]  PCN: [id number]  Group: [facility]   Ran Test claim via [place] & medication Rejects stating prior authorization is required.    Prior Authorization has been submitted via Cover My Meds: Key [id number]  Will follow up in 24-48 business hours.     Thank you,  Audra Gill, [facility]  Endocrinology Pharmacy Coordinator

## 2025-03-17 DIAGNOSIS — I48.91 ATRIAL FIBRILLATION WITH RAPID VENTRICULAR RESPONSE (HCC): ICD-10-CM

## 2025-03-17 DIAGNOSIS — E03.8 SECONDARY HYPOTHYROIDISM: ICD-10-CM

## 2025-03-17 DIAGNOSIS — I50.20 ACC/AHA STAGE C SYSTOLIC HEART FAILURE (HCC): ICD-10-CM

## 2025-03-17 RX ORDER — LOSARTAN POTASSIUM 25 MG/1
25 TABLET ORAL EVERY EVENING
Qty: 100 TABLET | Refills: 3 | Status: SHIPPED | OUTPATIENT
Start: 2025-03-17

## 2025-03-17 NOTE — TELEPHONE ENCOUNTER
Rx refill for Losartan updated to 100 days/SCP per protocol. Latest labs 3/1/25 results in chart.  Next OV 3/19/25. Reminders sent via [employer].    AST - 53, trending down from last visit.    To JG.  Kindly review and approve if appropriate.  Thank you.

## 2025-03-19 ENCOUNTER — OFFICE VISIT (OUTPATIENT)
Dept: CARDIOLOGY | Facility: MEDICAL CENTER | Age: 70
End: 2025-03-19
Attending: NURSE PRACTITIONER
Payer: MEDICARE

## 2025-03-19 VITALS
WEIGHT: 253.2 LBS | SYSTOLIC BLOOD PRESSURE: 102 MMHG | HEART RATE: 82 BPM | HEIGHT: 76 IN | RESPIRATION RATE: 18 BRPM | BODY MASS INDEX: 30.83 KG/M2 | DIASTOLIC BLOOD PRESSURE: 60 MMHG | OXYGEN SATURATION: 91 %

## 2025-03-19 DIAGNOSIS — I42.6 ALCOHOLIC CARDIOMYOPATHY (HCC): Chronic | ICD-10-CM

## 2025-03-19 DIAGNOSIS — I10 ESSENTIAL HYPERTENSION, BENIGN: ICD-10-CM

## 2025-03-19 DIAGNOSIS — I50.20 ACC/AHA STAGE C SYSTOLIC HEART FAILURE (HCC): ICD-10-CM

## 2025-03-19 DIAGNOSIS — I50.20 HEART FAILURE WITH REDUCED EJECTION FRACTION (HCC): ICD-10-CM

## 2025-03-19 DIAGNOSIS — I48.91 ATRIAL FIBRILLATION WITH RAPID VENTRICULAR RESPONSE (HCC): ICD-10-CM

## 2025-03-19 DIAGNOSIS — I51.89 LEFT VENTRICULAR SYSTOLIC DYSFUNCTION, NYHA CLASS 2: ICD-10-CM

## 2025-03-19 DIAGNOSIS — I48.0 HYPERCOAGULABLE STATE DUE TO PAROXYSMAL ATRIAL FIBRILLATION (HCC): ICD-10-CM

## 2025-03-19 DIAGNOSIS — D68.69 HYPERCOAGULABLE STATE DUE TO PAROXYSMAL ATRIAL FIBRILLATION (HCC): ICD-10-CM

## 2025-03-19 DIAGNOSIS — Z79.899 HIGH RISK MEDICATION USE: ICD-10-CM

## 2025-03-19 DIAGNOSIS — I51.3 LEFT VENTRICULAR THROMBUS: ICD-10-CM

## 2025-03-19 DIAGNOSIS — E78.5 HYPERLIPIDEMIA, UNSPECIFIED HYPERLIPIDEMIA TYPE: ICD-10-CM

## 2025-03-19 PROCEDURE — 93005 ELECTROCARDIOGRAM TRACING: CPT | Mod: TC | Performed by: NURSE PRACTITIONER

## 2025-03-19 PROCEDURE — 3074F SYST BP LT 130 MM HG: CPT | Performed by: NURSE PRACTITIONER

## 2025-03-19 PROCEDURE — 99213 OFFICE O/P EST LOW 20 MIN: CPT | Performed by: NURSE PRACTITIONER

## 2025-03-19 PROCEDURE — 99214 OFFICE O/P EST MOD 30 MIN: CPT | Performed by: NURSE PRACTITIONER

## 2025-03-19 PROCEDURE — 3078F DIAST BP <80 MM HG: CPT | Performed by: NURSE PRACTITIONER

## 2025-03-19 RX ORDER — APIXABAN 5 MG/1
5 TABLET, FILM COATED ORAL 2 TIMES DAILY
Qty: 200 TABLET | Refills: 3 | Status: SHIPPED | OUTPATIENT
Start: 2025-03-19

## 2025-03-19 RX ORDER — ATORVASTATIN CALCIUM 40 MG/1
40 TABLET, FILM COATED ORAL DAILY
Qty: 100 TABLET | Refills: 3 | Status: SHIPPED | OUTPATIENT
Start: 2025-03-19

## 2025-03-19 RX ORDER — FUROSEMIDE 40 MG/1
40 TABLET ORAL DAILY
Qty: 90 TABLET | Refills: 3 | Status: SHIPPED | OUTPATIENT
Start: 2025-03-19

## 2025-03-19 ASSESSMENT — FIBROSIS 4 INDEX: FIB4 SCORE: 2.51

## 2025-03-19 NOTE — PROGRESS NOTES
Chief Complaint   Patient presents with    Follow-Up     Dx: ACC/AHA stage C systolic heart failure (HCC)        Hyperlipidemia    Atrial Fibrillation     F/V Dx: Hypercoagulable state due to paroxysmal atrial fibrillation (HCC)         Subjective     Alberto Galicia is a 68 y.o. male who presents today heart failure follow-up.  [facility] nonobstructive [facility], EF 20%, A-fib, LV thrombus.  Patient is additional medical problems of adrenal insufficiency and EtOH.  Patient also seen by EP on 3/11/2024 where A-fib has not recurred since stopping amiodarone therapy.    Patient of Dr. Hauser, patient was last seen by myself on 7/23/2024.  Since patient was last seen, hospitalization for acute decompensated heart failure with A-fib with RVR on 12/29/2024.    Today, patient feels generally well, however still complains of fluid retention.  Otherwise, Patient denies chest pain, shortness of breath, palpitations, dizziness/lightheadedness, orthopnea, PND.     Based on physical examination findings, patient is near euvolemic. No JVD, lungs are clear to auscultation, no pitting edema in bilateral lower extremities, + ascites. Dry weight is 249 lbs.  Weight in office today is increased at 253 lbs.    EKG in office today personally interpreted by me sinus rhythm LAFB with no acute ST changes.    We will continue medical therapy as previously prescribed.  We will continue escalated diuretic dose this time.  We will check renal and electrolyte function medications. Reevaluation in 2 months.        Past Medical History:   Diagnosis Date    Asthma     Cardiomyopathy (HCC) EF <20% 1/30/2023    Hyperlipidemia     Left ventricular thrombus 1/30/2023    Pituitary disease (HCC)     Thyroid disease      Past Surgical History:   Procedure Laterality Date    CRANIOTOMY       Family History   Problem Relation Age of Onset    Diabetes Mother     Heart Disease Father     Diabetes Brother     Alzheimer's Disease Maternal  Grandmother     Cancer Maternal Grandfather     Heart Disease Paternal Grandmother     Cancer Paternal Grandfather      Social History     Socioeconomic History    Marital status: Single     Spouse name: Not on file    Number of children: Not on file    Years of education: Not on file    Highest education level: Not on file   Occupational History    Not on file   Tobacco Use    Smoking status: Never    Smokeless tobacco: Never   Vaping Use    Vaping status: Never Used   Substance and Sexual Activity    Alcohol use: Yes     Alcohol/week: 16.8 oz     Types: 28 Glasses of wine per week     Comment: Occasional    Drug use: No    Sexual activity: Not on file   Other Topics Concern    Not on file   Social History Narrative    Not on file     Social Drivers of Health     Financial Resource Strain: Not on file   Food Insecurity: No Food Insecurity (12/29/2024)    Hunger Vital Sign     Worried About Running Out of Food in the Last Year: Never true     Ran Out of Food in the Last Year: Never true   Transportation Needs: No Transportation Needs (12/29/2024)    PRAPARE - Transportation     Lack of Transportation (Medical): No     Lack of Transportation (Non-Medical): No   Physical Activity: Not on file   Stress: Not on file   Social Connections: Not on file   Intimate Partner Violence: Not At Risk (12/29/2024)    Humiliation, Afraid, Rape, and Kick questionnaire     Fear of Current or Ex-Partner: No     Emotionally Abused: No     Physically Abused: No     Sexually Abused: No   Housing Stability: Low Risk  (12/29/2024)    Housing Stability Vital Sign     Unable to Pay for Housing in the Last Year: No     Number of Times Moved in the Last Year: 0     Homeless in the Last Year: No     Allergies   Allergen Reactions    Penicillins Swelling     Swelling at injection site; reaction occurred at 25 yo    Viagra [Sildenafil]      If taken 100mg     Outpatient Encounter Medications as of 3/19/2025   Medication Sig Dispense Refill    Non  Formulary Request every day. ULTIMATE BEATS      atorvastatin (LIPITOR) 40 MG Tab Take 1 Tablet by mouth every day. 100 Tablet 3    ELIQUIS 5 MG Tab Take 1 Tablet by mouth 2 times a day. 200 Tablet 3    furosemide (LASIX) 40 MG Tab Take 1 Tablet by mouth every day. 90 Tablet 3    losartan (COZAAR) 25 MG Tab Take 1 Tablet by mouth every evening. 100 Tablet 3    fluticasone furoate-vilanterol (BREO ELLIPTA) 100-25 MCG/ACT AEROSOL POWDER, BREATH ACTIVATED Inhale 1 Puff by mouth every day. 60 Each 2    levalbuterol (XOPENEX HFA) 45 MCG/ACT inhaler Inhale 1-2 Puffs every four hours as needed for Shortness of Breath. 15 g 3    multivitamin Tab Take 1 Tablet by mouth every morning.      acetaminophen (TYLENOL) 500 MG Tab Take 1,000 mg by mouth every 6 hours as needed for Mild Pain, Moderate Pain or Fever.      MELATONIN PO Take 2 Tablets by mouth 1 time a day as needed (sleep).      levothyroxine (SYNTHROID) 150 MCG Tab Take 1 Tablet by mouth every morning on an empty stomach. 90 Tablet 3    hydrocortisone (CORTEF) 5 MG Tab Take 2 pills in the morning and 1 pill in the afternoon 200 Tablet 3    potassium chloride SA (KDUR) 20 MEQ Tab CR Take 0.5 Tablets by mouth every day. 45 Tablet 3    [DISCONTINUED] atorvastatin (LIPITOR) 40 MG Tab Take 1 tablet by mouth once daily 100 Tablet 0    metoprolol SR (TOPROL XL) 50 MG TABLET SR 24 HR Take 1 Tablet by mouth every evening. 30 Tablet 3    [DISCONTINUED] furosemide (LASIX) 40 MG Tab Take 1 Tablet by mouth every day. 30 Tablet 3    [DISCONTINUED] Omega-3 Fatty Acids (FISH OIL PO) Take 1-2 Capsules by mouth 2 times a day. Take 2 capsules in the morning and 1 capsule in the evening      [DISCONTINUED] Pseudoephedrine HCl (PSEUDOEPHEDRINE PO) Take 2 Tablets by mouth 1 time a day as needed (congestion). (Patient not taking: Reported on 3/19/2025)      [DISCONTINUED] ELIQUIS 5 MG Tab Take 1 Tablet by mouth 2 times a day. 200 Tablet 3    sildenafil citrate (VIAGRA) 50 MG tablet Take 1  "Tablet by mouth 1 time a day as needed for Erectile Dysfunction. (Patient not taking: Reported on 3/19/2025) 20 Tablet 3     No facility-administered encounter medications on file as of 3/19/2025.     ROS Complete review of systems negative except as noted in HPI/subjective           Objective     /60 (BP Location: Left arm, Patient Position: Sitting, BP Cuff Size: Adult)   Pulse 82   Resp 18   Ht 1.93 m (6' 4\")   Wt 115 kg (253 lb 3.2 oz)   SpO2 91%   BMI 30.82 kg/m²     Physical Exam  Vitals reviewed.   Constitutional:       Appearance: He is well-developed.   HENT:      Head: Normocephalic and atraumatic.   Eyes:      Pupils: Pupils are equal, round, and reactive to light.   Neck:      Vascular: No JVD.   Cardiovascular:      Rate and Rhythm: Normal rate and regular rhythm.      Heart sounds: Normal heart sounds. No murmur heard.     No friction rub. No gallop.   Pulmonary:      Effort: Pulmonary effort is normal. No respiratory distress.      Breath sounds: Normal breath sounds.   Abdominal:      General: There is distension.   Musculoskeletal:      Right lower leg: No edema.      Left lower leg: No edema.   Skin:     General: Skin is warm and dry.      Findings: No erythema.   Neurological:      Mental Status: He is alert and oriented to person, place, and time.   Psychiatric:         Behavior: Behavior normal.       Lab Results   Component Value Date/Time    CHOLSTRLTOT 175 08/06/2024 08:20 AM    LDL 83 08/06/2024 08:20 AM    HDL 48 08/06/2024 08:20 AM    TRIGLYCERIDE 220 (H) 08/06/2024 08:20 AM       Lab Results   Component Value Date/Time    SODIUM 136 03/01/2025 09:45 AM    POTASSIUM 4.2 03/01/2025 09:45 AM    CHLORIDE 100 03/01/2025 09:45 AM    CO2 22 03/01/2025 09:45 AM    GLUCOSE 128 (H) 03/01/2025 09:45 AM    BUN 17 03/01/2025 09:45 AM    CREATININE 1.04 03/01/2025 09:45 AM     Lab Results   Component Value Date/Time    ALKPHOSPHAT 72 03/01/2025 09:45 AM    ASTSGOT 53 (H) 03/01/2025 09:45 AM "    ALTSGPT 40 03/01/2025 09:45 AM    TBILIRUBIN 0.3 03/01/2025 09:45 AM       Latest Reference Range & Units 07/17/24 12:34 12/29/24 08:48   NT-proBNP 0 - 125 pg/mL <36 130 (H)     TTE (12/29/2024):  Normal left ventricular size, thickness, and systolic function.  The left ventricular ejection fraction is visually estimated to be 55-  60%.  Normal diastolic function for age.  Upper normal right ventricular size and systolic function.  Right ventricular systolic pressure is estimated to be 26 mmHg.  No significant valvular abnormalities.   The inferior vena cava is not well visualized, but appears grossly   normal.           Assessment & Plan     1. Hyperlipidemia, unspecified hyperlipidemia type  atorvastatin (LIPITOR) 40 MG Tab    ELIQUIS 5 MG Tab    Basic Metabolic Panel    EKG - Clinic Performed      2. High risk medication use  ELIQUIS 5 MG Tab    Basic Metabolic Panel    EKG - Clinic Performed      3. Essential hypertension, benign  ELIQUIS 5 MG Tab    Basic Metabolic Panel    EKG - Clinic Performed      4. ACC/AHA stage C systolic heart failure (HCC)  ELIQUIS 5 MG Tab    Basic Metabolic Panel    EKG - Clinic Performed      5. Alcoholic cardiomyopathy (HCC)  ELIQUIS 5 MG Tab    furosemide (LASIX) 40 MG Tab    Basic Metabolic Panel    EKG - Clinic Performed      6. Atrial fibrillation with rapid ventricular response (HCC)  ELIQUIS 5 MG Tab    Basic Metabolic Panel    EKG - Clinic Performed      7. Heart failure with reduced ejection fraction (HCC)  ELIQUIS 5 MG Tab    furosemide (LASIX) 40 MG Tab    Basic Metabolic Panel    EKG - Clinic Performed      8. Left ventricular thrombus  ELIQUIS 5 MG Tab    Basic Metabolic Panel    EKG - Clinic Performed      9. Hypercoagulable state due to paroxysmal atrial fibrillation (HCC)  furosemide (LASIX) 40 MG Tab    Basic Metabolic Panel    EKG - Clinic Performed      10. Left ventricular systolic dysfunction, NYHA class 2  furosemide (LASIX) 40 MG Tab    Basic Metabolic  Panel    EKG - Clinic Performed                Medical Decision Making: Today's Assessment/Status/Plan:        HFimpEF, Stage C, Class II, LVEF 70% (Recovered from 25%):  -Heart failure due to nonischemic cardiomyopathy  -Discussed Heart failure trajectory and prognosis with patient. Will continue to optimize medical therapy as tolerated. Advanced HF treatment, need for remote monitoring consideration at every visit.  -ACE-I/ARB/ARNI: Continue losartan 25  mg daily  -Evidence Based Beta-blocker: continue metoprolol XL 25 mg daily  -Aldosterone Antagonist: Adrenal insufficiency; poor tolerance to spironolactone in the past  -SGLT2-I: Increase patient's dizziness in the past  -Diuretic: Continue furosemide 40 mg daily with 10 mEq potassium supplementation.  Consider decreasing back to 20 mg in follow-up  -Labs: BMP ASAP. Will continue to closely monitor for side effects of patient's high risk medication(s) including renal function, NTproBNP, and electrolytes as needed  -EF >35%, no indication for primary prevention ICD fat this time  -Reinforced s/sx of worsening heart failure with patient and weight monitoring. Pt verbalizes understanding. Pt to call office if present.  -Heart Failure Education: pt to be contacted by HF nurse for further education.  In the meantime, during visit today we discussed indications and use for each medication, importance of treatment adherence, definition of heart failure and potential etiologies for current diagnosis.  -Pharmacotherapy referral deferred at this time   -Compliance Barriers none identified at this time  -Advanced care planning: Deferred at this time    LV thrombus; Paroxysmal Atrial Fibrillation (PAF); Hypothyroid  - Asymptomatic, denies AF breakthrough, rate controlled.   -Breakthrough A-fib with RVR in December   -Sinus rhythm on EKG today  - On OAC with Eliquis, continue.  - Continue metoprolol    Hypertension; hyperlipidemia  -Blood pressure well controlled in office  today.  .  -LDL 83 (2024).  At goal.  Continue atorvastatin 40 mg daily. Recheck annually    COPD; hypothyroid; adrenal sufficiency  -Per endocrine. On chronic steriods    FU in clinic in 2 months with cardiology. Sooner if needed.    Patient verbalizes understanding and agrees with the plan of care.       ISAIAS Matute.   [facility]  [phone]    PLEASE NOTE: This Note was created using voice recognition Software. I have made every reasonable attempt to correct obvious errors, but I expect that there are errors of grammar and possibly content that I did not discover before finalizing the note

## 2025-03-20 LAB — EKG IMPRESSION: NORMAL

## 2025-03-20 PROCEDURE — 93010 ELECTROCARDIOGRAM REPORT: CPT | Performed by: STUDENT IN AN ORGANIZED HEALTH CARE EDUCATION/TRAINING PROGRAM

## 2025-03-26 PROCEDURE — RXMED WILLOW AMBULATORY MEDICATION CHARGE: Performed by: HOSPITALIST

## 2025-03-28 ENCOUNTER — PHARMACY VISIT (OUTPATIENT)
Dept: PHARMACY | Facility: MEDICAL CENTER | Age: 70
End: 2025-03-28
Payer: COMMERCIAL

## 2025-03-28 ENCOUNTER — HOSPITAL ENCOUNTER (OUTPATIENT)
Dept: LAB | Facility: MEDICAL CENTER | Age: 70
End: 2025-03-28
Attending: NURSE PRACTITIONER
Payer: MEDICARE

## 2025-03-28 DIAGNOSIS — I51.3 LEFT VENTRICULAR THROMBUS: ICD-10-CM

## 2025-03-28 DIAGNOSIS — I51.89 LEFT VENTRICULAR SYSTOLIC DYSFUNCTION, NYHA CLASS 2: ICD-10-CM

## 2025-03-28 DIAGNOSIS — I48.91 ATRIAL FIBRILLATION WITH RAPID VENTRICULAR RESPONSE (HCC): ICD-10-CM

## 2025-03-28 DIAGNOSIS — I50.20 HEART FAILURE WITH REDUCED EJECTION FRACTION (HCC): ICD-10-CM

## 2025-03-28 DIAGNOSIS — I10 ESSENTIAL HYPERTENSION, BENIGN: ICD-10-CM

## 2025-03-28 DIAGNOSIS — I48.0 HYPERCOAGULABLE STATE DUE TO PAROXYSMAL ATRIAL FIBRILLATION (HCC): ICD-10-CM

## 2025-03-28 DIAGNOSIS — Z79.899 HIGH RISK MEDICATION USE: ICD-10-CM

## 2025-03-28 DIAGNOSIS — E78.5 HYPERLIPIDEMIA, UNSPECIFIED HYPERLIPIDEMIA TYPE: ICD-10-CM

## 2025-03-28 DIAGNOSIS — I42.6 ALCOHOLIC CARDIOMYOPATHY (HCC): Chronic | ICD-10-CM

## 2025-03-28 DIAGNOSIS — D68.69 HYPERCOAGULABLE STATE DUE TO PAROXYSMAL ATRIAL FIBRILLATION (HCC): ICD-10-CM

## 2025-03-28 DIAGNOSIS — I50.20 ACC/AHA STAGE C SYSTOLIC HEART FAILURE (HCC): ICD-10-CM

## 2025-03-28 PROCEDURE — 80048 BASIC METABOLIC PNL TOTAL CA: CPT

## 2025-03-28 PROCEDURE — 36415 COLL VENOUS BLD VENIPUNCTURE: CPT

## 2025-03-29 LAB
ANION GAP SERPL CALC-SCNC: 11 MMOL/L (ref 7–16)
BUN SERPL-MCNC: 12 MG/DL (ref 8–22)
CALCIUM SERPL-MCNC: 10 MG/DL (ref 8.5–10.5)
CHLORIDE SERPL-SCNC: 98 MMOL/L (ref 96–112)
CO2 SERPL-SCNC: 27 MMOL/L (ref 20–33)
CREAT SERPL-MCNC: 1.17 MG/DL (ref 0.5–1.4)
GFR SERPLBLD CREATININE-BSD FMLA CKD-EPI: 67 ML/MIN/1.73 M 2
GLUCOSE SERPL-MCNC: 134 MG/DL (ref 65–99)
POTASSIUM SERPL-SCNC: 4.6 MMOL/L (ref 3.6–5.5)
SODIUM SERPL-SCNC: 136 MMOL/L (ref 135–145)

## 2025-03-31 ENCOUNTER — RESULTS FOLLOW-UP (OUTPATIENT)
Dept: CARDIOLOGY | Facility: MEDICAL CENTER | Age: 70
End: 2025-03-31

## 2025-04-05 PROCEDURE — RXMED WILLOW AMBULATORY MEDICATION CHARGE: Performed by: STUDENT IN AN ORGANIZED HEALTH CARE EDUCATION/TRAINING PROGRAM

## 2025-04-06 ENCOUNTER — PHARMACY VISIT (OUTPATIENT)
Dept: PHARMACY | Facility: MEDICAL CENTER | Age: 70
End: 2025-04-06
Payer: COMMERCIAL

## 2025-04-07 ENCOUNTER — OFFICE VISIT (OUTPATIENT)
Dept: ENDOCRINOLOGY | Facility: MEDICAL CENTER | Age: 70
End: 2025-04-07
Attending: INTERNAL MEDICINE
Payer: MEDICARE

## 2025-04-07 VITALS
BODY MASS INDEX: 30.56 KG/M2 | HEART RATE: 76 BPM | DIASTOLIC BLOOD PRESSURE: 64 MMHG | WEIGHT: 251 LBS | OXYGEN SATURATION: 94 % | HEIGHT: 76 IN | SYSTOLIC BLOOD PRESSURE: 120 MMHG

## 2025-04-07 DIAGNOSIS — E03.8 SECONDARY HYPOTHYROIDISM: ICD-10-CM

## 2025-04-07 DIAGNOSIS — Z12.5 SCREENING FOR PROSTATE CANCER: ICD-10-CM

## 2025-04-07 DIAGNOSIS — E23.0 PANHYPOPITUITARISM (HCC): ICD-10-CM

## 2025-04-07 DIAGNOSIS — R79.89 ELEVATED LIVER FUNCTION TESTS: ICD-10-CM

## 2025-04-07 DIAGNOSIS — E27.49 SECONDARY ADRENAL INSUFFICIENCY (HCC): ICD-10-CM

## 2025-04-07 DIAGNOSIS — E55.9 VITAMIN D DEFICIENCY: ICD-10-CM

## 2025-04-07 DIAGNOSIS — E23.0 HYPOGONADOTROPIC HYPOGONADISM (HCC): ICD-10-CM

## 2025-04-07 PROCEDURE — 99215 OFFICE O/P EST HI 40 MIN: CPT | Performed by: INTERNAL MEDICINE

## 2025-04-07 PROCEDURE — 99211 OFF/OP EST MAY X REQ PHY/QHP: CPT | Performed by: INTERNAL MEDICINE

## 2025-04-07 PROCEDURE — 3078F DIAST BP <80 MM HG: CPT | Performed by: INTERNAL MEDICINE

## 2025-04-07 PROCEDURE — 3074F SYST BP LT 130 MM HG: CPT | Performed by: INTERNAL MEDICINE

## 2025-04-07 ASSESSMENT — FIBROSIS 4 INDEX: FIB4 SCORE: 2.51

## 2025-04-07 NOTE — PROGRESS NOTES
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 male here for follow up for the above medical issue.  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    Pituitary MRI on March 23, 2020 showed no evidence of tumor recurrence  He did have an incidental 5 mm meningioma at the area of his previous pituitary surgery in the sella.  I have asked him to follow up with his pcp for his meningioma      In 2023 he was admitted for acute diastolic CHF with EF of 25% at [facility].  He was diagnosed with alcoholic CMO.    He was treated with dobutamine.  He then developed PAF and was placed on Eliquis and Amidarone   He was sober for 6 months    He admits that he still drinks alcohol 4-5 shots vodka daily mixed with fruit juice and gatorade            For his secondary hypothyroidism,  He is taking Levothyroxine 150 MCG daily   He reports good compliance  He denies constipation and cold intolerance  He denies palpitations and tremors  His TSH is unreliable due to pituitary surgery  His free T4 is normal     Latest Reference Range & Units 03/01/25 09:45   Free T-4 0.93 - 1.70 ng/dL 1.21       For his secondary adrenal insufficiency  He is now taking hydrocortisone 10 mg in morning AM and 5MG in the afternoon   He reports feeling great  His blood pressure today normal and electrolytes are normal  His weight is up by 7 lbs  ( 243 to 250 lbs)  He likes to drink fruit juice and gatorade   He doesn't look cushingoid       His liver enzymes are still abnormal with an AST greater than ALT due to ongoing alcohol abuse   Latest Reference Range & Units 03/01/25 09:45   Sodium 135 - 145 mmol/L 136   Potassium 3.6 - 5.5 mmol/L 4.2   Chloride 96 - 112 mmol/L 100   Co2 20 - 33 mmol/L 22   Anion Gap 7.0 - 16.0  14.0   Glucose 65 - 99 mg/dL 128 (H)   Bun 8 - 22 mg/dL 17   Creatinine  0.50 - 1.40 mg/dL 1.04   GFR (CKD-EPI) >60 mL/min/1.73 m 2 77   Calcium 8.5 - 10.5 mg/dL 9.0   Correct Calcium 8.5 - 10.5 mg/dL 9.1   AST(SGOT) 12 - 45 U/L 53 (H)   ALT(SGPT) 2 - 50 U/L 40   Alkaline Phosphatase 30 - 99 U/L 72   Total Bilirubin 0.1 - 1.5 mg/dL 0.3   Albumin 3.2 - 4.9 g/dL 3.9   Total Protein 6.0 - 8.2 g/dL 7.4   Globulin 1.9 - 3.5 g/dL 3.5   A-G Ratio g/dL 1.1       For his secondary hypogonadism  He was on  Testim 50 mg 1 packet every other day   He then switched to Androgel 1.62% 1 pump daily 75grams monthly  Of note his testosterone levels are too high when he takes 2 pumps daily  He denies chest pain and dyspnea  He denies lower urinary tract obstructive symptoms.  He denies weak stream, urinary hesistancy, and intermittency   His total testosterone was normal     Latest Reference Range & Units 03/01/25 09:45   Free Testosterone 47 - 244 pg/mL 83   Sex Hormone Bind Globulin 19 - 76 nmol/L 49   Testosterone % Free 1.6 - 2.9 % 1.5 (L)   Testosterone,Total 300 - 720 ng/dL 545      Latest Reference Range & Units 03/01/25 09:45   Prostatic Specific Antigen Tot 0.00 - 4.00 ng/mL 0.09             Patient's medications, allergies, and social histories were reviewed and updated as appropriate.      ROS:       CONS:     No fever, no chills   EYES:     No diplopia, no blurry vision   CV:           No chest pain, no palpitations   PULM:     No SOB, no cough, no hemoptysis.   GI:            No nausea, no vomiting, no diarrhea, no constipation   ENDO:     No polyuria, no polydipsia, no heat intolerance, no cold intolerance       Past Medical History:  Problem List:  2024-12: Dyspnea  2024-12: Chronic systolic CHF (congestive heart failure) ([facility])  2024-12: Acute bronchospasm  2024-09: Alcohol dependence with other alcohol-induced disorder ([facility])  2024-05: Alcoholic cardiomyopathy ([facility])  2023-12: Hypercoagulable state due to paroxysmal atrial fibrillation   ([facility])  2023-11: Erectile dysfunction  2023-09:  Hypoxia  2023-09: URI (upper respiratory infection)  2023-09: Hyponatremia  2023-04: Hyperthyroidism  2023-04: Syncope  2023-04: Hypotension  2023-02: ANGEL (acute kidney injury) ([facility])  2023-01: Demand ischemia ([facility])  2023-01: Acute on chronic respiratory failure with hypoxia and   hypercapnia ([facility])  2023-01: Asthma with exacerbation  2023-01: Sepsis ([facility])  2023-01: Elevated troponin  2023-01: Elevated d-dimer  2023-01: Lactic acidosis  2023-01: Elevated lactic acid level  2023-01: Infestation by bed bug  2023-01: NSTEMI (non-ST elevated myocardial infarction) ([facility])  2023-01: Left ventricular thrombus  2023-01: Cardiomyopathy ([facility]) EF <20%  2023-01: Shock ([facility])  2023-01: Acute respiratory failure with hypoxia due to asthma   exacerbation ([facility])  2022-01: Moderate persistent asthma without complication  2022-01: Elevated liver enzymes  2021-07: Macrocytosis  2021-07: Seasonal allergies  2020-09: Meningioma ([facility])  2020-09: Panhypopituitarism ([facility])  2020-03: Secondary male hypogonadism  2020-03: Secondary adrenal insufficiency ([facility])  2019-11: Elevated liver function tests  2019-07: Mild intermittent asthma without complication  2019-07: Eczema  2017-07: Pollen allergies  2015-06: Secondary hypothyroidism  2015-06: Hyperlipidemia  2015-06: Reactive airway disease  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[facility])  Pituitary adenoma ([facility])  ACC/AHA stage C systolic heart failure ([facility])  Paroxysmal A-fib ([facility])      Past Surgical History:  Past Surgical History:   Procedure Laterality Date    CRANIOTOMY          Allergies:  Pcn [penicillins]     Social History:  Social History     Tobacco Use    Smoking status: Never    Smokeless tobacco: Never   Vaping Use    Vaping status: Never Used   Substance Use Topics    Alcohol use: Yes     Alcohol/week: 16.8 oz     Types: 28 Glasses of wine per week     Comment: Occasional    Drug use: No        Family History:   family history includes Alzheimer's Disease in his maternal grandmother;  "Cancer in his maternal grandfather and paternal grandfather; Diabetes in his brother and mother; Heart Disease in his father and paternal grandmother.        PHYSICAL EXAM:   Vital signs: /64 (BP Location: Left arm, Patient Position: Sitting, BP Cuff Size: Adult)   Pulse 76   Ht 1.93 m (6' 4\")   Wt 114 kg (251 lb)   SpO2 94%   BMI 30.55 kg/m²   GENERAL: Well-developed, well-nourished in no apparent distress.   EYE:  No ocular asymmetry, PERRLA  HENT: Pink, moist mucous membranes.     CHEST: no gynecomastia  CARDIOVASCULAR:  No murmurs  LUNGS: Clear breath sounds  ABDOMEN: Soft, nontender   GENIT: normal  EXTREMITIES: No clubbing, cyanosis, or edema.   NEUROLOGICAL: No gross focal motor abnormalities. No visible tremor, no proximal muscle weakness   LYMPH: No cervical adenopathy palpated.   SKIN: No rashes, lesions.       Labs:  Lab Results   Component Value Date/Time    WBC 8.9 12/29/2024 11:59 PM    RBC 5.23 12/29/2024 11:59 PM    HEMOGLOBIN 16.4 03/01/2025 09:45 AM    MCV 97.1 12/29/2024 11:59 PM    MCH 32.9 12/29/2024 11:59 PM    MCHC 33.9 12/29/2024 11:59 PM    RDW 48.8 12/29/2024 11:59 PM    MPV 9.5 12/29/2024 11:59 PM       Lab Results   Component Value Date/Time    SODIUM 136 03/28/2025 02:04 PM    POTASSIUM 4.6 03/28/2025 02:04 PM    CHLORIDE 98 03/28/2025 02:04 PM    CO2 27 03/28/2025 02:04 PM    ANION 11.0 03/28/2025 02:04 PM    GLUCOSE 134 (H) 03/28/2025 02:04 PM    BUN 12 03/28/2025 02:04 PM    CREATININE 1.17 03/28/2025 02:04 PM    CALCIUM 10.0 03/28/2025 02:04 PM    ASTSGOT 53 (H) 03/01/2025 09:45 AM    ALTSGPT 40 03/01/2025 09:45 AM    TBILIRUBIN 0.3 03/01/2025 09:45 AM    ALBUMIN 3.9 03/01/2025 09:45 AM    TOTPROTEIN 7.4 03/01/2025 09:45 AM    GLOBULIN 3.5 03/01/2025 09:45 AM    AGRATIO 1.1 03/01/2025 09:45 AM       No results found for: LH    Lab Results   Component Value Date/Time    FSH 4.0 03/19/2020 0914       Lab Results   Component Value Date/Time    TESTOSTERONE 68 (L) 03/19/2020 " 0914           Imaging: Please refer to pituitary MRI from March 2020    ASSESSMENT/PLAN:      1. Secondary adrenal insufficiency (HCC)  Stable control  Continue hydrocortisone 10 mg in the morning and 5 mg in the afternoon  Reviewed stress dosing of steroids for illness  Follow-up in 12 months with labs    2. Secondary hypothyroidism  Stable  His TSH levels are unreliable because of radiotherapy for his pituitary and should not be utilized for monitoring response to therapy  His free T4 is normal   Continue levothyroxine 150 MCG daily  We will repeat his free T4 levels in in 12 months    3. Secondary male hypogonadism  Controlled   Continue Androgel 1.62% 1 pump daily  He should call me in 6 mos for refills   Reviewed the risks of androgen replacement therapy  We will check his hematocrit, and testosterone levels in 12 months Recommend that he get an annual PSA and digital rectal exam with his primary care physician    4. Panhypopituitarism (HCC)  See above    5. Vitamin D deficiency  Will check calcium and vitamin D with futur e labs    6. Elevated liver function tests  Continue monitoring  This is abnormal due to ongoing alcohol abuse   Recommend that he reduce his alcohol intake, follow-up with primary care    7. Screening for prostate cancer  Will check PSA       Return in about 1 year (around 4/7/2026).    Total time spent on day of service was over 60 minutes which included obtaining a detailed history and physical exam, ordering labs, coordinating care and scheduling future follow-up    Thank you kindly for allowing me to participate in the endocrine care plan for this patient.    Catracho Montano MD, FACE, [facility]      CC:   Manjula Zaidi M.D.

## 2025-04-16 ENCOUNTER — OFFICE VISIT (OUTPATIENT)
Dept: MEDICAL GROUP | Age: 70
End: 2025-04-16
Payer: MEDICARE

## 2025-04-16 VITALS
HEIGHT: 76 IN | TEMPERATURE: 97.7 F | BODY MASS INDEX: 30.44 KG/M2 | OXYGEN SATURATION: 95 % | SYSTOLIC BLOOD PRESSURE: 112 MMHG | DIASTOLIC BLOOD PRESSURE: 78 MMHG | HEART RATE: 75 BPM | WEIGHT: 250 LBS

## 2025-04-16 DIAGNOSIS — Z12.11 COLON CANCER SCREENING: ICD-10-CM

## 2025-04-16 DIAGNOSIS — E66.9 OBESITY (BMI 30-39.9): ICD-10-CM

## 2025-04-16 DIAGNOSIS — N40.1 BENIGN PROSTATIC HYPERPLASIA WITH INCOMPLETE BLADDER EMPTYING: ICD-10-CM

## 2025-04-16 DIAGNOSIS — R39.14 BENIGN PROSTATIC HYPERPLASIA WITH INCOMPLETE BLADDER EMPTYING: ICD-10-CM

## 2025-04-16 DIAGNOSIS — E78.5 HYPERLIPIDEMIA, UNSPECIFIED HYPERLIPIDEMIA TYPE: ICD-10-CM

## 2025-04-16 DIAGNOSIS — R73.01 IMPAIRED FASTING BLOOD SUGAR: ICD-10-CM

## 2025-04-16 DIAGNOSIS — K42.9 UMBILICAL HERNIA WITHOUT OBSTRUCTION AND WITHOUT GANGRENE: ICD-10-CM

## 2025-04-16 PROCEDURE — 3078F DIAST BP <80 MM HG: CPT | Performed by: STUDENT IN AN ORGANIZED HEALTH CARE EDUCATION/TRAINING PROGRAM

## 2025-04-16 PROCEDURE — 99214 OFFICE O/P EST MOD 30 MIN: CPT | Performed by: STUDENT IN AN ORGANIZED HEALTH CARE EDUCATION/TRAINING PROGRAM

## 2025-04-16 PROCEDURE — 3074F SYST BP LT 130 MM HG: CPT | Performed by: STUDENT IN AN ORGANIZED HEALTH CARE EDUCATION/TRAINING PROGRAM

## 2025-04-16 ASSESSMENT — ENCOUNTER SYMPTOMS
BLURRED VISION: 0
SHORTNESS OF BREATH: 0
DIZZINESS: 0
CHILLS: 0
BLOOD IN STOOL: 0
ABDOMINAL PAIN: 0
DEPRESSION: 0
PALPITATIONS: 0
DOUBLE VISION: 0
COUGH: 0
BACK PAIN: 0
FEVER: 0
WEAKNESS: 0
BRUISES/BLEEDS EASILY: 0
WHEEZING: 0
HEADACHES: 0
ORTHOPNEA: 0

## 2025-04-16 ASSESSMENT — FIBROSIS 4 INDEX: FIB4 SCORE: 2.51

## 2025-04-16 ASSESSMENT — LIFESTYLE VARIABLES: SUBSTANCE_ABUSE: 0

## 2025-04-16 NOTE — PROGRESS NOTES
Subjective:     CC: 4-month follow-up visit    HPI:   History of Present Illness  The patient is a 69-year-old male presenting for a follow-up visit.    Adherence to his prescribed medication regimen is reported, which he believes enhances his energy levels. Care has been provided by a cardiologist and an endocrinologist, both of whom have prescribed medications taken daily without fail. A history of congestive heart failure is noted, diagnosed 3 to 4 years ago, leading to a week-long hospitalization and subsequent medication regimen. Weight dropped from 220 to 199 pounds during the hospital stay but has since increased to 250 pounds. He maintains a consistent lifestyle and diet, which includes daily consumption of mixed drinks with orange juice and ice, and occasional meals such as fried chicken or barbecue chicken with vegetables. A multivitamin supplement is taken regularly. Concerns about weight gain are expressed, and plans to discuss this with his endocrinologist are mentioned. Medication for hypothyroidism is currently being taken.    A desire to consult a urologist is expressed due to frequent urination at night, often resulting in only minor dribbling. No prostate medication is currently being taken. The most recent PSA test, conducted by the endocrinologist, showed a low level.    An umbilical hernia has developed, attributed to weight gain. Manual reduction of the hernia when coughing is necessary to prevent protrusion. A similar hernia issue in his father required surgical intervention.    Interest in scheduling a colonoscopy for the upcoming summer is noted, as it has been several decades since the last one. A previous colonoscopy was scheduled but was not attended due to lack of transportation.    SOCIAL HISTORY  He drinks alcohol daily, consuming about 4 to 5 shots of mixed drinks with orange juice and ice each night.    FAMILY HISTORY  His father had a hernia problem that required surgical  "intervention.       Problem   Obesity (Bmi 30-39.9)       ROS:  Review of Systems   Constitutional:  Negative for chills, fever and malaise/fatigue.   HENT:  Negative for nosebleeds and tinnitus.    Eyes:  Negative for blurred vision and double vision.   Respiratory:  Negative for cough, shortness of breath and wheezing.    Cardiovascular:  Negative for chest pain, palpitations, orthopnea and leg swelling.   Gastrointestinal:  Negative for abdominal pain, blood in stool and melena.   Genitourinary:  Negative for dysuria and urgency.   Musculoskeletal:  Negative for back pain and joint pain.   Skin:  Negative for rash.   Neurological:  Negative for dizziness, weakness and headaches.   Endo/Heme/Allergies:  Does not bruise/bleed easily.   Psychiatric/Behavioral:  Negative for depression, substance abuse and suicidal ideas.        Objective:     Exam:  /78 (BP Location: Right arm, Patient Position: Sitting, BP Cuff Size: Large adult)   Pulse 75   Temp 36.5 °C (97.7 °F) (Temporal)   Ht 1.93 m (6' 4\")   Wt 113 kg (250 lb)   SpO2 95%   BMI 30.43 kg/m²  Body mass index is 30.43 kg/m².    Physical Exam  Vitals reviewed.   Constitutional:       General: He is not in acute distress.     Appearance: He is not ill-appearing.   HENT:      Head: Normocephalic and atraumatic.      Nose: No congestion.      Mouth/Throat:      Mouth: Mucous membranes are moist.      Pharynx: No oropharyngeal exudate or posterior oropharyngeal erythema.   Eyes:      General: No scleral icterus.     Extraocular Movements: Extraocular movements intact.      Pupils: Pupils are equal, round, and reactive to light.   Cardiovascular:      Rate and Rhythm: Normal rate and regular rhythm.      Pulses: Normal pulses.      Heart sounds: Normal heart sounds. No murmur heard.  Pulmonary:      Effort: Pulmonary effort is normal. No respiratory distress.      Breath sounds: Normal breath sounds. No wheezing.   Abdominal:      General: Bowel sounds are " normal. There is no distension.      Palpations: Abdomen is soft.      Tenderness: There is no abdominal tenderness. There is no guarding or rebound.      Hernia: A hernia is present.          Comments: Umbilical hernia   Musculoskeletal:      Cervical back: Normal range of motion and neck supple.      Right lower leg: No edema.      Left lower leg: No edema.   Skin:     General: Skin is warm.      Capillary Refill: Capillary refill takes less than 2 seconds.      Coloration: Skin is not jaundiced.      Findings: No bruising or erythema.   Neurological:      General: No focal deficit present.      Mental Status: He is alert.      Cranial Nerves: No cranial nerve deficit.      Sensory: No sensory deficit.   Psychiatric:         Mood and Affect: Mood normal.         Behavior: Behavior normal.       Labs: Reviewed    Assessment & Plan:     1. Hyperlipidemia, unspecified hyperlipidemia type  -Chronic, stable  -Currently taking Lipitor 40 mg daily  -Repeat profile prior to next visit  -Continue same therapy.  - Comp Metabolic Panel; Future  - Lipid Profile; Future    2. Impaired fasting blood sugar  -Diabetes screening  - HEMOGLOBIN A1C; Future    3. Umbilical hernia without obstruction and without gangrene  - Reports an umbilical hernia that protrudes, especially when coughing, and requires manual reduction.  - Physical examination confirms the presence of the hernia.  - Discussed the hernia's progression and family history of similar issues.  - Referral to a surgeon will be placed for further evaluation and potential surgical intervention.  - Referral to General Surgery    4. Benign prostatic hyperplasia with incomplete bladder emptying  - Presents with symptoms indicative of an enlarged prostate, including nocturia and urinary hesitancy.  - No medications currently being taken for prostate symptoms; PSA levels reported as low.  - Discussed symptoms and the potential for prostate enlargement.  -Discussed with the  patient potential medical therapy, however he would like to see a urologist for evaluation.  - Referral to a urologist will be initiated for further evaluation and management.  - Referral to Urology    5. Obesity (BMI 30-39.9)  - Patient identified as having weight management issue.  Appropriate orders and counseling given.    6. Colon cancer screening  -Due for screening in June  - Referral to GI for Colonoscopy     Health maintenance.  - Not due for a colonoscopy until 06/2025; completed a Cologuard stool test approximately 3 years ago.  - Discussed the importance of regular screenings and the plan to schedule a colonoscopy once referral gets approved by his insurance.  - Blood work will be ordered prior to the next visit to monitor overall health.  - Referrals for a surgeon and urologist will be placed.    Return in about 4 months (around 8/16/2025) for Labs, Meds.    Please note that this dictation was created using voice recognition software. I have made every reasonable attempt to correct obvious errors, but I expect that there are errors of grammar and possibly content that I did not discover before finalizing the note.

## 2025-05-21 ENCOUNTER — OFFICE VISIT (OUTPATIENT)
Dept: CARDIOLOGY | Facility: MEDICAL CENTER | Age: 70
End: 2025-05-21
Attending: NURSE PRACTITIONER
Payer: MEDICARE

## 2025-05-21 VITALS
RESPIRATION RATE: 18 BRPM | DIASTOLIC BLOOD PRESSURE: 72 MMHG | HEART RATE: 69 BPM | OXYGEN SATURATION: 93 % | BODY MASS INDEX: 30.93 KG/M2 | WEIGHT: 254 LBS | SYSTOLIC BLOOD PRESSURE: 106 MMHG | HEIGHT: 76 IN

## 2025-05-21 DIAGNOSIS — D68.69 HYPERCOAGULABLE STATE DUE TO PAROXYSMAL ATRIAL FIBRILLATION (HCC): ICD-10-CM

## 2025-05-21 DIAGNOSIS — I50.32 HEART FAILURE WITH RECOVERED EJECTION FRACTION (HFRECEF) (HCC): ICD-10-CM

## 2025-05-21 DIAGNOSIS — I50.20 HEART FAILURE WITH REDUCED EJECTION FRACTION (HCC): ICD-10-CM

## 2025-05-21 DIAGNOSIS — E78.5 DYSLIPIDEMIA: ICD-10-CM

## 2025-05-21 DIAGNOSIS — I48.0 PAROXYSMAL A-FIB (HCC): ICD-10-CM

## 2025-05-21 DIAGNOSIS — R06.09 OTHER FORMS OF DYSPNEA: ICD-10-CM

## 2025-05-21 DIAGNOSIS — I51.89 LEFT VENTRICULAR SYSTOLIC DYSFUNCTION, NYHA CLASS 2: ICD-10-CM

## 2025-05-21 DIAGNOSIS — I48.0 HYPERCOAGULABLE STATE DUE TO PAROXYSMAL ATRIAL FIBRILLATION (HCC): ICD-10-CM

## 2025-05-21 DIAGNOSIS — R06.02 SHORTNESS OF BREATH: ICD-10-CM

## 2025-05-21 DIAGNOSIS — Z79.899 HIGH RISK MEDICATION USE: ICD-10-CM

## 2025-05-21 DIAGNOSIS — I50.20 ACC/AHA STAGE C SYSTOLIC HEART FAILURE (HCC): Primary | ICD-10-CM

## 2025-05-21 DIAGNOSIS — I42.6 ALCOHOLIC CARDIOMYOPATHY (HCC): Chronic | ICD-10-CM

## 2025-05-21 PROCEDURE — 3074F SYST BP LT 130 MM HG: CPT | Performed by: NURSE PRACTITIONER

## 2025-05-21 PROCEDURE — 99213 OFFICE O/P EST LOW 20 MIN: CPT | Performed by: NURSE PRACTITIONER

## 2025-05-21 PROCEDURE — 99214 OFFICE O/P EST MOD 30 MIN: CPT | Performed by: NURSE PRACTITIONER

## 2025-05-21 PROCEDURE — 3078F DIAST BP <80 MM HG: CPT | Performed by: NURSE PRACTITIONER

## 2025-05-21 RX ORDER — FUROSEMIDE 40 MG/1
40 TABLET ORAL DAILY
Qty: 100 TABLET | Refills: 3 | Status: SHIPPED | OUTPATIENT
Start: 2025-05-21 | End: 2025-05-21

## 2025-05-21 RX ORDER — FUROSEMIDE 40 MG/1
40 TABLET ORAL EVERY MORNING
Qty: 100 TABLET | Refills: 3 | Status: SHIPPED | OUTPATIENT
Start: 2025-05-21

## 2025-05-21 ASSESSMENT — MINNESOTA LIVING WITH HEART FAILURE QUESTIONNAIRE (MLHF)
DIFFICULTY GOING AWAY FROM HOME: 1
EATING LESS FOODS YOU LIKE: 2
MAKING YOU SHORT OF BREATH: 4
WORKING AROUND THE HOUSE OR YARD DIFFICULT: 0
LOSS OF SELF CONTROL IN YOUR LIFE: 1
SWELLING IN ANKLES OR LEGS: 2
WALKING ABOUT OR CLIMBING STAIRS DIFFICULT: 2
DIFFICULTY WITH RECREATIONAL PASTIMES, SPORTS, HOBBIES: 1
TIRED, FATIGUED OR LOW ON ENERGY: 3
DIFFICULTY WORKING TO EARN A LIVING: 2
DIFFICULTY SOCIALIZING WITH FAMILY OR FRIENDS: 1
MAKING YOU FEEL DEPRESSED: 0
MAKING YOU WORRY: 2
DIFFICULTY TO CONCENTRATE OR REMEMBERING THINGS: 0
COSTING YOU MONEY FOR MEDICAL CARE: 1
DIFFICULTY WITH SEXUAL ACTIVITIES: 5
DIFFICULTY SLEEPING WELL AT NIGHT: 2
MAKING YOU STAY IN A HOSPITAL: 0
TOTAL_SCORE: 32
FEELING LIKE A BURDEN TO FAMILY AND FRIENDS: 0
GIVING YOU SIDE EFFECTS FROM TREATMENTS: 0
HAVING TO SIT OR LIE DOWN DURING THE DAY: 3

## 2025-05-21 ASSESSMENT — FIBROSIS 4 INDEX: FIB4 SCORE: 2.54

## 2025-05-21 ASSESSMENT — 6 MINUTE WALK TEST (6MWT): TOTAL DISTANCE WALKED (METERS): 390.14

## 2025-05-21 NOTE — PROGRESS NOTES
Chief Complaint   Patient presents with    Follow-Up     Dx: Chronic systolic CHF (congestive heart failure) (HCC)    Hyperlipidemia       Subjective     Alberto Galicia is a 68 y.o. male who presents today heart failure follow-up.  [facility] hospitalization 1/23 with nonobstructive CAD, EF 20%, A-fib, LV thrombus.  Patient is additional medical problems of adrenal insufficiency and EtOH.  Patient also seen by EP on 3/11/2024 where A-fib has not recurred since stopping amiodarone therapy.    Patient of Dr. Hauser, patient was last seen by myself on 3/19/2025.      Today, patient reports for rare instances of SALMON and fatigue.  Patient reports that usually resolves in the next day.  Otherwise, Patient feels well, denies chest pain, shortness of breath, palpitations, dizziness/lightheadedness, orthopnea, PND or Edema.  Patient reports he does intermittently consume frozen dinners.    Based on physical examination findings, patient is euvolemic. No JVD, lungs are clear to auscultation, no pitting edema in bilateral lower extremities, no ascites.  Dry weight is 254 lbs.    We reviewed the sodium content of frozen dinners as being higher than typical foods.  Patient verbalizes understanding.  We will continue medical therapy as previously prescribed.  We discussed if patient experiences persistent SALMON and weight gain to take additional furosemide.  These instructions were written in patient's after visit summary.  Patient to continue to to increase cardiovascular exercise tolerance to goal of 30 minutes 5 times a week.  We will reevaluate renal, electrolyte, lipid function in 6 months for high risk medication use.  Patient to follow-up in 6 months, sooner if needed.      Past Medical History:   Diagnosis Date    Asthma     Cardiomyopathy (HCC) EF <20% 1/30/2023    Hyperlipidemia     Left ventricular thrombus 1/30/2023    Pituitary disease (HCC)     Thyroid disease      Past Surgical History:   Procedure Laterality Date     CRANIOTOMY       Family History   Problem Relation Age of Onset    Diabetes Mother     Heart Disease Father     Diabetes Brother     Alzheimer's Disease Maternal Grandmother     Cancer Maternal Grandfather     Heart Disease Paternal Grandmother     Cancer Paternal Grandfather      Social History     Socioeconomic History    Marital status: Single     Spouse name: Not on file    Number of children: Not on file    Years of education: Not on file    Highest education level: Not on file   Occupational History    Not on file   Tobacco Use    Smoking status: Never    Smokeless tobacco: Never   Vaping Use    Vaping status: Never Used   Substance and Sexual Activity    Alcohol use: Yes     Alcohol/week: 16.8 oz     Types: 28 Standard drinks or equivalent per week    Drug use: No    Sexual activity: Not on file   Other Topics Concern    Not on file   Social History Narrative    Not on file     Social Drivers of Health     Financial Resource Strain: Not on file   Food Insecurity: No Food Insecurity (12/29/2024)    Hunger Vital Sign     Worried About Running Out of Food in the Last Year: Never true     Ran Out of Food in the Last Year: Never true   Transportation Needs: No Transportation Needs (12/29/2024)    PRAPARE - Transportation     Lack of Transportation (Medical): No     Lack of Transportation (Non-Medical): No   Physical Activity: Not on file   Stress: Not on file   Social Connections: Not on file   Intimate Partner Violence: Not At Risk (12/29/2024)    Humiliation, Afraid, Rape, and Kick questionnaire     Fear of Current or Ex-Partner: No     Emotionally Abused: No     Physically Abused: No     Sexually Abused: No   Housing Stability: Low Risk  (12/29/2024)    Housing Stability Vital Sign     Unable to Pay for Housing in the Last Year: No     Number of Times Moved in the Last Year: 0     Homeless in the Last Year: No     Allergies   Allergen Reactions    Penicillins Swelling     Swelling at injection site; reaction  occurred at 23 yo    Viagra [Sildenafil]      If taken 100mg     Outpatient Encounter Medications as of 5/21/2025   Medication Sig Dispense Refill    furosemide (LASIX) 40 MG Tab Take 1 Tablet by mouth every morning. Take an extra tablet daily as needed for shortness of breath or weight gain greater than 3 pounds. 100 Tablet 3    atorvastatin (LIPITOR) 40 MG Tab Take 1 Tablet by mouth every day. 100 Tablet 3    ELIQUIS 5 MG Tab Take 1 Tablet by mouth 2 times a day. 200 Tablet 3    losartan (COZAAR) 25 MG Tab Take 1 Tablet by mouth every evening. 100 Tablet 3    fluticasone furoate-vilanterol (BREO ELLIPTA) 100-25 MCG/ACT AEROSOL POWDER, BREATH ACTIVATED Inhale 1 Puff by mouth every day. 60 Each 2    levalbuterol (XOPENEX HFA) 45 MCG/ACT inhaler Inhale 1-2 Puffs every four hours as needed for Shortness of Breath. 15 g 3    metoprolol SR (TOPROL XL) 50 MG TABLET SR 24 HR Take 1 Tablet by mouth every evening. 30 Tablet 3    multivitamin Tab Take 1 Tablet by mouth every morning.      acetaminophen (TYLENOL) 500 MG Tab Take 1,000 mg by mouth every 6 hours as needed for Mild Pain, Moderate Pain or Fever.      MELATONIN PO Take 2 Tablets by mouth 1 time a day as needed (sleep).      levothyroxine (SYNTHROID) 150 MCG Tab Take 1 Tablet by mouth every morning on an empty stomach. 90 Tablet 3    hydrocortisone (CORTEF) 5 MG Tab Take 2 pills in the morning and 1 pill in the afternoon 200 Tablet 3    potassium chloride SA (KDUR) 20 MEQ Tab CR Take 0.5 Tablets by mouth every day. 45 Tablet 3    [DISCONTINUED] furosemide (LASIX) 40 MG Tab Take 1 Tablet by mouth every day. Take an extra tablet daily as needed for shortness of breath or weight gain greater than 3 pounds. 100 Tablet 3    Non Formulary Request every day. ULTIMATE BEATS      [DISCONTINUED] furosemide (LASIX) 40 MG Tab Take 1 Tablet by mouth every day. 90 Tablet 3    sildenafil citrate (VIAGRA) 50 MG tablet Take 1 Tablet by mouth 1 time a day as needed for Erectile  "Dysfunction. (Patient not taking: Reported on 5/21/2025) 20 Tablet 3     No facility-administered encounter medications on file as of 5/21/2025.     ROS Complete review of systems negative except as noted in HPI/subjective           Objective     /72 (BP Location: Left arm, Patient Position: Sitting, BP Cuff Size: Adult)   Pulse 69   Resp 18   Ht 1.93 m (6' 4\")   Wt 115 kg (254 lb)   SpO2 93%   BMI 30.92 kg/m²     Physical Exam  Vitals reviewed.   Constitutional:       Appearance: He is well-developed.   HENT:      Head: Normocephalic and atraumatic.   Eyes:      Pupils: Pupils are equal, round, and reactive to light.   Neck:      Vascular: No JVD.   Cardiovascular:      Rate and Rhythm: Normal rate and regular rhythm.      Heart sounds: Normal heart sounds. No murmur heard.     No friction rub. No gallop.   Pulmonary:      Effort: Pulmonary effort is normal. No respiratory distress.      Breath sounds: Normal breath sounds.   Abdominal:      General: There is distension.   Musculoskeletal:      Right lower leg: No edema.      Left lower leg: No edema.   Skin:     General: Skin is warm and dry.      Findings: No erythema.   Neurological:      Mental Status: He is alert and oriented to person, place, and time.   Psychiatric:         Behavior: Behavior normal.       Lab Results   Component Value Date/Time    CHOLSTRLTOT 175 08/06/2024 08:20 AM    LDL 83 08/06/2024 08:20 AM    HDL 48 08/06/2024 08:20 AM    TRIGLYCERIDE 220 (H) 08/06/2024 08:20 AM       Lab Results   Component Value Date/Time    SODIUM 136 03/28/2025 02:04 PM    POTASSIUM 4.6 03/28/2025 02:04 PM    CHLORIDE 98 03/28/2025 02:04 PM    CO2 27 03/28/2025 02:04 PM    GLUCOSE 134 (H) 03/28/2025 02:04 PM    BUN 12 03/28/2025 02:04 PM    CREATININE 1.17 03/28/2025 02:04 PM     Lab Results   Component Value Date/Time    ALKPHOSPHAT 72 03/01/2025 09:45 AM    ASTSGOT 53 (H) 03/01/2025 09:45 AM    ALTSGPT 40 03/01/2025 09:45 AM    TBILIRUBIN 0.3 " 03/01/2025 09:45 AM       Latest Reference Range & Units 07/17/24 12:34 12/29/24 08:48   NT-proBNP 0 - 125 pg/mL <36 130 (H)      05/21/25   MLHF Total Score  32   Baseline to end of test 6:00   Total meters walked 390.14     TTE (12/29/2024):  Normal left ventricular size, thickness, and systolic function.  The left ventricular ejection fraction is visually estimated to be 55-  60%.  Normal diastolic function for age.  Upper normal right ventricular size and systolic function.  Right ventricular systolic pressure is estimated to be 26 mmHg.  No significant valvular abnormalities.   The inferior vena cava is not well visualized, but appears grossly   normal.           Assessment & Plan     1. ACC/AHA stage C systolic heart failure (HCC)  Comp Metabolic Panel    Lipid Profile      2. Shortness of breath  Comp Metabolic Panel    Lipid Profile      3. Other forms of dyspnea  Comp Metabolic Panel    Lipid Profile      4. 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 Heart failure with reduced ejection fraction (HCC)  Comp Metabolic Panel    Lipid Profile    furosemide (LASIX) 40 MG Tab    DISCONTINUED: furosemide (LASIX) 40 MG Tab      6. Hypercoagulable state due to paroxysmal atrial fibrillation ([facility])  Comp Metabolic Panel    Lipid Profile    furosemide (LASIX) 40 MG Tab    DISCONTINUED: furosemide (LASIX) 40 MG Tab      7. Left ventricular systolic dysfunction, NYHA class 2  Comp Metabolic Panel    Lipid Profile    furosemide (LASIX) 40 MG Tab    DISCONTINUED: furosemide (LASIX) 40 MG Tab      8. Dyslipidemia        9. High risk medication use        10. Heart failure with recovered ejection fraction (HFrecEF) ([facility])        11. Paroxysmal A-fib ([facility])                    Medical Decision Making: Today's Assessment/Status/Plan:        HFimpEF, Stage C, Class II, LVEF 70% (Recovered from 25%):  -Heart failure due  to nonischemic cardiomyopathy  -Discussed Heart failure trajectory and prognosis with patient. Will continue to optimize medical therapy as tolerated. Advanced HF treatment, need for remote monitoring consideration at every visit.  -ACE-I/ARB/ARNI: Continue losartan 25  mg daily  -Evidence Based Beta-blocker: continue metoprolol XL 25 mg daily  -Aldosterone Antagonist: Adrenal insufficiency; poor tolerance to spironolactone in the past  -SGLT2-I: Increased patient's dizziness in the past  -Diuretic: Continue furosemide 40 mg daily with 10 mEq potassium supplementation.  Additional dose PRN  -Labs: CMP and lipids in 6 months. Will continue to closely monitor for side effects of patient's high risk medication(s) including renal function, NTproBNP, and electrolytes as needed  -EF >35%, no indication for primary prevention ICD fat this time  -Reinforced s/sx of worsening heart failure with patient and weight monitoring. Pt verbalizes understanding. Pt to call office if present.  Discussed intermittent high sodium meals can be associated with intermittent SALMON. Patient to monitor and use as needed Lasix.   -Heart Failure Education: pt to be contacted by HF nurse for further education.  In the meantime, during visit today we discussed indications and use for each medication, importance of treatment adherence, definition of heart failure and potential etiologies for current diagnosis.  -Pharmacotherapy referral deferred at this time   -Compliance Barriers none identified at this time  -Advanced care planning: Deferred at this time    LV thrombus (resolved); Paroxysmal Atrial Fibrillation (PAF); Hypothyroid  - Asymptomatic, denies AF breakthrough, rate controlled.   -Breakthrough A-fib with RVR in December   -Sinus rhythm on EKG previously  - On OAC with Eliquis, continue.  - Continue metoprolol    Hypertension; hyperlipidemia  -Blood pressure well controlled in office today.  .  -LDL 83 (2024).  At goal.  Continue  atorvastatin 40 mg daily. Recheck in 6 months    COPD; hypothyroid; adrenal sufficiency  -Per endocrine. On chronic steriods    FU in clinic in 6 months with cardiology. Sooner if needed.    Patient verbalizes understanding and agrees with the plan of care.     ARAMIS Matute, CCK, HF-Cert   [facility]  [phone]    PLEASE NOTE: This Note was created using voice recognition Software. I have made every reasonable attempt to correct obvious errors, but I expect that there are errors of grammar and possibly content that I did not discover before finalizing the note

## 2025-05-21 NOTE — PATIENT INSTRUCTIONS
Continue furosemide 40 mg every morning with 10 mEq potassium supplementation.  Additional Furosemide 40 mg daily as needed for shortness of breath for 2 days weight gain greater than 3 pounds in 1 day or 5 pounds in 1 week.

## 2025-06-02 DIAGNOSIS — L30.9 ECZEMA, UNSPECIFIED TYPE: ICD-10-CM

## 2025-06-03 RX ORDER — TRIAMCINOLONE ACETONIDE 1 MG/G
CREAM TOPICAL
Qty: 45 G | Refills: 0 | Status: SHIPPED | OUTPATIENT
Start: 2025-06-03 | End: 2025-06-08 | Stop reason: SDUPTHER

## 2025-06-08 DIAGNOSIS — I42.6 ALCOHOLIC CARDIOMYOPATHY (HCC): ICD-10-CM

## 2025-06-08 DIAGNOSIS — D68.69 HYPERCOAGULABLE STATE DUE TO PAROXYSMAL ATRIAL FIBRILLATION (HCC): ICD-10-CM

## 2025-06-08 DIAGNOSIS — L30.9 ECZEMA, UNSPECIFIED TYPE: ICD-10-CM

## 2025-06-08 DIAGNOSIS — I51.89 LEFT VENTRICULAR SYSTOLIC DYSFUNCTION, NYHA CLASS 2: ICD-10-CM

## 2025-06-08 DIAGNOSIS — E03.8 SECONDARY HYPOTHYROIDISM: ICD-10-CM

## 2025-06-08 DIAGNOSIS — E78.5 HYPERLIPIDEMIA, UNSPECIFIED HYPERLIPIDEMIA TYPE: ICD-10-CM

## 2025-06-08 DIAGNOSIS — I48.0 HYPERCOAGULABLE STATE DUE TO PAROXYSMAL ATRIAL FIBRILLATION (HCC): ICD-10-CM

## 2025-06-08 DIAGNOSIS — I50.20 ACC/AHA STAGE C SYSTOLIC HEART FAILURE (HCC): ICD-10-CM

## 2025-06-08 DIAGNOSIS — I48.91 ATRIAL FIBRILLATION WITH RAPID VENTRICULAR RESPONSE (HCC): ICD-10-CM

## 2025-06-08 DIAGNOSIS — E27.49 SECONDARY ADRENAL INSUFFICIENCY (HCC): ICD-10-CM

## 2025-06-08 DIAGNOSIS — I50.20 HEART FAILURE WITH REDUCED EJECTION FRACTION (HCC): ICD-10-CM

## 2025-06-09 DIAGNOSIS — E27.49 SECONDARY ADRENAL INSUFFICIENCY (HCC): ICD-10-CM

## 2025-06-09 RX ORDER — TRIAMCINOLONE ACETONIDE 1 MG/G
1 CREAM TOPICAL 2 TIMES DAILY
Qty: 45 G | Refills: 0 | Status: SHIPPED | OUTPATIENT
Start: 2025-06-09 | End: 2025-06-23

## 2025-06-09 RX ORDER — LOSARTAN POTASSIUM 25 MG/1
25 TABLET ORAL EVERY EVENING
Qty: 100 TABLET | Refills: 3 | Status: SHIPPED | OUTPATIENT
Start: 2025-06-09

## 2025-06-09 RX ORDER — POTASSIUM CHLORIDE 1500 MG/1
10 TABLET, EXTENDED RELEASE ORAL DAILY
Qty: 45 TABLET | Refills: 3 | Status: SHIPPED | OUTPATIENT
Start: 2025-06-09

## 2025-06-09 RX ORDER — ATORVASTATIN CALCIUM 40 MG/1
40 TABLET, FILM COATED ORAL DAILY
Qty: 100 TABLET | Refills: 3 | Status: SHIPPED | OUTPATIENT
Start: 2025-06-09

## 2025-06-09 RX ORDER — HYDROCORTISONE 5 MG/1
TABLET ORAL
Qty: 300 TABLET | Refills: 3 | Status: SHIPPED | OUTPATIENT
Start: 2025-06-09

## 2025-06-09 RX ORDER — HYDROCORTISONE 5 MG/1
TABLET ORAL
Qty: 200 TABLET | Refills: 3 | Status: SHIPPED | OUTPATIENT
Start: 2025-06-09 | End: 2025-06-09 | Stop reason: SDUPTHER

## 2025-06-09 NOTE — TELEPHONE ENCOUNTER
Is the patient due for a refill? No    Was the patient seen the last 15 months? Yes    Date of last office visit: 05/21/2025    Does the patient have an upcoming appointment?  Yes   If yes, When? 12/03/2025    Provider to refill:ZHOU    Does the patient have [facility] and need 100-day supply? (This applies to ALL medications) Yes, quantity updated to 100 days

## 2025-06-09 NOTE — TELEPHONE ENCOUNTER
To JG - please refill potassium if appropriate. Last seen by you 5/21/25. Last K 4.6 on 3/28/25. Thank you!

## 2025-06-10 ENCOUNTER — TELEPHONE (OUTPATIENT)
Dept: MEDICAL GROUP | Age: 70
End: 2025-06-10
Payer: MEDICARE

## 2025-06-10 NOTE — TELEPHONE ENCOUNTER
Phone Number Called: [phone]    Call outcome: Left detailed message for patient. Informed to call back with any additional questions.    Message: lvm to confirm he was able to  his triamcinolone cream

## 2025-06-18 ENCOUNTER — HOSPITAL ENCOUNTER (INPATIENT)
Facility: MEDICAL CENTER | Age: 70
LOS: 2 days | DRG: 563 | End: 2025-06-20
Attending: EMERGENCY MEDICINE | Admitting: INTERNAL MEDICINE
Payer: MEDICARE

## 2025-06-18 ENCOUNTER — APPOINTMENT (OUTPATIENT)
Dept: RADIOLOGY | Facility: MEDICAL CENTER | Age: 70
DRG: 563 | End: 2025-06-18
Attending: EMERGENCY MEDICINE
Payer: MEDICARE

## 2025-06-18 DIAGNOSIS — R53.1 GENERALIZED WEAKNESS: ICD-10-CM

## 2025-06-18 DIAGNOSIS — R06.09 DYSPNEA ON EXERTION: ICD-10-CM

## 2025-06-18 DIAGNOSIS — S82.832A CLOSED FRACTURE OF DISTAL END OF LEFT FIBULA, UNSPECIFIED FRACTURE MORPHOLOGY, INITIAL ENCOUNTER: ICD-10-CM

## 2025-06-18 DIAGNOSIS — R55 SYNCOPE, UNSPECIFIED SYNCOPE TYPE: Primary | ICD-10-CM

## 2025-06-18 DIAGNOSIS — F10.10 ALCOHOL ABUSE: ICD-10-CM

## 2025-06-18 DIAGNOSIS — R19.5 GUAIAC POSITIVE STOOLS: ICD-10-CM

## 2025-06-18 PROBLEM — S82.892A CLOSED FRACTURE OF LEFT ANKLE: Status: ACTIVE | Noted: 2025-06-18

## 2025-06-18 LAB
ABO + RH BLD: NORMAL
ABO GROUP BLD: NORMAL
ALBUMIN SERPL BCP-MCNC: 3.9 G/DL (ref 3.2–4.9)
ALBUMIN/GLOB SERPL: 1.1 G/DL
ALP SERPL-CCNC: 78 U/L (ref 30–99)
ALT SERPL-CCNC: 33 U/L (ref 2–50)
ANION GAP SERPL CALC-SCNC: 14 MMOL/L (ref 7–16)
APTT PPP: 33.3 SEC (ref 24.7–36)
AST SERPL-CCNC: 60 U/L (ref 12–45)
BASOPHILS # BLD AUTO: 0.4 % (ref 0–1.8)
BASOPHILS # BLD: 0.04 K/UL (ref 0–0.12)
BILIRUB SERPL-MCNC: 0.7 MG/DL (ref 0.1–1.5)
BLD GP AB SCN SERPL QL: NORMAL
BUN SERPL-MCNC: 16 MG/DL (ref 8–22)
CALCIUM ALBUM COR SERPL-MCNC: 9.3 MG/DL (ref 8.5–10.5)
CALCIUM SERPL-MCNC: 9.2 MG/DL (ref 8.5–10.5)
CHLORIDE SERPL-SCNC: 99 MMOL/L (ref 96–112)
CO2 SERPL-SCNC: 20 MMOL/L (ref 20–33)
CREAT SERPL-MCNC: 1.14 MG/DL (ref 0.5–1.4)
D DIMER PPP IA.FEU-MCNC: 1.09 UG/ML (FEU) (ref 0–0.5)
EKG IMPRESSION: NORMAL
EOSINOPHIL # BLD AUTO: 0.47 K/UL (ref 0–0.51)
EOSINOPHIL NFR BLD: 5.2 % (ref 0–6.9)
ERYTHROCYTE [DISTWIDTH] IN BLOOD BY AUTOMATED COUNT: 48.4 FL (ref 35.9–50)
ETHANOL BLD-MCNC: <10.1 MG/DL
GFR SERPLBLD CREATININE-BSD FMLA CKD-EPI: 69 ML/MIN/1.73 M 2
GLOBULIN SER CALC-MCNC: 3.5 G/DL (ref 1.9–3.5)
GLUCOSE SERPL-MCNC: 136 MG/DL (ref 65–99)
HCT VFR BLD AUTO: 46.8 % (ref 42–52)
HGB BLD-MCNC: 15.8 G/DL (ref 14–18)
IMM GRANULOCYTES # BLD AUTO: 0.03 K/UL (ref 0–0.11)
IMM GRANULOCYTES NFR BLD AUTO: 0.3 % (ref 0–0.9)
INR PPP: 1.15 (ref 0.87–1.13)
LIPASE SERPL-CCNC: 45 U/L (ref 11–82)
LYMPHOCYTES # BLD AUTO: 1.93 K/UL (ref 1–4.8)
LYMPHOCYTES NFR BLD: 21.4 % (ref 22–41)
MAGNESIUM SERPL-MCNC: 2.1 MG/DL (ref 1.5–2.5)
MCH RBC QN AUTO: 32.4 PG (ref 27–33)
MCHC RBC AUTO-ENTMCNC: 33.8 G/DL (ref 32.3–36.5)
MCV RBC AUTO: 95.9 FL (ref 81.4–97.8)
MONOCYTES # BLD AUTO: 0.71 K/UL (ref 0–0.85)
MONOCYTES NFR BLD AUTO: 7.9 % (ref 0–13.4)
NEUTROPHILS # BLD AUTO: 5.82 K/UL (ref 1.82–7.42)
NEUTROPHILS NFR BLD: 64.8 % (ref 44–72)
NRBC # BLD AUTO: 0 K/UL
NRBC BLD-RTO: 0 /100 WBC (ref 0–0.2)
NT-PROBNP SERPL IA-MCNC: 176 PG/ML (ref 0–125)
PLATELET # BLD AUTO: 283 K/UL (ref 164–446)
PMV BLD AUTO: 9.4 FL (ref 9–12.9)
POTASSIUM SERPL-SCNC: 4.3 MMOL/L (ref 3.6–5.5)
PROT SERPL-MCNC: 7.4 G/DL (ref 6–8.2)
PROTHROMBIN TIME: 14.7 SEC (ref 12–14.6)
RBC # BLD AUTO: 4.88 M/UL (ref 4.7–6.1)
RH BLD: NORMAL
SODIUM SERPL-SCNC: 133 MMOL/L (ref 135–145)
TROPONIN T SERPL-MCNC: 7 NG/L (ref 6–19)
TROPONIN T SERPL-MCNC: 9 NG/L (ref 6–19)
WBC # BLD AUTO: 9 K/UL (ref 4.8–10.8)

## 2025-06-18 PROCEDURE — 72125 CT NECK SPINE W/O DYE: CPT

## 2025-06-18 PROCEDURE — 96366 THER/PROPH/DIAG IV INF ADDON: CPT

## 2025-06-18 PROCEDURE — 82077 ASSAY SPEC XCP UR&BREATH IA: CPT

## 2025-06-18 PROCEDURE — 93005 ELECTROCARDIOGRAM TRACING: CPT | Mod: TC | Performed by: EMERGENCY MEDICINE

## 2025-06-18 PROCEDURE — 85025 COMPLETE CBC W/AUTO DIFF WBC: CPT

## 2025-06-18 PROCEDURE — 70450 CT HEAD/BRAIN W/O DYE: CPT

## 2025-06-18 PROCEDURE — 86900 BLOOD TYPING SEROLOGIC ABO: CPT

## 2025-06-18 PROCEDURE — 85379 FIBRIN DEGRADATION QUANT: CPT

## 2025-06-18 PROCEDURE — 73590 X-RAY EXAM OF LOWER LEG: CPT | Mod: LT

## 2025-06-18 PROCEDURE — 84484 ASSAY OF TROPONIN QUANT: CPT

## 2025-06-18 PROCEDURE — 96365 THER/PROPH/DIAG IV INF INIT: CPT

## 2025-06-18 PROCEDURE — A9270 NON-COVERED ITEM OR SERVICE: HCPCS | Performed by: EMERGENCY MEDICINE

## 2025-06-18 PROCEDURE — 700105 HCHG RX REV CODE 258: Performed by: INTERNAL MEDICINE

## 2025-06-18 PROCEDURE — 80053 COMPREHEN METABOLIC PANEL: CPT

## 2025-06-18 PROCEDURE — 83690 ASSAY OF LIPASE: CPT

## 2025-06-18 PROCEDURE — 700102 HCHG RX REV CODE 250 W/ 637 OVERRIDE(OP): Performed by: EMERGENCY MEDICINE

## 2025-06-18 PROCEDURE — 770020 HCHG ROOM/CARE - TELE (206)

## 2025-06-18 PROCEDURE — 36415 COLL VENOUS BLD VENIPUNCTURE: CPT

## 2025-06-18 PROCEDURE — 71045 X-RAY EXAM CHEST 1 VIEW: CPT

## 2025-06-18 PROCEDURE — 86850 RBC ANTIBODY SCREEN: CPT

## 2025-06-18 PROCEDURE — 73610 X-RAY EXAM OF ANKLE: CPT | Mod: LT

## 2025-06-18 PROCEDURE — 85730 THROMBOPLASTIN TIME PARTIAL: CPT

## 2025-06-18 PROCEDURE — 99285 EMERGENCY DEPT VISIT HI MDM: CPT

## 2025-06-18 PROCEDURE — 302874 HCHG BANDAGE ACE 2 OR 3""

## 2025-06-18 PROCEDURE — 302875 HCHG BANDAGE ACE 4 OR 6""

## 2025-06-18 PROCEDURE — 700111 HCHG RX REV CODE 636 W/ 250 OVERRIDE (IP): Performed by: EMERGENCY MEDICINE

## 2025-06-18 PROCEDURE — 86901 BLOOD TYPING SEROLOGIC RH(D): CPT

## 2025-06-18 PROCEDURE — 83735 ASSAY OF MAGNESIUM: CPT

## 2025-06-18 PROCEDURE — 99223 1ST HOSP IP/OBS HIGH 75: CPT | Mod: AI | Performed by: INTERNAL MEDICINE

## 2025-06-18 PROCEDURE — 700111 HCHG RX REV CODE 636 W/ 250 OVERRIDE (IP): Performed by: INTERNAL MEDICINE

## 2025-06-18 PROCEDURE — 700102 HCHG RX REV CODE 250 W/ 637 OVERRIDE(OP): Performed by: INTERNAL MEDICINE

## 2025-06-18 PROCEDURE — 29515 APPLICATION SHORT LEG SPLINT: CPT

## 2025-06-18 PROCEDURE — 96375 TX/PRO/DX INJ NEW DRUG ADDON: CPT

## 2025-06-18 PROCEDURE — 93005 ELECTROCARDIOGRAM TRACING: CPT | Mod: TC

## 2025-06-18 PROCEDURE — A9270 NON-COVERED ITEM OR SERVICE: HCPCS | Performed by: INTERNAL MEDICINE

## 2025-06-18 PROCEDURE — 85610 PROTHROMBIN TIME: CPT

## 2025-06-18 PROCEDURE — 83880 ASSAY OF NATRIURETIC PEPTIDE: CPT

## 2025-06-18 RX ORDER — METOPROLOL SUCCINATE 50 MG/1
50 TABLET, EXTENDED RELEASE ORAL EVERY EVENING
Status: DISCONTINUED | OUTPATIENT
Start: 2025-06-18 | End: 2025-06-20 | Stop reason: HOSPADM

## 2025-06-18 RX ORDER — PHENOL 1.4 %
20 AEROSOL, SPRAY (ML) MUCOUS MEMBRANE
COMMUNITY

## 2025-06-18 RX ORDER — ONDANSETRON 4 MG/1
4 TABLET, ORALLY DISINTEGRATING ORAL EVERY 4 HOURS PRN
Status: DISCONTINUED | OUTPATIENT
Start: 2025-06-18 | End: 2025-06-20 | Stop reason: HOSPADM

## 2025-06-18 RX ORDER — LOSARTAN POTASSIUM 25 MG/1
25 TABLET ORAL EVERY EVENING
Status: DISCONTINUED | OUTPATIENT
Start: 2025-06-18 | End: 2025-06-20 | Stop reason: HOSPADM

## 2025-06-18 RX ORDER — ACETAMINOPHEN 325 MG/1
650 TABLET ORAL EVERY 6 HOURS PRN
Status: DISCONTINUED | OUTPATIENT
Start: 2025-06-18 | End: 2025-06-20 | Stop reason: HOSPADM

## 2025-06-18 RX ORDER — TESTOSTERONE 20.25 MG/1.25G
1 GEL TOPICAL DAILY
COMMUNITY
Start: 2025-04-18

## 2025-06-18 RX ORDER — SODIUM CHLORIDE 9 MG/ML
INJECTION, SOLUTION INTRAVENOUS CONTINUOUS
Status: DISCONTINUED | OUTPATIENT
Start: 2025-06-18 | End: 2025-06-20

## 2025-06-18 RX ORDER — ENOXAPARIN SODIUM 100 MG/ML
40 INJECTION SUBCUTANEOUS DAILY
Status: DISCONTINUED | OUTPATIENT
Start: 2025-06-18 | End: 2025-06-18

## 2025-06-18 RX ORDER — ENOXAPARIN SODIUM 100 MG/ML
40 INJECTION SUBCUTANEOUS DAILY
Status: DISCONTINUED | OUTPATIENT
Start: 2025-06-19 | End: 2025-06-19

## 2025-06-18 RX ORDER — GAUZE BANDAGE 2" X 2"
100 BANDAGE TOPICAL DAILY
Status: DISCONTINUED | OUTPATIENT
Start: 2025-06-19 | End: 2025-06-20 | Stop reason: HOSPADM

## 2025-06-18 RX ORDER — HYDROCORTISONE 5 MG/1
5 TABLET ORAL 2 TIMES DAILY
Status: DISCONTINUED | OUTPATIENT
Start: 2025-06-18 | End: 2025-06-19

## 2025-06-18 RX ORDER — ONDANSETRON 2 MG/ML
4 INJECTION INTRAMUSCULAR; INTRAVENOUS EVERY 4 HOURS PRN
Status: DISCONTINUED | OUTPATIENT
Start: 2025-06-18 | End: 2025-06-20 | Stop reason: HOSPADM

## 2025-06-18 RX ORDER — ATORVASTATIN CALCIUM 40 MG/1
40 TABLET, FILM COATED ORAL DAILY
Status: DISCONTINUED | OUTPATIENT
Start: 2025-06-19 | End: 2025-06-20 | Stop reason: HOSPADM

## 2025-06-18 RX ORDER — FOLIC ACID 1 MG/1
1 TABLET ORAL DAILY
Status: DISCONTINUED | OUTPATIENT
Start: 2025-06-19 | End: 2025-06-20 | Stop reason: HOSPADM

## 2025-06-18 RX ORDER — GAUZE BANDAGE 2" X 2"
100 BANDAGE TOPICAL ONCE
Status: COMPLETED | OUTPATIENT
Start: 2025-06-18 | End: 2025-06-18

## 2025-06-18 RX ORDER — PANTOPRAZOLE SODIUM 40 MG/10ML
80 INJECTION, POWDER, LYOPHILIZED, FOR SOLUTION INTRAVENOUS ONCE
Status: COMPLETED | OUTPATIENT
Start: 2025-06-18 | End: 2025-06-18

## 2025-06-18 RX ORDER — LEVOTHYROXINE SODIUM 75 UG/1
150 TABLET ORAL
Status: DISCONTINUED | OUTPATIENT
Start: 2025-06-19 | End: 2025-06-20 | Stop reason: HOSPADM

## 2025-06-18 RX ORDER — FOLIC ACID 1 MG/1
1 TABLET ORAL ONCE
Status: COMPLETED | OUTPATIENT
Start: 2025-06-18 | End: 2025-06-18

## 2025-06-18 RX ORDER — PANTOPRAZOLE SODIUM 40 MG/10ML
40 INJECTION, POWDER, LYOPHILIZED, FOR SOLUTION INTRAVENOUS 2 TIMES DAILY
Status: DISCONTINUED | OUTPATIENT
Start: 2025-06-19 | End: 2025-06-20

## 2025-06-18 RX ADMIN — THERA TABS 1 TABLET: TAB at 15:17

## 2025-06-18 RX ADMIN — FOLIC ACID 1 MG: 1 TABLET ORAL at 15:17

## 2025-06-18 RX ADMIN — FOLIC ACID: 5 INJECTION, SOLUTION INTRAMUSCULAR; INTRAVENOUS; SUBCUTANEOUS at 20:36

## 2025-06-18 RX ADMIN — PANTOPRAZOLE SODIUM 80 MG: 40 INJECTION, POWDER, FOR SOLUTION INTRAVENOUS at 19:04

## 2025-06-18 RX ADMIN — Medication 100 MG: at 15:17

## 2025-06-18 RX ADMIN — METOPROLOL SUCCINATE 50 MG: 50 TABLET, EXTENDED RELEASE ORAL at 20:33

## 2025-06-18 RX ADMIN — HYDROCORTISONE 5 MG: 5 TABLET ORAL at 22:15

## 2025-06-18 RX ADMIN — SODIUM CHLORIDE: 9 INJECTION, SOLUTION INTRAVENOUS at 22:55

## 2025-06-18 SDOH — ECONOMIC STABILITY: TRANSPORTATION INSECURITY
IN THE PAST 12 MONTHS, HAS THE LACK OF TRANSPORTATION KEPT YOU FROM MEDICAL APPOINTMENTS OR FROM GETTING MEDICATIONS?: PATIENT DECLINED

## 2025-06-18 SDOH — ECONOMIC STABILITY: TRANSPORTATION INSECURITY
IN THE PAST 12 MONTHS, HAS LACK OF RELIABLE TRANSPORTATION KEPT YOU FROM MEDICAL APPOINTMENTS, MEETINGS, WORK OR FROM GETTING THINGS NEEDED FOR DAILY LIVING?: PATIENT DECLINED

## 2025-06-18 ASSESSMENT — LIFESTYLE VARIABLES
PAROXYSMAL SWEATS: NO SWEAT VISIBLE
AGITATION: NORMAL ACTIVITY
CONSUMPTION TOTAL: POSITIVE
ON A TYPICAL DAY WHEN YOU DRINK ALCOHOL HOW MANY DRINKS DO YOU HAVE: 4
VISUAL DISTURBANCES: NOT PRESENT
HOW MANY TIMES IN THE PAST YEAR HAVE YOU HAD 5 OR MORE DRINKS IN A DAY: 100
AUDITORY DISTURBANCES: NOT PRESENT
ANXIETY: NO ANXIETY (AT EASE)
TOTAL SCORE: 2
HEADACHE, FULLNESS IN HEAD: NOT PRESENT
HAVE PEOPLE ANNOYED YOU BY CRITICIZING YOUR DRINKING: YES
ORIENTATION AND CLOUDING OF SENSORIUM: ORIENTED AND CAN DO SERIAL ADDITIONS
AVERAGE NUMBER OF DAYS PER WEEK YOU HAVE A DRINK CONTAINING ALCOHOL: 7
TREMOR: NO TREMOR
DOES PATIENT WANT TO TALK TO SOMEONE ABOUT QUITTING: YES
TOTAL SCORE: 2
EVER FELT BAD OR GUILTY ABOUT YOUR DRINKING: NO
NAUSEA AND VOMITING: NO NAUSEA AND NO VOMITING
TOTAL SCORE: 0
HAVE YOU EVER FELT YOU SHOULD CUT DOWN ON YOUR DRINKING: YES
TOTAL SCORE: 2
ALCOHOL_USE: YES
DOES PATIENT WANT TO STOP DRINKING: YES
EVER HAD A DRINK FIRST THING IN THE MORNING TO STEADY YOUR NERVES TO GET RID OF A HANGOVER: NO

## 2025-06-18 ASSESSMENT — HEART SCORE
HISTORY: SLIGHTLY SUSPICIOUS
HEART SCORE: 5
ECG: NON-SPECIFIC REPOLARIZATION DISTURBANCE
AGE: 65+
TROPONIN: LESS THAN OR EQUAL TO NORMAL LIMIT
RISK FACTORS: >2 RISK FACTORS OR HX OF ATHEROSCLEROTIC DISEASE

## 2025-06-18 ASSESSMENT — SOCIAL DETERMINANTS OF HEALTH (SDOH)
WITHIN THE PAST 12 MONTHS, THE FOOD YOU BOUGHT JUST DIDN'T LAST AND YOU DIDN'T HAVE MONEY TO GET MORE: PATIENT DECLINED
WITHIN THE LAST YEAR, HAVE YOU BEEN KICKED, HIT, SLAPPED, OR OTHERWISE PHYSICALLY HURT BY YOUR PARTNER OR EX-PARTNER?: PATIENT DECLINED
WITHIN THE LAST YEAR, HAVE YOU BEEN AFRAID OF YOUR PARTNER OR EX-PARTNER?: PATIENT DECLINED
WITHIN THE PAST 12 MONTHS, YOU WORRIED THAT YOUR FOOD WOULD RUN OUT BEFORE YOU GOT THE MONEY TO BUY MORE: PATIENT DECLINED
WITHIN THE LAST YEAR, HAVE TO BEEN RAPED OR FORCED TO HAVE ANY KIND OF SEXUAL ACTIVITY BY YOUR PARTNER OR EX-PARTNER?: PATIENT DECLINED
WITHIN THE LAST YEAR, HAVE YOU BEEN HUMILIATED OR EMOTIONALLY ABUSED IN OTHER WAYS BY YOUR PARTNER OR EX-PARTNER?: PATIENT DECLINED
IN THE PAST 12 MONTHS, HAS THE ELECTRIC, GAS, OIL, OR WATER COMPANY THREATENED TO SHUT OFF SERVICE IN YOUR HOME?: PATIENT DECLINED

## 2025-06-18 ASSESSMENT — ENCOUNTER SYMPTOMS
WEIGHT LOSS: 0
SPEECH CHANGE: 0
VOMITING: 0
CHILLS: 0
DIZZINESS: 0
ABDOMINAL PAIN: 0
NECK PAIN: 0
ORTHOPNEA: 0
DOUBLE VISION: 0
LOSS OF CONSCIOUSNESS: 1
WEAKNESS: 0
BLURRED VISION: 0
COUGH: 0
FEVER: 0
HEMOPTYSIS: 0
PALPITATIONS: 0
DIARRHEA: 0
PHOTOPHOBIA: 0
CONSTIPATION: 0
MYALGIAS: 0
CLAUDICATION: 0
NAUSEA: 0

## 2025-06-18 ASSESSMENT — FIBROSIS 4 INDEX
FIB4 SCORE: 2.62
FIB4 SCORE: 2.54

## 2025-06-18 ASSESSMENT — PAIN DESCRIPTION - PAIN TYPE: TYPE: ACUTE PAIN

## 2025-06-18 NOTE — ED PROVIDER NOTES
ED Provider Note    CHIEF COMPLAINT  Chief Complaint   Patient presents with    Syncope       EXTERNAL RECORDS REVIEWED  Outpatient Notes patient was seen by cardiology May 21, 2025.  The patient has history of heart failure.  He had an EF of 20% in 2023.  He was diagnosed with alcohol CMO.  He has nonobstructive CAD, atrial fibrillation and LV thrombus.  He also has adrenal sufficiency and EtOH abuse.  During that visit he was reporting rare instances of dyspnea on exertion and fatigue.  Dry weight is 254 pounds.    Echocardiogram done December 29, 2024 reveals ejection fraction of 55 to 60%.    Patient has history of secondary adrenal insufficiency and is followed by Dr. Ang with endocrinology.  He was last seen by Dr. Ang on April 7, 2025.  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  Pituitary MRI in March 2020 shows no evidence of tumor recurrence.  Endocrinologist note says that patient still drinks alcohol 4-5 shots of vodka daily mixed with fruit juice and Gatorade.  Patient was to continue hydrocortisone 10 mg in the morning and 5 mg in the afternoon.    HPI/ROS  LIMITATION TO HISTORY   Select: : None  OUTSIDE HISTORIAN(S):  None    lAberto Galicia is a 70 y.o. male who presents to the ED for evaluation after a syncopal episode five days ago.  Patient states that he was in the bathroom when he suddenly felt very lightheaded and then passed out.  He believes he struck his head.  He does not think he was out for very long.  He denies any vertigo, chest pain or shortness of breath prior to his syncopal episode.  He is on Eliquis.  He explains that he fell to the ground, and has had left ankle pain since the incident, with associated swelling. The patient explains that right after he fell to the ground, he was aware of the fall and was conscious.  However, he thinks he was briefly knocked out.  The patient reports that since the  "incident he has vomited one time. He also reports feeling short of breath over the last few weeks, particularly with exertion.  He also reports feeling intermittently lightheaded and near syncopal over the last few days since a syncopal episode.  He reports feeling \"low energy\". The patient also states he feels anxious. He denies any headache, denies neck pain. He also reports he drinks alcohol occasionally, noting that he drinks 4-5 shots of vodka at a time.  he has noticed black stool over the last 2 weeks.  He is also reported intermittent heartburn over the last 2 weeks. He takes eliquis.     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    SURGICAL HISTORY   has a past surgical history that includes craniotomy.    FAMILY HISTORY  Family History   Problem Relation Age of Onset    Diabetes Mother     Heart Disease Father     Diabetes Brother     Alzheimer's Disease Maternal Grandmother     Cancer Maternal Grandfather     Heart Disease Paternal Grandmother     Cancer Paternal Grandfather        SOCIAL HISTORY  Social History     Tobacco Use    Smoking status: Never    Smokeless tobacco: Never   Vaping Use    Vaping status: Never Used   Substance and Sexual Activity    Alcohol use: Yes     Alcohol/week: 16.8 oz     Types: 28 Standard drinks or equivalent per week    Drug use: No    Sexual activity: Not on file       CURRENT MEDICATIONS  Home Medications    **Home medications have not yet been reviewed for this encounter**       Audit from Redirected Encounters    **Home medications have not yet been reviewed for this encounter**         ALLERGIES  Allergies[1]    PHYSICAL EXAM  VITAL SIGNS: /72   Pulse 74   Temp 37.2 °C (98.9 °F) (Temporal)   Resp 16   Ht 1.93 m (6' 4\")   Wt 115 kg (253 lb 8.5 oz)   SpO2 93%   BMI 30.86 kg/m²      Constitutional: " Appears weak, slightly pale in color  HENT: Normocephalic, atraumatic; Bilateral external ears normal; Dry mucus membranes;   Eyes: PERRL, EOMI, Conjunctiva normal. No discharge.   Neck:  Supple, nontender midline; No stridor; No nuchal rigidity.   Lymphatic: No cervical lymphadenopathy noted.   Cardiovascular: Regular rate and rhythm without murmurs, rubs, or gallop.   Thorax & Lungs: No respiratory distress, breath sounds clear to auscultation bilaterally without wheezing, rales or rhonchi. Nontender chest wall. No crepitus or subcutaneous air  Abdomen: Soft, nontender, bowel sounds normal. No obvious masses; No pulsatile masses; no rebound, guarding, or peritoneal signs.   Skin: Good color; warm and dry without rash or petechia.  Back: Nontender, No CVA tenderness.    Rectal:  Normal appearance, Normal sphincter tone.  Dark stool, but not black.  Guaiac positive.  Extremities:  Distal radial, dorsalis pedis, posterior tibial pulses are equal bilaterally; left lower extremity: Patient has ecchymosis and swelling over the left ankle.  The left ankle is diffusely tender, maximally over the distal fibula.  Knee is nontender.  Nontender proximal fibula.  Nontender foot.  Skin is intact.  No blistering.  Musculoskeletal: Good range of motion in all major joints. No tenderness to palpation or major deformities noted.   Neurologic: Alert & oriented x 4, clear speech,    EKG/LABS  Results for orders placed or performed during the hospital encounter of 06/18/25   CBC with Differential    Collection Time: 06/18/25  2:17 PM   Result Value Ref Range    WBC 9.0 4.8 - 10.8 K/uL    RBC 4.88 4.70 - 6.10 M/uL    Hemoglobin 15.8 14.0 - 18.0 g/dL    Hematocrit 46.8 42.0 - 52.0 %    MCV 95.9 81.4 - 97.8 fL    MCH 32.4 27.0 - 33.0 pg    MCHC 33.8 32.3 - 36.5 g/dL    RDW 48.4 35.9 - 50.0 fL    Platelet Count 283 164 - 446 K/uL    MPV 9.4 9.0 - 12.9 fL    Neutrophils-Polys 64.80 44.00 - 72.00 %    Lymphocytes 21.40 (L) 22.00 - 41.00 %     Monocytes 7.90 0.00 - 13.40 %    Eosinophils 5.20 0.00 - 6.90 %    Basophils 0.40 0.00 - 1.80 %    Immature Granulocytes 0.30 0.00 - 0.90 %    Nucleated RBC 0.00 0.00 - 0.20 /100 WBC    Neutrophils (Absolute) 5.82 1.82 - 7.42 K/uL    Lymphs (Absolute) 1.93 1.00 - 4.80 K/uL    Monos (Absolute) 0.71 0.00 - 0.85 K/uL    Eos (Absolute) 0.47 0.00 - 0.51 K/uL    Baso (Absolute) 0.04 0.00 - 0.12 K/uL    Immature Granulocytes (abs) 0.03 0.00 - 0.11 K/uL    NRBC (Absolute) 0.00 K/uL   Complete Metabolic Panel (CMP)    Collection Time: 06/18/25  2:17 PM   Result Value Ref Range    Sodium 133 (L) 135 - 145 mmol/L    Potassium 4.3 3.6 - 5.5 mmol/L    Chloride 99 96 - 112 mmol/L    Co2 20 20 - 33 mmol/L    Anion Gap 14.0 7.0 - 16.0    Glucose 136 (H) 65 - 99 mg/dL    Bun 16 8 - 22 mg/dL    Creatinine 1.14 0.50 - 1.40 mg/dL    Calcium 9.2 8.5 - 10.5 mg/dL    Correct Calcium 9.3 8.5 - 10.5 mg/dL    AST(SGOT) 60 (H) 12 - 45 U/L    ALT(SGPT) 33 2 - 50 U/L    Alkaline Phosphatase 78 30 - 99 U/L    Total Bilirubin 0.7 0.1 - 1.5 mg/dL    Albumin 3.9 3.2 - 4.9 g/dL    Total Protein 7.4 6.0 - 8.2 g/dL    Globulin 3.5 1.9 - 3.5 g/dL    A-G Ratio 1.1 g/dL   proBrain Natriuretic Peptide, NT (BNP)    Collection Time: 06/18/25  2:17 PM   Result Value Ref Range    NT-proBNP 176 (H) 0 - 125 pg/mL   Troponins NOW    Collection Time: 06/18/25  2:17 PM   Result Value Ref Range    Troponin T 9 6 - 19 ng/L   ESTIMATED GFR    Collection Time: 06/18/25  2:17 PM   Result Value Ref Range    GFR (CKD-EPI) 69 >60 mL/min/1.73 m 2   APTT    Collection Time: 06/18/25  3:07 PM   Result Value Ref Range    APTT 33.3 24.7 - 36.0 sec   PROTHROMBIN TIME (INR)    Collection Time: 06/18/25  3:07 PM   Result Value Ref Range    PT 14.7 (H) 12.0 - 14.6 sec    INR 1.15 (H) 0.87 - 1.13   COD (ADULT)    Collection Time: 06/18/25  3:07 PM   Result Value Ref Range    ABO Grouping Only O     Rh Grouping Only POS     Antibody Screen-Cod NEG    DIAGNOSTIC ALCOHOL     Collection Time: 25  3:07 PM   Result Value Ref Range    Diagnostic Alcohol <10.1 <10.1 mg/dL   MAGNESIUM    Collection Time: 25  3:07 PM   Result Value Ref Range    Magnesium 2.1 1.5 - 2.5 mg/dL   LIPASE    Collection Time: 25  3:07 PM   Result Value Ref Range    Lipase 45 11 - 82 U/L   ABO Rh Confirm    Collection Time: 25  4:01 PM   Result Value Ref Range    ABO Rh Confirm O POS    EKG    Collection Time: 25  4:17 PM   Result Value Ref Range    Report       [facility] Emergency Dept[facility]    Test Date:  2025  Pt Name:    MATT MOODY                   Department: ER  MRN:        [id number]                      Room:  Gender:     Male                         Technician: [id number]  :        [id number]                   Requested By:ER TRIAGE PROTOCOL  Order #:    [id number]                    Reading MD: Geni Jean    Measurements  Intervals                                Axis  Rate:       102                          P:          78  NJ:         161                          QRS:        -61  QRSD:       108                          T:          74  QT:         349  QTc:        455    Interpretive Statements  Sinus tachycardia rate 102  Left axis deviation  Normal intervals  No ST elevation or depression  Left anterior fascicular block  Abnormal R-wave progression, early transition  Compared to ECG 2025 14:39:46  ST (T wave) deviation now present  Sinus rhythm no longer present  Electronically Signed On 2025 16:17:43  PDT by Geni Jean     Troponins in two (2) hours    Collection Time: 25  4:18 PM   Result Value Ref Range    Troponin T 7 6 - 19 ng/L       I have independently interpreted this EKG    RADIOLOGY/PROCEDURES   I have independently interpreted the diagnostic imaging associated with this visit and am waiting the final reading from the radiologist.     My preliminary interpretation is as follows: ER MD is reviewed the patient chest x-ray.  No  "obvious infiltrates.    Radiologist interpretation:  CT-CSPINE WITHOUT PLUS RECONS   Final Result      1.  No acute traumatic injury of the cervical spine.   2.  Multilevel degenerative changes, greater inferiorly.      CT-HEAD W/O   Final Result      1.  There is no definite acute intracranial abnormality.   2.  Diffuse atrophy.   3.  Chronic sinus disease.                  DX-CHEST-PORTABLE (1 VIEW)   Final Result      1.  No acute cardiac or pulmonary abnormalities are identified.      DX-TIBIA AND FIBULA LEFT   Final Result      Mildly displaced fracture of the distal fibula.      DX-ANKLE 3+ VIEWS LEFT   Final Result      Mildly displaced oblique fracture of the distal fibula.          COURSE & MEDICAL DECISION MAKING    ASSESSMENT, COURSE AND PLAN  Care Narrative: Patient presents to the ER with multiple complaints.  5 days ago he had a syncopal episode in his bathroom.  He said he suddenly got lightheaded and then passed out.  When he fell to the ground he twisted his ankle.  He has tenderness, swelling and ecchymosis over his ankle, lateral aspect more than medial aspect.  He was found to have a distal fibula fracture.  He says he is been walking around on his ankle for the last 5 days although it hurts to do so.  Since the syncopal episode he has had a few episodes of near syncope.  He feels very weak.  He describes dyspnea on exertion over the last couple weeks.  He describes \"heartburn\" intermittently over the last few weeks.  He also says his stools have been black in color over the last few weeks.  He drinks 4-5 shots of vodka on a regular basis.  He has a history of alcohol abuse.  He has history of alcohol induced cardiomyopathy with an EF of 20% back in 2023.  Echocardiogram done 6 months ago reveals improvement in his EF.  Patient says he is, compliant with his current medications.  However, patient says that even climbing the stairs lately make him very short of breath and tired.  He also feels " "very anxious as a result of all this.  Patient was found of a distal fibular fracture.  Initially he was placed in a splint.  However, I spoke with Dr. Jones, orthopedic surgeon on-call.  He would like weightbearing x-rays.  For this reason patient was taken out of his splint and then placed in a walking boot after weightbearing x-rays.  Based upon weightbearing x-rays, and orthopedics will know whether or not this fracture will require operative intervention.  With respect to patient's black tarry stools over the last couple weeks, I do note some dark stool on rectal examination which is guaiac positive but it is not valeriano melena.  He was given some IV Protonix here in the ER for possible alcoholic gastritis/bleeding ulcer.  With respect to the patient's syncopal episode, he struck his head.  He is on Eliquis.  CT brain is negative.  CT C-spine is negative.  EKG is nonacute.  Initial troponin is negative.  At this time no evidence of STEMI or non-STEMI.  I think patient will need to be hospitalized as I am concerned that his dyspnea on exertion, \"heartburn\", and syncopal episode could be cardiac in etiology.  He continues to drink alcohol.  He has history of alcohol-induced cardiomyopathy with low EF.  Perhaps he needs another echocardiogram to make sure he is not getting into trouble with his heart failure again.  Additionally, we should do serial hemoglobins to make sure the patient is not dropping hemoglobin as he is guaiac positive on his rectal examination today, drinks alcohol, and is on Eliquis.  I spoke with the hospitalist on-call and he will kindly valuate the patient possible station    CHEST PAIN:   HEART Score for Major Cardiac Events  HEART Score     History: Slightly suspicious  ECG: Non-specific repolarization disturbance  Age: 65+  Risk Factors: >2 risk factors or hx of atherosclerotic disease  Troponin: Less than or equal to normal limit    Heart Score: 5    Total Score   0-3 Points = Low Score, " risk of MACE 0.9-1.7%.  4-6 Points = Moderate Score, risk of MACE 12-16.6%  7-10 Points = High Score, risk of MACE 50-65%      2:53 PM - Patient seen and examined at bedside. Discussed plan of care, including lab work and imaging. Patient agrees to the plan of care.     1900: Discussed with Dr. Mckeon, hospitalist on-call.  He will kindly evaluate the patient for auscultation.    1920: Discussed with Dr. Jones, orthopedic surgeon.  He would like me to remove the splint and do a three-view weightbearing ankle x-ray.  The 3 view ankle weightbearing x-ray will determine if it is stable.  If it is stable, the patient can be in a walking boot.  If it is not stable, then patient may need an operative intervention.        DISPOSITION AND DISCUSSIONS  I have discussed management of the patient with the following physicians and JOANA's: Hospitalist    Discussion of management with other [facility] or appropriate source(s): None     Escalation of care considered, and ultimately not performed:diagnostic imaging.  Patient has no complaints of hip pain.  No need for hip x-ray.    Barriers to care at this time, including but not limited to: None known.     Decision tools and prescription drugs considered including, but not limited to: Will hold off on aspirin for now as patient is on Eliquis and does have guaiac positive stools..    FINAL DIAGNOSIS  1. Syncope, unspecified syncope type Acute   2. Alcohol abuse Acute   3. Closed fracture of distal end of left fibula, unspecified fracture morphology, initial encounter Acute   4. Dyspnea on exertion Acute   5. Generalized weakness Acute   6. Guaiac positive stools Acute          This dictation has been created using voice recognition software. The accuracy of the dictation is limited by the abilities of the software. I expect there may be some errors of grammar and possibly content. I made every attempt to manually correct the errors within my dictation. However, errors related to voice  recognition software may still exist and should be interpreted within the appropriate context.     Electronically signed by: Geni Jean M.D., 6/18/2025 2:38 PM           [1]   Allergies  Allergen Reactions    Penicillins Swelling     Swelling at injection site; reaction occurred at 25 yo    Viagra [Sildenafil]      If taken 100mg

## 2025-06-18 NOTE — ED TRIAGE NOTES
Pt had a syncopal episode on Friday and hit his head. Pt is on eliquis. Pt sts he did not come to ER because his Left ankle was injured and he couldn't walk. Pt does have a LAC on his head. Pt had a near syncopal episode today. Denies any falls since Friday. Protocol orders placed and pt is A&O, placed in lobby pending room. Charge notified.

## 2025-06-18 NOTE — DISCHARGE PLANNING
TCN following. HTH/SCP chart review completed. Note pt currently in ED secondary to recent syncope with headstrike, possible ankle injury and subsequent near syncope.    Per review, no noted ambulatory/mobility status yet documented in chart. Pt remains on RA. At this time unclear if pt functionally capable of dc to home once medically appropriate (either directly from ED or after admission to [facility] if warranted). Note dc location will be highly dependent on functional status, results of imaging and possible interventions. Per chart patient has [facility] PCP and no noted upcoming outpatient PCP visits scheduled. If transitional/hospital follow up with PCP indicated, TCN will assist with scheduling as indicated.    If pt functionally capable of dc to home, given current chart review, pt may benefit from  services at time of dc from ED.      If pt does NOT warrant admission to [facility] and is functionally unable to dc to home, please reach out to TCN for assistance with SCP auth for dc directly to SNF from ED if needed.      If pt admits to [facility], TCN will continue to monitor and assist with SCP/HTH authorization consideration for post acute needs if indicated. Please reach out to TCN via VOALTE if post acute transitional care needs are warranted for dc planning.

## 2025-06-19 PROBLEM — I95.1 ORTHOSTATIC HYPOTENSION: Status: ACTIVE | Noted: 2023-04-03

## 2025-06-19 LAB
ANION GAP SERPL CALC-SCNC: 13 MMOL/L (ref 7–16)
BUN SERPL-MCNC: 14 MG/DL (ref 8–22)
CALCIUM SERPL-MCNC: 8.9 MG/DL (ref 8.5–10.5)
CHLORIDE SERPL-SCNC: 102 MMOL/L (ref 96–112)
CO2 SERPL-SCNC: 23 MMOL/L (ref 20–33)
CORTIS SERPL-MCNC: 16.3 UG/DL (ref 0–23)
CREAT SERPL-MCNC: 1.08 MG/DL (ref 0.5–1.4)
ERYTHROCYTE [DISTWIDTH] IN BLOOD BY AUTOMATED COUNT: 50.3 FL (ref 35.9–50)
GFR SERPLBLD CREATININE-BSD FMLA CKD-EPI: 73 ML/MIN/1.73 M 2
GLUCOSE SERPL-MCNC: 102 MG/DL (ref 65–99)
HCT VFR BLD AUTO: 41.8 % (ref 42–52)
HCT VFR BLD AUTO: 45.4 % (ref 42–52)
HGB BLD-MCNC: 14.1 G/DL (ref 14–18)
HGB BLD-MCNC: 15.2 G/DL (ref 14–18)
MAGNESIUM SERPL-MCNC: 2.3 MG/DL (ref 1.5–2.5)
MCH RBC QN AUTO: 33.2 PG (ref 27–33)
MCHC RBC AUTO-ENTMCNC: 33.7 G/DL (ref 32.3–36.5)
MCV RBC AUTO: 98.4 FL (ref 81.4–97.8)
PHOSPHATE SERPL-MCNC: 2.6 MG/DL (ref 2.5–4.5)
PLATELET # BLD AUTO: 255 K/UL (ref 164–446)
PMV BLD AUTO: 10 FL (ref 9–12.9)
POTASSIUM SERPL-SCNC: 4 MMOL/L (ref 3.6–5.5)
RBC # BLD AUTO: 4.25 M/UL (ref 4.7–6.1)
SODIUM SERPL-SCNC: 138 MMOL/L (ref 135–145)
T4 FREE SERPL-MCNC: 1.16 NG/DL (ref 0.93–1.7)
TROPONIN T SERPL-MCNC: 10 NG/L (ref 6–19)
TSH SERPL DL<=0.005 MIU/L-ACNC: 0.27 UIU/ML (ref 0.38–5.33)
WBC # BLD AUTO: 7.6 K/UL (ref 4.8–10.8)

## 2025-06-19 PROCEDURE — 85027 COMPLETE CBC AUTOMATED: CPT

## 2025-06-19 PROCEDURE — 97535 SELF CARE MNGMENT TRAINING: CPT

## 2025-06-19 PROCEDURE — 97166 OT EVAL MOD COMPLEX 45 MIN: CPT

## 2025-06-19 PROCEDURE — 84443 ASSAY THYROID STIM HORMONE: CPT

## 2025-06-19 PROCEDURE — 85014 HEMATOCRIT: CPT

## 2025-06-19 PROCEDURE — 700111 HCHG RX REV CODE 636 W/ 250 OVERRIDE (IP): Performed by: INTERNAL MEDICINE

## 2025-06-19 PROCEDURE — 83735 ASSAY OF MAGNESIUM: CPT

## 2025-06-19 PROCEDURE — 82652 VIT D 1 25-DIHYDROXY: CPT

## 2025-06-19 PROCEDURE — 84484 ASSAY OF TROPONIN QUANT: CPT

## 2025-06-19 PROCEDURE — 82533 TOTAL CORTISOL: CPT

## 2025-06-19 PROCEDURE — 99232 SBSQ HOSP IP/OBS MODERATE 35: CPT | Performed by: INTERNAL MEDICINE

## 2025-06-19 PROCEDURE — 85018 HEMOGLOBIN: CPT

## 2025-06-19 PROCEDURE — 84439 ASSAY OF FREE THYROXINE: CPT

## 2025-06-19 PROCEDURE — 84100 ASSAY OF PHOSPHORUS: CPT

## 2025-06-19 PROCEDURE — 97162 PT EVAL MOD COMPLEX 30 MIN: CPT

## 2025-06-19 PROCEDURE — 700102 HCHG RX REV CODE 250 W/ 637 OVERRIDE(OP): Performed by: INTERNAL MEDICINE

## 2025-06-19 PROCEDURE — 80048 BASIC METABOLIC PNL TOTAL CA: CPT

## 2025-06-19 PROCEDURE — A9270 NON-COVERED ITEM OR SERVICE: HCPCS | Performed by: INTERNAL MEDICINE

## 2025-06-19 PROCEDURE — 770020 HCHG ROOM/CARE - TELE (206)

## 2025-06-19 RX ORDER — HYDROCORTISONE 10 MG/1
10 TABLET ORAL DAILY
Status: DISCONTINUED | OUTPATIENT
Start: 2025-06-20 | End: 2025-06-20 | Stop reason: HOSPADM

## 2025-06-19 RX ORDER — HYDROCORTISONE 5 MG/1
5 TABLET ORAL EVERY EVENING
Status: DISCONTINUED | OUTPATIENT
Start: 2025-06-19 | End: 2025-06-20 | Stop reason: HOSPADM

## 2025-06-19 RX ADMIN — HYDROCORTISONE 5 MG: 5 TABLET ORAL at 06:13

## 2025-06-19 RX ADMIN — FOLIC ACID 1 MG: 1 TABLET ORAL at 06:13

## 2025-06-19 RX ADMIN — THERA TABS 1 TABLET: TAB at 06:13

## 2025-06-19 RX ADMIN — PANTOPRAZOLE SODIUM 40 MG: 40 INJECTION, POWDER, FOR SOLUTION INTRAVENOUS at 06:12

## 2025-06-19 RX ADMIN — HYDROCORTISONE 5 MG: 5 TABLET ORAL at 19:54

## 2025-06-19 RX ADMIN — LEVOTHYROXINE SODIUM 150 MCG: 0.07 TABLET ORAL at 06:13

## 2025-06-19 RX ADMIN — PANTOPRAZOLE SODIUM 40 MG: 40 INJECTION, POWDER, FOR SOLUTION INTRAVENOUS at 17:50

## 2025-06-19 RX ADMIN — ATORVASTATIN CALCIUM 40 MG: 40 TABLET, FILM COATED ORAL at 06:13

## 2025-06-19 RX ADMIN — METOPROLOL SUCCINATE 50 MG: 50 TABLET, EXTENDED RELEASE ORAL at 17:50

## 2025-06-19 RX ADMIN — APIXABAN 5 MG: 5 TABLET, FILM COATED ORAL at 17:50

## 2025-06-19 RX ADMIN — ACETAMINOPHEN 650 MG: 325 TABLET ORAL at 19:54

## 2025-06-19 RX ADMIN — Medication 100 MG: at 06:13

## 2025-06-19 ASSESSMENT — GAIT ASSESSMENTS
DISTANCE (FEET): 200
GAIT LEVEL OF ASSIST: SUPERVISED
DEVIATION: DECREASED HEEL STRIKE;DECREASED TOE OFF

## 2025-06-19 ASSESSMENT — ENCOUNTER SYMPTOMS
CHILLS: 0
DEPRESSION: 0
ABDOMINAL PAIN: 0
FEVER: 0
NAUSEA: 0
MYALGIAS: 0
FALLS: 1
DIZZINESS: 0
SHORTNESS OF BREATH: 0
HEADACHES: 0

## 2025-06-19 ASSESSMENT — COGNITIVE AND FUNCTIONAL STATUS - GENERAL
SUGGESTED CMS G CODE MODIFIER MOBILITY: CJ
DAILY ACTIVITIY SCORE: 24
CLIMB 3 TO 5 STEPS WITH RAILING: A LITTLE
WALKING IN HOSPITAL ROOM: A LITTLE
SUGGESTED CMS G CODE MODIFIER DAILY ACTIVITY: CH
MOBILITY SCORE: 22

## 2025-06-19 ASSESSMENT — LIFESTYLE VARIABLES
VISUAL DISTURBANCES: NOT PRESENT
AUDITORY DISTURBANCES: NOT PRESENT
HEADACHE, FULLNESS IN HEAD: NOT PRESENT
PAROXYSMAL SWEATS: NO SWEAT VISIBLE
ANXIETY: NO ANXIETY (AT EASE)
PAROXYSMAL SWEATS: NO SWEAT VISIBLE
TOTAL SCORE: 0
HEADACHE, FULLNESS IN HEAD: NOT PRESENT
TREMOR: NO TREMOR
TREMOR: NO TREMOR
VISUAL DISTURBANCES: NOT PRESENT
AUDITORY DISTURBANCES: NOT PRESENT
AGITATION: NORMAL ACTIVITY
AGITATION: NORMAL ACTIVITY
AUDITORY DISTURBANCES: NOT PRESENT
TOTAL SCORE: 0
NAUSEA AND VOMITING: NO NAUSEA AND NO VOMITING
NAUSEA AND VOMITING: NO NAUSEA AND NO VOMITING
HEADACHE, FULLNESS IN HEAD: NOT PRESENT
ANXIETY: NO ANXIETY (AT EASE)
ORIENTATION AND CLOUDING OF SENSORIUM: ORIENTED AND CAN DO SERIAL ADDITIONS
TOTAL SCORE: 0
AGITATION: NORMAL ACTIVITY
SUBSTANCE_ABUSE: 1
PAROXYSMAL SWEATS: NO SWEAT VISIBLE
ORIENTATION AND CLOUDING OF SENSORIUM: ORIENTED AND CAN DO SERIAL ADDITIONS
ANXIETY: NO ANXIETY (AT EASE)
VISUAL DISTURBANCES: NOT PRESENT
ORIENTATION AND CLOUDING OF SENSORIUM: ORIENTED AND CAN DO SERIAL ADDITIONS
NAUSEA AND VOMITING: NO NAUSEA AND NO VOMITING
TREMOR: NO TREMOR

## 2025-06-19 ASSESSMENT — FIBROSIS 4 INDEX: FIB4 SCORE: 2.62

## 2025-06-19 ASSESSMENT — ACTIVITIES OF DAILY LIVING (ADL): TOILETING: INDEPENDENT

## 2025-06-19 ASSESSMENT — PAIN DESCRIPTION - PAIN TYPE
TYPE: ACUTE PAIN
TYPE: ACUTE PAIN

## 2025-06-19 NOTE — H&P
Hospital Medicine History & Physical Note    Date of Service  6/18/2025    Primary Care Physician  James Prajapati M.D.    Consultants  Orthopedic    Code Status  Full Code    Chief Complaint  Chief Complaint   Patient presents with    Syncope       History of Presenting Illness    70-year-old male with history of alcoholism, atrial fibrillation and dyslipidemia with hypothyroidism who presented 6/18 with left ankle pain and syncope.  Few days ago on Friday patient was walking to the bathroom when he fainted and he lost his consciousness, patient had dizziness at that time, no significant chest pain or other symptoms.  Patient did not go to the emergency or doctors at that time, patient started having generalized weakness and more pain on the left ankle, patient drinking more than 4 shots every day, denied any fever or chills however he noticed heart burning and dark stool.  On admission vital signs were stable, EKG showed sinus rhythm with no A-fib, labs showed hemoglobin 15.8 with creatinine 1.1 and sodium 133 however AST 60.  Troponin was 9 and repeat troponin was 7.  CT head and neck did not show any acute finding or bleeding however x-ray for the left ankle showed mildly displaced fracture of the distal fibula, brace was placed by ED physician and Ortho was consulted.    I discussed the plan of care with patient, bedside RN, and ED physician.    Review of Systems  Review of Systems   Constitutional:  Positive for malaise/fatigue. Negative for chills, fever and weight loss.   HENT:  Negative for ear pain, hearing loss and tinnitus.    Eyes:  Negative for blurred vision, double vision and photophobia.   Respiratory:  Negative for cough and hemoptysis.    Cardiovascular:  Negative for chest pain, palpitations, orthopnea and claudication.   Gastrointestinal:  Negative for abdominal pain, constipation, diarrhea, nausea and vomiting.   Genitourinary:  Negative for dysuria, frequency and urgency.   Musculoskeletal:   Negative for myalgias and neck pain.   Skin:  Negative for rash.   Neurological:  Positive for loss of consciousness. Negative for dizziness, speech change and weakness.       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    Surgical History   has a past surgical history that includes craniotomy.     Family History  Family History   Problem Relation Age of Onset    Diabetes Mother     Heart Disease Father     Diabetes Brother     Alzheimer's Disease Maternal Grandmother     Cancer Maternal Grandfather     Heart Disease Paternal Grandmother     Cancer Paternal Grandfather         Family history reviewed with patient. There is no family history that is pertinent to the chief complaint.     Social History   reports that he has never smoked. He has never used smokeless tobacco. He reports current alcohol use of about 16.8 oz of alcohol per week. He reports that he does not use drugs.    Allergies  Allergies[1]    Medications  Prior to Admission Medications   Prescriptions Last Dose Informant Patient Reported? Taking?   ELIQUIS 5 MG Tab   No No   Sig: Take 1 Tablet by mouth 2 times a day.   MELATONIN PO  Patient Yes No   Sig: Take 2 Tablets by mouth 1 time a day as needed (sleep).   Non Formulary Request   Yes No   Sig: every day. ULTIMATE BEATS   acetaminophen (TYLENOL) 500 MG Tab  Patient Yes No   Sig: Take 1,000 mg by mouth every 6 hours as needed for Mild Pain, Moderate Pain or Fever.   atorvastatin (LIPITOR) 40 MG Tab   No No   Sig: Take 1 Tablet by mouth every day.   fluticasone furoate-vilanterol (BREO ELLIPTA) 100-25 MCG/ACT AEROSOL POWDER, BREATH ACTIVATED   No No   Sig: Inhale 1 Puff by mouth every day.   furosemide (LASIX) 40 MG Tab   No No   Sig: Take 1 Tablet by mouth every morning. Take an extra tablet daily as needed for shortness of breath or weight  gain greater than 3 pounds.   hydrocortisone (CORTEF) 5 MG Tab   No No   Sig: Take 2 pills in the morning and 1 pill in the afternoon   levalbuterol (XOPENEX HFA) 45 MCG/ACT inhaler   No No   Sig: Inhale 1-2 Puffs every four hours as needed for Shortness of Breath.   levothyroxine (SYNTHROID) 150 MCG Tab  Patient No No   Sig: Take 1 Tablet by mouth every morning on an empty stomach.   losartan (COZAAR) 25 MG Tab   No No   Sig: Take 1 Tablet by mouth every evening.   metoprolol SR (TOPROL XL) 50 MG TABLET SR 24 HR   No No   Sig: Take 1 Tablet by mouth every evening.   multivitamin Tab  Patient Yes No   Sig: Take 1 Tablet by mouth every morning.   potassium chloride SA (KDUR) 20 MEQ Tab CR   No No   Sig: Take 0.5 Tablets by mouth every day.   sildenafil citrate (VIAGRA) 50 MG tablet  Patient No No   Sig: Take 1 Tablet by mouth 1 time a day as needed for Erectile Dysfunction.   Patient not taking: Reported on 5/21/2025   triamcinolone acetonide (KENALOG) 0.1 % Cream   No No   Sig: Apply 1 g topically 2 times a day for 14 days.      Facility-Administered Medications: None       Physical Exam  Temp:  [37.2 °C (98.9 °F)] 37.2 °C (98.9 °F)  Pulse:  [] 80  Resp:  [16-18] 16  BP: (113-134)/(72-87) 117/78  SpO2:  [91 %-97 %] 96 %  Blood Pressure : 117/78   Temperature: 37.2 °C (98.9 °F)   Pulse: 80   Respiration: 16   Pulse Oximetry: 96 %       Physical Exam  Constitutional:       General: He is not in acute distress.     Appearance: He is ill-appearing.   Eyes:      General: No scleral icterus.  Cardiovascular:      Rate and Rhythm: Normal rate.      Heart sounds: No murmur heard.  Pulmonary:      Effort: No respiratory distress.      Breath sounds: No wheezing.   Abdominal:      General: There is no distension.      Tenderness: There is no abdominal tenderness. There is no right CVA tenderness, left CVA tenderness or guarding.   Musculoskeletal:         General: Tenderness and signs of injury present.      Right lower  leg: No edema.      Left lower leg: No edema.      Comments: Right ankle pain   Lymphadenopathy:      Cervical: No cervical adenopathy.   Skin:     Coloration: Skin is not jaundiced.      Findings: No bruising, lesion or rash.   Neurological:      General: No focal deficit present.      Mental Status: He is alert and oriented to person, place, and time. Mental status is at baseline.      Cranial Nerves: No cranial nerve deficit.      Motor: No weakness.      Gait: Gait normal.         Laboratory:  Recent Labs     06/18/25  1417   WBC 9.0   RBC 4.88   HEMOGLOBIN 15.8   HEMATOCRIT 46.8   MCV 95.9   MCH 32.4   MCHC 33.8   RDW 48.4   PLATELETCT 283   MPV 9.4     Recent Labs     06/18/25  1417   SODIUM 133*   POTASSIUM 4.3   CHLORIDE 99   CO2 20   GLUCOSE 136*   BUN 16   CREATININE 1.14   CALCIUM 9.2     Recent Labs     06/18/25  1417 06/18/25  1507   ALTSGPT 33  --    ASTSGOT 60*  --    ALKPHOSPHAT 78  --    TBILIRUBIN 0.7  --    LIPASE  --  45   GLUCOSE 136*  --      Recent Labs     06/18/25  1507   APTT 33.3   INR 1.15*     Recent Labs     06/18/25  1417   NTPROBNP 176*         Recent Labs     06/18/25  1417 06/18/25  1618   TROPONINT 9 7       Imaging:  CT-CSPINE WITHOUT PLUS RECONS   Final Result      1.  No acute traumatic injury of the cervical spine.   2.  Multilevel degenerative changes, greater inferiorly.      CT-HEAD W/O   Final Result      1.  There is no definite acute intracranial abnormality.   2.  Diffuse atrophy.   3.  Chronic sinus disease.                  DX-CHEST-PORTABLE (1 VIEW)   Final Result      1.  No acute cardiac or pulmonary abnormalities are identified.      DX-TIBIA AND FIBULA LEFT   Final Result      Mildly displaced fracture of the distal fibula.      DX-ANKLE 3+ VIEWS LEFT   Final Result      Mildly displaced oblique fracture of the distal fibula.      DX-ANKLE 3+ VIEWS LEFT    (Results Pending)   EC-ECHOCARDIOGRAM COMPLETE W/O CONT    (Results Pending)       X-Ray:  I have personally  reviewed the images and compared with prior images.  EKG:  I have personally reviewed the images and compared with prior images.    Assessment/Plan:  Justification for Admission Status  I anticipate this patient will require at least two midnights for appropriate medical management, necessitating inpatient admission because syncope and alcoholism with ankle fracture    Patient will need a Telemetry bed on CARDIOLOGY service .  The need is secondary to syncope.    * Syncope and collapse- (present on admission)  Assessment & Plan  With dizziness and loss of consciousness  Likely related to alcoholism and possible dehydration  History of congestive heart failure, alcoholism  Denied any chest pain  EKG and troponin did not show any acute finding  Since patient has history of alcoholism and history of congestive heart failure, repeat echo  Telemetry  PT and OT  Check orthostatic  IV fluid gently    Occult blood positive stool  Assessment & Plan  No melena however rectal exam was done by ED and occult blood was positive  Patient has GERD  PPI twice daily  Holding Eliquis for today, okay for DVT prophylaxis  Hemoglobin every 12 hours.    Closed fracture of left ankle  Assessment & Plan  After fall  Ortho was consulted  PT and OT and pain control    Paroxysmal A-fib (HCC)- (present on admission)  Assessment & Plan  On admission EKG showed sinus rhythm  Patient came with syncope  Admitted to telemetry  Repeat echo  Holding Eliquis for possible GI bleeding    Alcohol dependence with other alcohol-induced disorder (HCC)- (present on admission)  Assessment & Plan  Patient drinks more than 4 shots every day  Last drink was on the day of admission  High risk for delirium alcohol withdrawal  High-dose of thiamine  Consider CIWA protocol if needed    Hyperthyroidism- (present on admission)  Assessment & Plan  Patient is on levothyroxine 50 mcg daily  Check TSH    ACC/AHA stage C systolic heart failure (HCC)- (present on  admission)  Assessment & Plan  No exacerbation  Repeat echo  Telemetry    Elevated liver enzymes- (present on admission)  Assessment & Plan  Acute hepatitis  Mild  Labs daily    Mild intermittent asthma without complication- (present on admission)  Assessment & Plan  No exacerbation  Inhalers as needed        VTE prophylaxis: SCDs/TEDs and enoxaparin ppx       [1]   Allergies  Allergen Reactions    Penicillins Swelling     Swelling at injection site; reaction occurred at 23 yo    Viagra [Sildenafil] Unspecified     If taken 100mg

## 2025-06-19 NOTE — ASSESSMENT & PLAN NOTE
With dizziness and loss of consciousness  Likely related to alcoholism and possible dehydration  History of congestive heart failure, alcoholism  Denied any chest pain  EKG and troponin did not show any acute finding  Since patient has history of alcoholism and history of congestive heart failure, repeat echo  Telemetry  PT and OT-no further needs on discharge  Orthostatics positive  IV fluid gently

## 2025-06-19 NOTE — PROGRESS NOTES
4 Eyes Skin Assessment Completed by DELIA Lopez and DELIA Pacheco.    Skin assessment is primarily focused on high risk bony prominences. Pay special attention to skin beneath and around medical devices, high risk bony prominences, skin to skin areas and areas where the patient lacks sensation to feel pain and areas where the patient previously had breakdown.     Head (Occipital):  WDL   Ears (Under Medical Devices): WDL   Nose (Under Medical Devices): WDL   Mouth:  WDL   Neck: WDL   Breast/Chest:  WDL   Shoulder Blades:  WDL   Spine:   WDL   (R) Arm/Elbow/Hand: Pink and Blanching   (L) Arm/Elbow/Hand: Pink and Blanching   Abdomen: WDL   Pannus/Groin:  WDL and Pink   Sacrum/Coccyx:   WDL   (R) Ischial Tuberosity (Sit Bones):  WDL   (L) Ischial Tuberosity (Sit Bones):  WDL   (R) Leg:  Small scabs scattered bruising    (L) Leg:  Red and Bruising, swollen    (R) Heel:  Dry, cracked, pink, blanching   (R) Foot/Toe: Pink    (L) Heel: Dry, cracked,pink, blanching, bruising   (L) Foot/Toe:  Pink        DEVICES IN USE:   Respiratory Devices:  NA, patient on room air  Feeding Devices:  N/A   Lines & BP Monitoring Devices:  Pulse ox,   Orthopedic Devices:  N/A  Miscellaneous Devices: Telemetry monitor    PROTOCOL INTERVENTIONS:   Heel floated with pillows     WOUND PHOTOS:   N/A no wounds identified    WOUND CONSULT:   N/A, no advanced wound care needs identified

## 2025-06-19 NOTE — DISCHARGE PLANNING
Care Transition Team Assessment    Patient is a 70 year-old male admitted for Syncope and collapse. Please see patient's H&P for prior medical history. LMSW met with patient at bedside to complete assessment. Patient is A&Ox4 and able to verify the information on the face sheet. Patient lives alone in a second floor apartment with 1 steps to enter, No emergency contact. No Advance Directive on file, patient currently working on completing AD and will provide hospital with copy. Prior to admission patient is independent with ADL's and IADL’s. Patient does not use any DME at baseline. Patient reported that his brother is good support for when patient is medically clear to discharge home. Patient reports, they work at [employer] as a . The patient's PCP is Dr. James Prajapati. Patient's preferred pharmacy is [facility] . Patient denies a history of SNF/IPR nor HHC use in the past. Patient denies any SA or MH concerns. Patient's confirmed medical coverage via [employer]. Patient has means of transportation when medically cleared and patient will provide own transport once stable for discharge.     Information Source  Orientation Level: Oriented X4  Information Given By: Patient  Who is responsible for making decisions for patient? : Patient    Readmission Evaluation  Is this a readmission?: No    Elopement Risk  Legal Hold: No  Ambulatory or Self Mobile in Wheelchair: Yes  Disoriented: No  Psychiatric Symptoms: None  History of Wandering: No  Elopement this Admit: No  Vocalizing Wanting to Leave: No  Displays Behaviors, Body Language Wanting to Leave: No-Not at Risk for Elopement  Elopement Risk: Not at Risk for Elopement    Interdisciplinary Discharge Planning  Lives with - Patient's Self Care Capacity: Alone and Able to Care For Self  Patient or legal guardian wants to designate a caregiver: No  Support Systems: None  Housing / Facility: 2 Story Apartment / Condo  Prior Services:  None    Discharge Preparedness  What is your plan after discharge?: Home with help  What are your discharge supports?: Sibling  Prior Functional Level: Ambulatory  Difficulity with ADLs: None  Difficulity with IADLs: None    Functional Assesment  Prior Functional Level: Ambulatory    Finances  Financial Barriers to Discharge: No  Prescription Coverage: Yes    Vision / Hearing Impairment  Vision Impairment : Yes  Right Eye Vision: Impaired, Wears Glasses  Left Eye Vision: Impaired, Wears Glasses  Hearing Impairment : No         Advance Directive  Advance Directive?: None  Advance Directive offered?: AD Booklet given    Domestic Abuse  Have you ever been the victim of abuse or violence?: No  Possible Abuse/Neglect Reported to:: Not Applicable    Psychological Assessment  History of Substance Abuse: None  History of Psychiatric Problems: No  Non-compliant with Treatment: No  Newly Diagnosed Illness: No    Discharge Risks or Barriers  Patient risk factors: Complex medical needs    Anticipated Discharge Information  Discharge Disposition: Discharged to home/self care (01)

## 2025-06-19 NOTE — CARE PLAN
The patient is Stable - Low risk of patient condition declining or worsening    Shift Goals  Clinical Goals: Safety, Echo, Trend H&H Q12 hr., Monitor stool, Ortho consulted for fractured ankle, CIWA  Patient Goals: Sleep  Family Goals: GRISEL    Progress made toward(s) clinical / shift goals:        Problem: Pain - Standard  Goal: Alleviation of pain or a reduction in pain to the patient’s comfort goal  Outcome: Progressing     Problem: Knowledge Deficit - Standard  Goal: Patient and family/care givers will demonstrate understanding of plan of care, disease process/condition, diagnostic tests and medications  Outcome: Progressing     Problem: Seizure Precautions  Goal: Implementation of seizure precautions  Outcome: Progressing     Problem: Lifestyle Changes  Goal: Patient's ability to identify lifestyle changes and available resources to help reduce recurrence of condition will improve  Outcome: Progressing       Patient is not progressing towards the following goals:

## 2025-06-19 NOTE — ASSESSMENT & PLAN NOTE
On admission EKG showed sinus rhythm  Patient came with syncope  Admitted to telemetry  Repeat echo  Holding Eliquis for possible GI bleeding

## 2025-06-19 NOTE — THERAPY
Physical Therapy   Initial Evaluation     Patient Name:  Alberto Galicia  Age:  70 y.o., Sex:  male  Medical Record #:  [id number]  Today's Date: 6/19/2025     Precautions  Medical: Fall Risk  Orthopedic: CAM Boot  Weight Bearing: Weight Bearing as Tolerated Left Lower Extremity    Assessment  Patient is 70 y.o. male with a diagnosis of syncope and collapse likely related to alcoholism and possible dehydration. Sustained a distal fibula fracture, non-op,  WBAT in boot for comfort.   Pt demonstrating the ability to ambulate for functional distances with CAM boot and personal hiking shoe. Pt able to ascend and descend steps following education on sequencing.   Pain well managed. No DME or post acute PT needs.   Pt instructed on donning and doffing CAM boot.   Plan    Physical Therapy Initial Treatment Plan   Duration: (P) Evaluation only    DC Equipment Recommendations: (P) None  Discharge Recommendations: (P) Anticipate that the patient will have no further physical therapy needs after discharge from the hospital         Initial Contact Note    Initial Contact Note Order Received and Verified, Physical Therapy Evaluation in Progress with Full Report to Follow.   Precautions   Medical Fall Risk   Orthopedic CAM Boot   Weight Bearing Weight Bearing as Tolerated Left Lower Extremity   Pain 0 - 10 Group   Location Ankle   Location Orientation Left   Pain Rating Scale (NPRS) 3   Description Aching   Therapist Pain Assessment During Activity;3   Prior Living Situation   Prior Services None   Housing / Facility 2 Story Apartment / Condo   Steps Into Home 15   Steps In Home 0   Rail Left Rail  (Steps into Home)   Equipment Owned None   Lives with - Patient's Self Care Capacity Alone and Able to Care For Self   Prior Level of Functional Mobility   Bed Mobility Independent   Transfer Status Independent   Ambulation Independent   Ambulation Distance community   Assistive Devices Used None   Stairs Independent   History of Falls    History of Falls Yes   Date of Last Fall 06/13/25  (related to syncope)   Cognition    Level of Consciousness Alert   Active ROM Upper Body   Active ROM Upper Body  WDL   Strength Upper Body   Upper Body Strength  WDL   Active ROM Lower Body    Active ROM Lower Body  X   Comments left ankle NT .   Strength Lower Body   Lower Body Strength  WDL   Sensation Lower Body   Lower Extremity Sensation   WDL   Comments LLE toes warm   Other Treatments   Other Treatments Provided Education on donning CAM boot, role of therapy, stair sequencing and training with return demonstration. boot doffing   Balance Assessment   Sitting Balance (Static) Good   Sitting Balance (Dynamic) Good   Standing Balance (Static) Good   Standing Balance (Dynamic) Good   Weight Shift Sitting Good   Weight Shift Standing Fair   Comments w/o AD   Bed Mobility    Supine to Sit Independent   Sit to Supine Independent   Scooting Independent   Rolling Independent   Gait Analysis   Gait Level Of Assist Supervised  (for direction on unit)   Assistive Device None   Distance (Feet) 200   # of Times Distance was Traveled 1   Deviation Decreased Heel Strike;Decreased Toe Off  (CAM boot and hiking shoe.)   # of Stairs Climbed 10   Level of Assist with Stairs Supervised   Weight Bearing Status WBAT   Comments able to ascend and descend steps following education.   Functional Mobility   Sit to Stand Independent   Bed, Chair, Wheelchair Transfer Independent   6 Clicks Assessment - How much HELP from another person do you currently need... (If the patient hasn't done an activity recently, how much help from another person do you think he/she would need if he/she tried?)   Turning from your back to your side while in a flat bed without using bedrails? 4   Moving from lying on your back to sitting on the side of a flat bed without using bedrails? 4   Moving to and from a bed to a chair (including a wheelchair)? 4   Standing up from a chair using your arms (e.g.,  wheelchair, or bedside chair)? 4   Walking in hospital room? 3   Climbing 3-5 steps with a railing? 3   6 clicks Mobility Score 22   Edema / Skin Assessment   Comments edema and bruising left ankle   Education Group   Education Provided Role of Physical Therapist;Gait Training;Stair Training;Weight Bearing Status   Role of Physical Therapist Patient Response Patient;Acceptance;Explanation;Verbal Demonstration   Gait Training Patient Response Patient;Acceptance;Explanation;Action Demonstration;Verbal Demonstration   Stair Training Patient Response Family;Acceptance;Explanation;Demonstration;Action Demonstration;Verbal Demonstration   Weight Bearing Status Patient Response Patient;Acceptance;Explanation;Verbal Demonstration   Physical Therapy Initial Treatment Plan    Duration Evaluation only   Anticipated Discharge Equipment and Recommendations   DC Equipment Recommendations None   Discharge Recommendations Anticipate that the patient will have no further physical therapy needs after discharge from the hospital   Interdisciplinary Plan of Care Collaboration   IDT Collaboration with  Nursing;Occupational Therapist   Patient Position at End of Therapy In Bed;Call Light within Reach;Tray Table within Reach;Phone within Reach   Collaboration Comments staff updated.   Session Information   Date / Session Number  6/19-Eval and education only.

## 2025-06-19 NOTE — PROGRESS NOTES
[facility] Medicine Daily Progress Note    Date of Service  6/19/2025    Chief Complaint  Alberto Galicia is a 70 y.o. male admitted 6/18/2025 with left ankle pain     Hospital Course  70-year-old male with history of alcoholism, atrial fibrillation and dyslipidemia with hypothyroidism who presented 6/18 with left ankle pain and syncope. Few days ago on Friday patient was walking to the bathroom when he fainted and he lost his consciousness, patient had dizziness at that time, no significant chest pain or other symptoms. Patient did not go to the emergency or doctors at that time, patient started having generalized weakness and more pain on the left ankle, patient drinking more than 4 shots every day, denied any fever or chills however he noticed heart burning and dark stool. On admission vital signs were stable, EKG showed sinus rhythm with no A-fib, labs showed hemoglobin 15.8 with creatinine 1.1 and sodium 133 however AST 60. Troponin was 9 and repeat troponin was 7. CT head and neck did not show any acute finding or bleeding however x-ray for the left ankle showed mildly displaced fracture of the distal fibula, brace was placed by ED physician and Ortho was consulted.     Interval Problem Update  6/19 vitals stable on room air   WBC 7.6  Hemoglobin 14.1, repeat 15.2  -No signs of GI bleed and hemoglobin stable restart Eliquis  Creatinine 1.08, GFR 73  repeat troponin negative   Echo pending   On CIWA protocol, no signs of withdrawal   Orthopedic surgery recommended WBAT, non operative management and outpatient follow up in 2-4 weeks   Telemetry reviewed patient in sinus rhythm  PT/OT recommending no further needs on discharge  Patient reports he is feeling much better.  He does report 3 syncopal episodes at home.  Orthostatics were positive however patient denied symptoms, continue IV fluids  - Repeat orthostatics in the morning    I have discussed this patient's plan of care and discharge plan at IDT rounds today  with Case Management, Nursing, Nursing leadership, and other members of the IDT team.    Consultants/Specialty  orthopedics    Code Status  Full Code    Disposition  The patient is not medically cleared for discharge to home or a post-acute facility.  Anticipate discharge to: home with close outpatient follow-up    I have placed the appropriate orders for post-discharge needs.    Review of Systems  Review of Systems   Constitutional:  Negative for chills and fever.   Respiratory:  Negative for shortness of breath.    Cardiovascular:  Negative for chest pain.   Gastrointestinal:  Negative for abdominal pain and nausea.   Musculoskeletal:  Positive for falls and joint pain. Negative for myalgias.   Skin:  Negative for rash.   Neurological:  Negative for dizziness and headaches.   Psychiatric/Behavioral:  Positive for substance abuse. Negative for depression.    All other systems reviewed and are negative.       Physical Exam  Temp:  [36.2 °C (97.2 °F)-36.7 °C (98.1 °F)] 36.3 °C (97.3 °F)  Pulse:  [58-87] 61  Resp:  [15-18] 15  BP: ()/(46-80) 104/54  SpO2:  [92 %-96 %] 95 %    Physical Exam  Vitals and nursing note reviewed.   Constitutional:       General: He is not in acute distress.     Appearance: Normal appearance.   HENT:      Head: Normocephalic and atraumatic.   Eyes:      Conjunctiva/sclera: Conjunctivae normal.   Cardiovascular:      Rate and Rhythm: Normal rate and regular rhythm.      Pulses: Normal pulses.   Pulmonary:      Effort: Pulmonary effort is normal. No respiratory distress.      Breath sounds: Normal breath sounds. No wheezing.   Abdominal:      General: Abdomen is flat. There is no distension.      Palpations: Abdomen is soft.      Tenderness: There is no abdominal tenderness.   Musculoskeletal:         General: Tenderness (Right ankle) and signs of injury present.      Cervical back: Normal range of motion and neck supple.   Skin:     General: Skin is warm and dry.   Neurological:       General: No focal deficit present.      Mental Status: He is alert and oriented to person, place, and time.      Cranial Nerves: No cranial nerve deficit.   Psychiatric:         Mood and Affect: Mood normal.         Behavior: Behavior normal.         Fluids    Intake/Output Summary (Last 24 hours) at 6/19/2025 1704  Last data filed at 6/19/2025 1552  Gross per 24 hour   Intake 1190 ml   Output 1280 ml   Net -90 ml        Laboratory  Recent Labs     06/18/25  1417 06/19/25  0645 06/19/25  1227   WBC 9.0 7.6  --    RBC 4.88 4.25*  --    HEMOGLOBIN 15.8 14.1 15.2   HEMATOCRIT 46.8 41.8* 45.4   MCV 95.9 98.4*  --    MCH 32.4 33.2*  --    MCHC 33.8 33.7  --    RDW 48.4 50.3*  --    PLATELETCT 283 255  --    MPV 9.4 10.0  --      Recent Labs     06/18/25  1417 06/19/25  0645   SODIUM 133* 138   POTASSIUM 4.3 4.0   CHLORIDE 99 102   CO2 20 23   GLUCOSE 136* 102*   BUN 16 14   CREATININE 1.14 1.08   CALCIUM 9.2 8.9     Recent Labs     06/18/25  1507   APTT 33.3   INR 1.15*               Imaging  DX-ANKLE 3+ VIEWS LEFT   Final Result      There has been no significant interval change in the distal fibular fracture on these weightbearing radiographs.      CT-CSPINE WITHOUT PLUS RECONS   Final Result      1.  No acute traumatic injury of the cervical spine.   2.  Multilevel degenerative changes, greater inferiorly.      CT-HEAD W/O   Final Result      1.  There is no definite acute intracranial abnormality.   2.  Diffuse atrophy.   3.  Chronic sinus disease.                  DX-CHEST-PORTABLE (1 VIEW)   Final Result      1.  No acute cardiac or pulmonary abnormalities are identified.      DX-TIBIA AND FIBULA LEFT   Final Result      Mildly displaced fracture of the distal fibula.      DX-ANKLE 3+ VIEWS LEFT   Final Result      Mildly displaced oblique fracture of the distal fibula.      EC-ECHOCARDIOGRAM COMPLETE W/O CONT    (Results Pending)        Assessment/Plan  * Syncope and collapse- (present on admission)  Assessment &  Plan  With dizziness and loss of consciousness  Likely related to alcoholism and possible dehydration  History of congestive heart failure, alcoholism  Denied any chest pain  EKG and troponin did not show any acute finding  Since patient has history of alcoholism and history of congestive heart failure, repeat echo  Telemetry  PT and OT-no further needs on discharge  Orthostatics positive  IV fluid gently    Occult blood positive stool  Assessment & Plan  No melena however rectal exam was done by ED and occult blood was positive  Patient has GERD  PPI twice daily  Hemoglobin every 12 hours.  Hemoglobin is stable and no signs of bleeding, restart Eliquis and monitor    Closed fracture of left ankle  Assessment & Plan  After fall  Ortho was consulted  PT and OT and pain control    Paroxysmal A-fib (HCC)- (present on admission)  Assessment & Plan  On admission EKG showed sinus rhythm  Patient came with syncope  Admitted to telemetry  Repeat echo  Holding Eliquis for possible GI bleeding    Alcohol dependence with other alcohol-induced disorder (HCC)- (present on admission)  Assessment & Plan  Patient drinks more than 4 shots every day  Last drink was on the day of admission  High risk for delirium alcohol withdrawal  High-dose of thiamine  Consider CIWA protocol if needed    Hyperthyroidism- (present on admission)  Assessment & Plan  Patient is on levothyroxine 50 mcg daily  Check TSH    Orthostatic hypotension  Assessment & Plan  Positive orthostatics  Continue IV fluids  Repeat orthostatics in the morning    ACC/AHA stage C systolic heart failure (HCC)- (present on admission)  Assessment & Plan  No exacerbation  Repeat echo  Telemetry    Elevated liver enzymes- (present on admission)  Assessment & Plan  Acute hepatitis  Mild  Labs daily    Mild intermittent asthma without complication- (present on admission)  Assessment & Plan  No exacerbation  Inhalers as needed         VTE prophylaxis:    therapeutic  anticoagulation with eliquis 5 mg BID    I have performed a physical exam and reviewed and updated ROS and Plan today (6/19/2025). In review of yesterday's note (6/18/2025), there are no changes except as documented above.

## 2025-06-19 NOTE — THERAPY
"Occupational Therapy   Initial Evaluation     Patient Name:  Alberto Galicia  Age:  70 y.o., Sex:  male  Medical Record #:  [id number]  Today's Date:  6/19/2025     Precautions  Medical: Fall Risk  Orthopedic: CAM Boot  Weight Bearing: Weight Bearing as Tolerated Left Lower Extremity    Assessment    Patient is 70 y.o. male with h/o HF, HLD, a-fib who presented following GLF due to syncope with L ankle injury. Fall occurred 5 days PTA. Pt dx with L distal fibula fx; non-op, WBAT in boot per Ortho. Pt seen for OT eval and treatment. See grid below for details. Pt able to don L CAM boot and R sock, hiking boot without assist, but demonstrated increased WOB with task due to forward flexion. Extensive education provided regarding pacing techniques, fall reduction strategies. See Education Group for details. Pt is near baseline function from OT standpoint in this setting. No further acute OT needs at this time.     Plan    Occupational Therapy Initial Treatment Plan   Duration: Evaluation only    DC Equipment Recommendations: Tub / Shower Seat  Discharge Recommendations: Anticipate that the patient will have no further occupational therapy needs after discharge from the hospital     Subjective    \"I've passed out and fallen three times.\"     Objective       06/19/25 1222   Prior Living Situation   Prior Services None   Housing / Facility 2 Story Apartment / Condo   Steps Into Home   (full flight)   Rail Left Rail  (Steps into Home)   Elevator No   Bathroom Set up Bathtub / Shower Combination;Shower Glass Doors   Equipment Owned None   Lives with - Patient's Self Care Capacity Alone and Able to Care For Self   Prior Level of ADL Function   Self Feeding Independent   Grooming / Hygiene Independent   Bathing Independent   Dressing Independent   Toileting Independent   Prior Level of IADL Function   Laundry Independent   Finances Independent   Home Management Independent   Shopping Independent   Prior Level Of Mobility " Independent Without Device in Community;Independent Without Device in Home   Driving / Transportation Driving Independent   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)   History of Falls   History of Falls Yes   Precautions   Medical Fall Risk   Orthopedic CAM Boot   Weight Bearing Weight Bearing as Tolerated Left Lower Extremity   Vitals   Patient BP Position Sitting   Blood Pressure  138/80   O2 (LPM) 0   O2 Delivery Device None - Room Air   Pain   Pain Scales 0 to 10 Scale    Pain 0 - 10 Group   Location Ankle   Location Orientation Left   Therapist Pain Assessment Post Activity Pain Same as Prior to Activity;Nurse Notified  (minimal c/o; agreeable to activity)   Cognition    Cognition / Consciousness WDL   Level of Consciousness Alert   Comments pleasant & participatory   Active ROM Upper Body   Active ROM Upper Body  WDL   Strength Upper Body   Upper Body Strength  WDL   Coordination Upper Body   Coordination WDL   Balance Assessment   Sitting Balance (Static) Good   Sitting Balance (Dynamic) Good   Standing Balance (Static) Fair +   Standing Balance (Dynamic) Fair +   Weight Shift Sitting Good   Weight Shift Standing Fair   Comments no AD, no LOB   Bed Mobility    Supine to Sit Supervised   Sit to Supine   (up to chair post)   Scooting Modified Independent  (seated)   Comments flat bed, no rail   ADL Assessment   Eating Independent   Grooming   (deferred due to meal time)   Lower Body Dressing Supervision  (don L CAM boot, don R sock and hiking boot)   Toileting   (declined need)   Functional Mobility   Sit to Stand Supervised   Bed, Chair, Wheelchair Transfer Supervised   Transfer Method Stand Pivot  (FWW)   Mobility bed mobility, pivot to chair; further mobility deferred due to IV pump attached to bed frame    Visual Perception   Visual Perception  Not Tested   Edema / Skin Assessment   Comments contusion and swelling to L ankle   Activity Tolerance   Sitting in Chair >10  min; up post   Sitting Edge of Bed 10 min   Standing 1 min   Comments increased WOB with forward flexion during CAM boot donning, fastening   Education Group   Education Provided Energy Conservation;Home Safety;Role of Occupational Therapist   Role of Occupational Therapist Patient Response Patient;Acceptance;Explanation;Verbal Demonstration   Energy Conservation Patient Response Patient;Acceptance;Explanation;Handout;Verbal Demonstration  (pacing strategies)   Home Safety Patient Response Patient;Acceptance;Explanation;Verbal Demonstration  (educated on use of shower chair and urinal at night for fall reduction; also educated on increased self-awareness to pre-syncope and moving to safe position (sitting or laying down) for fall reduction)   Interdisciplinary Plan of Care Collaboration   IDT Collaboration with  Nursing;Physical Therapist   Patient Position at End of Therapy Seated;Chair Alarm On;Call Light within Reach;Tray Table within Reach;Phone within Reach   Collaboration Comments OT results and recs   Session Information   Date / Session Number  6/19 #1

## 2025-06-19 NOTE — PROGRESS NOTES
Monitor Summary  Rhythm: SR  Rate: 60-75  Ectopy: (F)PVC   Measurements: .20/.12/.43  ---12 hr Chart Review---           no

## 2025-06-19 NOTE — ASSESSMENT & PLAN NOTE
No melena however rectal exam was done by ED and occult blood was positive  Patient has GERD  PPI twice daily  Hemoglobin every 12 hours.  Hemoglobin is stable and no signs of bleeding, restart Eliquis and monitor

## 2025-06-19 NOTE — CARE PLAN
The patient is Stable - Low risk of patient condition declining or worsening    Shift Goals  Clinical Goals: CIWA, PT OT for ankle fracture  Patient Goals: rest, ambulate with boot, go home  Family Goals: GRISEL    Progress made toward(s) clinical / shift goals:    Problem: Pain - Standard  Goal: Alleviation of pain or a reduction in pain to the patient’s comfort goal  Outcome: Progressing     Problem: Knowledge Deficit - Standard  Goal: Patient and family/care givers will demonstrate understanding of plan of care, disease process/condition, diagnostic tests and medications  Outcome: Progressing     Problem: Optimal Care for Alcohol Withdrawal  Goal: Optimal Care for the alcohol withdrawal patient  Outcome: Progressing     Problem: Seizure Precautions  Goal: Implementation of seizure precautions  Outcome: Progressing     Problem: Risk for Aspiration  Goal: Patient's risk for aspiration will be absent or decrease  Outcome: Progressing     Problem: Fall Risk  Goal: Patient will remain free from falls  Outcome: Progressing       Patient is not progressing towards the following goals:

## 2025-06-19 NOTE — CONSULTS
DATE OF SERVICE:  06/18/2025     ORTHOPEDIC CONSULTATION     REQUESTING PHYSICIAN:  Geni Jean, Emergency Department.     REASON FOR CONSULTATION:  Left distal fibula fracture.     CHIEF COMPLAINT:  Left ankle pain.     HISTORY OF PRESENT ILLNESS:  The patient is 70 years old.  He has a history of   heart failure with an ejection fraction of 20% in the past reportedly as a   result of alcoholic cardiomyopathy.  He states he had a syncopal episode about   5 days ago when he felt lightheaded in the bathroom and then passed out.  He   thinks he struck his head.  He also injured his left ankle.  He states that   his ankle hurt quite a bit for a few days, but he had to get things done, so   he kept walking on it to the point where it started to feel a little bit   better, but still swollen and painful, although he is able to bear weight to   some degree.  He felt that his ankle was just sprained.  He presented to   [facility] Emergency Department with low energy and is being evaluated for   admission to the hospital service given underlying medical comorbidities and   his recent syncopal event.  I was consulted to provide treatment   recommendations for his left distal fibula fracture.  He denies other   extremity injuries.     ALLERGIES:  PENICILLIN AND VIAGRA.     OUTPATIENT MEDICATIONS:  Kenalog, potassium chloride, Cozaar, Lipitor,   hydrocortisone, Lasix, testosterone gel, Eliquis, fluticasone, levalbuterol,   metoprolol SR, levothyroxine, Tylenol, melatonin, Sudafed, vitamin B12.     PAST MEDICAL DIAGNOSES:  Cardiomyopathy, hyperlipidemia, atrial fibrillation   with left ventricular thrombus, thyroid disorder, pituitary disease.     PAST SURGICAL HISTORY:  Craniotomy.     SOCIAL HISTORY:  The patient denies smoking.  Does drink alcohol.  Denies   illicit drug use.     PHYSICAL EXAMINATION:   VITAL SIGNS:  Temperature 98.9, heart rate 75, respiratory rate 18, blood   pressure 116/62, pulse oximetry 96% on room air.    GENERAL APPEARANCE:  The patient is alert, pleasant, and in no acute distress.     MUSCULOSKELETAL:  Examination of the left lower extremity:  He has mild   swelling in the leg and the ankle.  He is able to dorsiflex and plantarflex   the foot and flex and extend the toes.  He has a palpable dorsalis pedis   pulse.  There are no open wounds present.  He does have moderate ecchymosis   present mostly laterally at the ankle and the hindfoot.  He is nontender to   palpation of the knee.  There is no knee effusion present.  There is no   evidence of obvious traumatic deformity of the right lower or bilateral upper   extremities, which were grossly neurovascularly intact.     DIAGNOSTIC IMAGING:  Plain x-rays of the left tibia and fibula and left ankle   show a mildly displaced Schuler B distal fibula fracture.  Weightbearing x-rays   confirm a congruent, stable ankle mortise with stable mild displacement of the   distal fibula fracture with no lateral translation of the talus in relation   to the distal tibia.     ASSESSMENT:  The patient is 70 years old, who has multiple medical   comorbidities, has a subacute left lateral malleolus fracture after a syncopal   event 5 days ago.  Weightbearing x-rays confirm a stable distal fibula   fracture.     RECOMMENDATIONS:  1.  I have discussed these findings with the patient and I recommend   nonoperative management for his stable ankle fracture.  2.  I feel he can weight bear as tolerated in the left lower extremity with a   boot for comfort and added stability.  3.  He should work with physical and occupational therapy for mobilization.  4.  He can follow up with me in 4 weeks on an outpatient basis for x-rays,   three views of the left ankle weightbearing.  He can return sooner if issues   arise in the meantime in 2-4 weeks on an outpatient basis for followup x-rays,   three views of the left ankle weightbearing to confirm expected routine   healing and ankle  stability.        ______________________________  MD CHRISTIANO Boyd/ANGELITO    DD:  06/18/2025 21:00  DT:  06/18/2025 22:14    Job#:  [id number]

## 2025-06-19 NOTE — ED NOTES
Medication history reviewed with patient in [facility].   Med rec is complete  Allergies reviewed.     Patient has not had any outpatient antibiotics in the last 30 days.   Anticoagulants: Eliquis 5mg- Last dose 6/18/25 AM.    Dispense history available in EPIC: Yes    Mariola Mullen

## 2025-06-19 NOTE — ED NOTES
Orthostatic vitals completed.     Supine: 124/78, HR 88, RR 16, 02 95% on RA  Sittin/70, HR 90, RR 17, 02 97% on RA  Standin/78, HR 82, RR 16, 02 95% on RA

## 2025-06-19 NOTE — ASSESSMENT & PLAN NOTE
Patient drinks more than 4 shots every day  Last drink was on the day of admission  High risk for delirium alcohol withdrawal  High-dose of thiamine  Consider CIWA protocol if needed

## 2025-06-19 NOTE — ED NOTES
Tele 7 RN to transport pt. Pt transported on monitor, pt sent with all belongings and walking boot.

## 2025-06-20 ENCOUNTER — APPOINTMENT (OUTPATIENT)
Dept: CARDIOLOGY | Facility: MEDICAL CENTER | Age: 70
DRG: 563 | End: 2025-06-20
Attending: INTERNAL MEDICINE
Payer: MEDICARE

## 2025-06-20 VITALS
WEIGHT: 255.73 LBS | TEMPERATURE: 98.6 F | RESPIRATION RATE: 15 BRPM | SYSTOLIC BLOOD PRESSURE: 108 MMHG | DIASTOLIC BLOOD PRESSURE: 55 MMHG | HEIGHT: 76 IN | BODY MASS INDEX: 31.14 KG/M2 | OXYGEN SATURATION: 94 % | HEART RATE: 56 BPM

## 2025-06-20 LAB
1,25(OH)2D3 SERPL-MCNC: 21.7 PG/ML (ref 19.9–79.3)
ALBUMIN SERPL BCP-MCNC: 3.4 G/DL (ref 3.2–4.9)
ANION GAP SERPL CALC-SCNC: 10 MMOL/L (ref 7–16)
BUN SERPL-MCNC: 14 MG/DL (ref 8–22)
CALCIUM ALBUM COR SERPL-MCNC: 9.1 MG/DL (ref 8.5–10.5)
CALCIUM SERPL-MCNC: 8.6 MG/DL (ref 8.5–10.5)
CHLORIDE SERPL-SCNC: 105 MMOL/L (ref 96–112)
CO2 SERPL-SCNC: 23 MMOL/L (ref 20–33)
CREAT SERPL-MCNC: 1.18 MG/DL (ref 0.5–1.4)
EKG IMPRESSION: NORMAL
ERYTHROCYTE [DISTWIDTH] IN BLOOD BY AUTOMATED COUNT: 49.2 FL (ref 35.9–50)
GFR SERPLBLD CREATININE-BSD FMLA CKD-EPI: 66 ML/MIN/1.73 M 2
GLUCOSE SERPL-MCNC: 103 MG/DL (ref 65–99)
HCT VFR BLD AUTO: 40.6 % (ref 42–52)
HGB BLD-MCNC: 13.6 G/DL (ref 14–18)
LV EJECT FRACT  99904: 60
LV EJECT FRACT MOD 2C 99903: 57.02
LV EJECT FRACT MOD 4C 99902: 62.17
LV EJECT FRACT MOD BP 99901: 59.7
MCH RBC QN AUTO: 32.9 PG (ref 27–33)
MCHC RBC AUTO-ENTMCNC: 33.5 G/DL (ref 32.3–36.5)
MCV RBC AUTO: 98.3 FL (ref 81.4–97.8)
PHOSPHATE SERPL-MCNC: 2.5 MG/DL (ref 2.5–4.5)
PLATELET # BLD AUTO: 252 K/UL (ref 164–446)
PMV BLD AUTO: 9.3 FL (ref 9–12.9)
POTASSIUM SERPL-SCNC: 5.2 MMOL/L (ref 3.6–5.5)
RBC # BLD AUTO: 4.13 M/UL (ref 4.7–6.1)
SODIUM SERPL-SCNC: 138 MMOL/L (ref 135–145)
WBC # BLD AUTO: 7.7 K/UL (ref 4.8–10.8)

## 2025-06-20 PROCEDURE — 700102 HCHG RX REV CODE 250 W/ 637 OVERRIDE(OP): Performed by: INTERNAL MEDICINE

## 2025-06-20 PROCEDURE — 700111 HCHG RX REV CODE 636 W/ 250 OVERRIDE (IP): Performed by: INTERNAL MEDICINE

## 2025-06-20 PROCEDURE — A9270 NON-COVERED ITEM OR SERVICE: HCPCS | Performed by: INTERNAL MEDICINE

## 2025-06-20 PROCEDURE — 93005 ELECTROCARDIOGRAM TRACING: CPT | Mod: TC

## 2025-06-20 PROCEDURE — 85027 COMPLETE CBC AUTOMATED: CPT

## 2025-06-20 PROCEDURE — 99239 HOSP IP/OBS DSCHRG MGMT >30: CPT | Performed by: INTERNAL MEDICINE

## 2025-06-20 PROCEDURE — 700105 HCHG RX REV CODE 258: Performed by: INTERNAL MEDICINE

## 2025-06-20 PROCEDURE — 93306 TTE W/DOPPLER COMPLETE: CPT | Mod: 26 | Performed by: INTERNAL MEDICINE

## 2025-06-20 PROCEDURE — 80069 RENAL FUNCTION PANEL: CPT

## 2025-06-20 PROCEDURE — 93306 TTE W/DOPPLER COMPLETE: CPT

## 2025-06-20 PROCEDURE — 93010 ELECTROCARDIOGRAM REPORT: CPT | Performed by: INTERNAL MEDICINE

## 2025-06-20 RX ORDER — OMEPRAZOLE 20 MG/1
CAPSULE, DELAYED RELEASE ORAL
Qty: 44 CAPSULE | Refills: 0 | Status: SHIPPED | OUTPATIENT
Start: 2025-06-20 | End: 2025-07-20

## 2025-06-20 RX ADMIN — Medication 100 MG: at 05:53

## 2025-06-20 RX ADMIN — APIXABAN 5 MG: 5 TABLET, FILM COATED ORAL at 05:52

## 2025-06-20 RX ADMIN — FOLIC ACID 1 MG: 1 TABLET ORAL at 05:52

## 2025-06-20 RX ADMIN — ATORVASTATIN CALCIUM 40 MG: 40 TABLET, FILM COATED ORAL at 05:52

## 2025-06-20 RX ADMIN — LEVOTHYROXINE SODIUM 150 MCG: 0.07 TABLET ORAL at 05:52

## 2025-06-20 RX ADMIN — PANTOPRAZOLE SODIUM 40 MG: 40 INJECTION, POWDER, FOR SOLUTION INTRAVENOUS at 05:52

## 2025-06-20 RX ADMIN — HYDROCORTISONE 10 MG: 10 TABLET ORAL at 05:52

## 2025-06-20 RX ADMIN — THERA TABS 1 TABLET: TAB at 05:52

## 2025-06-20 RX ADMIN — SODIUM CHLORIDE: 9 INJECTION, SOLUTION INTRAVENOUS at 05:52

## 2025-06-20 RX ADMIN — ACETAMINOPHEN 650 MG: 325 TABLET ORAL at 05:51

## 2025-06-20 ASSESSMENT — LIFESTYLE VARIABLES
AGITATION: NORMAL ACTIVITY
HEADACHE, FULLNESS IN HEAD: NOT PRESENT
ORIENTATION AND CLOUDING OF SENSORIUM: ORIENTED AND CAN DO SERIAL ADDITIONS
TREMOR: NO TREMOR
PAROXYSMAL SWEATS: NO SWEAT VISIBLE
AGITATION: NORMAL ACTIVITY
VISUAL DISTURBANCES: NOT PRESENT
HEADACHE, FULLNESS IN HEAD: NOT PRESENT
PAROXYSMAL SWEATS: NO SWEAT VISIBLE
TOTAL SCORE: 0
VISUAL DISTURBANCES: NOT PRESENT
TOTAL SCORE: 0
ANXIETY: NO ANXIETY (AT EASE)
NAUSEA AND VOMITING: NO NAUSEA AND NO VOMITING
ORIENTATION AND CLOUDING OF SENSORIUM: ORIENTED AND CAN DO SERIAL ADDITIONS
ANXIETY: NO ANXIETY (AT EASE)
AUDITORY DISTURBANCES: NOT PRESENT
AUDITORY DISTURBANCES: NOT PRESENT
NAUSEA AND VOMITING: NO NAUSEA AND NO VOMITING
TREMOR: NO TREMOR

## 2025-06-20 ASSESSMENT — FIBROSIS 4 INDEX: FIB4 SCORE: 2.94

## 2025-06-20 ASSESSMENT — PAIN DESCRIPTION - PAIN TYPE
TYPE: ACUTE PAIN
TYPE: ACUTE PAIN

## 2025-06-20 NOTE — PROGRESS NOTES
Monitor summary:        Rhythm: SR SB  Rate: 51-76  Ectopy: (O)PAC, (O)PVC  Measurements: .21/.10/.47        12hr chart check

## 2025-06-20 NOTE — CARE PLAN
The patient is Watcher - Medium risk of patient condition declining or worsening    Shift Goals  Clinical Goals: CIWA; promote hemodynamic stability  Patient Goals: Comfort; rest; pain management  Family Goals: GRISEL    Progress made toward(s) clinical / shift goals:    Problem: Pain - Standard  Goal: Alleviation of pain or a reduction in pain to the patient’s comfort goal  Outcome: Progressing     Problem: Optimal Care for Alcohol Withdrawal  Goal: Optimal Care for the alcohol withdrawal patient  Outcome: Progressing     Problem: Fall Risk  Goal: Patient will remain free from falls  Outcome: Progressing       Patient is not progressing towards the following goals:

## 2025-06-20 NOTE — PROGRESS NOTES
Discharge orders received.  Patient arrived to the discharge lounge.  PIV removed by bedside RN prior to arrival. AVS instructions given, medications reviewed and general discharge education provided to patient.  Follow up appointments discussed.  Patient verbalized understanding of dc instructions and prescriptions.  Patient signed discharge instructions.  Patient verbalized understanding and had all belongings with him.  Patient left via ride. Wished patient a speedy recovery.

## 2025-06-20 NOTE — PROGRESS NOTES
Monitor Summary  Rhythm: SB   Rate: 50- 58  Ectopy: Freq. PVC, PAC/ Rare TRIG, BIG  Measurements: 19/13/45  ---12 hr Chart Review---

## 2025-06-20 NOTE — DISCHARGE SUMMARY
Discharge Summary    CHIEF COMPLAINT ON ADMISSION  Chief Complaint   Patient presents with    Syncope       Reason for Admission  Syncope     Admission Date  6/18/2025    CODE STATUS  Full Code    HPI & HOSPITAL COURSE  This is a 70 y.o. male here with left ankle pain, syncope     70-year-old male with history of alcoholism, atrial fibrillation and dyslipidemia with hypothyroidism who presented 6/18 with left ankle pain and syncope. Few days ago on Friday patient was walking to the bathroom when he fainted and he lost his consciousness, patient had dizziness at that time, no significant chest pain or other symptoms. Patient did not go to the emergency or doctors at that time, patient started having generalized weakness and more pain on the left ankle, patient drinking more than 4 shots every day, denied any fever or chills however he noticed heart burning and dark stool. On admission vital signs were stable, EKG showed sinus rhythm with no A-fib, labs showed hemoglobin 15.8 with creatinine 1.1 and sodium 133 however AST 60. Troponin was 9 and repeat troponin was 7. CT head and neck did not show any acute finding or bleeding however x-ray for the left ankle showed mildly displaced fracture of the distal fibula, brace was placed by ED physician and Ortho was consulted. Orthopedic surgery recommended WBAT in cam boot, non operative management and outpatient follow up in 2-4 weeks.     Patient had been complaining of dark stool at home. Takes pepto bismol multiple times a day for GERD.  Discussed switching to omeprazole twice a day for at least the next few weeks then decreasing to once a day.  If he has not had any black stool here since has been off the Pepto-Bismol and his hemoglobin is stable.  If his GERD symptoms continue he should be seen by gastroenterology for EGD as an outpatient.  Due to patient's complaint of syncope echocardiogram was performed that showed no changes, EF preserved.  He was noted to have  positive orthostatic hypotension and this resolved with IV fluids.  Counseled at length about importance of alcohol cessation for his GERD as well as his dehydration.  Patient's vital signs are stable and he is medically clear for outpatient follow-up, he is to return to the ER if his condition worsens.    Therefore, he is discharged in good and stable condition to home with close outpatient follow-up.    The patient met 2-midnight criteria for an inpatient stay at the time of discharge.    Discharge Date  6/20/2025    FOLLOW UP ITEMS POST DISCHARGE  Follow-up with primary care and orthopedic surgery    DISCHARGE DIAGNOSES  Principal Problem:    Syncope and collapse (POA: Yes)  Active Problems:    Mild intermittent asthma without complication (POA: Yes)    Elevated liver enzymes (POA: Yes)    ACC/AHA stage C systolic heart failure (HCC) (POA: Yes)      Overview: This problem was marked as resolved by a user in a SmartForm.    Orthostatic hypotension (POA: Unknown)    Hyperthyroidism (POA: Yes)    Alcohol dependence with other alcohol-induced disorder (HCC) (POA: Yes)    Paroxysmal A-fib (HCC) (POA: Yes)      Overview: This problem was marked as resolved by a user in a SmartForm.    Closed fracture of left ankle (POA: Unknown)    Occult blood positive stool (POA: Unknown)  Resolved Problems:    * No resolved hospital problems. *      FOLLOW UP  Future Appointments   Date Time Provider Department Center   6/20/2025  8:15 AM [facility] [place] [facility]   8/4/2025  2:00 PM James Prajapati M.D. [address]   12/3/2025  4:30 PM Glo Hauser M.D. [facility] None   4/7/2026  4:20 PM ERNST Lorenzo M.D.  [address] [facility] [place] [place] [address] [place] [id number] [phone]    Follow up  Follow up with primary care in 1-2 weeks      MEDICATIONS ON DISCHARGE     Medication List        START taking these medications        Instructions   omeprazole 20 MG delayed-release capsule  Start taking on:  June 20, 2025  Commonly known as: PriLOSEC   Take 1 Capsule by mouth 2 times a day for 14 days, THEN 1 Capsule every day for 16 days.            CHANGE how you take these medications        Instructions   hydrocortisone 5 MG Tabs  What changed:   how much to take  how to take this  when to take this  Commonly known as: Cortef   Doctor's comments:    Take 2 pills in the morning and 1 pill in the afternoon            CONTINUE taking these medications        Instructions   acetaminophen 500 MG Tabs  Commonly known as: Tylenol   Take 1,000 mg by mouth every 6 hours as needed for Mild Pain, Moderate Pain or Fever.  Dose: 1,000 mg     atorvastatin 40 MG Tabs  Commonly known as: Lipitor   Take 1 Tablet by mouth every day.  Dose: 40 mg     B-12 PO   Take 2 Tablets by mouth every day.  Dose: 2 Tablet     Eliquis 5 MG Tabs  Generic drug: apixaban   Take 1 Tablet by mouth 2 times a day.  Dose: 5 mg     furosemide 40 MG Tabs  Commonly known as: Lasix   Take 1 Tablet by mouth every morning. Take an extra tablet daily as needed for shortness of breath or weight gain greater than 3 pounds.  Dose: 40 mg     levalbuterol 45 MCG/ACT inhaler  Commonly known as: Xopenex HFA   Inhale 1-2 Puffs every four hours as needed for Shortness of Breath.  Dose: 1-2 Puff     levothyroxine 150 MCG Tabs  Commonly known as: Synthroid   Take 1 Tablet by mouth every morning on an empty stomach.  Dose: 150 mcg     losartan 25 MG Tabs  Commonly known as: Cozaar   Take 1 Tablet by mouth every evening.  Dose: 25 mg     Melatonin 10 MG Tabs   Take 20 mg by mouth 1 time a day as needed (sleep).  Dose: 20 mg     metoprolol SR 50 MG Tb24  Commonly known as: Toprol XL   Take 1 Tablet by mouth every evening.  Dose: 50 mg     potassium chloride SA 20 MEQ Tbcr  Commonly known as: Kdur   Take 0.5 Tablets by mouth every day.  Dose: 10 mEq     SUDAFED PO   Take 1 Tablet by mouth 1 time a day as needed (cold/ allergies).  Dose: 1 Tablet     Testosterone 1.62 % Gel    Apply 1 Pump topically every day.  Dose: 1 Pump     triamcinolone acetonide 0.1 % Crea  Commonly known as: Kenalog   Apply 1 g topically 2 times a day for 14 days.  Dose: 1 g            ASK your doctor about these medications        Instructions   Breo Ellipta 100-25 MCG/ACT Aepb  Generic drug: fluticasone furoate-vilanterol   Inhale 1 Puff by mouth every day.  Dose: 1 Puff              Allergies  Allergies[1]    DIET  Orders Placed This Encounter   Procedures    Diet Order Diet: Regular     Standing Status:   Standing     Number of Occurrences:   1     Diet::   Regular [1]       ACTIVITY  As tolerated.  Weight bearing as tolerated    CONSULTATIONS  Orthopedic surgery    PROCEDURES  None    LABORATORY  Lab Results   Component Value Date    SODIUM 138 06/20/2025    POTASSIUM 5.2 06/20/2025    CHLORIDE 105 06/20/2025    CO2 23 06/20/2025    GLUCOSE 103 (H) 06/20/2025    BUN 14 06/20/2025    CREATININE 1.18 06/20/2025        Lab Results   Component Value Date    WBC 7.7 06/20/2025    HEMOGLOBIN 13.6 (L) 06/20/2025    HEMATOCRIT 40.6 (L) 06/20/2025    PLATELETCT 252 06/20/2025        Total time of the discharge process exceeds 36 minutes.         [1]   Allergies  Allergen Reactions    Penicillins Swelling     Swelling at injection site; reaction occurred at 25 yo    Viagra [Sildenafil] Unspecified     If taken 100mg

## 2025-06-20 NOTE — CARE PLAN
The patient is Stable - Low risk of patient condition declining or worsening    Shift Goals  Clinical Goals: CIWA, discharge  Patient Goals: discharge  Family Goals: benigno    Progress made toward(s) clinical / shift goals:      Patient is not progressing towards the following goals:      Problem: Pain - Standard  Goal: Alleviation of pain or a reduction in pain to the patient’s comfort goal  Outcome: Met     Problem: Knowledge Deficit - Standard  Goal: Patient and family/care givers will demonstrate understanding of plan of care, disease process/condition, diagnostic tests and medications  Outcome: Met     Problem: Optimal Care for Alcohol Withdrawal  Goal: Optimal Care for the alcohol withdrawal patient  Outcome: Met     Problem: Seizure Precautions  Goal: Implementation of seizure precautions  Outcome: Met     Problem: Lifestyle Changes  Goal: Patient's ability to identify lifestyle changes and available resources to help reduce recurrence of condition will improve  Outcome: Met     Problem: Psychosocial  Goal: Patient's level of anxiety will decrease  Outcome: Met  Goal: Spiritual and cultural needs incorporated into hospitalization  Outcome: Met     Problem: Risk for Aspiration  Goal: Patient's risk for aspiration will be absent or decrease  Outcome: Met     Problem: Fall Risk  Goal: Patient will remain free from falls  Outcome: Met

## 2025-06-20 NOTE — PROGRESS NOTES
IV and tele box removed. Monitor room notified. Pt to [facility].    Pt ok to drive self home, car in [facility] parking lot.

## 2025-06-23 ENCOUNTER — TELEPHONE (OUTPATIENT)
Dept: HEALTH INFORMATION MANAGEMENT | Facility: OTHER | Age: 70
End: 2025-06-23
Payer: MEDICARE

## 2025-06-23 ENCOUNTER — PATIENT OUTREACH (OUTPATIENT)
Dept: MEDICAL GROUP | Age: 70
End: 2025-06-23
Payer: MEDICARE

## 2025-06-23 NOTE — PROGRESS NOTES
Transitional Care Management  TCM Outreach Date and Time: Filed (6/23/2025 12:24 PM)    Discharge Questions  Actual Discharge Date: 06/20/25  Now that you are home, how are you feeling?: Good  Did you receive any new prescriptions?: Yes (Start: omeprazole)  Were you able to get them filled?: No  Reason prescriptions not filled?: Other (please specify) (No transportation)  Do you have any questions about your current medications or new medications (Review Med Rec)?: No  Did you have any durable medical equipment ordered?: No  Do you have a follow up appointment scheduled with your PCP?: Yes  Appointment Date: 07/03/25  Appointment Time: 1420  Any issues or paperwork you wish to discuss with your PCP?: Yes (Please specify) (Hospital Stay)  Are you (patient) able to get to the appointment?: Yes  If Home Health was ordered, have they contacted you (Patient): Not Applicable  Did you have enough support after your last discharge?: Yes  Does this patient qualify for the CCM program?: Yes ([facility] to [facility])    Transitional Care  Number of attempts made to contact patient: 1  Current or previous attempts completed within two business days of discharge? : Yes  Provided education regarding treatment plan, medications, self-management, ADLs?: Yes (ED precautions reviewed)  Has patient completed an Advanced Directive?: No  Is there anything else I can help you with?: No    Discharge Summary  Chief Complaint: Syncope and collapse  Admitting Diagnosis: Syncope and collapse  Discharge Diagnosis: Syncope and collapse      Pt denies other questions or concerns at this time.    Tiarra Serna R.N.

## 2025-06-26 ENCOUNTER — PHARMACY VISIT (OUTPATIENT)
Dept: PHARMACY | Facility: MEDICAL CENTER | Age: 70
End: 2025-06-26
Payer: COMMERCIAL

## 2025-06-26 PROCEDURE — RXMED WILLOW AMBULATORY MEDICATION CHARGE: Performed by: STUDENT IN AN ORGANIZED HEALTH CARE EDUCATION/TRAINING PROGRAM

## 2025-07-02 ENCOUNTER — TELEPHONE (OUTPATIENT)
Dept: HEALTH INFORMATION MANAGEMENT | Facility: OTHER | Age: 70
End: 2025-07-02
Payer: MEDICARE

## 2025-07-03 ENCOUNTER — OFFICE VISIT (OUTPATIENT)
Dept: MEDICAL GROUP | Age: 70
End: 2025-07-03
Payer: MEDICARE

## 2025-07-03 VITALS
HEIGHT: 76 IN | DIASTOLIC BLOOD PRESSURE: 64 MMHG | SYSTOLIC BLOOD PRESSURE: 102 MMHG | HEART RATE: 70 BPM | OXYGEN SATURATION: 91 % | RESPIRATION RATE: 20 BRPM | TEMPERATURE: 97.6 F | WEIGHT: 249 LBS | BODY MASS INDEX: 30.32 KG/M2

## 2025-07-03 DIAGNOSIS — R35.0 FREQUENT URINATION: ICD-10-CM

## 2025-07-03 DIAGNOSIS — R35.1 NOCTURIA: ICD-10-CM

## 2025-07-03 DIAGNOSIS — Z09 HOSPITAL DISCHARGE FOLLOW-UP: Primary | ICD-10-CM

## 2025-07-03 ASSESSMENT — FIBROSIS 4 INDEX: FIB4 SCORE: 2.94

## 2025-07-03 ASSESSMENT — ENCOUNTER SYMPTOMS: SHORTNESS OF BREATH: 0

## 2025-07-03 NOTE — PROGRESS NOTES
CC:   Chief Complaint   Patient presents with    Transitional Care Management Hospital Follow-up     The primary encounter diagnosis was Hospital discharge follow-up. Diagnoses of Frequent urination and Nocturia were also pertinent to this visit.  Verbal consent was acquired by the patient to use [employer] ambient listening note generation during this visit Yes     Transitional Care Management  TCM Outreach Date and Time: Filed (6/23/2025 12:24 PM)     Discharge Questions  Actual Discharge Date: 06/20/25  Now that you are home, how are you feeling?: Good  Did you receive any new prescriptions?: Yes (Start: omeprazole)  Were you able to get them filled?: No  Reason prescriptions not filled?: Other (please specify) (No transportation)  Do you have any questions about your current medications or new medications (Review Med Rec)?: No  Did you have any durable medical equipment ordered?: No  Do you have a follow up appointment scheduled with your PCP?: Yes  Appointment Date: 07/03/25  Appointment Time: 1420  Any issues or paperwork you wish to discuss with your PCP?: Yes (Please specify) (Hospital Stay)  Are you (patient) able to get to the appointment?: Yes  If Home Health was ordered, have they contacted you (Patient): Not Applicable  Did you have enough support after your last discharge?: Yes  Does this patient qualify for the CCM program?: Yes ([facility] to [facility])     Transitional Care  Number of attempts made to contact patient: 1  Current or previous attempts completed within two business days of discharge? : Yes  Provided education regarding treatment plan, medications, self-management, ADLs?: Yes (ED precautions reviewed)  Has patient completed an Advanced Directive?: No  Is there anything else I can help you with?: No     Discharge Summary  Chief Complaint: Syncope and collapse  Admitting Diagnosis: Syncope and collapse  Discharge Diagnosis: Syncope and collapse     Pt denies other questions or concerns at  this time.  History of Present Illness  Alberto is a pleasant 70 y.o.male with a history of heart failure, hyperlipidemia, asthma, and adrenal insufficiency. He is presenting for a hospital follow-up. He was admitted on 06/18/2025 and discharged on 06/20/2025 due to syncope.    He experienced a syncopal episode 5 days prior to his hospital admission. During this episode, he felt lightheaded while in the bathroom, lost consciousness, and struck his head. He also fell to the ground, resulting in left ankle pain. Although he was aware of the fall and regained consciousness, he believes he may have been briefly unconscious. He has been feeling short of breath on exertion, reporting low energy, and feeling anxious. He had an echocardiogram in the ER, which showed an ejection fraction of 60% with no abnormalities.  A CT scan of his head showed no acute findings. He had a fracture of the left ankle.     He reports feeling significantly better since his hospital discharge. However, he forgot his medication this morning but plans to take it this afternoon. He experiences occasional lightheadedness, similar to his 69-year-old brother. He consumes Gatorade and fruit drinks but not pure water. He recalls being advised by his cardiologist to avoid Gatorade due to its salt content.     He recounts an incident where he felt lightheaded around 2:00 AM while using the restroom, grabbed the towel rack for support, and fell backward, hitting his head and causing it to bleed. He also broke his ankle during this fall. He has not been wearing the prescribed boot due to difficulty climbing stairs to his second-floor residence.     He consumes alcohol, primarily vodka or rum mixed with fruit drinks, totaling about 5 or 6 drinks per day. He has attempted to reduce his alcohol intake since his hospital discharge and does not consume alcohol in the morning before driving. He reports not feeling intoxicated today.    He expresses interest in  "undergoing a colonoscopy and requests a referral for the same.    He reports frequent urination, often producing only a small amount each time. He suspects an enlarged prostate and requests a referral to a urologist. He does not report any urinary incontinence but does report waking up approximately 3 times per night to urinate. He has no history of prostate issues or cancer.    FAMILY HISTORY  The patient's father had prostate cancer at the age of 55.    Past Medical History[1]    Current Medications and Prescriptions Ordered in Epic[2]    Review of Systems   Respiratory:  Negative for shortness of breath.    Cardiovascular:  Negative for chest pain.   All other systems reviewed and are negative.    Objective:     Exam:  /64 (BP Location: Right arm, Patient Position: Sitting, BP Cuff Size: Large adult)   Pulse 70   Temp 36.4 °C (97.6 °F) (Temporal)   Resp 20   Ht 1.93 m (6' 4\")   Wt 113 kg (249 lb)   SpO2 91%   BMI 30.31 kg/m²  Body mass index is 30.31 kg/m².    Physical Exam  Constitutional:       Appearance: Normal appearance.   HENT:      Head: Normocephalic and atraumatic.   Cardiovascular:      Pulses: Normal pulses.      Heart sounds: Normal heart sounds. No murmur heard.  Pulmonary:      Effort: Pulmonary effort is normal. No respiratory distress.   Neurological:      General: No focal deficit present.      Mental Status: He is alert and oriented to person, place, and time.   Psychiatric:         Mood and Affect: Mood normal.         Behavior: Behavior normal.         Thought Content: Thought content normal.         Results:    6/20/2025      Result Text  Transthoracic  Echo Report        Echocardiography Laboratory     CONCLUSIONS  Normal left ventricular size, thickness, systolic function, and   diastolic function.  Normal right ventricular size and systolic function.  Normal left atrial size.  No pericardial effusion.  Compared to the prior study on 12/29/24, similar findings.  Results   " Latest Reference Range & Units 06/19/25 06:45 06/19/25 12:27 06/20/25 01:32   WBC 4.8 - 10.8 K/uL 7.6  7.7   RBC 4.70 - 6.10 M/uL 4.25 (L)  4.13 (L)   Hemoglobin 14.0 - 18.0 g/dL 14.1 15.2 13.6 (L)   Hematocrit 42.0 - 52.0 % 41.8 (L) 45.4 40.6 (L)   MCV 81.4 - 97.8 fL 98.4 (H)  98.3 (H)   MCH 27.0 - 33.0 pg 33.2 (H)  32.9   MCHC 32.3 - 36.5 g/dL 33.7  33.5   RDW 35.9 - 50.0 fL 50.3 (H)  49.2   Platelet Count 164 - 446 K/uL 255  252   MPV 9.0 - 12.9 fL 10.0  9.3   Sodium 135 - 145 mmol/L 138  138   Potassium 3.6 - 5.5 mmol/L 4.0  5.2   Chloride 96 - 112 mmol/L 102  105   Co2 20 - 33 mmol/L 23  23   Anion Gap 7.0 - 16.0  13.0  10.0   Glucose 65 - 99 mg/dL 102 (H)  103 (H)   Bun 8 - 22 mg/dL 14  14   Creatinine 0.50 - 1.40 mg/dL 1.08  1.18   GFR (CKD-EPI) >60 mL/min/1.73 m 2 73  66   Calcium 8.5 - 10.5 mg/dL 8.9  8.6   Correct Calcium 8.5 - 10.5 mg/dL   9.1   Albumin 3.2 - 4.9 g/dL   3.4   Phosphorus 2.5 - 4.5 mg/dL 2.6  2.5   Magnesium 1.5 - 2.5 mg/dL 2.3     Troponin T 6 - 19 ng/L 10     Cortisol 0.0 - 23.0 ug/dL 16.3     Vitamin D-1, 25-Dihydroxy 19.9 - 79.3 pg/mL 21.7     TSH 0.380 - 5.330 uIU/mL 0.265 (L)     Free T-4 0.93 - 1.70 ng/dL 1.16     (L): Data is abnormally low  (H): Data is abnormally high    Assessment & Plan:   Alberto  is a pleasant 70 y.o. male with the following -     Assessment & Plan  1. Hospital discharge follow-up.  He was admitted on 06/18/2025 and discharged on 06/20/2025 due to syncope. He presented for evaluation of a syncopal episode 5 days prior to arrival, where he felt very lightheaded and passed out in the bathroom, striking his head and experiencing left ankle pain since the incident. He was aware of the fall and was conscious but thought he might have been briefly knocked out.  An echocardiogram in the ER showed an ejection fraction of 60% with no abnormalities. CBC showed mild anemia with hemoglobin of 13.6. TSH was slightly abnormal. CT head showed no acute findings. He had a  fracture of the left ankle, a mildly displaced fracture of the distal fibula.    2. Nocturia.  He reports frequent urination at night, approximately three times per night, and frequent urination during the day with a weak stream. He suspects an enlarged prostate. A referral to urology has been placed for further evaluation. He is advised to call and schedule his appointment once he receives a message on [employer] in about 2 weeks.    3. Left ankle fracture.  He has a mildly displaced fracture of the distal fibula. He is advised to wear the brace daily to ensure proper healing and prevent future complications.    4. Alcohol use disorder.  He reports drinking about five or six drinks a day, primarily vodka or rum mixed with fruit drinks. He is advised to cut back on alcohol consumption.     5. Colorectal cancer screening.  A referral for a colonoscopy was placed by Dr. Prajapati in 04/2025. He is advised to call and schedule his appointment with [facility].     - Chart and discharge summary were reviewed.   - Hospitalization and results reviewed with patient.   - Medications reviewed including instructions regarding high risk medications, dosing and side effects.  - Recommended Services: No services needed at this time  - Advance directive/POLST on file?  Yes    Face-to-face transitional care management services with MODERATE (today's visit is within 14 days post discharge & LACE+ score of 28-58) medical decision complexity were provided.     LACE+ Historical Score Over Time (0-28: Low, 29-58: Medium, 59+: High): 76    Problem List Items Addressed This Visit    None  Visit Diagnoses         Hospital discharge follow-up    -  Primary      Frequent urination        Relevant Orders    Referral to Urology      Nocturia        Relevant Orders    Referral to Urology          Follow up With PCP  08/04/2025     Please note that this dictation was created using voice recognition software. I have made every  reasonable attempt to correct obvious errors, but I expect that there are errors of grammar and possibly content that I did not discover before finalizing the note.       [1]   Past Medical History:  Diagnosis Date    Asthma     Cardiomyopathy (HCC) EF <20% 1/30/2023    Hyperlipidemia     Left ventricular thrombus 1/30/2023    Pituitary disease (HCC)     Thyroid disease    [2]   Current Outpatient Medications Ordered in Epic   Medication Sig Dispense Refill    omeprazole (PRILOSEC) 20 MG delayed-release capsule Take 1 Capsule by mouth 2 times a day for 14 days, THEN 1 Capsule every day for 16 days. 44 Capsule 0    Testosterone 1.62 % Gel Apply 1 Pump topically every day.      Melatonin 10 MG Tab Take 20 mg by mouth 1 time a day as needed (sleep).      Pseudoephedrine HCl (SUDAFED PO) Take 1 Tablet by mouth 1 time a day as needed (cold/ allergies).      Cyanocobalamin (B-12 PO) Take 2 Tablets by mouth every day.      potassium chloride SA (KDUR) 20 MEQ Tab CR Take 0.5 Tablets by mouth every day. (Patient taking differently: Take 10 mEq by mouth every day. 1/2 tablet= 10meq) 45 Tablet 3    losartan (COZAAR) 25 MG Tab Take 1 Tablet by mouth every evening. 100 Tablet 3    atorvastatin (LIPITOR) 40 MG Tab Take 1 Tablet by mouth every day. 100 Tablet 3    hydrocortisone (CORTEF) 5 MG Tab Take 2 pills in the morning and 1 pill in the afternoon (Patient taking differently: Take 5-10 mg by mouth every day. Take 2 pills in the morning and 1 pill in the afternoon) 300 Tablet 3    furosemide (LASIX) 40 MG Tab Take 1 Tablet by mouth every morning. Take an extra tablet daily as needed for shortness of breath or weight gain greater than 3 pounds. 100 Tablet 3    ELIQUIS 5 MG Tab Take 1 Tablet by mouth 2 times a day. 200 Tablet 3    fluticasone furoate-vilanterol (BREO ELLIPTA) 100-25 MCG/ACT AEROSOL POWDER, BREATH ACTIVATED Inhale 1 Puff by mouth every day. 60 Each 2    levalbuterol (XOPENEX HFA) 45 MCG/ACT inhaler Inhale 1-2  Puffs every four hours as needed for Shortness of Breath. 15 g 3    metoprolol SR (TOPROL XL) 50 MG TABLET SR 24 HR Take 1 Tablet by mouth every evening. 30 Tablet 3    acetaminophen (TYLENOL) 500 MG Tab Take 1,000 mg by mouth every 6 hours as needed for Mild Pain, Moderate Pain or Fever.      levothyroxine (SYNTHROID) 150 MCG Tab Take 1 Tablet by mouth every morning on an empty stomach. 90 Tablet 3     No current Epic-ordered facility-administered medications on file.

## 2025-07-03 NOTE — PATIENT INSTRUCTIONS
Referral information sent to the following:  Gastroenterology     [facility]  [address] [facility] [address]  Phone: [phone]     Patient Care Coordination notes:  Referral faxed.

## 2025-07-10 ENCOUNTER — TELEPHONE (OUTPATIENT)
Dept: CARDIOLOGY | Facility: MEDICAL CENTER | Age: 70
End: 2025-07-10
Payer: MEDICARE

## 2025-07-10 NOTE — LETTER
PROCEDURE/SURGERY CLEARANCE FORM    Date: 7/14/2025   Patient Name: Alberto Galicia    Dear Surgeon or Proceduralist,     The above patient is cleared to have the following procedure/surgery: colonoscopy with deep propofol sedation        Thank you for your request for cardiac stratification of our mutual patient Alberto Galicia 1955. We have reviewed their [facility] records; and to the best of our understanding this patient has not had stenting, ablation, watchman, cardiothoracic surgery or hospitalization for cardiovascular reasons in the past 6 months.  Alberto Galicia has been seen within the past 15 months and is considered to have non-modifiable cardiac risk for this low-risk procedure/surgery. They may proceed from a cardiovascular standpoint and may hold their antiplatelet/anticoagulation as briefly as possible. Please have patient resume this medication when hemodynamically stable to do so.     Aspirin or Prasugrel   - hold 7 days prior to procedure/surgery, resume when hemodynamically stable      Clopidrogrel or Ticagrelor  - hold 7 days for all neurological procedures, hold 5 days prior to all other procedure/surgery,  resume when hemodynamically stable     Warfarin - hold 7 days for all neurological procedures, hold 5 days prior to all other procedure/surgery and coordinate with [facility] ([phone]) INR testing and dose management.      Pradaxa/Xarelto/Eliquis/Savesya - hold 1 day prior to procedure for low bleeding risk procedure, 2 days for high bleeding risk procedure, or consider holding 3 days or longer for patients with reduced kidney function (CrCl <30mL/min) or spinal/cranial surgeries/procedures.      If they have a mechanical heart valve, please coordinate with [facility] ([phone]) the proper management of their anticoagulant in the periprocedural or perioperative period.      Some patients have higher risk for cardiovascular complications or  holding medication. If our patient has had prior complications of holding antiplatelet or anticoagulants in the past and we have seen them after these events, we have addressed these concerns with the patient. They are at an unknown degree of increased risk for recurrent complication.  You may hold anticoagulation/antiplatelets for the procedure or surgery if the benefits of the procedure or surgery outweigh this nonmodifiable risk.      If Alberto Galicia 1955 has new symptoms of heart failure decompensation, unstable arrythmia, or angina please reach out and we will assess the patient.      If you have other patient-specific concerns, please feel free to reach out to the patient's cardiologist directly at [phone].     Thank you,   [facility]    __ _Electronically signed________  Provider: ARAMIS Matute

## 2025-07-10 NOTE — Clinical Note
REFERRAL APPROVAL NOTICE         Sent on July 10, 2025                   Alberto Galicia  [address]  [place] [address]                   Dear Mr. Galicia,    After a careful review of the medical information and benefit coverage, [facility] has processed your referral. See below for additional details.    If applicable, you must be actively enrolled with your insurance for coverage of the authorized service. If you have any questions regarding your coverage, please contact your insurance directly.    REFERRAL INFORMATION   Referral #:  [id number]  Referred-To Department    Referred-By Provider:  Urology    Fatemeh Siddiqui M.D.   [facility]rology      [address] [place] [address]  [phone] [address]  [place] [id number] [phone]    Referral Start Date:  07/03/2025  Referral End Date:   07/03/2026             SCHEDULING  If you do not already have an appointment, please call [phone] to make an appointment.     MORE INFORMATION  If you do not already have a [employer] account, sign up at: [email or web address]  You can access your medical information, make appointments, see lab results, billing information, and more.  If you have questions regarding this referral, please contact  the [facility] Referrals department at:             [phone]. Monday - Friday 8:00AM - 5:00PM.     Sincerely,    [facility]

## 2025-07-11 NOTE — TELEPHONE ENCOUNTER
Last OV: 5/21/2025  Proposed Surgery: colonoscopy with deep propofol sedation   Surgery Date: 8/8/2025  Requesting Office Name: GI  Fax Number: [phone]  Preference of Location (default is surgery center unless specified by Cardiologist or JOANA)  Prior Clearance Addressed: No    Is this a general clearance? YES   Anticoags/Antiplatelets: Apixaban   Anticoags/Antiplatelet managed by Cardiology? YES    Outstanding Cardiac Imaging : No  Ablation, Cardioversion, Stent, Cardiac Devices, Catheterization, Watchman: Yes  Date : 1/29/2023   TAVR/Valve, Mitral Clip, Watchman (including open heart),: N/A   Recent Cardiac Hospitalization: Yes  Date:  6/18/2025            When: Hospitalized in the last 3 months. Forward to provider to review.   History (cardiac history): Past Medical History[1]        Is this a dental clearance? NO  Ablation, Cardioversion, Watchman, Stents, Cath, Devices within the last 3 months? No   If yes- Send dental wait letter, do not forward to provider for review.     TAVR / Valve, Mitral clip within the last 6 months? No  If yes- Send dental wait letter, do not forward to provider for review.     If completing a general clearance, continue per protocol.           Surgical Clearance Letter Sent: No Provider to advise.   **Scan clearance request letter into [employer].**        [1]   Past Medical History:  Diagnosis Date    Asthma     Cardiomyopathy (HCC) EF <20% 1/30/2023    Hyperlipidemia     Left ventricular thrombus 1/30/2023    Pituitary disease (HCC)     Thyroid disease

## 2025-07-15 ENCOUNTER — TELEPHONE (OUTPATIENT)
Dept: CARDIOLOGY | Facility: MEDICAL CENTER | Age: 70
End: 2025-07-15
Payer: MEDICARE

## 2025-07-15 DIAGNOSIS — I50.20 HEART FAILURE WITH REDUCED EJECTION FRACTION (HCC): ICD-10-CM

## 2025-07-15 PROCEDURE — RXMED WILLOW AMBULATORY MEDICATION CHARGE: Performed by: NURSE PRACTITIONER

## 2025-07-15 RX ORDER — METOPROLOL SUCCINATE 50 MG/1
50 TABLET, EXTENDED RELEASE ORAL EVERY EVENING
Qty: 90 TABLET | Refills: 3 | Status: SHIPPED | OUTPATIENT
Start: 2025-07-15

## 2025-07-15 NOTE — TELEPHONE ENCOUNTER
ZHOU    Received request via: Patient    Was the patient seen in the last year in this department? Yes    Does the patient have an active prescription (recently filled or refills available) for medication(s) requested? No    Pharmacy Name: [facility]    Does the patient have shelter Plus and need 100-day supply? (This applies to ALL medications) Patient does not have SCP     metoprolol SR (TOPROL XL) 50 MG TABLET SR 24 HR   *Patient is out of the medication*    Thank you,  Jackeline BYERS

## 2025-07-17 ENCOUNTER — PHARMACY VISIT (OUTPATIENT)
Dept: PHARMACY | Facility: MEDICAL CENTER | Age: 70
End: 2025-07-17
Payer: COMMERCIAL

## 2025-08-04 ENCOUNTER — APPOINTMENT (OUTPATIENT)
Dept: MEDICAL GROUP | Age: 70
End: 2025-08-04
Payer: MEDICARE

## 2025-08-09 ENCOUNTER — HOSPITAL ENCOUNTER (OUTPATIENT)
Dept: LAB | Facility: MEDICAL CENTER | Age: 70
End: 2025-08-09
Attending: STUDENT IN AN ORGANIZED HEALTH CARE EDUCATION/TRAINING PROGRAM
Payer: MEDICARE

## 2025-08-09 DIAGNOSIS — E78.5 HYPERLIPIDEMIA, UNSPECIFIED HYPERLIPIDEMIA TYPE: ICD-10-CM

## 2025-08-09 DIAGNOSIS — R73.01 IMPAIRED FASTING BLOOD SUGAR: ICD-10-CM

## 2025-08-09 LAB
ALBUMIN SERPL BCP-MCNC: 3.9 G/DL (ref 3.2–4.9)
ALBUMIN/GLOB SERPL: 1.1 G/DL
ALP SERPL-CCNC: 85 U/L (ref 30–99)
ALT SERPL-CCNC: 32 U/L (ref 2–50)
ANION GAP SERPL CALC-SCNC: 15 MMOL/L (ref 7–16)
AST SERPL-CCNC: 47 U/L (ref 12–45)
BILIRUB SERPL-MCNC: 0.6 MG/DL (ref 0.1–1.5)
BUN SERPL-MCNC: 12 MG/DL (ref 8–22)
CALCIUM ALBUM COR SERPL-MCNC: 9.2 MG/DL (ref 8.5–10.5)
CALCIUM SERPL-MCNC: 9.1 MG/DL (ref 8.5–10.5)
CHLORIDE SERPL-SCNC: 98 MMOL/L (ref 96–112)
CHOLEST SERPL-MCNC: 174 MG/DL (ref 100–199)
CO2 SERPL-SCNC: 24 MMOL/L (ref 20–33)
CREAT SERPL-MCNC: 1.25 MG/DL (ref 0.5–1.4)
EST. AVERAGE GLUCOSE BLD GHB EST-MCNC: 123 MG/DL
GFR SERPLBLD CREATININE-BSD FMLA CKD-EPI: 61 ML/MIN/1.73 M 2
GLOBULIN SER CALC-MCNC: 3.7 G/DL (ref 1.9–3.5)
GLUCOSE SERPL-MCNC: 102 MG/DL (ref 65–99)
HBA1C MFR BLD: 5.9 % (ref 4–5.6)
HDLC SERPL-MCNC: 41 MG/DL
LDLC SERPL CALC-MCNC: 88 MG/DL
POTASSIUM SERPL-SCNC: 3.8 MMOL/L (ref 3.6–5.5)
PROT SERPL-MCNC: 7.6 G/DL (ref 6–8.2)
SODIUM SERPL-SCNC: 137 MMOL/L (ref 135–145)
TRIGL SERPL-MCNC: 227 MG/DL (ref 0–149)

## 2025-08-09 PROCEDURE — 36415 COLL VENOUS BLD VENIPUNCTURE: CPT

## 2025-08-09 PROCEDURE — 80061 LIPID PANEL: CPT

## 2025-08-09 PROCEDURE — 83036 HEMOGLOBIN GLYCOSYLATED A1C: CPT

## 2025-08-09 PROCEDURE — 80053 COMPREHEN METABOLIC PANEL: CPT

## 2025-08-11 ENCOUNTER — OFFICE VISIT (OUTPATIENT)
Dept: MEDICAL GROUP | Age: 70
End: 2025-08-11
Payer: MEDICARE

## 2025-08-11 VITALS
DIASTOLIC BLOOD PRESSURE: 55 MMHG | HEART RATE: 72 BPM | BODY MASS INDEX: 31.08 KG/M2 | OXYGEN SATURATION: 92 % | WEIGHT: 255.2 LBS | TEMPERATURE: 97 F | SYSTOLIC BLOOD PRESSURE: 90 MMHG | HEIGHT: 76 IN

## 2025-08-11 DIAGNOSIS — I48.0 PAROXYSMAL A-FIB (HCC): ICD-10-CM

## 2025-08-11 DIAGNOSIS — N40.1 BENIGN PROSTATIC HYPERPLASIA WITH INCOMPLETE BLADDER EMPTYING: Primary | ICD-10-CM

## 2025-08-11 DIAGNOSIS — R39.14 BENIGN PROSTATIC HYPERPLASIA WITH INCOMPLETE BLADDER EMPTYING: Primary | ICD-10-CM

## 2025-08-11 DIAGNOSIS — I42.6 ALCOHOLIC CARDIOMYOPATHY (HCC): ICD-10-CM

## 2025-08-11 DIAGNOSIS — F10.288 ALCOHOL DEPENDENCE WITH OTHER ALCOHOL-INDUCED DISORDER (HCC): ICD-10-CM

## 2025-08-11 DIAGNOSIS — I50.22 CHRONIC SYSTOLIC CHF (CONGESTIVE HEART FAILURE) (HCC): ICD-10-CM

## 2025-08-11 DIAGNOSIS — I10 HYPERTENSION, UNSPECIFIED TYPE: ICD-10-CM

## 2025-08-11 DIAGNOSIS — G47.00 INSOMNIA, UNSPECIFIED TYPE: ICD-10-CM

## 2025-08-11 PROCEDURE — 99214 OFFICE O/P EST MOD 30 MIN: CPT | Mod: 25 | Performed by: STUDENT IN AN ORGANIZED HEALTH CARE EDUCATION/TRAINING PROGRAM

## 2025-08-11 PROCEDURE — G2211 COMPLEX E/M VISIT ADD ON: HCPCS | Performed by: STUDENT IN AN ORGANIZED HEALTH CARE EDUCATION/TRAINING PROGRAM

## 2025-08-11 PROCEDURE — 3078F DIAST BP <80 MM HG: CPT | Performed by: STUDENT IN AN ORGANIZED HEALTH CARE EDUCATION/TRAINING PROGRAM

## 2025-08-11 PROCEDURE — 3074F SYST BP LT 130 MM HG: CPT | Performed by: STUDENT IN AN ORGANIZED HEALTH CARE EDUCATION/TRAINING PROGRAM

## 2025-08-11 PROCEDURE — G0396 ALCOHOL/SUBS INTERV 15-30MN: HCPCS | Performed by: STUDENT IN AN ORGANIZED HEALTH CARE EDUCATION/TRAINING PROGRAM

## 2025-08-11 RX ORDER — TRIAMCINOLONE ACETONIDE 1 MG/G
CREAM TOPICAL
COMMUNITY

## 2025-08-11 RX ORDER — TRAZODONE HYDROCHLORIDE 50 MG/1
50 TABLET ORAL NIGHTLY
Qty: 30 TABLET | Refills: 3 | Status: SHIPPED | OUTPATIENT
Start: 2025-08-11

## 2025-08-11 ASSESSMENT — ENCOUNTER SYMPTOMS
WHEEZING: 0
DEPRESSION: 0
DIZZINESS: 0
SHORTNESS OF BREATH: 0
FEVER: 0
COUGH: 0
DOUBLE VISION: 0
BRUISES/BLEEDS EASILY: 0
ABDOMINAL PAIN: 0
WEAKNESS: 0
PALPITATIONS: 0
PHOTOPHOBIA: 0
HEADACHES: 0
BACK PAIN: 0
CHILLS: 0
BLOOD IN STOOL: 0
ORTHOPNEA: 0
BLURRED VISION: 0

## 2025-08-11 ASSESSMENT — LIFESTYLE VARIABLES: SUBSTANCE_ABUSE: 0

## 2025-08-11 ASSESSMENT — FIBROSIS 4 INDEX: FIB4 SCORE: 2.34

## 2025-08-14 ENCOUNTER — APPOINTMENT (OUTPATIENT)
Dept: RADIOLOGY | Facility: MEDICAL CENTER | Age: 70
End: 2025-08-14
Attending: EMERGENCY MEDICINE
Payer: MEDICARE

## 2025-08-14 ENCOUNTER — HOSPITAL ENCOUNTER (EMERGENCY)
Facility: MEDICAL CENTER | Age: 70
End: 2025-08-14
Attending: EMERGENCY MEDICINE
Payer: MEDICARE

## 2025-08-14 VITALS
TEMPERATURE: 97.4 F | BODY MASS INDEX: 31.01 KG/M2 | DIASTOLIC BLOOD PRESSURE: 71 MMHG | HEIGHT: 76 IN | HEART RATE: 74 BPM | SYSTOLIC BLOOD PRESSURE: 118 MMHG | WEIGHT: 254.63 LBS | OXYGEN SATURATION: 94 % | RESPIRATION RATE: 16 BRPM

## 2025-08-14 DIAGNOSIS — R12 HEARTBURN: ICD-10-CM

## 2025-08-14 DIAGNOSIS — G47.00 INSOMNIA, UNSPECIFIED TYPE: Primary | ICD-10-CM

## 2025-08-14 DIAGNOSIS — R53.83 OTHER FATIGUE: ICD-10-CM

## 2025-08-14 LAB
ALBUMIN SERPL BCP-MCNC: 3.8 G/DL (ref 3.2–4.9)
ALBUMIN/GLOB SERPL: 1 G/DL
ALP SERPL-CCNC: 88 U/L (ref 30–99)
ALT SERPL-CCNC: 33 U/L (ref 2–50)
ANION GAP SERPL CALC-SCNC: 16 MMOL/L (ref 7–16)
AST SERPL-CCNC: 69 U/L (ref 12–45)
BASOPHILS # BLD AUTO: 0.6 % (ref 0–1.8)
BASOPHILS # BLD: 0.04 K/UL (ref 0–0.12)
BILIRUB SERPL-MCNC: 0.9 MG/DL (ref 0.1–1.5)
BUN SERPL-MCNC: 13 MG/DL (ref 8–22)
CALCIUM ALBUM COR SERPL-MCNC: 10.7 MG/DL (ref 8.5–10.5)
CALCIUM SERPL-MCNC: 10.5 MG/DL (ref 8.5–10.5)
CHLORIDE SERPL-SCNC: 97 MMOL/L (ref 96–112)
CO2 SERPL-SCNC: 26 MMOL/L (ref 20–33)
CREAT SERPL-MCNC: 1.48 MG/DL (ref 0.5–1.4)
EKG IMPRESSION: NORMAL
EOSINOPHIL # BLD AUTO: 0.47 K/UL (ref 0–0.51)
EOSINOPHIL NFR BLD: 6.7 % (ref 0–6.9)
ERYTHROCYTE [DISTWIDTH] IN BLOOD BY AUTOMATED COUNT: 47.4 FL (ref 35.9–50)
GFR SERPLBLD CREATININE-BSD FMLA CKD-EPI: 50 ML/MIN/1.73 M 2
GLOBULIN SER CALC-MCNC: 3.8 G/DL (ref 1.9–3.5)
GLUCOSE SERPL-MCNC: 135 MG/DL (ref 65–99)
HCT VFR BLD AUTO: 48.8 % (ref 42–52)
HGB BLD-MCNC: 16.6 G/DL (ref 14–18)
IMM GRANULOCYTES # BLD AUTO: 0.02 K/UL (ref 0–0.11)
IMM GRANULOCYTES NFR BLD AUTO: 0.3 % (ref 0–0.9)
LIPASE SERPL-CCNC: 51 U/L (ref 11–82)
LYMPHOCYTES # BLD AUTO: 1.78 K/UL (ref 1–4.8)
LYMPHOCYTES NFR BLD: 25.5 % (ref 22–41)
MCH RBC QN AUTO: 32 PG (ref 27–33)
MCHC RBC AUTO-ENTMCNC: 34 G/DL (ref 32.3–36.5)
MCV RBC AUTO: 94.2 FL (ref 81.4–97.8)
MONOCYTES # BLD AUTO: 0.67 K/UL (ref 0–0.85)
MONOCYTES NFR BLD AUTO: 9.6 % (ref 0–13.4)
NEUTROPHILS # BLD AUTO: 4.01 K/UL (ref 1.82–7.42)
NEUTROPHILS NFR BLD: 57.3 % (ref 44–72)
NRBC # BLD AUTO: 0 K/UL
NRBC BLD-RTO: 0 /100 WBC (ref 0–0.2)
PLATELET # BLD AUTO: 267 K/UL (ref 164–446)
PMV BLD AUTO: 9.7 FL (ref 9–12.9)
POTASSIUM SERPL-SCNC: 3.9 MMOL/L (ref 3.6–5.5)
PROT SERPL-MCNC: 7.6 G/DL (ref 6–8.2)
RBC # BLD AUTO: 5.18 M/UL (ref 4.7–6.1)
SODIUM SERPL-SCNC: 139 MMOL/L (ref 135–145)
TROPONIN T SERPL-MCNC: 16 NG/L (ref 6–19)
WBC # BLD AUTO: 7 K/UL (ref 4.8–10.8)

## 2025-08-14 PROCEDURE — 84484 ASSAY OF TROPONIN QUANT: CPT

## 2025-08-14 PROCEDURE — 99285 EMERGENCY DEPT VISIT HI MDM: CPT

## 2025-08-14 PROCEDURE — 71045 X-RAY EXAM CHEST 1 VIEW: CPT

## 2025-08-14 PROCEDURE — 80053 COMPREHEN METABOLIC PANEL: CPT

## 2025-08-14 PROCEDURE — 85025 COMPLETE CBC W/AUTO DIFF WBC: CPT

## 2025-08-14 PROCEDURE — 93005 ELECTROCARDIOGRAM TRACING: CPT | Mod: TC | Performed by: EMERGENCY MEDICINE

## 2025-08-14 PROCEDURE — 36415 COLL VENOUS BLD VENIPUNCTURE: CPT

## 2025-08-14 PROCEDURE — 93005 ELECTROCARDIOGRAM TRACING: CPT | Mod: TC

## 2025-08-14 PROCEDURE — 83690 ASSAY OF LIPASE: CPT

## 2025-08-14 RX ORDER — OMEPRAZOLE 20 MG/1
20 CAPSULE, DELAYED RELEASE ORAL DAILY
Qty: 14 CAPSULE | Refills: 0 | Status: SHIPPED | OUTPATIENT
Start: 2025-08-14 | End: 2025-08-28

## 2025-08-14 ASSESSMENT — HEART SCORE
TROPONIN: LESS THAN OR EQUAL TO NORMAL LIMIT
HEART SCORE: 3
ECG: NORMAL
RISK FACTORS: 1-2 RISK FACTORS
HISTORY: SLIGHTLY SUSPICIOUS
AGE: 65+

## 2025-08-14 ASSESSMENT — FIBROSIS 4 INDEX: FIB4 SCORE: 2.34

## 2025-08-23 DIAGNOSIS — J98.01 ACUTE BRONCHOSPASM: ICD-10-CM

## 2025-08-26 RX ORDER — LEVALBUTEROL TARTRATE 45 UG/1
1-2 AEROSOL, METERED ORAL EVERY 4 HOURS PRN
Qty: 15 G | Refills: 0 | Status: SHIPPED | OUTPATIENT
Start: 2025-08-26